# Patient Record
Sex: FEMALE | Race: WHITE | NOT HISPANIC OR LATINO | URBAN - METROPOLITAN AREA
[De-identification: names, ages, dates, MRNs, and addresses within clinical notes are randomized per-mention and may not be internally consistent; named-entity substitution may affect disease eponyms.]

---

## 2017-06-19 ENCOUNTER — INPATIENT (INPATIENT)
Facility: HOSPITAL | Age: 82
LOS: 2 days | Discharge: NURSING, REHAB OR LONG-TERM CARE | End: 2017-06-22
Attending: INTERNAL MEDICINE | Admitting: INTERNAL MEDICINE

## 2017-06-19 DIAGNOSIS — I10 ESSENTIAL (PRIMARY) HYPERTENSION: ICD-10-CM

## 2017-06-19 DIAGNOSIS — B34.9 VIRAL INFECTION, UNSPECIFIED: ICD-10-CM

## 2017-06-19 DIAGNOSIS — R42 DIZZINESS AND GIDDINESS: ICD-10-CM

## 2017-06-19 DIAGNOSIS — I42.9 CARDIOMYOPATHY, UNSPECIFIED: ICD-10-CM

## 2017-06-19 DIAGNOSIS — N20.0 CALCULUS OF KIDNEY: ICD-10-CM

## 2017-06-19 DIAGNOSIS — I48.91 UNSPECIFIED ATRIAL FIBRILLATION: ICD-10-CM

## 2017-06-19 DIAGNOSIS — E78.5 HYPERLIPIDEMIA, UNSPECIFIED: ICD-10-CM

## 2017-06-19 DIAGNOSIS — R19.7 DIARRHEA, UNSPECIFIED: ICD-10-CM

## 2017-06-19 DIAGNOSIS — J47.9 BRONCHIECTASIS, UNCOMPLICATED: ICD-10-CM

## 2017-06-19 DIAGNOSIS — K51.90 ULCERATIVE COLITIS, UNSPECIFIED, WITHOUT COMPLICATIONS: ICD-10-CM

## 2017-06-19 DIAGNOSIS — I25.10 ATHEROSCLEROTIC HEART DISEASE OF NATIVE CORONARY ARTERY WITHOUT ANGINA PECTORIS: ICD-10-CM

## 2017-06-19 DIAGNOSIS — C67.9 MALIGNANT NEOPLASM OF BLADDER, UNSPECIFIED: ICD-10-CM

## 2017-06-19 DIAGNOSIS — I50.9 HEART FAILURE, UNSPECIFIED: ICD-10-CM

## 2017-06-19 DIAGNOSIS — J84.9 INTERSTITIAL PULMONARY DISEASE, UNSPECIFIED: ICD-10-CM

## 2017-06-19 DIAGNOSIS — M25.579 PAIN IN UNSPECIFIED ANKLE AND JOINTS OF UNSPECIFIED FOOT: ICD-10-CM

## 2017-06-19 DIAGNOSIS — K21.9 GASTRO-ESOPHAGEAL REFLUX DISEASE WITHOUT ESOPHAGITIS: ICD-10-CM

## 2017-06-19 DIAGNOSIS — R11.2 NAUSEA WITH VOMITING, UNSPECIFIED: ICD-10-CM

## 2017-06-22 ENCOUNTER — INPATIENT (INPATIENT)
Facility: HOSPITAL | Age: 82
LOS: 0 days | Discharge: ANOTHER HOSPITAL | End: 2017-06-23
Attending: PHYSICAL MEDICINE & REHABILITATION

## 2017-06-22 DIAGNOSIS — R11.2 NAUSEA WITH VOMITING, UNSPECIFIED: ICD-10-CM

## 2017-06-22 DIAGNOSIS — R19.7 DIARRHEA, UNSPECIFIED: ICD-10-CM

## 2017-06-22 DIAGNOSIS — J47.9 BRONCHIECTASIS, UNCOMPLICATED: ICD-10-CM

## 2017-06-22 DIAGNOSIS — I42.9 CARDIOMYOPATHY, UNSPECIFIED: ICD-10-CM

## 2017-06-22 DIAGNOSIS — M25.579 PAIN IN UNSPECIFIED ANKLE AND JOINTS OF UNSPECIFIED FOOT: ICD-10-CM

## 2017-06-22 DIAGNOSIS — I25.10 ATHEROSCLEROTIC HEART DISEASE OF NATIVE CORONARY ARTERY WITHOUT ANGINA PECTORIS: ICD-10-CM

## 2017-06-22 DIAGNOSIS — N20.0 CALCULUS OF KIDNEY: ICD-10-CM

## 2017-06-22 DIAGNOSIS — C67.9 MALIGNANT NEOPLASM OF BLADDER, UNSPECIFIED: ICD-10-CM

## 2017-06-22 DIAGNOSIS — E78.5 HYPERLIPIDEMIA, UNSPECIFIED: ICD-10-CM

## 2017-06-22 DIAGNOSIS — I48.91 UNSPECIFIED ATRIAL FIBRILLATION: ICD-10-CM

## 2017-06-22 DIAGNOSIS — K51.90 ULCERATIVE COLITIS, UNSPECIFIED, WITHOUT COMPLICATIONS: ICD-10-CM

## 2017-06-22 DIAGNOSIS — K21.9 GASTRO-ESOPHAGEAL REFLUX DISEASE WITHOUT ESOPHAGITIS: ICD-10-CM

## 2017-06-22 DIAGNOSIS — B34.9 VIRAL INFECTION, UNSPECIFIED: ICD-10-CM

## 2017-06-22 DIAGNOSIS — I50.9 HEART FAILURE, UNSPECIFIED: ICD-10-CM

## 2017-06-22 DIAGNOSIS — R42 DIZZINESS AND GIDDINESS: ICD-10-CM

## 2017-06-22 DIAGNOSIS — I10 ESSENTIAL (PRIMARY) HYPERTENSION: ICD-10-CM

## 2017-06-22 DIAGNOSIS — J84.9 INTERSTITIAL PULMONARY DISEASE, UNSPECIFIED: ICD-10-CM

## 2017-06-23 ENCOUNTER — INPATIENT (INPATIENT)
Facility: HOSPITAL | Age: 82
LOS: 11 days | Discharge: NURSING, REHAB OR LONG-TERM CARE | End: 2017-07-05
Attending: INTERNAL MEDICINE

## 2017-06-23 DIAGNOSIS — B34.9 VIRAL INFECTION, UNSPECIFIED: ICD-10-CM

## 2017-06-23 DIAGNOSIS — N20.0 CALCULUS OF KIDNEY: ICD-10-CM

## 2017-06-23 DIAGNOSIS — I10 ESSENTIAL (PRIMARY) HYPERTENSION: ICD-10-CM

## 2017-06-23 DIAGNOSIS — I25.10 ATHEROSCLEROTIC HEART DISEASE OF NATIVE CORONARY ARTERY WITHOUT ANGINA PECTORIS: ICD-10-CM

## 2017-06-23 DIAGNOSIS — K21.9 GASTRO-ESOPHAGEAL REFLUX DISEASE WITHOUT ESOPHAGITIS: ICD-10-CM

## 2017-06-23 DIAGNOSIS — I50.9 HEART FAILURE, UNSPECIFIED: ICD-10-CM

## 2017-06-23 DIAGNOSIS — I48.91 UNSPECIFIED ATRIAL FIBRILLATION: ICD-10-CM

## 2017-06-23 DIAGNOSIS — R42 DIZZINESS AND GIDDINESS: ICD-10-CM

## 2017-06-23 DIAGNOSIS — I42.9 CARDIOMYOPATHY, UNSPECIFIED: ICD-10-CM

## 2017-06-23 DIAGNOSIS — M25.579 PAIN IN UNSPECIFIED ANKLE AND JOINTS OF UNSPECIFIED FOOT: ICD-10-CM

## 2017-06-23 DIAGNOSIS — R19.7 DIARRHEA, UNSPECIFIED: ICD-10-CM

## 2017-06-23 DIAGNOSIS — C67.9 MALIGNANT NEOPLASM OF BLADDER, UNSPECIFIED: ICD-10-CM

## 2017-06-23 DIAGNOSIS — J84.9 INTERSTITIAL PULMONARY DISEASE, UNSPECIFIED: ICD-10-CM

## 2017-06-23 DIAGNOSIS — K51.90 ULCERATIVE COLITIS, UNSPECIFIED, WITHOUT COMPLICATIONS: ICD-10-CM

## 2017-06-23 DIAGNOSIS — R11.2 NAUSEA WITH VOMITING, UNSPECIFIED: ICD-10-CM

## 2017-06-23 DIAGNOSIS — E78.5 HYPERLIPIDEMIA, UNSPECIFIED: ICD-10-CM

## 2017-06-23 DIAGNOSIS — J47.9 BRONCHIECTASIS, UNCOMPLICATED: ICD-10-CM

## 2017-06-28 DIAGNOSIS — G47.33 OBSTRUCTIVE SLEEP APNEA (ADULT) (PEDIATRIC): ICD-10-CM

## 2017-06-28 DIAGNOSIS — R47.1 DYSARTHRIA AND ANARTHRIA: ICD-10-CM

## 2017-06-28 DIAGNOSIS — Z86.718 PERSONAL HISTORY OF OTHER VENOUS THROMBOSIS AND EMBOLISM: ICD-10-CM

## 2017-06-28 DIAGNOSIS — I69.322 DYSARTHRIA FOLLOWING CEREBRAL INFARCTION: ICD-10-CM

## 2017-06-28 DIAGNOSIS — Z86.19 PERSONAL HISTORY OF OTHER INFECTIOUS AND PARASITIC DISEASES: ICD-10-CM

## 2017-06-28 DIAGNOSIS — I50.22 CHRONIC SYSTOLIC (CONGESTIVE) HEART FAILURE: ICD-10-CM

## 2017-06-28 DIAGNOSIS — I11.0 HYPERTENSIVE HEART DISEASE WITH HEART FAILURE: ICD-10-CM

## 2017-06-28 DIAGNOSIS — Z85.51 PERSONAL HISTORY OF MALIGNANT NEOPLASM OF BLADDER: ICD-10-CM

## 2017-06-28 DIAGNOSIS — I69.398 OTHER SEQUELAE OF CEREBRAL INFARCTION: ICD-10-CM

## 2017-06-28 DIAGNOSIS — I48.91 UNSPECIFIED ATRIAL FIBRILLATION: ICD-10-CM

## 2017-06-28 DIAGNOSIS — G81.94 HEMIPLEGIA, UNSPECIFIED AFFECTING LEFT NONDOMINANT SIDE: ICD-10-CM

## 2017-06-28 DIAGNOSIS — I69.354 HEMIPLEGIA AND HEMIPARESIS FOLLOWING CEREBRAL INFARCTION AFFECTING LEFT NON-DOMINANT SIDE: ICD-10-CM

## 2017-06-28 DIAGNOSIS — Z95.810 PRESENCE OF AUTOMATIC (IMPLANTABLE) CARDIAC DEFIBRILLATOR: ICD-10-CM

## 2017-06-28 DIAGNOSIS — Z91.013 ALLERGY TO SEAFOOD: ICD-10-CM

## 2017-06-28 DIAGNOSIS — J44.9 CHRONIC OBSTRUCTIVE PULMONARY DISEASE, UNSPECIFIED: ICD-10-CM

## 2017-06-28 DIAGNOSIS — Z87.19 PERSONAL HISTORY OF OTHER DISEASES OF THE DIGESTIVE SYSTEM: ICD-10-CM

## 2017-06-28 DIAGNOSIS — H53.2 DIPLOPIA: ICD-10-CM

## 2017-06-28 DIAGNOSIS — K58.9 IRRITABLE BOWEL SYNDROME WITHOUT DIARRHEA: ICD-10-CM

## 2017-06-28 DIAGNOSIS — I63.9 CEREBRAL INFARCTION, UNSPECIFIED: ICD-10-CM

## 2017-06-28 DIAGNOSIS — I69.391 DYSPHAGIA FOLLOWING CEREBRAL INFARCTION: ICD-10-CM

## 2017-06-28 DIAGNOSIS — I42.9 CARDIOMYOPATHY, UNSPECIFIED: ICD-10-CM

## 2017-06-28 DIAGNOSIS — Z82.49 FAMILY HISTORY OF ISCHEMIC HEART DISEASE AND OTHER DISEASES OF THE CIRCULATORY SYSTEM: ICD-10-CM

## 2017-06-28 DIAGNOSIS — I69.998 OTHER SEQUELAE FOLLOWING UNSPECIFIED CEREBROVASCULAR DISEASE: ICD-10-CM

## 2017-06-28 DIAGNOSIS — I69.320 APHASIA FOLLOWING CEREBRAL INFARCTION: ICD-10-CM

## 2017-06-28 DIAGNOSIS — Z79.02 LONG TERM (CURRENT) USE OF ANTITHROMBOTICS/ANTIPLATELETS: ICD-10-CM

## 2017-06-28 DIAGNOSIS — Z88.5 ALLERGY STATUS TO NARCOTIC AGENT: ICD-10-CM

## 2017-06-28 DIAGNOSIS — R29.810 FACIAL WEAKNESS: ICD-10-CM

## 2017-06-28 DIAGNOSIS — K59.00 CONSTIPATION, UNSPECIFIED: ICD-10-CM

## 2017-06-28 DIAGNOSIS — R53.1 WEAKNESS: ICD-10-CM

## 2017-06-28 DIAGNOSIS — Z91.81 HISTORY OF FALLING: ICD-10-CM

## 2017-06-28 DIAGNOSIS — Z95.0 PRESENCE OF CARDIAC PACEMAKER: ICD-10-CM

## 2017-06-28 DIAGNOSIS — Z88.8 ALLERGY STATUS TO OTHER DRUGS, MEDICAMENTS AND BIOLOGICAL SUBSTANCES STATUS: ICD-10-CM

## 2017-06-28 DIAGNOSIS — R13.10 DYSPHAGIA, UNSPECIFIED: ICD-10-CM

## 2017-06-28 DIAGNOSIS — R47.81 SLURRED SPEECH: ICD-10-CM

## 2017-07-05 ENCOUNTER — INPATIENT (INPATIENT)
Facility: HOSPITAL | Age: 82
LOS: 97 days | Discharge: HOME HEALTH | End: 2017-10-11
Attending: PHYSICAL MEDICINE & REHABILITATION

## 2017-07-05 DIAGNOSIS — E78.5 HYPERLIPIDEMIA, UNSPECIFIED: ICD-10-CM

## 2017-07-05 DIAGNOSIS — R42 DIZZINESS AND GIDDINESS: ICD-10-CM

## 2017-07-05 DIAGNOSIS — K51.90 ULCERATIVE COLITIS, UNSPECIFIED, WITHOUT COMPLICATIONS: ICD-10-CM

## 2017-07-05 DIAGNOSIS — B34.9 VIRAL INFECTION, UNSPECIFIED: ICD-10-CM

## 2017-07-05 DIAGNOSIS — I48.91 UNSPECIFIED ATRIAL FIBRILLATION: ICD-10-CM

## 2017-07-05 DIAGNOSIS — I50.9 HEART FAILURE, UNSPECIFIED: ICD-10-CM

## 2017-07-05 DIAGNOSIS — R19.7 DIARRHEA, UNSPECIFIED: ICD-10-CM

## 2017-07-05 DIAGNOSIS — K21.9 GASTRO-ESOPHAGEAL REFLUX DISEASE WITHOUT ESOPHAGITIS: ICD-10-CM

## 2017-07-05 DIAGNOSIS — C67.9 MALIGNANT NEOPLASM OF BLADDER, UNSPECIFIED: ICD-10-CM

## 2017-07-05 DIAGNOSIS — M25.579 PAIN IN UNSPECIFIED ANKLE AND JOINTS OF UNSPECIFIED FOOT: ICD-10-CM

## 2017-07-05 DIAGNOSIS — I25.10 ATHEROSCLEROTIC HEART DISEASE OF NATIVE CORONARY ARTERY WITHOUT ANGINA PECTORIS: ICD-10-CM

## 2017-07-05 DIAGNOSIS — I42.9 CARDIOMYOPATHY, UNSPECIFIED: ICD-10-CM

## 2017-07-05 DIAGNOSIS — J84.9 INTERSTITIAL PULMONARY DISEASE, UNSPECIFIED: ICD-10-CM

## 2017-07-05 DIAGNOSIS — J47.9 BRONCHIECTASIS, UNCOMPLICATED: ICD-10-CM

## 2017-07-05 DIAGNOSIS — R11.2 NAUSEA WITH VOMITING, UNSPECIFIED: ICD-10-CM

## 2017-07-05 DIAGNOSIS — I10 ESSENTIAL (PRIMARY) HYPERTENSION: ICD-10-CM

## 2017-07-05 DIAGNOSIS — N20.0 CALCULUS OF KIDNEY: ICD-10-CM

## 2017-07-12 DIAGNOSIS — G81.94 HEMIPLEGIA, UNSPECIFIED AFFECTING LEFT NONDOMINANT SIDE: ICD-10-CM

## 2017-07-12 DIAGNOSIS — J44.9 CHRONIC OBSTRUCTIVE PULMONARY DISEASE, UNSPECIFIED: ICD-10-CM

## 2017-07-12 DIAGNOSIS — I50.22 CHRONIC SYSTOLIC (CONGESTIVE) HEART FAILURE: ICD-10-CM

## 2017-07-12 DIAGNOSIS — I48.91 UNSPECIFIED ATRIAL FIBRILLATION: ICD-10-CM

## 2017-07-12 DIAGNOSIS — Z79.82 LONG TERM (CURRENT) USE OF ASPIRIN: ICD-10-CM

## 2017-07-12 DIAGNOSIS — Z85.51 PERSONAL HISTORY OF MALIGNANT NEOPLASM OF BLADDER: ICD-10-CM

## 2017-07-12 DIAGNOSIS — Z87.442 PERSONAL HISTORY OF URINARY CALCULI: ICD-10-CM

## 2017-07-12 DIAGNOSIS — K58.9 IRRITABLE BOWEL SYNDROME WITHOUT DIARRHEA: ICD-10-CM

## 2017-07-12 DIAGNOSIS — R79.1 ABNORMAL COAGULATION PROFILE: ICD-10-CM

## 2017-07-12 DIAGNOSIS — N39.0 URINARY TRACT INFECTION, SITE NOT SPECIFIED: ICD-10-CM

## 2017-07-12 DIAGNOSIS — I11.0 HYPERTENSIVE HEART DISEASE WITH HEART FAILURE: ICD-10-CM

## 2017-07-12 DIAGNOSIS — Z91.013 ALLERGY TO SEAFOOD: ICD-10-CM

## 2017-07-12 DIAGNOSIS — R33.9 RETENTION OF URINE, UNSPECIFIED: ICD-10-CM

## 2017-07-12 DIAGNOSIS — Z86.73 PERSONAL HISTORY OF TRANSIENT ISCHEMIC ATTACK (TIA), AND CEREBRAL INFARCTION WITHOUT RESIDUAL DEFICITS: ICD-10-CM

## 2017-07-12 DIAGNOSIS — E78.5 HYPERLIPIDEMIA, UNSPECIFIED: ICD-10-CM

## 2017-07-12 DIAGNOSIS — I63.9 CEREBRAL INFARCTION, UNSPECIFIED: ICD-10-CM

## 2017-07-12 DIAGNOSIS — Z95.810 PRESENCE OF AUTOMATIC (IMPLANTABLE) CARDIAC DEFIBRILLATOR: ICD-10-CM

## 2017-07-12 PROBLEM — Z00.00 ENCOUNTER FOR PREVENTIVE HEALTH EXAMINATION: Status: ACTIVE | Noted: 2017-07-12

## 2017-08-15 ENCOUNTER — TRANSCRIPTION ENCOUNTER (OUTPATIENT)
Age: 82
End: 2017-08-15

## 2017-10-16 DIAGNOSIS — Z51.89 ENCOUNTER FOR OTHER SPECIFIED AFTERCARE: ICD-10-CM

## 2017-10-16 DIAGNOSIS — Z95.810 PRESENCE OF AUTOMATIC (IMPLANTABLE) CARDIAC DEFIBRILLATOR: ICD-10-CM

## 2017-10-16 DIAGNOSIS — I69.354 HEMIPLEGIA AND HEMIPARESIS FOLLOWING CEREBRAL INFARCTION AFFECTING LEFT NON-DOMINANT SIDE: ICD-10-CM

## 2017-10-16 DIAGNOSIS — J44.9 CHRONIC OBSTRUCTIVE PULMONARY DISEASE, UNSPECIFIED: ICD-10-CM

## 2017-10-16 DIAGNOSIS — R33.8 OTHER RETENTION OF URINE: ICD-10-CM

## 2017-10-16 DIAGNOSIS — Z79.01 LONG TERM (CURRENT) USE OF ANTICOAGULANTS: ICD-10-CM

## 2017-10-16 DIAGNOSIS — Z91.013 ALLERGY TO SEAFOOD: ICD-10-CM

## 2017-10-16 DIAGNOSIS — I50.9 HEART FAILURE, UNSPECIFIED: ICD-10-CM

## 2017-10-16 DIAGNOSIS — E87.6 HYPOKALEMIA: ICD-10-CM

## 2017-10-16 DIAGNOSIS — K22.4 DYSKINESIA OF ESOPHAGUS: ICD-10-CM

## 2017-10-16 DIAGNOSIS — Z85.51 PERSONAL HISTORY OF MALIGNANT NEOPLASM OF BLADDER: ICD-10-CM

## 2017-10-16 DIAGNOSIS — I48.91 UNSPECIFIED ATRIAL FIBRILLATION: ICD-10-CM

## 2017-10-16 DIAGNOSIS — I50.22 CHRONIC SYSTOLIC (CONGESTIVE) HEART FAILURE: ICD-10-CM

## 2017-10-16 DIAGNOSIS — I11.0 HYPERTENSIVE HEART DISEASE WITH HEART FAILURE: ICD-10-CM

## 2017-10-16 DIAGNOSIS — B96.20 UNSPECIFIED ESCHERICHIA COLI [E. COLI] AS THE CAUSE OF DISEASES CLASSIFIED ELSEWHERE: ICD-10-CM

## 2017-10-16 DIAGNOSIS — K59.00 CONSTIPATION, UNSPECIFIED: ICD-10-CM

## 2017-10-16 DIAGNOSIS — K44.9 DIAPHRAGMATIC HERNIA WITHOUT OBSTRUCTION OR GANGRENE: ICD-10-CM

## 2017-10-16 DIAGNOSIS — F32.9 MAJOR DEPRESSIVE DISORDER, SINGLE EPISODE, UNSPECIFIED: ICD-10-CM

## 2017-10-16 DIAGNOSIS — E78.5 HYPERLIPIDEMIA, UNSPECIFIED: ICD-10-CM

## 2017-10-16 DIAGNOSIS — R31.9 HEMATURIA, UNSPECIFIED: ICD-10-CM

## 2017-10-16 DIAGNOSIS — I42.9 CARDIOMYOPATHY, UNSPECIFIED: ICD-10-CM

## 2017-10-16 DIAGNOSIS — F41.9 ANXIETY DISORDER, UNSPECIFIED: ICD-10-CM

## 2017-10-16 DIAGNOSIS — Z88.8 ALLERGY STATUS TO OTHER DRUGS, MEDICAMENTS AND BIOLOGICAL SUBSTANCES STATUS: ICD-10-CM

## 2017-10-16 DIAGNOSIS — K64.9 UNSPECIFIED HEMORRHOIDS: ICD-10-CM

## 2017-10-16 DIAGNOSIS — B35.1 TINEA UNGUIUM: ICD-10-CM

## 2017-10-16 DIAGNOSIS — B96.1 KLEBSIELLA PNEUMONIAE [K. PNEUMONIAE] AS THE CAUSE OF DISEASES CLASSIFIED ELSEWHERE: ICD-10-CM

## 2017-10-16 DIAGNOSIS — R07.89 OTHER CHEST PAIN: ICD-10-CM

## 2017-10-16 DIAGNOSIS — N20.0 CALCULUS OF KIDNEY: ICD-10-CM

## 2017-10-16 DIAGNOSIS — J84.9 INTERSTITIAL PULMONARY DISEASE, UNSPECIFIED: ICD-10-CM

## 2017-10-16 DIAGNOSIS — K58.9 IRRITABLE BOWEL SYNDROME WITHOUT DIARRHEA: ICD-10-CM

## 2017-10-16 DIAGNOSIS — N39.0 URINARY TRACT INFECTION, SITE NOT SPECIFIED: ICD-10-CM

## 2017-10-18 ENCOUNTER — OUTPATIENT (OUTPATIENT)
Dept: OUTPATIENT SERVICES | Facility: HOSPITAL | Age: 82
LOS: 1 days | Discharge: HOME | End: 2017-10-18

## 2017-10-18 DIAGNOSIS — R11.2 NAUSEA WITH VOMITING, UNSPECIFIED: ICD-10-CM

## 2017-10-18 DIAGNOSIS — I10 ESSENTIAL (PRIMARY) HYPERTENSION: ICD-10-CM

## 2017-10-18 DIAGNOSIS — I25.10 ATHEROSCLEROTIC HEART DISEASE OF NATIVE CORONARY ARTERY WITHOUT ANGINA PECTORIS: ICD-10-CM

## 2017-10-18 DIAGNOSIS — B34.9 VIRAL INFECTION, UNSPECIFIED: ICD-10-CM

## 2017-10-18 DIAGNOSIS — R42 DIZZINESS AND GIDDINESS: ICD-10-CM

## 2017-10-18 DIAGNOSIS — N20.0 CALCULUS OF KIDNEY: ICD-10-CM

## 2017-10-18 DIAGNOSIS — J84.9 INTERSTITIAL PULMONARY DISEASE, UNSPECIFIED: ICD-10-CM

## 2017-10-18 DIAGNOSIS — C67.9 MALIGNANT NEOPLASM OF BLADDER, UNSPECIFIED: ICD-10-CM

## 2017-10-18 DIAGNOSIS — I50.9 HEART FAILURE, UNSPECIFIED: ICD-10-CM

## 2017-10-18 DIAGNOSIS — I48.91 UNSPECIFIED ATRIAL FIBRILLATION: ICD-10-CM

## 2017-10-18 DIAGNOSIS — I42.9 CARDIOMYOPATHY, UNSPECIFIED: ICD-10-CM

## 2017-10-18 DIAGNOSIS — R19.7 DIARRHEA, UNSPECIFIED: ICD-10-CM

## 2017-10-18 DIAGNOSIS — K21.9 GASTRO-ESOPHAGEAL REFLUX DISEASE WITHOUT ESOPHAGITIS: ICD-10-CM

## 2017-10-18 DIAGNOSIS — J47.9 BRONCHIECTASIS, UNCOMPLICATED: ICD-10-CM

## 2017-10-18 DIAGNOSIS — E78.5 HYPERLIPIDEMIA, UNSPECIFIED: ICD-10-CM

## 2017-10-18 DIAGNOSIS — K51.90 ULCERATIVE COLITIS, UNSPECIFIED, WITHOUT COMPLICATIONS: ICD-10-CM

## 2017-10-18 DIAGNOSIS — M25.579 PAIN IN UNSPECIFIED ANKLE AND JOINTS OF UNSPECIFIED FOOT: ICD-10-CM

## 2017-10-25 ENCOUNTER — OUTPATIENT (OUTPATIENT)
Dept: OUTPATIENT SERVICES | Facility: HOSPITAL | Age: 82
LOS: 1 days | Discharge: HOME | End: 2017-10-25

## 2017-10-25 DIAGNOSIS — R11.2 NAUSEA WITH VOMITING, UNSPECIFIED: ICD-10-CM

## 2017-10-25 DIAGNOSIS — R19.7 DIARRHEA, UNSPECIFIED: ICD-10-CM

## 2017-10-25 DIAGNOSIS — M25.579 PAIN IN UNSPECIFIED ANKLE AND JOINTS OF UNSPECIFIED FOOT: ICD-10-CM

## 2017-10-25 DIAGNOSIS — I42.9 CARDIOMYOPATHY, UNSPECIFIED: ICD-10-CM

## 2017-10-25 DIAGNOSIS — B34.9 VIRAL INFECTION, UNSPECIFIED: ICD-10-CM

## 2017-10-25 DIAGNOSIS — N20.0 CALCULUS OF KIDNEY: ICD-10-CM

## 2017-10-25 DIAGNOSIS — K21.9 GASTRO-ESOPHAGEAL REFLUX DISEASE WITHOUT ESOPHAGITIS: ICD-10-CM

## 2017-10-25 DIAGNOSIS — J84.9 INTERSTITIAL PULMONARY DISEASE, UNSPECIFIED: ICD-10-CM

## 2017-10-25 DIAGNOSIS — K51.90 ULCERATIVE COLITIS, UNSPECIFIED, WITHOUT COMPLICATIONS: ICD-10-CM

## 2017-10-25 DIAGNOSIS — I48.91 UNSPECIFIED ATRIAL FIBRILLATION: ICD-10-CM

## 2017-10-25 DIAGNOSIS — I25.10 ATHEROSCLEROTIC HEART DISEASE OF NATIVE CORONARY ARTERY WITHOUT ANGINA PECTORIS: ICD-10-CM

## 2017-10-25 DIAGNOSIS — I50.9 HEART FAILURE, UNSPECIFIED: ICD-10-CM

## 2017-10-25 DIAGNOSIS — E78.5 HYPERLIPIDEMIA, UNSPECIFIED: ICD-10-CM

## 2017-10-25 DIAGNOSIS — I10 ESSENTIAL (PRIMARY) HYPERTENSION: ICD-10-CM

## 2017-10-25 DIAGNOSIS — J47.9 BRONCHIECTASIS, UNCOMPLICATED: ICD-10-CM

## 2017-10-25 DIAGNOSIS — C67.9 MALIGNANT NEOPLASM OF BLADDER, UNSPECIFIED: ICD-10-CM

## 2017-10-25 DIAGNOSIS — R42 DIZZINESS AND GIDDINESS: ICD-10-CM

## 2017-11-01 ENCOUNTER — OUTPATIENT (OUTPATIENT)
Dept: OUTPATIENT SERVICES | Facility: HOSPITAL | Age: 82
LOS: 1 days | Discharge: HOME | End: 2017-11-01

## 2017-11-01 DIAGNOSIS — M25.579 PAIN IN UNSPECIFIED ANKLE AND JOINTS OF UNSPECIFIED FOOT: ICD-10-CM

## 2017-11-01 DIAGNOSIS — R19.7 DIARRHEA, UNSPECIFIED: ICD-10-CM

## 2017-11-01 DIAGNOSIS — I50.9 HEART FAILURE, UNSPECIFIED: ICD-10-CM

## 2017-11-01 DIAGNOSIS — E78.5 HYPERLIPIDEMIA, UNSPECIFIED: ICD-10-CM

## 2017-11-01 DIAGNOSIS — N20.0 CALCULUS OF KIDNEY: ICD-10-CM

## 2017-11-01 DIAGNOSIS — K21.9 GASTRO-ESOPHAGEAL REFLUX DISEASE WITHOUT ESOPHAGITIS: ICD-10-CM

## 2017-11-01 DIAGNOSIS — B34.9 VIRAL INFECTION, UNSPECIFIED: ICD-10-CM

## 2017-11-01 DIAGNOSIS — I48.91 UNSPECIFIED ATRIAL FIBRILLATION: ICD-10-CM

## 2017-11-01 DIAGNOSIS — I42.9 CARDIOMYOPATHY, UNSPECIFIED: ICD-10-CM

## 2017-11-01 DIAGNOSIS — I25.10 ATHEROSCLEROTIC HEART DISEASE OF NATIVE CORONARY ARTERY WITHOUT ANGINA PECTORIS: ICD-10-CM

## 2017-11-01 DIAGNOSIS — K51.90 ULCERATIVE COLITIS, UNSPECIFIED, WITHOUT COMPLICATIONS: ICD-10-CM

## 2017-11-01 DIAGNOSIS — C67.9 MALIGNANT NEOPLASM OF BLADDER, UNSPECIFIED: ICD-10-CM

## 2017-11-01 DIAGNOSIS — J47.9 BRONCHIECTASIS, UNCOMPLICATED: ICD-10-CM

## 2017-11-01 DIAGNOSIS — R42 DIZZINESS AND GIDDINESS: ICD-10-CM

## 2017-11-01 DIAGNOSIS — J84.9 INTERSTITIAL PULMONARY DISEASE, UNSPECIFIED: ICD-10-CM

## 2017-11-01 DIAGNOSIS — R11.2 NAUSEA WITH VOMITING, UNSPECIFIED: ICD-10-CM

## 2017-11-01 DIAGNOSIS — I10 ESSENTIAL (PRIMARY) HYPERTENSION: ICD-10-CM

## 2017-11-08 ENCOUNTER — OUTPATIENT (OUTPATIENT)
Dept: OUTPATIENT SERVICES | Facility: HOSPITAL | Age: 82
LOS: 1 days | Discharge: HOME | End: 2017-11-08

## 2017-11-08 DIAGNOSIS — R42 DIZZINESS AND GIDDINESS: ICD-10-CM

## 2017-11-08 DIAGNOSIS — I25.10 ATHEROSCLEROTIC HEART DISEASE OF NATIVE CORONARY ARTERY WITHOUT ANGINA PECTORIS: ICD-10-CM

## 2017-11-08 DIAGNOSIS — K21.9 GASTRO-ESOPHAGEAL REFLUX DISEASE WITHOUT ESOPHAGITIS: ICD-10-CM

## 2017-11-08 DIAGNOSIS — I50.9 HEART FAILURE, UNSPECIFIED: ICD-10-CM

## 2017-11-08 DIAGNOSIS — B34.9 VIRAL INFECTION, UNSPECIFIED: ICD-10-CM

## 2017-11-08 DIAGNOSIS — R11.2 NAUSEA WITH VOMITING, UNSPECIFIED: ICD-10-CM

## 2017-11-08 DIAGNOSIS — K51.90 ULCERATIVE COLITIS, UNSPECIFIED, WITHOUT COMPLICATIONS: ICD-10-CM

## 2017-11-08 DIAGNOSIS — N20.0 CALCULUS OF KIDNEY: ICD-10-CM

## 2017-11-08 DIAGNOSIS — J84.9 INTERSTITIAL PULMONARY DISEASE, UNSPECIFIED: ICD-10-CM

## 2017-11-08 DIAGNOSIS — J47.9 BRONCHIECTASIS, UNCOMPLICATED: ICD-10-CM

## 2017-11-08 DIAGNOSIS — I48.91 UNSPECIFIED ATRIAL FIBRILLATION: ICD-10-CM

## 2017-11-08 DIAGNOSIS — M25.579 PAIN IN UNSPECIFIED ANKLE AND JOINTS OF UNSPECIFIED FOOT: ICD-10-CM

## 2017-11-08 DIAGNOSIS — R19.7 DIARRHEA, UNSPECIFIED: ICD-10-CM

## 2017-11-08 DIAGNOSIS — E78.5 HYPERLIPIDEMIA, UNSPECIFIED: ICD-10-CM

## 2017-11-08 DIAGNOSIS — I42.9 CARDIOMYOPATHY, UNSPECIFIED: ICD-10-CM

## 2017-11-08 DIAGNOSIS — C67.9 MALIGNANT NEOPLASM OF BLADDER, UNSPECIFIED: ICD-10-CM

## 2017-11-08 DIAGNOSIS — I10 ESSENTIAL (PRIMARY) HYPERTENSION: ICD-10-CM

## 2017-11-15 ENCOUNTER — OUTPATIENT (OUTPATIENT)
Dept: OUTPATIENT SERVICES | Facility: HOSPITAL | Age: 82
LOS: 1 days | Discharge: HOME | End: 2017-11-15

## 2017-11-15 DIAGNOSIS — K51.90 ULCERATIVE COLITIS, UNSPECIFIED, WITHOUT COMPLICATIONS: ICD-10-CM

## 2017-11-15 DIAGNOSIS — I50.9 HEART FAILURE, UNSPECIFIED: ICD-10-CM

## 2017-11-15 DIAGNOSIS — I48.91 UNSPECIFIED ATRIAL FIBRILLATION: ICD-10-CM

## 2017-11-15 DIAGNOSIS — R19.7 DIARRHEA, UNSPECIFIED: ICD-10-CM

## 2017-11-15 DIAGNOSIS — M25.579 PAIN IN UNSPECIFIED ANKLE AND JOINTS OF UNSPECIFIED FOOT: ICD-10-CM

## 2017-11-15 DIAGNOSIS — I10 ESSENTIAL (PRIMARY) HYPERTENSION: ICD-10-CM

## 2017-11-15 DIAGNOSIS — B34.9 VIRAL INFECTION, UNSPECIFIED: ICD-10-CM

## 2017-11-15 DIAGNOSIS — R11.2 NAUSEA WITH VOMITING, UNSPECIFIED: ICD-10-CM

## 2017-11-15 DIAGNOSIS — I25.10 ATHEROSCLEROTIC HEART DISEASE OF NATIVE CORONARY ARTERY WITHOUT ANGINA PECTORIS: ICD-10-CM

## 2017-11-15 DIAGNOSIS — I42.9 CARDIOMYOPATHY, UNSPECIFIED: ICD-10-CM

## 2017-11-15 DIAGNOSIS — R42 DIZZINESS AND GIDDINESS: ICD-10-CM

## 2017-11-15 DIAGNOSIS — E78.5 HYPERLIPIDEMIA, UNSPECIFIED: ICD-10-CM

## 2017-11-15 DIAGNOSIS — K21.9 GASTRO-ESOPHAGEAL REFLUX DISEASE WITHOUT ESOPHAGITIS: ICD-10-CM

## 2017-11-15 DIAGNOSIS — N20.0 CALCULUS OF KIDNEY: ICD-10-CM

## 2017-11-15 DIAGNOSIS — C67.9 MALIGNANT NEOPLASM OF BLADDER, UNSPECIFIED: ICD-10-CM

## 2017-11-15 DIAGNOSIS — J47.9 BRONCHIECTASIS, UNCOMPLICATED: ICD-10-CM

## 2017-11-15 DIAGNOSIS — J84.9 INTERSTITIAL PULMONARY DISEASE, UNSPECIFIED: ICD-10-CM

## 2017-11-16 ENCOUNTER — EMERGENCY (EMERGENCY)
Facility: HOSPITAL | Age: 82
LOS: 0 days | Discharge: HOME | End: 2017-11-16

## 2017-11-16 DIAGNOSIS — B34.9 VIRAL INFECTION, UNSPECIFIED: ICD-10-CM

## 2017-11-16 DIAGNOSIS — I50.9 HEART FAILURE, UNSPECIFIED: ICD-10-CM
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
## 2017-11-16 DIAGNOSIS — S20.211A CONTUSION OF RIGHT FRONT WALL OF THORAX, INITIAL ENCOUNTER: ICD-10-CM

## 2017-11-16 DIAGNOSIS — C67.9 MALIGNANT NEOPLASM OF BLADDER, UNSPECIFIED: ICD-10-CM

## 2017-11-16 DIAGNOSIS — S09.90XA UNSPECIFIED INJURY OF HEAD, INITIAL ENCOUNTER: ICD-10-CM

## 2017-11-16 DIAGNOSIS — Z79.899 OTHER LONG TERM (CURRENT) DRUG THERAPY: ICD-10-CM

## 2017-11-16 DIAGNOSIS — Z91.013 ALLERGY TO SEAFOOD: ICD-10-CM

## 2017-11-16 DIAGNOSIS — I10 ESSENTIAL (PRIMARY) HYPERTENSION: ICD-10-CM

## 2017-11-16 DIAGNOSIS — E78.5 HYPERLIPIDEMIA, UNSPECIFIED: ICD-10-CM

## 2017-11-16 DIAGNOSIS — Z79.02 LONG TERM (CURRENT) USE OF ANTITHROMBOTICS/ANTIPLATELETS: ICD-10-CM

## 2017-11-16 DIAGNOSIS — J47.9 BRONCHIECTASIS, UNCOMPLICATED: ICD-10-CM

## 2017-11-16 DIAGNOSIS — I42.9 CARDIOMYOPATHY, UNSPECIFIED: ICD-10-CM

## 2017-11-16 DIAGNOSIS — K21.9 GASTRO-ESOPHAGEAL REFLUX DISEASE WITHOUT ESOPHAGITIS: ICD-10-CM

## 2017-11-16 DIAGNOSIS — Z95.0 PRESENCE OF CARDIAC PACEMAKER: ICD-10-CM

## 2017-11-16 DIAGNOSIS — K51.90 ULCERATIVE COLITIS, UNSPECIFIED, WITHOUT COMPLICATIONS: ICD-10-CM

## 2017-11-16 DIAGNOSIS — I25.10 ATHEROSCLEROTIC HEART DISEASE OF NATIVE CORONARY ARTERY WITHOUT ANGINA PECTORIS: ICD-10-CM

## 2017-11-16 DIAGNOSIS — I48.91 UNSPECIFIED ATRIAL FIBRILLATION: ICD-10-CM

## 2017-11-16 DIAGNOSIS — Z98.890 OTHER SPECIFIED POSTPROCEDURAL STATES: ICD-10-CM

## 2017-11-16 DIAGNOSIS — Z95.810 PRESENCE OF AUTOMATIC (IMPLANTABLE) CARDIAC DEFIBRILLATOR: ICD-10-CM

## 2017-11-16 DIAGNOSIS — R19.7 DIARRHEA, UNSPECIFIED: ICD-10-CM

## 2017-11-16 DIAGNOSIS — N20.0 CALCULUS OF KIDNEY: ICD-10-CM

## 2017-11-16 DIAGNOSIS — R11.2 NAUSEA WITH VOMITING, UNSPECIFIED: ICD-10-CM

## 2017-11-16 DIAGNOSIS — Z91.09 OTHER ALLERGY STATUS, OTHER THAN TO DRUGS AND BIOLOGICAL SUBSTANCES: ICD-10-CM
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
## 2017-11-16 DIAGNOSIS — M25.579 PAIN IN UNSPECIFIED ANKLE AND JOINTS OF UNSPECIFIED FOOT: ICD-10-CM

## 2017-11-16 DIAGNOSIS — J84.9 INTERSTITIAL PULMONARY DISEASE, UNSPECIFIED: ICD-10-CM

## 2017-11-16 DIAGNOSIS — R42 DIZZINESS AND GIDDINESS: ICD-10-CM

## 2017-11-22 ENCOUNTER — OUTPATIENT (OUTPATIENT)
Dept: OUTPATIENT SERVICES | Facility: HOSPITAL | Age: 82
LOS: 1 days | Discharge: HOME | End: 2017-11-22

## 2017-11-22 DIAGNOSIS — C67.9 MALIGNANT NEOPLASM OF BLADDER, UNSPECIFIED: ICD-10-CM

## 2017-11-22 DIAGNOSIS — B34.9 VIRAL INFECTION, UNSPECIFIED: ICD-10-CM

## 2017-11-22 DIAGNOSIS — I50.9 HEART FAILURE, UNSPECIFIED: ICD-10-CM

## 2017-11-22 DIAGNOSIS — I10 ESSENTIAL (PRIMARY) HYPERTENSION: ICD-10-CM

## 2017-11-22 DIAGNOSIS — R42 DIZZINESS AND GIDDINESS: ICD-10-CM

## 2017-11-22 DIAGNOSIS — I48.91 UNSPECIFIED ATRIAL FIBRILLATION: ICD-10-CM

## 2017-11-22 DIAGNOSIS — E78.5 HYPERLIPIDEMIA, UNSPECIFIED: ICD-10-CM

## 2017-11-22 DIAGNOSIS — M25.579 PAIN IN UNSPECIFIED ANKLE AND JOINTS OF UNSPECIFIED FOOT: ICD-10-CM

## 2017-11-22 DIAGNOSIS — K21.9 GASTRO-ESOPHAGEAL REFLUX DISEASE WITHOUT ESOPHAGITIS: ICD-10-CM

## 2017-11-22 DIAGNOSIS — R19.7 DIARRHEA, UNSPECIFIED: ICD-10-CM

## 2017-11-22 DIAGNOSIS — J84.9 INTERSTITIAL PULMONARY DISEASE, UNSPECIFIED: ICD-10-CM

## 2017-11-22 DIAGNOSIS — N20.0 CALCULUS OF KIDNEY: ICD-10-CM

## 2017-11-22 DIAGNOSIS — I42.9 CARDIOMYOPATHY, UNSPECIFIED: ICD-10-CM

## 2017-11-22 DIAGNOSIS — K51.90 ULCERATIVE COLITIS, UNSPECIFIED, WITHOUT COMPLICATIONS: ICD-10-CM

## 2017-11-22 DIAGNOSIS — J47.9 BRONCHIECTASIS, UNCOMPLICATED: ICD-10-CM

## 2017-11-22 DIAGNOSIS — I25.10 ATHEROSCLEROTIC HEART DISEASE OF NATIVE CORONARY ARTERY WITHOUT ANGINA PECTORIS: ICD-10-CM

## 2017-11-22 DIAGNOSIS — R11.2 NAUSEA WITH VOMITING, UNSPECIFIED: ICD-10-CM

## 2017-11-29 ENCOUNTER — OUTPATIENT (OUTPATIENT)
Dept: OUTPATIENT SERVICES | Facility: HOSPITAL | Age: 82
LOS: 1 days | Discharge: HOME | End: 2017-11-29

## 2017-11-29 DIAGNOSIS — I25.10 ATHEROSCLEROTIC HEART DISEASE OF NATIVE CORONARY ARTERY WITHOUT ANGINA PECTORIS: ICD-10-CM

## 2017-11-29 DIAGNOSIS — R42 DIZZINESS AND GIDDINESS: ICD-10-CM

## 2017-11-29 DIAGNOSIS — M25.579 PAIN IN UNSPECIFIED ANKLE AND JOINTS OF UNSPECIFIED FOOT: ICD-10-CM

## 2017-11-29 DIAGNOSIS — C67.9 MALIGNANT NEOPLASM OF BLADDER, UNSPECIFIED: ICD-10-CM

## 2017-11-29 DIAGNOSIS — R11.2 NAUSEA WITH VOMITING, UNSPECIFIED: ICD-10-CM

## 2017-11-29 DIAGNOSIS — E78.5 HYPERLIPIDEMIA, UNSPECIFIED: ICD-10-CM

## 2017-11-29 DIAGNOSIS — I10 ESSENTIAL (PRIMARY) HYPERTENSION: ICD-10-CM

## 2017-11-29 DIAGNOSIS — N20.0 CALCULUS OF KIDNEY: ICD-10-CM

## 2017-11-29 DIAGNOSIS — J84.9 INTERSTITIAL PULMONARY DISEASE, UNSPECIFIED: ICD-10-CM

## 2017-11-29 DIAGNOSIS — I42.9 CARDIOMYOPATHY, UNSPECIFIED: ICD-10-CM

## 2017-11-29 DIAGNOSIS — I50.9 HEART FAILURE, UNSPECIFIED: ICD-10-CM

## 2017-11-29 DIAGNOSIS — B34.9 VIRAL INFECTION, UNSPECIFIED: ICD-10-CM

## 2017-11-29 DIAGNOSIS — K21.9 GASTRO-ESOPHAGEAL REFLUX DISEASE WITHOUT ESOPHAGITIS: ICD-10-CM

## 2017-11-29 DIAGNOSIS — I48.91 UNSPECIFIED ATRIAL FIBRILLATION: ICD-10-CM

## 2017-11-29 DIAGNOSIS — J47.9 BRONCHIECTASIS, UNCOMPLICATED: ICD-10-CM

## 2017-11-29 DIAGNOSIS — R19.7 DIARRHEA, UNSPECIFIED: ICD-10-CM

## 2017-11-29 DIAGNOSIS — K51.90 ULCERATIVE COLITIS, UNSPECIFIED, WITHOUT COMPLICATIONS: ICD-10-CM

## 2017-12-06 ENCOUNTER — OUTPATIENT (OUTPATIENT)
Dept: OUTPATIENT SERVICES | Facility: HOSPITAL | Age: 82
LOS: 1 days | Discharge: HOME | End: 2017-12-06

## 2017-12-06 DIAGNOSIS — B34.9 VIRAL INFECTION, UNSPECIFIED: ICD-10-CM

## 2017-12-06 DIAGNOSIS — I25.10 ATHEROSCLEROTIC HEART DISEASE OF NATIVE CORONARY ARTERY WITHOUT ANGINA PECTORIS: ICD-10-CM

## 2017-12-06 DIAGNOSIS — R19.7 DIARRHEA, UNSPECIFIED: ICD-10-CM

## 2017-12-06 DIAGNOSIS — R11.2 NAUSEA WITH VOMITING, UNSPECIFIED: ICD-10-CM

## 2017-12-06 DIAGNOSIS — I42.9 CARDIOMYOPATHY, UNSPECIFIED: ICD-10-CM

## 2017-12-06 DIAGNOSIS — I10 ESSENTIAL (PRIMARY) HYPERTENSION: ICD-10-CM

## 2017-12-06 DIAGNOSIS — I48.91 UNSPECIFIED ATRIAL FIBRILLATION: ICD-10-CM

## 2017-12-06 DIAGNOSIS — M25.579 PAIN IN UNSPECIFIED ANKLE AND JOINTS OF UNSPECIFIED FOOT: ICD-10-CM

## 2017-12-06 DIAGNOSIS — N20.0 CALCULUS OF KIDNEY: ICD-10-CM

## 2017-12-06 DIAGNOSIS — K51.90 ULCERATIVE COLITIS, UNSPECIFIED, WITHOUT COMPLICATIONS: ICD-10-CM

## 2017-12-06 DIAGNOSIS — E78.5 HYPERLIPIDEMIA, UNSPECIFIED: ICD-10-CM

## 2017-12-06 DIAGNOSIS — J47.9 BRONCHIECTASIS, UNCOMPLICATED: ICD-10-CM

## 2017-12-06 DIAGNOSIS — J84.9 INTERSTITIAL PULMONARY DISEASE, UNSPECIFIED: ICD-10-CM

## 2017-12-06 DIAGNOSIS — R42 DIZZINESS AND GIDDINESS: ICD-10-CM

## 2017-12-06 DIAGNOSIS — K21.9 GASTRO-ESOPHAGEAL REFLUX DISEASE WITHOUT ESOPHAGITIS: ICD-10-CM

## 2017-12-06 DIAGNOSIS — C67.9 MALIGNANT NEOPLASM OF BLADDER, UNSPECIFIED: ICD-10-CM

## 2017-12-06 DIAGNOSIS — I50.9 HEART FAILURE, UNSPECIFIED: ICD-10-CM

## 2017-12-13 ENCOUNTER — OUTPATIENT (OUTPATIENT)
Dept: OUTPATIENT SERVICES | Facility: HOSPITAL | Age: 82
LOS: 1 days | Discharge: HOME | End: 2017-12-13

## 2017-12-13 DIAGNOSIS — J84.9 INTERSTITIAL PULMONARY DISEASE, UNSPECIFIED: ICD-10-CM

## 2017-12-13 DIAGNOSIS — R19.7 DIARRHEA, UNSPECIFIED: ICD-10-CM

## 2017-12-13 DIAGNOSIS — K21.9 GASTRO-ESOPHAGEAL REFLUX DISEASE WITHOUT ESOPHAGITIS: ICD-10-CM

## 2017-12-13 DIAGNOSIS — C67.9 MALIGNANT NEOPLASM OF BLADDER, UNSPECIFIED: ICD-10-CM

## 2017-12-13 DIAGNOSIS — I25.10 ATHEROSCLEROTIC HEART DISEASE OF NATIVE CORONARY ARTERY WITHOUT ANGINA PECTORIS: ICD-10-CM

## 2017-12-13 DIAGNOSIS — B34.9 VIRAL INFECTION, UNSPECIFIED: ICD-10-CM

## 2017-12-13 DIAGNOSIS — N20.0 CALCULUS OF KIDNEY: ICD-10-CM

## 2017-12-13 DIAGNOSIS — K51.90 ULCERATIVE COLITIS, UNSPECIFIED, WITHOUT COMPLICATIONS: ICD-10-CM

## 2017-12-13 DIAGNOSIS — J47.9 BRONCHIECTASIS, UNCOMPLICATED: ICD-10-CM

## 2017-12-13 DIAGNOSIS — I48.91 UNSPECIFIED ATRIAL FIBRILLATION: ICD-10-CM

## 2017-12-13 DIAGNOSIS — R11.2 NAUSEA WITH VOMITING, UNSPECIFIED: ICD-10-CM

## 2017-12-13 DIAGNOSIS — I42.9 CARDIOMYOPATHY, UNSPECIFIED: ICD-10-CM

## 2017-12-13 DIAGNOSIS — N25.9 DISORDER RESULTING FROM IMPAIRED RENAL TUBULAR FUNCTION, UNSPECIFIED: ICD-10-CM

## 2017-12-13 DIAGNOSIS — E78.5 HYPERLIPIDEMIA, UNSPECIFIED: ICD-10-CM

## 2017-12-13 DIAGNOSIS — M25.579 PAIN IN UNSPECIFIED ANKLE AND JOINTS OF UNSPECIFIED FOOT: ICD-10-CM

## 2017-12-13 DIAGNOSIS — R42 DIZZINESS AND GIDDINESS: ICD-10-CM

## 2017-12-13 DIAGNOSIS — I50.9 HEART FAILURE, UNSPECIFIED: ICD-10-CM

## 2017-12-13 DIAGNOSIS — I10 ESSENTIAL (PRIMARY) HYPERTENSION: ICD-10-CM

## 2017-12-20 ENCOUNTER — OUTPATIENT (OUTPATIENT)
Dept: OUTPATIENT SERVICES | Facility: HOSPITAL | Age: 82
LOS: 1 days | Discharge: HOME | End: 2017-12-20

## 2017-12-20 DIAGNOSIS — I48.91 UNSPECIFIED ATRIAL FIBRILLATION: ICD-10-CM

## 2017-12-20 DIAGNOSIS — E78.5 HYPERLIPIDEMIA, UNSPECIFIED: ICD-10-CM

## 2017-12-20 DIAGNOSIS — C67.9 MALIGNANT NEOPLASM OF BLADDER, UNSPECIFIED: ICD-10-CM

## 2017-12-20 DIAGNOSIS — R19.7 DIARRHEA, UNSPECIFIED: ICD-10-CM

## 2017-12-20 DIAGNOSIS — J84.9 INTERSTITIAL PULMONARY DISEASE, UNSPECIFIED: ICD-10-CM

## 2017-12-20 DIAGNOSIS — M25.579 PAIN IN UNSPECIFIED ANKLE AND JOINTS OF UNSPECIFIED FOOT: ICD-10-CM

## 2017-12-20 DIAGNOSIS — I10 ESSENTIAL (PRIMARY) HYPERTENSION: ICD-10-CM

## 2017-12-20 DIAGNOSIS — I25.10 ATHEROSCLEROTIC HEART DISEASE OF NATIVE CORONARY ARTERY WITHOUT ANGINA PECTORIS: ICD-10-CM

## 2017-12-20 DIAGNOSIS — I50.9 HEART FAILURE, UNSPECIFIED: ICD-10-CM

## 2017-12-20 DIAGNOSIS — N20.0 CALCULUS OF KIDNEY: ICD-10-CM

## 2017-12-20 DIAGNOSIS — R42 DIZZINESS AND GIDDINESS: ICD-10-CM

## 2017-12-20 DIAGNOSIS — R11.2 NAUSEA WITH VOMITING, UNSPECIFIED: ICD-10-CM

## 2017-12-20 DIAGNOSIS — K21.9 GASTRO-ESOPHAGEAL REFLUX DISEASE WITHOUT ESOPHAGITIS: ICD-10-CM

## 2017-12-20 DIAGNOSIS — J47.9 BRONCHIECTASIS, UNCOMPLICATED: ICD-10-CM

## 2017-12-20 DIAGNOSIS — I42.9 CARDIOMYOPATHY, UNSPECIFIED: ICD-10-CM

## 2017-12-20 DIAGNOSIS — B34.9 VIRAL INFECTION, UNSPECIFIED: ICD-10-CM

## 2017-12-20 DIAGNOSIS — K51.90 ULCERATIVE COLITIS, UNSPECIFIED, WITHOUT COMPLICATIONS: ICD-10-CM

## 2017-12-27 ENCOUNTER — OUTPATIENT (OUTPATIENT)
Dept: OUTPATIENT SERVICES | Facility: HOSPITAL | Age: 82
LOS: 1 days | Discharge: HOME | End: 2017-12-27

## 2017-12-27 DIAGNOSIS — J47.9 BRONCHIECTASIS, UNCOMPLICATED: ICD-10-CM

## 2017-12-27 DIAGNOSIS — B34.9 VIRAL INFECTION, UNSPECIFIED: ICD-10-CM

## 2017-12-27 DIAGNOSIS — I48.91 UNSPECIFIED ATRIAL FIBRILLATION: ICD-10-CM

## 2017-12-27 DIAGNOSIS — I50.9 HEART FAILURE, UNSPECIFIED: ICD-10-CM

## 2017-12-27 DIAGNOSIS — E78.5 HYPERLIPIDEMIA, UNSPECIFIED: ICD-10-CM

## 2017-12-27 DIAGNOSIS — I10 ESSENTIAL (PRIMARY) HYPERTENSION: ICD-10-CM

## 2017-12-27 DIAGNOSIS — C67.9 MALIGNANT NEOPLASM OF BLADDER, UNSPECIFIED: ICD-10-CM

## 2017-12-27 DIAGNOSIS — K21.9 GASTRO-ESOPHAGEAL REFLUX DISEASE WITHOUT ESOPHAGITIS: ICD-10-CM

## 2017-12-27 DIAGNOSIS — R19.7 DIARRHEA, UNSPECIFIED: ICD-10-CM

## 2017-12-27 DIAGNOSIS — M25.579 PAIN IN UNSPECIFIED ANKLE AND JOINTS OF UNSPECIFIED FOOT: ICD-10-CM

## 2017-12-27 DIAGNOSIS — J84.9 INTERSTITIAL PULMONARY DISEASE, UNSPECIFIED: ICD-10-CM

## 2017-12-27 DIAGNOSIS — N20.0 CALCULUS OF KIDNEY: ICD-10-CM

## 2017-12-27 DIAGNOSIS — K51.90 ULCERATIVE COLITIS, UNSPECIFIED, WITHOUT COMPLICATIONS: ICD-10-CM

## 2017-12-27 DIAGNOSIS — I25.10 ATHEROSCLEROTIC HEART DISEASE OF NATIVE CORONARY ARTERY WITHOUT ANGINA PECTORIS: ICD-10-CM

## 2017-12-27 DIAGNOSIS — I42.9 CARDIOMYOPATHY, UNSPECIFIED: ICD-10-CM

## 2017-12-27 DIAGNOSIS — R42 DIZZINESS AND GIDDINESS: ICD-10-CM

## 2017-12-27 DIAGNOSIS — R11.2 NAUSEA WITH VOMITING, UNSPECIFIED: ICD-10-CM

## 2018-01-03 ENCOUNTER — OUTPATIENT (OUTPATIENT)
Dept: OUTPATIENT SERVICES | Facility: HOSPITAL | Age: 83
LOS: 1 days | Discharge: HOME | End: 2018-01-03

## 2018-01-03 DIAGNOSIS — R42 DIZZINESS AND GIDDINESS: ICD-10-CM

## 2018-01-03 DIAGNOSIS — I25.10 ATHEROSCLEROTIC HEART DISEASE OF NATIVE CORONARY ARTERY WITHOUT ANGINA PECTORIS: ICD-10-CM

## 2018-01-03 DIAGNOSIS — I48.91 UNSPECIFIED ATRIAL FIBRILLATION: ICD-10-CM

## 2018-01-03 DIAGNOSIS — I42.9 CARDIOMYOPATHY, UNSPECIFIED: ICD-10-CM

## 2018-01-03 DIAGNOSIS — I10 ESSENTIAL (PRIMARY) HYPERTENSION: ICD-10-CM

## 2018-01-03 DIAGNOSIS — N20.0 CALCULUS OF KIDNEY: ICD-10-CM

## 2018-01-03 DIAGNOSIS — E78.5 HYPERLIPIDEMIA, UNSPECIFIED: ICD-10-CM

## 2018-01-03 DIAGNOSIS — K51.90 ULCERATIVE COLITIS, UNSPECIFIED, WITHOUT COMPLICATIONS: ICD-10-CM

## 2018-01-03 DIAGNOSIS — J84.9 INTERSTITIAL PULMONARY DISEASE, UNSPECIFIED: ICD-10-CM

## 2018-01-03 DIAGNOSIS — K21.9 GASTRO-ESOPHAGEAL REFLUX DISEASE WITHOUT ESOPHAGITIS: ICD-10-CM

## 2018-01-03 DIAGNOSIS — R19.7 DIARRHEA, UNSPECIFIED: ICD-10-CM

## 2018-01-03 DIAGNOSIS — I50.9 HEART FAILURE, UNSPECIFIED: ICD-10-CM

## 2018-01-03 DIAGNOSIS — B34.9 VIRAL INFECTION, UNSPECIFIED: ICD-10-CM

## 2018-01-03 DIAGNOSIS — M25.579 PAIN IN UNSPECIFIED ANKLE AND JOINTS OF UNSPECIFIED FOOT: ICD-10-CM

## 2018-01-03 DIAGNOSIS — R11.2 NAUSEA WITH VOMITING, UNSPECIFIED: ICD-10-CM

## 2018-01-03 DIAGNOSIS — C67.9 MALIGNANT NEOPLASM OF BLADDER, UNSPECIFIED: ICD-10-CM

## 2018-01-03 DIAGNOSIS — J47.9 BRONCHIECTASIS, UNCOMPLICATED: ICD-10-CM

## 2018-01-10 ENCOUNTER — OUTPATIENT (OUTPATIENT)
Dept: OUTPATIENT SERVICES | Facility: HOSPITAL | Age: 83
LOS: 1 days | Discharge: HOME | End: 2018-01-10

## 2018-01-10 DIAGNOSIS — I42.9 CARDIOMYOPATHY, UNSPECIFIED: ICD-10-CM

## 2018-01-10 DIAGNOSIS — K21.9 GASTRO-ESOPHAGEAL REFLUX DISEASE WITHOUT ESOPHAGITIS: ICD-10-CM

## 2018-01-10 DIAGNOSIS — M25.579 PAIN IN UNSPECIFIED ANKLE AND JOINTS OF UNSPECIFIED FOOT: ICD-10-CM

## 2018-01-10 DIAGNOSIS — J47.9 BRONCHIECTASIS, UNCOMPLICATED: ICD-10-CM

## 2018-01-10 DIAGNOSIS — N20.0 CALCULUS OF KIDNEY: ICD-10-CM

## 2018-01-10 DIAGNOSIS — I48.91 UNSPECIFIED ATRIAL FIBRILLATION: ICD-10-CM

## 2018-01-10 DIAGNOSIS — K51.90 ULCERATIVE COLITIS, UNSPECIFIED, WITHOUT COMPLICATIONS: ICD-10-CM

## 2018-01-10 DIAGNOSIS — J84.9 INTERSTITIAL PULMONARY DISEASE, UNSPECIFIED: ICD-10-CM

## 2018-01-10 DIAGNOSIS — I10 ESSENTIAL (PRIMARY) HYPERTENSION: ICD-10-CM

## 2018-01-10 DIAGNOSIS — B34.9 VIRAL INFECTION, UNSPECIFIED: ICD-10-CM

## 2018-01-10 DIAGNOSIS — C67.9 MALIGNANT NEOPLASM OF BLADDER, UNSPECIFIED: ICD-10-CM

## 2018-01-10 DIAGNOSIS — R11.2 NAUSEA WITH VOMITING, UNSPECIFIED: ICD-10-CM

## 2018-01-10 DIAGNOSIS — I50.9 HEART FAILURE, UNSPECIFIED: ICD-10-CM

## 2018-01-10 DIAGNOSIS — I25.10 ATHEROSCLEROTIC HEART DISEASE OF NATIVE CORONARY ARTERY WITHOUT ANGINA PECTORIS: ICD-10-CM

## 2018-01-10 DIAGNOSIS — R19.7 DIARRHEA, UNSPECIFIED: ICD-10-CM

## 2018-01-10 DIAGNOSIS — E78.5 HYPERLIPIDEMIA, UNSPECIFIED: ICD-10-CM

## 2018-01-10 DIAGNOSIS — R42 DIZZINESS AND GIDDINESS: ICD-10-CM

## 2018-01-17 ENCOUNTER — OUTPATIENT (OUTPATIENT)
Dept: OUTPATIENT SERVICES | Facility: HOSPITAL | Age: 83
LOS: 1 days | Discharge: HOME | End: 2018-01-17

## 2018-01-17 DIAGNOSIS — J84.9 INTERSTITIAL PULMONARY DISEASE, UNSPECIFIED: ICD-10-CM

## 2018-01-17 DIAGNOSIS — I42.9 CARDIOMYOPATHY, UNSPECIFIED: ICD-10-CM

## 2018-01-17 DIAGNOSIS — I25.10 ATHEROSCLEROTIC HEART DISEASE OF NATIVE CORONARY ARTERY WITHOUT ANGINA PECTORIS: ICD-10-CM

## 2018-01-17 DIAGNOSIS — I48.91 UNSPECIFIED ATRIAL FIBRILLATION: ICD-10-CM

## 2018-01-17 DIAGNOSIS — E78.5 HYPERLIPIDEMIA, UNSPECIFIED: ICD-10-CM

## 2018-01-17 DIAGNOSIS — J47.9 BRONCHIECTASIS, UNCOMPLICATED: ICD-10-CM

## 2018-01-17 DIAGNOSIS — C67.9 MALIGNANT NEOPLASM OF BLADDER, UNSPECIFIED: ICD-10-CM

## 2018-01-17 DIAGNOSIS — K21.9 GASTRO-ESOPHAGEAL REFLUX DISEASE WITHOUT ESOPHAGITIS: ICD-10-CM

## 2018-01-17 DIAGNOSIS — R19.7 DIARRHEA, UNSPECIFIED: ICD-10-CM

## 2018-01-17 DIAGNOSIS — N20.0 CALCULUS OF KIDNEY: ICD-10-CM

## 2018-01-17 DIAGNOSIS — B34.9 VIRAL INFECTION, UNSPECIFIED: ICD-10-CM

## 2018-01-17 DIAGNOSIS — R42 DIZZINESS AND GIDDINESS: ICD-10-CM

## 2018-01-17 DIAGNOSIS — K51.90 ULCERATIVE COLITIS, UNSPECIFIED, WITHOUT COMPLICATIONS: ICD-10-CM

## 2018-01-17 DIAGNOSIS — I10 ESSENTIAL (PRIMARY) HYPERTENSION: ICD-10-CM

## 2018-01-17 DIAGNOSIS — M25.579 PAIN IN UNSPECIFIED ANKLE AND JOINTS OF UNSPECIFIED FOOT: ICD-10-CM

## 2018-01-17 DIAGNOSIS — I50.9 HEART FAILURE, UNSPECIFIED: ICD-10-CM

## 2018-01-17 DIAGNOSIS — R11.2 NAUSEA WITH VOMITING, UNSPECIFIED: ICD-10-CM

## 2018-01-24 ENCOUNTER — OUTPATIENT (OUTPATIENT)
Dept: OUTPATIENT SERVICES | Facility: HOSPITAL | Age: 83
LOS: 1 days | Discharge: HOME | End: 2018-01-24

## 2018-01-24 DIAGNOSIS — I48.91 UNSPECIFIED ATRIAL FIBRILLATION: ICD-10-CM

## 2018-01-24 DIAGNOSIS — Z02.89 ENCOUNTER FOR OTHER ADMINISTRATIVE EXAMINATIONS: ICD-10-CM

## 2018-01-31 ENCOUNTER — OUTPATIENT (OUTPATIENT)
Dept: OUTPATIENT SERVICES | Facility: HOSPITAL | Age: 83
LOS: 1 days | Discharge: HOME | End: 2018-01-31

## 2018-01-31 DIAGNOSIS — I48.91 UNSPECIFIED ATRIAL FIBRILLATION: ICD-10-CM

## 2018-02-04 DIAGNOSIS — J84.9 INTERSTITIAL PULMONARY DISEASE, UNSPECIFIED: ICD-10-CM

## 2018-02-04 DIAGNOSIS — I48.91 UNSPECIFIED ATRIAL FIBRILLATION: ICD-10-CM

## 2018-02-04 DIAGNOSIS — J47.9 BRONCHIECTASIS, UNCOMPLICATED: ICD-10-CM

## 2018-02-04 DIAGNOSIS — N20.0 CALCULUS OF KIDNEY: ICD-10-CM

## 2018-02-04 DIAGNOSIS — M25.579 PAIN IN UNSPECIFIED ANKLE AND JOINTS OF UNSPECIFIED FOOT: ICD-10-CM

## 2018-02-04 DIAGNOSIS — B34.9 VIRAL INFECTION, UNSPECIFIED: ICD-10-CM

## 2018-02-04 DIAGNOSIS — R19.7 DIARRHEA, UNSPECIFIED: ICD-10-CM

## 2018-02-04 DIAGNOSIS — I42.9 CARDIOMYOPATHY, UNSPECIFIED: ICD-10-CM

## 2018-02-04 DIAGNOSIS — I10 ESSENTIAL (PRIMARY) HYPERTENSION: ICD-10-CM

## 2018-02-04 DIAGNOSIS — K51.90 ULCERATIVE COLITIS, UNSPECIFIED, WITHOUT COMPLICATIONS: ICD-10-CM

## 2018-02-04 DIAGNOSIS — E78.5 HYPERLIPIDEMIA, UNSPECIFIED: ICD-10-CM

## 2018-02-04 DIAGNOSIS — I50.9 HEART FAILURE, UNSPECIFIED: ICD-10-CM

## 2018-02-04 DIAGNOSIS — R11.2 NAUSEA WITH VOMITING, UNSPECIFIED: ICD-10-CM

## 2018-02-04 DIAGNOSIS — R42 DIZZINESS AND GIDDINESS: ICD-10-CM

## 2018-02-04 DIAGNOSIS — K21.9 GASTRO-ESOPHAGEAL REFLUX DISEASE WITHOUT ESOPHAGITIS: ICD-10-CM

## 2018-02-04 DIAGNOSIS — I25.10 ATHEROSCLEROTIC HEART DISEASE OF NATIVE CORONARY ARTERY WITHOUT ANGINA PECTORIS: ICD-10-CM

## 2018-02-04 DIAGNOSIS — C67.9 MALIGNANT NEOPLASM OF BLADDER, UNSPECIFIED: ICD-10-CM

## 2018-02-07 ENCOUNTER — OUTPATIENT (OUTPATIENT)
Dept: OUTPATIENT SERVICES | Facility: HOSPITAL | Age: 83
LOS: 1 days | Discharge: HOME | End: 2018-02-07

## 2018-02-07 DIAGNOSIS — I48.91 UNSPECIFIED ATRIAL FIBRILLATION: ICD-10-CM

## 2018-02-14 ENCOUNTER — OUTPATIENT (OUTPATIENT)
Dept: OUTPATIENT SERVICES | Facility: HOSPITAL | Age: 83
LOS: 1 days | Discharge: HOME | End: 2018-02-14

## 2018-02-14 DIAGNOSIS — I48.91 UNSPECIFIED ATRIAL FIBRILLATION: ICD-10-CM

## 2018-02-21 ENCOUNTER — OUTPATIENT (OUTPATIENT)
Dept: OUTPATIENT SERVICES | Facility: HOSPITAL | Age: 83
LOS: 1 days | Discharge: HOME | End: 2018-02-21

## 2018-02-21 DIAGNOSIS — I48.91 UNSPECIFIED ATRIAL FIBRILLATION: ICD-10-CM

## 2018-02-21 DIAGNOSIS — I72.9 ANEURYSM OF UNSPECIFIED SITE: ICD-10-CM

## 2018-02-28 ENCOUNTER — OUTPATIENT (OUTPATIENT)
Dept: OUTPATIENT SERVICES | Facility: HOSPITAL | Age: 83
LOS: 1 days | Discharge: HOME | End: 2018-02-28

## 2018-02-28 DIAGNOSIS — I48.91 UNSPECIFIED ATRIAL FIBRILLATION: ICD-10-CM

## 2018-03-07 ENCOUNTER — OUTPATIENT (OUTPATIENT)
Dept: OUTPATIENT SERVICES | Facility: HOSPITAL | Age: 83
LOS: 1 days | Discharge: HOME | End: 2018-03-07

## 2018-03-07 DIAGNOSIS — I48.91 UNSPECIFIED ATRIAL FIBRILLATION: ICD-10-CM

## 2018-03-09 ENCOUNTER — TRANSCRIPTION ENCOUNTER (OUTPATIENT)
Age: 83
End: 2018-03-09

## 2018-03-14 ENCOUNTER — OUTPATIENT (OUTPATIENT)
Dept: OUTPATIENT SERVICES | Facility: HOSPITAL | Age: 83
LOS: 1 days | Discharge: HOME | End: 2018-03-14

## 2018-03-14 DIAGNOSIS — I48.91 UNSPECIFIED ATRIAL FIBRILLATION: ICD-10-CM

## 2018-03-21 ENCOUNTER — OUTPATIENT (OUTPATIENT)
Dept: OUTPATIENT SERVICES | Facility: HOSPITAL | Age: 83
LOS: 1 days | Discharge: HOME | End: 2018-03-21

## 2018-03-21 DIAGNOSIS — I48.91 UNSPECIFIED ATRIAL FIBRILLATION: ICD-10-CM

## 2018-03-28 ENCOUNTER — OUTPATIENT (OUTPATIENT)
Dept: OUTPATIENT SERVICES | Facility: HOSPITAL | Age: 83
LOS: 1 days | Discharge: HOME | End: 2018-03-28

## 2018-03-28 DIAGNOSIS — I48.91 UNSPECIFIED ATRIAL FIBRILLATION: ICD-10-CM

## 2018-04-04 ENCOUNTER — OUTPATIENT (OUTPATIENT)
Dept: OUTPATIENT SERVICES | Facility: HOSPITAL | Age: 83
LOS: 1 days | Discharge: HOME | End: 2018-04-04

## 2018-04-04 DIAGNOSIS — I48.91 UNSPECIFIED ATRIAL FIBRILLATION: ICD-10-CM

## 2018-04-11 ENCOUNTER — OUTPATIENT (OUTPATIENT)
Dept: OUTPATIENT SERVICES | Facility: HOSPITAL | Age: 83
LOS: 1 days | Discharge: HOME | End: 2018-04-11

## 2018-04-11 DIAGNOSIS — I48.91 UNSPECIFIED ATRIAL FIBRILLATION: ICD-10-CM

## 2018-04-18 ENCOUNTER — OUTPATIENT (OUTPATIENT)
Dept: OUTPATIENT SERVICES | Facility: HOSPITAL | Age: 83
LOS: 1 days | Discharge: HOME | End: 2018-04-18

## 2018-04-18 DIAGNOSIS — I48.91 UNSPECIFIED ATRIAL FIBRILLATION: ICD-10-CM

## 2018-04-25 ENCOUNTER — OUTPATIENT (OUTPATIENT)
Dept: OUTPATIENT SERVICES | Facility: HOSPITAL | Age: 83
LOS: 1 days | Discharge: HOME | End: 2018-04-25

## 2018-04-25 DIAGNOSIS — I48.91 UNSPECIFIED ATRIAL FIBRILLATION: ICD-10-CM

## 2018-04-25 DIAGNOSIS — R68.89 OTHER GENERAL SYMPTOMS AND SIGNS: ICD-10-CM

## 2018-04-25 DIAGNOSIS — R73.09 OTHER ABNORMAL GLUCOSE: ICD-10-CM

## 2018-04-25 DIAGNOSIS — E75.6 LIPID STORAGE DISORDER, UNSPECIFIED: ICD-10-CM

## 2018-05-02 ENCOUNTER — OUTPATIENT (OUTPATIENT)
Dept: OUTPATIENT SERVICES | Facility: HOSPITAL | Age: 83
LOS: 1 days | Discharge: HOME | End: 2018-05-02

## 2018-05-02 DIAGNOSIS — I48.91 UNSPECIFIED ATRIAL FIBRILLATION: ICD-10-CM

## 2018-05-09 ENCOUNTER — OUTPATIENT (OUTPATIENT)
Dept: OUTPATIENT SERVICES | Facility: HOSPITAL | Age: 83
LOS: 1 days | Discharge: HOME | End: 2018-05-09

## 2018-05-09 DIAGNOSIS — I48.91 UNSPECIFIED ATRIAL FIBRILLATION: ICD-10-CM

## 2018-05-16 ENCOUNTER — OUTPATIENT (OUTPATIENT)
Dept: OUTPATIENT SERVICES | Facility: HOSPITAL | Age: 83
LOS: 1 days | Discharge: HOME | End: 2018-05-16

## 2018-05-16 DIAGNOSIS — I48.91 UNSPECIFIED ATRIAL FIBRILLATION: ICD-10-CM

## 2018-05-23 ENCOUNTER — OUTPATIENT (OUTPATIENT)
Dept: OUTPATIENT SERVICES | Facility: HOSPITAL | Age: 83
LOS: 1 days | Discharge: HOME | End: 2018-05-23

## 2018-05-23 DIAGNOSIS — I48.91 UNSPECIFIED ATRIAL FIBRILLATION: ICD-10-CM

## 2018-05-30 ENCOUNTER — OUTPATIENT (OUTPATIENT)
Dept: OUTPATIENT SERVICES | Facility: HOSPITAL | Age: 83
LOS: 1 days | Discharge: HOME | End: 2018-05-30

## 2018-05-30 DIAGNOSIS — I48.91 UNSPECIFIED ATRIAL FIBRILLATION: ICD-10-CM

## 2018-05-30 DIAGNOSIS — I10 ESSENTIAL (PRIMARY) HYPERTENSION: ICD-10-CM

## 2018-06-06 ENCOUNTER — OUTPATIENT (OUTPATIENT)
Dept: OUTPATIENT SERVICES | Facility: HOSPITAL | Age: 83
LOS: 1 days | Discharge: HOME | End: 2018-06-06

## 2018-06-06 DIAGNOSIS — I10 ESSENTIAL (PRIMARY) HYPERTENSION: ICD-10-CM

## 2018-06-13 ENCOUNTER — OUTPATIENT (OUTPATIENT)
Dept: OUTPATIENT SERVICES | Facility: HOSPITAL | Age: 83
LOS: 1 days | Discharge: HOME | End: 2018-06-13

## 2018-06-13 DIAGNOSIS — I10 ESSENTIAL (PRIMARY) HYPERTENSION: ICD-10-CM

## 2018-06-20 ENCOUNTER — OUTPATIENT (OUTPATIENT)
Dept: OUTPATIENT SERVICES | Facility: HOSPITAL | Age: 83
LOS: 1 days | Discharge: HOME | End: 2018-06-20

## 2018-06-20 DIAGNOSIS — I48.91 UNSPECIFIED ATRIAL FIBRILLATION: ICD-10-CM

## 2018-06-27 ENCOUNTER — OUTPATIENT (OUTPATIENT)
Dept: OUTPATIENT SERVICES | Facility: HOSPITAL | Age: 83
LOS: 1 days | Discharge: HOME | End: 2018-06-27

## 2018-06-27 DIAGNOSIS — I48.91 UNSPECIFIED ATRIAL FIBRILLATION: ICD-10-CM

## 2018-07-03 ENCOUNTER — OUTPATIENT (OUTPATIENT)
Dept: OUTPATIENT SERVICES | Facility: HOSPITAL | Age: 83
LOS: 1 days | Discharge: HOME | End: 2018-07-03

## 2018-07-03 DIAGNOSIS — R79.89 OTHER SPECIFIED ABNORMAL FINDINGS OF BLOOD CHEMISTRY: ICD-10-CM

## 2018-07-11 ENCOUNTER — OUTPATIENT (OUTPATIENT)
Dept: OUTPATIENT SERVICES | Facility: HOSPITAL | Age: 83
LOS: 1 days | Discharge: HOME | End: 2018-07-11

## 2018-07-11 DIAGNOSIS — I48.91 UNSPECIFIED ATRIAL FIBRILLATION: ICD-10-CM

## 2018-07-18 ENCOUNTER — OUTPATIENT (OUTPATIENT)
Dept: OUTPATIENT SERVICES | Facility: HOSPITAL | Age: 83
LOS: 1 days | Discharge: HOME | End: 2018-07-18

## 2018-07-18 DIAGNOSIS — I48.91 UNSPECIFIED ATRIAL FIBRILLATION: ICD-10-CM

## 2018-07-25 ENCOUNTER — OUTPATIENT (OUTPATIENT)
Dept: OUTPATIENT SERVICES | Facility: HOSPITAL | Age: 83
LOS: 1 days | Discharge: HOME | End: 2018-07-25

## 2018-07-25 DIAGNOSIS — I48.91 UNSPECIFIED ATRIAL FIBRILLATION: ICD-10-CM

## 2018-07-28 ENCOUNTER — INPATIENT (INPATIENT)
Facility: HOSPITAL | Age: 83
LOS: 8 days | Discharge: HOME HEALTH | End: 2018-08-06
Attending: INTERNAL MEDICINE | Admitting: INTERNAL MEDICINE

## 2018-07-28 VITALS
OXYGEN SATURATION: 98 % | RESPIRATION RATE: 18 BRPM | DIASTOLIC BLOOD PRESSURE: 79 MMHG | SYSTOLIC BLOOD PRESSURE: 193 MMHG | TEMPERATURE: 97 F | HEART RATE: 78 BPM

## 2018-07-28 DIAGNOSIS — S43.021A: ICD-10-CM

## 2018-07-28 DIAGNOSIS — E78.5 HYPERLIPIDEMIA, UNSPECIFIED: ICD-10-CM

## 2018-07-28 DIAGNOSIS — I69.954 HEMIPLEGIA AND HEMIPARESIS FOLLOWING UNSPECIFIED CEREBROVASCULAR DISEASE AFFECTING LEFT NON-DOMINANT SIDE: ICD-10-CM

## 2018-07-28 DIAGNOSIS — Z95.0 PRESENCE OF CARDIAC PACEMAKER: ICD-10-CM

## 2018-07-28 DIAGNOSIS — Z79.01 LONG TERM (CURRENT) USE OF ANTICOAGULANTS: ICD-10-CM
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
## 2018-07-28 DIAGNOSIS — I11.0 HYPERTENSIVE HEART DISEASE WITH HEART FAILURE: ICD-10-CM

## 2018-07-28 DIAGNOSIS — M75.51 BURSITIS OF RIGHT SHOULDER: ICD-10-CM

## 2018-07-28 DIAGNOSIS — Z88.8 ALLERGY STATUS TO OTHER DRUGS, MEDICAMENTS AND BIOLOGICAL SUBSTANCES STATUS: ICD-10-CM

## 2018-07-28 DIAGNOSIS — M25.511 PAIN IN RIGHT SHOULDER: ICD-10-CM

## 2018-07-28 DIAGNOSIS — Z85.51 PERSONAL HISTORY OF MALIGNANT NEOPLASM OF BLADDER: ICD-10-CM

## 2018-07-28 DIAGNOSIS — N39.0 URINARY TRACT INFECTION, SITE NOT SPECIFIED: ICD-10-CM

## 2018-07-28 DIAGNOSIS — I50.22 CHRONIC SYSTOLIC (CONGESTIVE) HEART FAILURE: ICD-10-CM

## 2018-07-28 DIAGNOSIS — Z91.013 ALLERGY TO SEAFOOD: ICD-10-CM

## 2018-07-28 DIAGNOSIS — J44.9 CHRONIC OBSTRUCTIVE PULMONARY DISEASE, UNSPECIFIED: ICD-10-CM

## 2018-07-28 DIAGNOSIS — I48.0 PAROXYSMAL ATRIAL FIBRILLATION: ICD-10-CM

## 2018-07-29 DIAGNOSIS — Z95.810 PRESENCE OF AUTOMATIC (IMPLANTABLE) CARDIAC DEFIBRILLATOR: Chronic | ICD-10-CM

## 2018-07-29 DIAGNOSIS — Z90.49 ACQUIRED ABSENCE OF OTHER SPECIFIED PARTS OF DIGESTIVE TRACT: Chronic | ICD-10-CM

## 2018-07-29 LAB
ALBUMIN SERPL ELPH-MCNC: 3.7 G/DL — SIGNIFICANT CHANGE UP (ref 3.5–5.2)
ALP SERPL-CCNC: 100 U/L — SIGNIFICANT CHANGE UP (ref 30–115)
ALT FLD-CCNC: 9 U/L — SIGNIFICANT CHANGE UP (ref 0–41)
ANION GAP SERPL CALC-SCNC: 12 MMOL/L — SIGNIFICANT CHANGE UP (ref 7–14)
APPEARANCE UR: ABNORMAL
APTT BLD: 50.1 SEC — HIGH (ref 27–39.2)
AST SERPL-CCNC: 17 U/L — SIGNIFICANT CHANGE UP (ref 0–41)
BILIRUB SERPL-MCNC: 0.3 MG/DL — SIGNIFICANT CHANGE UP (ref 0.2–1.2)
BILIRUB UR-MCNC: NEGATIVE — SIGNIFICANT CHANGE UP
BUN SERPL-MCNC: 17 MG/DL — SIGNIFICANT CHANGE UP (ref 10–20)
CALCIUM SERPL-MCNC: 9.8 MG/DL — SIGNIFICANT CHANGE UP (ref 8.5–10.1)
CHLORIDE SERPL-SCNC: 102 MMOL/L — SIGNIFICANT CHANGE UP (ref 98–110)
CK MB CFR SERPL CALC: <1 NG/ML — SIGNIFICANT CHANGE UP (ref 0.6–6.3)
CK SERPL-CCNC: 29 U/L — SIGNIFICANT CHANGE UP (ref 0–225)
CO2 SERPL-SCNC: 26 MMOL/L — SIGNIFICANT CHANGE UP (ref 17–32)
COLOR SPEC: YELLOW — SIGNIFICANT CHANGE UP
CREAT SERPL-MCNC: 0.7 MG/DL — SIGNIFICANT CHANGE UP (ref 0.7–1.5)
DIFF PNL FLD: ABNORMAL
ERYTHROCYTE [SEDIMENTATION RATE] IN BLOOD: 38 MM/HR — HIGH (ref 0–20)
GLUCOSE SERPL-MCNC: 109 MG/DL — HIGH (ref 70–99)
GLUCOSE UR QL: NEGATIVE MG/DL — SIGNIFICANT CHANGE UP
HCT VFR BLD CALC: 35.7 % — LOW (ref 37–47)
HGB BLD-MCNC: 11.9 G/DL — LOW (ref 12–16)
INR BLD: 3.98 RATIO — HIGH (ref 0.65–1.3)
KETONES UR-MCNC: NEGATIVE — SIGNIFICANT CHANGE UP
LEUKOCYTE ESTERASE UR-ACNC: ABNORMAL
MCHC RBC-ENTMCNC: 28.4 PG — SIGNIFICANT CHANGE UP (ref 27–31)
MCHC RBC-ENTMCNC: 33.3 G/DL — SIGNIFICANT CHANGE UP (ref 32–37)
MCV RBC AUTO: 85.2 FL — SIGNIFICANT CHANGE UP (ref 81–99)
NITRITE UR-MCNC: POSITIVE
NRBC # BLD: 0 /100 WBCS — SIGNIFICANT CHANGE UP (ref 0–0)
PH UR: 7 — SIGNIFICANT CHANGE UP (ref 5–8)
PLATELET # BLD AUTO: 247 K/UL — SIGNIFICANT CHANGE UP (ref 130–400)
POTASSIUM SERPL-MCNC: 3.9 MMOL/L — SIGNIFICANT CHANGE UP (ref 3.5–5)
POTASSIUM SERPL-SCNC: 3.9 MMOL/L — SIGNIFICANT CHANGE UP (ref 3.5–5)
PROT SERPL-MCNC: 6.6 G/DL — SIGNIFICANT CHANGE UP (ref 6–8)
PROT UR-MCNC: 30 MG/DL
PROTHROM AB SERPL-ACNC: >40 SEC — HIGH (ref 9.95–12.87)
RBC # BLD: 4.19 M/UL — LOW (ref 4.2–5.4)
RBC # FLD: 14.3 % — SIGNIFICANT CHANGE UP (ref 11.5–14.5)
SODIUM SERPL-SCNC: 140 MMOL/L — SIGNIFICANT CHANGE UP (ref 135–146)
SP GR SPEC: 1.01 — SIGNIFICANT CHANGE UP (ref 1.01–1.03)
TROPONIN T SERPL-MCNC: <0.01 NG/ML — SIGNIFICANT CHANGE UP
UROBILINOGEN FLD QL: 0.2 MG/DL — SIGNIFICANT CHANGE UP (ref 0.2–0.2)
WBC # BLD: 10.5 K/UL — SIGNIFICANT CHANGE UP (ref 4.8–10.8)
WBC # FLD AUTO: 10.5 K/UL — SIGNIFICANT CHANGE UP (ref 4.8–10.8)

## 2018-07-29 RX ORDER — METOPROLOL TARTRATE 50 MG
100 TABLET ORAL DAILY
Qty: 0 | Refills: 0 | Status: DISCONTINUED | OUTPATIENT
Start: 2018-07-29 | End: 2018-08-06

## 2018-07-29 RX ORDER — SODIUM CHLORIDE 9 MG/ML
1000 INJECTION INTRAMUSCULAR; INTRAVENOUS; SUBCUTANEOUS
Qty: 0 | Refills: 0 | Status: DISCONTINUED | OUTPATIENT
Start: 2018-07-29 | End: 2018-07-29

## 2018-07-29 RX ORDER — ATORVASTATIN CALCIUM 80 MG/1
80 TABLET, FILM COATED ORAL AT BEDTIME
Qty: 0 | Refills: 0 | Status: DISCONTINUED | OUTPATIENT
Start: 2018-07-29 | End: 2018-08-06

## 2018-07-29 RX ORDER — ONDANSETRON 8 MG/1
4 TABLET, FILM COATED ORAL ONCE
Qty: 0 | Refills: 0 | Status: COMPLETED | OUTPATIENT
Start: 2018-07-29 | End: 2018-07-29

## 2018-07-29 RX ORDER — METOCLOPRAMIDE HCL 10 MG
5 TABLET ORAL EVERY 8 HOURS
Qty: 0 | Refills: 0 | Status: DISCONTINUED | OUTPATIENT
Start: 2018-07-29 | End: 2018-08-06

## 2018-07-29 RX ORDER — DOCUSATE SODIUM 100 MG
100 CAPSULE ORAL THREE TIMES A DAY
Qty: 0 | Refills: 0 | Status: DISCONTINUED | OUTPATIENT
Start: 2018-07-29 | End: 2018-08-06

## 2018-07-29 RX ORDER — SENNA PLUS 8.6 MG/1
2 TABLET ORAL AT BEDTIME
Qty: 0 | Refills: 0 | Status: DISCONTINUED | OUTPATIENT
Start: 2018-07-29 | End: 2018-08-06

## 2018-07-29 RX ORDER — DILTIAZEM HCL 120 MG
120 CAPSULE, EXT RELEASE 24 HR ORAL DAILY
Qty: 0 | Refills: 0 | Status: DISCONTINUED | OUTPATIENT
Start: 2018-07-29 | End: 2018-08-06

## 2018-07-29 RX ORDER — OXYCODONE AND ACETAMINOPHEN 5; 325 MG/1; MG/1
1 TABLET ORAL EVERY 4 HOURS
Qty: 0 | Refills: 0 | Status: DISCONTINUED | OUTPATIENT
Start: 2018-07-29 | End: 2018-07-30

## 2018-07-29 RX ORDER — FUROSEMIDE 40 MG
20 TABLET ORAL DAILY
Qty: 0 | Refills: 0 | Status: DISCONTINUED | OUTPATIENT
Start: 2018-07-29 | End: 2018-08-06

## 2018-07-29 RX ORDER — MORPHINE SULFATE 50 MG/1
4 CAPSULE, EXTENDED RELEASE ORAL ONCE
Qty: 0 | Refills: 0 | Status: DISCONTINUED | OUTPATIENT
Start: 2018-07-29 | End: 2018-07-29

## 2018-07-29 RX ORDER — PANTOPRAZOLE SODIUM 20 MG/1
40 TABLET, DELAYED RELEASE ORAL
Qty: 0 | Refills: 0 | Status: DISCONTINUED | OUTPATIENT
Start: 2018-07-29 | End: 2018-08-06

## 2018-07-29 RX ORDER — HYDROMORPHONE HYDROCHLORIDE 2 MG/ML
0.5 INJECTION INTRAMUSCULAR; INTRAVENOUS; SUBCUTANEOUS ONCE
Qty: 0 | Refills: 0 | Status: DISCONTINUED | OUTPATIENT
Start: 2018-07-29 | End: 2018-07-29

## 2018-07-29 RX ORDER — HYDROMORPHONE HYDROCHLORIDE 2 MG/ML
0.5 INJECTION INTRAMUSCULAR; INTRAVENOUS; SUBCUTANEOUS EVERY 4 HOURS
Qty: 0 | Refills: 0 | Status: DISCONTINUED | OUTPATIENT
Start: 2018-07-29 | End: 2018-08-01

## 2018-07-29 RX ORDER — METOCLOPRAMIDE HCL 10 MG
10 TABLET ORAL ONCE
Qty: 0 | Refills: 0 | Status: COMPLETED | OUTPATIENT
Start: 2018-07-29 | End: 2018-07-29

## 2018-07-29 RX ORDER — SODIUM CHLORIDE 9 MG/ML
1000 INJECTION INTRAMUSCULAR; INTRAVENOUS; SUBCUTANEOUS
Qty: 0 | Refills: 0 | Status: DISCONTINUED | OUTPATIENT
Start: 2018-07-29 | End: 2018-08-01

## 2018-07-29 RX ORDER — MORPHINE SULFATE 50 MG/1
2 CAPSULE, EXTENDED RELEASE ORAL ONCE
Qty: 0 | Refills: 0 | Status: DISCONTINUED | OUTPATIENT
Start: 2018-07-29 | End: 2018-07-29

## 2018-07-29 RX ORDER — SERTRALINE 25 MG/1
50 TABLET, FILM COATED ORAL DAILY
Qty: 0 | Refills: 0 | Status: DISCONTINUED | OUTPATIENT
Start: 2018-07-29 | End: 2018-08-06

## 2018-07-29 RX ADMIN — MORPHINE SULFATE 4 MILLIGRAM(S): 50 CAPSULE, EXTENDED RELEASE ORAL at 03:00

## 2018-07-29 RX ADMIN — OXYCODONE AND ACETAMINOPHEN 1 TABLET(S): 5; 325 TABLET ORAL at 22:39

## 2018-07-29 RX ADMIN — SERTRALINE 50 MILLIGRAM(S): 25 TABLET, FILM COATED ORAL at 13:14

## 2018-07-29 RX ADMIN — SODIUM CHLORIDE 100 MILLILITER(S): 9 INJECTION INTRAMUSCULAR; INTRAVENOUS; SUBCUTANEOUS at 01:13

## 2018-07-29 RX ADMIN — HYDROMORPHONE HYDROCHLORIDE 0.5 MILLIGRAM(S): 2 INJECTION INTRAMUSCULAR; INTRAVENOUS; SUBCUTANEOUS at 11:40

## 2018-07-29 RX ADMIN — MORPHINE SULFATE 2 MILLIGRAM(S): 50 CAPSULE, EXTENDED RELEASE ORAL at 01:37

## 2018-07-29 RX ADMIN — MORPHINE SULFATE 4 MILLIGRAM(S): 50 CAPSULE, EXTENDED RELEASE ORAL at 02:07

## 2018-07-29 RX ADMIN — HYDROMORPHONE HYDROCHLORIDE 0.5 MILLIGRAM(S): 2 INJECTION INTRAMUSCULAR; INTRAVENOUS; SUBCUTANEOUS at 06:35

## 2018-07-29 RX ADMIN — ATORVASTATIN CALCIUM 80 MILLIGRAM(S): 80 TABLET, FILM COATED ORAL at 21:45

## 2018-07-29 RX ADMIN — HYDROMORPHONE HYDROCHLORIDE 0.5 MILLIGRAM(S): 2 INJECTION INTRAMUSCULAR; INTRAVENOUS; SUBCUTANEOUS at 05:30

## 2018-07-29 RX ADMIN — HYDROMORPHONE HYDROCHLORIDE 0.5 MILLIGRAM(S): 2 INJECTION INTRAMUSCULAR; INTRAVENOUS; SUBCUTANEOUS at 19:44

## 2018-07-29 RX ADMIN — Medication 120 MILLIGRAM(S): at 13:14

## 2018-07-29 RX ADMIN — ONDANSETRON 4 MILLIGRAM(S): 8 TABLET, FILM COATED ORAL at 01:12

## 2018-07-29 RX ADMIN — Medication 5 MILLIGRAM(S): at 11:39

## 2018-07-29 RX ADMIN — MORPHINE SULFATE 2 MILLIGRAM(S): 50 CAPSULE, EXTENDED RELEASE ORAL at 01:13

## 2018-07-29 RX ADMIN — Medication 104 MILLIGRAM(S): at 05:30

## 2018-07-29 RX ADMIN — OXYCODONE AND ACETAMINOPHEN 1 TABLET(S): 5; 325 TABLET ORAL at 22:09

## 2018-07-29 RX ADMIN — Medication 5 MILLIGRAM(S): at 19:43

## 2018-07-29 RX ADMIN — Medication 10 MILLIGRAM(S): at 06:35

## 2018-07-29 RX ADMIN — ONDANSETRON 4 MILLIGRAM(S): 8 TABLET, FILM COATED ORAL at 04:08

## 2018-07-29 RX ADMIN — HYDROMORPHONE HYDROCHLORIDE 0.5 MILLIGRAM(S): 2 INJECTION INTRAMUSCULAR; INTRAVENOUS; SUBCUTANEOUS at 20:14

## 2018-07-29 RX ADMIN — Medication 100 MILLIGRAM(S): at 13:14

## 2018-07-29 NOTE — H&P ADULT - HISTORY OF PRESENT ILLNESS
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 Patient gets oupt PT since last stroke she had in June , had new therapists coming in the last week , and the daughter believes that they were putting extra pressure on her shoulders and over exerting her  , patient usually uses a wheelchair since last stroke . R shoulder pain started on Thursday , was worsening in intensity , and leading to immobility of the R arm 2/2 to severe pain , she also reports some numbness in her R hand that currently resolved , pain in radiating to the R elbow , not relive by heat pads and Tramadol .  No direct trauma to the joint , no erythema

## 2018-07-29 NOTE — H&P ADULT - PMH
Atrial fibrillation    Cerebrovascular accident (CVA)    COPD (chronic obstructive pulmonary disease)    HTN (hypertension)    Systolic heart failure

## 2018-07-29 NOTE — H&P ADULT - ASSESSMENT
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, HTN, COPD , and bladder Ca in remission presenting for worsening atraumatic R shoulder pain     1)R shoulder pain     Xray showing possible effusion     DD includes hemarthrosis VS ligament injury vs pathological fracture    Pain seems nonproportional to a simple effusion     -->Ortho evaluation   -->Will get CT shoulder   -->F/U official Xray report  -->Pain control with bowel regimen   -->Hold Coumadin for now     2)Afib , with prior CVA *2    Continue Cardizem and Toprol for rate control    Hold Coumadin , INR is supra therapeutic for now     Will need long term AC considering ROSALINA VASC score of 7    3)Chronic HFrEF S/P AICD   Not in acute exacerbation    Keep on Metoprolol and Lipitor    Consider adding ACE/ARB    #DVT px: SCD  #GI px: Protonix PO

## 2018-07-29 NOTE — PROGRESS NOTE ADULT - SUBJECTIVE AND OBJECTIVE BOX
Patient, an elderly woman in her 80s, with multiple comorbidities including h/o bladder  cancer, stroke, paroxysmal atrial fibrillation on AC, was getting PT/OT at home past few days and developed increasing pain right shoulder, unbearable, brought to ER by daughter  Patient in a lot of pain inspite of Morphine, Dilaudid, Percocet etc.  There is swelling over right shoulder without ecchymosis and significant pain on the most minimal passive movement of the arm.  Alert, in pain, sleepless because of pain  Vitals stable, cardiorespiratory status okay  Last dose of MS at 5.30am  For now apply ice pack every two hours.  Ortho input awaited. Patient has automatic defibrillator so MRI a contraindication.  INR was 4, doubt there is any bleeding in the shoulder joint. Need to r/o fx.

## 2018-07-29 NOTE — ED PROVIDER NOTE - PHYSICAL EXAMINATION
VITAL SIGNS: I have reviewed nursing notes and confirm.  CONSTITUTIONAL: Well-developed; well-nourished; in no acute distress.  SKIN: Skin exam is warm and dry, no acute rash.  HEAD: Normocephalic; atraumatic.  EYES: PERRL, EOM intact; conjunctiva and sclera clear.  ENT: No nasal discharge; airway clear. TMs clear.  NECK: Supple; non tender.  CARD: S1, S2 normal; no murmurs, gallops, or rubs. Regular rate and rhythm.  RESP: No wheezes, rales or rhonchi.  ABD: Normal bowel sounds; soft; non-distended; non-tender;  EXT: mild swelling of the right shoulder, no erythema, ttp anteriorly and laterally, nontender humerus, nontender elbow, unable to actively range due to pain, passive range severely limited due to pain  NEURO: aox3, cn2-12 grossly normal, LUE and LLE with residual weakness, sensation intact RUE, good   PSYCH: Cooperative, anxious

## 2018-07-29 NOTE — H&P ADULT - NSHPPHYSICALEXAM_GEN_ALL_CORE
T(C): 35.6 (07-29-18 @ 09:00), Max: 36.1 (07-28-18 @ 23:08)  HR: 77 (07-29-18 @ 09:00) (66 - 78)  BP: 137/81 (07-29-18 @ 09:00) (137/81 - 193/79)  RR: 18 (07-29-18 @ 09:00) (18 - 18)  SpO2: 95% (07-29-18 @ 07:43) (95% - 98%)    PHYSICAL EXAM:  GENERAL: in pain , well-developed  NECK: Supple, No JVD  CHEST/LUNG: Clear to auscultation bilaterally; No wheeze  HEART: Regular rate and rhythm; No murmurs, rubs, or gallops  ABDOMEN: Soft, Nontender, Nondistended; Bowel sounds present  EXTREMITIES:  2+ Peripheral Pulses, No clubbing, cyanosis, or edema  Swollen R shoulder , no erythema , no ecchymosis

## 2018-07-29 NOTE — ED PROVIDER NOTE - MEDICAL DECISION MAKING DETAILS
pt with right atraumatic shoulder pain, possible widening of joint space on xray, effusion v. hermarthosis, ortho consulted, will admit for intractable pain despite multiple doses of iv pain medication

## 2018-07-29 NOTE — H&P ADULT - NSHPLABSRESULTS_GEN_ALL_CORE
11.9   10.50 )-----------( 247      ( 29 Jul 2018 00:57 )             35.7       07-29    140  |  102  |  17  ----------------------------<  109<H>  3.9   |  26  |  0.7    Ca    9.8      29 Jul 2018 00:57    TPro  6.6  /  Alb  3.7  /  TBili  0.3  /  DBili  x   /  AST  17  /  ALT  9   /  AlkPhos  100  07-29                  PT/INR - ( 29 Jul 2018 00:57 )   PT: >40.00 sec;   INR: 3.98 ratio         PTT - ( 29 Jul 2018 00:57 )  PTT:50.1 sec    Lactate Trend      CARDIAC MARKERS ( 29 Jul 2018 00:57 )  x     / <0.01 ng/mL / 29 U/L / x     / <1.0 ng/mL

## 2018-07-29 NOTE — CONSULT NOTE ADULT - SUBJECTIVE AND OBJECTIVE BOX
84F presents to ED with R shoulder pain. Daughter reports pt has been getting more aggressive PT at home this past week and the R shoulder pain started on Thursday and has been progressively getting worse. Denies fall or other sort of injuries. Denies f/ch. sob/chp. Had short episode of n/t in RUE while in waiting area, now resolved.      PMH/PSH: CVA with L sided weakness, afib on coumadin, see chart for details  Meds: Coumadin, see chart  All: Eliquis, shellfish  Sx: (-) x 3      Vital Signs Last 24 Hrs  T(C): 35.6 (29 Jul 2018 09:00), Max: 36.1 (28 Jul 2018 23:08)  T(F): 96.1 (29 Jul 2018 09:00), Max: 97 (29 Jul 2018 07:43)  HR: 77 (29 Jul 2018 09:00) (66 - 78)  BP: 137/81 (29 Jul 2018 09:00) (137/81 - 193/79)  BP(mean): --  RR: 18 (29 Jul 2018 09:00) (18 - 18)  SpO2: 95% (29 Jul 2018 07:43) (95% - 98%)      NAD, unlabored breathing, AAO x 3  RUE, shoulder  skin intact, nonerythematous  no gross deformity  minimal swelling  no warmth  TTP most prominent over AC joint, NTTP prox/dist  AROM deferred 2/2 pain  PROM 0-75 deg ff and abd  ain/pin/u/r intact  silt throughout  r palpable, digits wwp x 5, cap ref wnl                          11.9   10.50 )-----------( 247      ( 29 Jul 2018 00:57 )             35.7     07-29    140  |  102  |  17  ----------------------------<  109<H>  3.9   |  26  |  0.7    Ca    9.8      29 Jul 2018 00:57    TPro  6.6  /  Alb  3.7  /  TBili  0.3  /  DBili  x   /  AST  17  /  ALT  9   /  AlkPhos  100  07-29    INR: 4  ESR: 38      XR R shoulder: No fx/disloc, no acute osseous changes.

## 2018-07-29 NOTE — CONSULT NOTE ADULT - ASSESSMENT
84F with R shoulder pain/swelling. Clinical presentation and PE not concerning for SA. Likely hemarthrosis from minimal trauma from increased PT and INR of 4.  -No acute ortho surg intervention indicated at this time  -No indication for aspiration  -activity modification  -Ice  -pain control  -monitor/control INR  -Follow up in ortho surg clinic with Dr. Trevino as an outpt

## 2018-07-29 NOTE — ED PROVIDER NOTE - PROGRESS NOTE DETAILS
ortho consulted for possible shoulder effusion v. hermarthosis given pt's coumadin use.  pt continues to have severe pain spoke to dr. lew, says to admit to his service for pain control, waiting for ortho eval

## 2018-07-29 NOTE — ED PROVIDER NOTE - OBJECTIVE STATEMENT
85 yo f with pmh of cva with left sided weakness, biba with c/o right shoulder pain.  as per family, pt has been doing PT/OT at home.  daughter says that she feels the physical therapist had been more aggressive this past week.  pt had been c/o left shoulder pain since thurs, but worse today.  no trauma.  pt says she cannot move her right arm due to pain at the shoulder.  while pt was waiting to be seen in the ED, she started to c/o paresthesias of her right hand.  family became concerned that it was another cva. 83 yo f with pmh of cva, with left sided weakness, afib on coumadin, biba with c/o right shoulder pain.  as per family, pt has been doing PT/OT at home.  daughter says that she feels the physical therapist had been more aggressive this past week.  pt had been c/o left shoulder pain since thurs, but worse today.  no trauma.  pt says she cannot move her right arm due to pain at the shoulder.  while pt was waiting to be seen in the ED, she started to c/o paresthesias of her right hand.  family became concerned that it was another cva. 83 yo f with pmh of cva, with left sided weakness, afib on coumadin, ppm, biba with c/o right shoulder pain.  as per family, pt has been doing PT/OT at home.  daughter says that she feels the physical therapist had been more aggressive this past week.  pt had been c/o left shoulder pain since thurs, but worse today.  no trauma.  pt says she cannot move her right arm due to pain at the shoulder.  while pt was waiting to be seen in the ED, she started to c/o paresthesias of her right hand.  family became concerned that it was another cva.

## 2018-07-30 LAB — CRP SERPL-MCNC: 0.39 MG/DL — SIGNIFICANT CHANGE UP (ref 0–0.4)

## 2018-07-30 RX ADMIN — Medication 5 MILLIGRAM(S): at 11:22

## 2018-07-30 RX ADMIN — HYDROMORPHONE HYDROCHLORIDE 0.5 MILLIGRAM(S): 2 INJECTION INTRAMUSCULAR; INTRAVENOUS; SUBCUTANEOUS at 17:30

## 2018-07-30 RX ADMIN — SERTRALINE 50 MILLIGRAM(S): 25 TABLET, FILM COATED ORAL at 11:21

## 2018-07-30 RX ADMIN — Medication 100 MILLIGRAM(S): at 05:57

## 2018-07-30 RX ADMIN — HYDROMORPHONE HYDROCHLORIDE 0.5 MILLIGRAM(S): 2 INJECTION INTRAMUSCULAR; INTRAVENOUS; SUBCUTANEOUS at 04:34

## 2018-07-30 RX ADMIN — Medication 20 MILLIGRAM(S): at 05:56

## 2018-07-30 RX ADMIN — Medication 5 MILLIGRAM(S): at 21:54

## 2018-07-30 RX ADMIN — SODIUM CHLORIDE 50 MILLILITER(S): 9 INJECTION INTRAMUSCULAR; INTRAVENOUS; SUBCUTANEOUS at 21:55

## 2018-07-30 RX ADMIN — ATORVASTATIN CALCIUM 80 MILLIGRAM(S): 80 TABLET, FILM COATED ORAL at 21:13

## 2018-07-30 RX ADMIN — HYDROMORPHONE HYDROCHLORIDE 0.5 MILLIGRAM(S): 2 INJECTION INTRAMUSCULAR; INTRAVENOUS; SUBCUTANEOUS at 21:50

## 2018-07-30 RX ADMIN — HYDROMORPHONE HYDROCHLORIDE 0.5 MILLIGRAM(S): 2 INJECTION INTRAMUSCULAR; INTRAVENOUS; SUBCUTANEOUS at 11:22

## 2018-07-30 RX ADMIN — HYDROMORPHONE HYDROCHLORIDE 0.5 MILLIGRAM(S): 2 INJECTION INTRAMUSCULAR; INTRAVENOUS; SUBCUTANEOUS at 04:04

## 2018-07-30 RX ADMIN — Medication 100 MILLIGRAM(S): at 05:56

## 2018-07-30 RX ADMIN — Medication 120 MILLIGRAM(S): at 05:57

## 2018-07-30 RX ADMIN — SODIUM CHLORIDE 50 MILLILITER(S): 9 INJECTION INTRAMUSCULAR; INTRAVENOUS; SUBCUTANEOUS at 23:46

## 2018-07-30 RX ADMIN — Medication 100 MILLIGRAM(S): at 17:40

## 2018-07-30 RX ADMIN — HYDROMORPHONE HYDROCHLORIDE 0.5 MILLIGRAM(S): 2 INJECTION INTRAMUSCULAR; INTRAVENOUS; SUBCUTANEOUS at 16:59

## 2018-07-30 RX ADMIN — Medication 100 MILLIGRAM(S): at 21:13

## 2018-07-30 RX ADMIN — Medication 5 MILLIGRAM(S): at 04:04

## 2018-07-30 NOTE — PROGRESS NOTE ADULT - SUBJECTIVE AND OBJECTIVE BOX
SUBJECTIVE:    Patient is a 84y old Female who presents with a chief complaint of R shoulder pain (2018 10:47)    Currently admitted to medicine with the primary diagnosis of Shoulder pain, acute     Today is hospital day 1d. This morning she is resting comfortably in bed and reports no new issues or overnight events.     PAST MEDICAL & SURGICAL HISTORY  HTN (hypertension)  Cerebrovascular accident (CVA)  COPD (chronic obstructive pulmonary disease)  Atrial fibrillation  Systolic heart failure  AICD (automatic cardioverter/defibrillator) present  S/P appendectomy    SOCIAL HISTORY:  Negative for smoking/alcohol/drug use.     ALLERGIES:  No Known Drug Allergies  shellfish (Other)    MEDICATIONS:  STANDING MEDICATIONS  atorvastatin 80 milliGRAM(s) Oral at bedtime  diltiazem    milliGRAM(s) Oral daily  docusate sodium 100 milliGRAM(s) Oral three times a day  furosemide    Tablet 20 milliGRAM(s) Oral daily  metoprolol succinate  milliGRAM(s) Oral daily  pantoprazole    Tablet 40 milliGRAM(s) Oral before breakfast  senna 2 Tablet(s) Oral at bedtime  sertraline 50 milliGRAM(s) Oral daily  sodium chloride 0.9%. 1000 milliLiter(s) IV Continuous <Continuous>    PRN MEDICATIONS  HYDROmorphone  Injectable 0.5 milliGRAM(s) IV Push every 4 hours PRN  metoclopramide Injectable 5 milliGRAM(s) IV Push every 8 hours PRN    VITALS:   T(F): 96.3  HR: 72  BP: 176/80  RR: 18  SpO2: 94%    LABS:                        11.9   10.50 )-----------( 247      ( 2018 00:57 )             35.7     07-    140  |  102  |  17  ----------------------------<  109<H>  3.9   |  26  |  0.7    Ca    9.8      2018 00:57    TPro  6.6  /  Alb  3.7  /  TBili  0.3  /  DBili  x   /  AST  17  /  ALT  9   /  AlkPhos  100  07-29    PT/INR - ( 2018 00:57 )   PT: >40.00 sec;   INR: 3.98 ratio         PTT - ( 2018 00:57 )  PTT:50.1 sec  Urinalysis Basic - ( 2018 11:30 )    Color: Yellow / Appearance: Cloudy / S.015 / pH: x  Gluc: x / Ketone: Negative  / Bili: Negative / Urobili: 0.2 mg/dL   Blood: x / Protein: 30 mg/dL / Nitrite: Positive   Leuk Esterase: Small / RBC: 25-50 /HPF / WBC 3-5 /HPF   Sq Epi: x / Non Sq Epi: Occasional /HPF / Bacteria: Many /HPF            CARDIAC MARKERS ( 2018 00:57 )  x     / <0.01 ng/mL / 29 U/L / x     / <1.0 ng/mL      RADIOLOGY:    < from: CT Shoulder No Cont, Right (18 @ 08:47) >  IMPRESSION:  1. No acute glenohumeral fracture or dislocation  2. Complex subacromial subdeltoid bursitis with Hounsfield units   measuring 60. Findings are consistent with hemorrhagic bursitis   3.    Mild posterior subluxation of humeral head relative to glenoid and   chronic enthesopathy along the lesser tubercle    < end of copied text >      PHYSICAL EXAM:  GEN: No acute distress  LUNGS: Clear to auscultation bilaterally   HEART: Regular  ABD: Soft, non-tender, non-distended.  EXT: NC/NC, R shoulder swelling, tender to palpation. LUE & LLE weakness  NEURO: AAOX3    Intravenous access:   NG tube:   Krishnamurthy Catheter:   Indwelling Urethral Catheter:     Connect To:  Straight Drainage/Gravity    Indication:  Improved Comfort Care (18 @ 23:26) (not performed) [active]  Indwelling Urethral Catheter:     Connect To:  Straight Drainage/Gravity    Indication:  Improved Comfort Care (18 @ 10:01) (not performed) [active]

## 2018-07-30 NOTE — PROGRESS NOTE ADULT - SUBJECTIVE AND OBJECTIVE BOX
Patient able to sleep last night  Needed Krishnamurthy catheter since she urinated every ten minutes and is bed bound  Swelling right shoulder, no erythema, some tenderness  Scheduled for CT shoulder today.

## 2018-07-30 NOTE — PROGRESS NOTE ADULT - ASSESSMENT
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 R shoulder pain   CT scan findings consistent with hemorrhagic bursitis  CT also shows mild posterior subluxation of humeral head  Ortho - no inpatient intervention - Follow up in ortho surg clinic with Dr. Trevnio as an outpt  Pain control  Holding Warfarin for now given INR of 3.98    2. Afib, with prior CVA  Continue Cardizem and Toprol for rate control  Hold Coumadin, INR is supra therapeutic for now     #DVT px: SCD  #GI px: Protonix PO

## 2018-07-31 LAB
ANION GAP SERPL CALC-SCNC: 14 MMOL/L — SIGNIFICANT CHANGE UP (ref 7–14)
BASOPHILS # BLD AUTO: 0.02 K/UL — SIGNIFICANT CHANGE UP (ref 0–0.2)
BASOPHILS NFR BLD AUTO: 0.2 % — SIGNIFICANT CHANGE UP (ref 0–1)
BUN SERPL-MCNC: 15 MG/DL — SIGNIFICANT CHANGE UP (ref 10–20)
CALCIUM SERPL-MCNC: 9.7 MG/DL — SIGNIFICANT CHANGE UP (ref 8.5–10.1)
CHLORIDE SERPL-SCNC: 98 MMOL/L — SIGNIFICANT CHANGE UP (ref 98–110)
CO2 SERPL-SCNC: 28 MMOL/L — SIGNIFICANT CHANGE UP (ref 17–32)
CREAT SERPL-MCNC: 0.7 MG/DL — SIGNIFICANT CHANGE UP (ref 0.7–1.5)
EOSINOPHIL # BLD AUTO: 0.07 K/UL — SIGNIFICANT CHANGE UP (ref 0–0.7)
EOSINOPHIL NFR BLD AUTO: 0.6 % — SIGNIFICANT CHANGE UP (ref 0–8)
GLUCOSE SERPL-MCNC: 120 MG/DL — HIGH (ref 70–99)
HCT VFR BLD CALC: 36.8 % — LOW (ref 37–47)
HGB BLD-MCNC: 12.3 G/DL — SIGNIFICANT CHANGE UP (ref 12–16)
IMM GRANULOCYTES NFR BLD AUTO: 0.3 % — SIGNIFICANT CHANGE UP (ref 0.1–0.3)
INR BLD: 3.85 RATIO — HIGH (ref 0.65–1.3)
LYMPHOCYTES # BLD AUTO: 1.47 K/UL — SIGNIFICANT CHANGE UP (ref 1.2–3.4)
LYMPHOCYTES # BLD AUTO: 12.5 % — LOW (ref 20.5–51.1)
MCHC RBC-ENTMCNC: 28.3 PG — SIGNIFICANT CHANGE UP (ref 27–31)
MCHC RBC-ENTMCNC: 33.4 G/DL — SIGNIFICANT CHANGE UP (ref 32–37)
MCV RBC AUTO: 84.8 FL — SIGNIFICANT CHANGE UP (ref 81–99)
MONOCYTES # BLD AUTO: 1.58 K/UL — HIGH (ref 0.1–0.6)
MONOCYTES NFR BLD AUTO: 13.5 % — HIGH (ref 1.7–9.3)
NEUTROPHILS # BLD AUTO: 8.54 K/UL — HIGH (ref 1.4–6.5)
NEUTROPHILS NFR BLD AUTO: 72.9 % — SIGNIFICANT CHANGE UP (ref 42.2–75.2)
NRBC # BLD: 0 /100 WBCS — SIGNIFICANT CHANGE UP (ref 0–0)
PLATELET # BLD AUTO: 271 K/UL — SIGNIFICANT CHANGE UP (ref 130–400)
POTASSIUM SERPL-MCNC: 4 MMOL/L — SIGNIFICANT CHANGE UP (ref 3.5–5)
POTASSIUM SERPL-SCNC: 4 MMOL/L — SIGNIFICANT CHANGE UP (ref 3.5–5)
PROTHROM AB SERPL-ACNC: >40 SEC — HIGH (ref 9.95–12.87)
RBC # BLD: 4.34 M/UL — SIGNIFICANT CHANGE UP (ref 4.2–5.4)
RBC # FLD: 14.4 % — SIGNIFICANT CHANGE UP (ref 11.5–14.5)
SODIUM SERPL-SCNC: 140 MMOL/L — SIGNIFICANT CHANGE UP (ref 135–146)
WBC # BLD: 11.72 K/UL — HIGH (ref 4.8–10.8)
WBC # FLD AUTO: 11.72 K/UL — HIGH (ref 4.8–10.8)

## 2018-07-31 RX ADMIN — SERTRALINE 50 MILLIGRAM(S): 25 TABLET, FILM COATED ORAL at 11:17

## 2018-07-31 RX ADMIN — Medication 100 MILLIGRAM(S): at 05:40

## 2018-07-31 RX ADMIN — Medication 20 MILLIGRAM(S): at 05:40

## 2018-07-31 RX ADMIN — Medication 120 MILLIGRAM(S): at 05:41

## 2018-07-31 RX ADMIN — Medication 100 MILLIGRAM(S): at 13:19

## 2018-07-31 RX ADMIN — SODIUM CHLORIDE 50 MILLILITER(S): 9 INJECTION INTRAMUSCULAR; INTRAVENOUS; SUBCUTANEOUS at 18:55

## 2018-07-31 RX ADMIN — HYDROMORPHONE HYDROCHLORIDE 0.5 MILLIGRAM(S): 2 INJECTION INTRAMUSCULAR; INTRAVENOUS; SUBCUTANEOUS at 12:30

## 2018-07-31 RX ADMIN — Medication 5 MILLIGRAM(S): at 21:13

## 2018-07-31 RX ADMIN — PANTOPRAZOLE SODIUM 40 MILLIGRAM(S): 20 TABLET, DELAYED RELEASE ORAL at 05:41

## 2018-07-31 RX ADMIN — Medication 100 MILLIGRAM(S): at 21:12

## 2018-07-31 RX ADMIN — HYDROMORPHONE HYDROCHLORIDE 0.5 MILLIGRAM(S): 2 INJECTION INTRAMUSCULAR; INTRAVENOUS; SUBCUTANEOUS at 14:47

## 2018-07-31 RX ADMIN — HYDROMORPHONE HYDROCHLORIDE 0.5 MILLIGRAM(S): 2 INJECTION INTRAMUSCULAR; INTRAVENOUS; SUBCUTANEOUS at 21:13

## 2018-07-31 RX ADMIN — ATORVASTATIN CALCIUM 80 MILLIGRAM(S): 80 TABLET, FILM COATED ORAL at 21:12

## 2018-07-31 RX ADMIN — Medication 5 MILLIGRAM(S): at 12:32

## 2018-07-31 NOTE — PROGRESS NOTE ADULT - SUBJECTIVE AND OBJECTIVE BOX
Patient underwent CT scan, found to have hemorrhagic bursites accounting for pain.  INR still high and Warferin on hold  Ortho opinion: no need to intervene  NSAIDs contraindicated at this time  Continue ice application to shoulder  Krishnamurthy still in place: will keep one more day  Discussed with house staff.

## 2018-07-31 NOTE — PROGRESS NOTE ADULT - SUBJECTIVE AND OBJECTIVE BOX
SUBJECTIVE:    Patient is a 84y old Female who presents with a chief complaint of R shoulder pain (29 Jul 2018 10:47)    Currently admitted to medicine with the primary diagnosis of Shoulder pain, acute     Today is hospital day 2d. This morning she is resting comfortably in bed with the daughter at bedside. Daughter reports that the patient had increased confusion overnight. Otherwise no complaints.    PAST MEDICAL & SURGICAL HISTORY  HTN (hypertension)  Cerebrovascular accident (CVA)  COPD (chronic obstructive pulmonary disease)  Atrial fibrillation  Systolic heart failure  AICD (automatic cardioverter/defibrillator) present  S/P appendectomy    SOCIAL HISTORY:  Negative for smoking/alcohol/drug use.     ALLERGIES:  No Known Drug Allergies  shellfish (Other)    MEDICATIONS:  STANDING MEDICATIONS  atorvastatin 80 milliGRAM(s) Oral at bedtime  diltiazem    milliGRAM(s) Oral daily  docusate sodium 100 milliGRAM(s) Oral three times a day  furosemide    Tablet 20 milliGRAM(s) Oral daily  metoprolol succinate  milliGRAM(s) Oral daily  pantoprazole    Tablet 40 milliGRAM(s) Oral before breakfast  senna 2 Tablet(s) Oral at bedtime  sertraline 50 milliGRAM(s) Oral daily  sodium chloride 0.9%. 1000 milliLiter(s) IV Continuous <Continuous>    PRN MEDICATIONS  HYDROmorphone  Injectable 0.5 milliGRAM(s) IV Push every 4 hours PRN  metoclopramide Injectable 5 milliGRAM(s) IV Push every 8 hours PRN    VITALS:   T(F): 98.4  HR: 89  BP: 175/78  RR: 20  SpO2: 98%    LABS:                        12.3   11.72 )-----------( 271      ( 31 Jul 2018 05:56 )             36.8     07-31    140  |  98  |  15  ----------------------------<  120<H>  4.0   |  28  |  0.7    Ca    9.7      31 Jul 2018 05:56      PT/INR - ( 31 Jul 2018 05:56 )   PT: >40.00 sec;   INR: 3.85 ratio         RADIOLOGY:    < from: CT Shoulder No Cont, Right (07.30.18 @ 08:47) >  IMPRESSION:  1. No acute glenohumeral fracture or dislocation  2. Complex subacromial subdeltoid bursitis with Hounsfield units   measuring 60. Findings are consistent with hemorrhagic bursitis   3.    Mild posterior subluxation of humeral head relative to glenoid and   chronic enthesopathy along the lesser tubercle    < end of copied text >    PHYSICAL EXAM:  GEN: No acute distress  LUNGS: Clear to auscultation bilaterally   HEART: Regular  ABD: Soft, non-tender, non-distended.  EXT: NC/NC, R shoulder swelling, tender to palpation. LUE & LLE weakness  NEURO: AAOX2    Intravenous access: peripheral  NG tube: N/A  Krishnamurthy Catheter:   Indwelling Urethral Catheter:     Connect To:  Straight Drainage/Gravity    Indication:  Improved Comfort Care (07-29-18 @ 23:26) (not performed) [active]  Indwelling Urethral Catheter:     Connect To:  Straight Drainage/Gravity    Indication:  Improved Comfort Care (07-30-18 @ 10:01) (not performed) [active]

## 2018-07-31 NOTE — PROGRESS NOTE ADULT - ASSESSMENT
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    1. R shoulder pain   CT scan findings consistent with hemorrhagic bursitis  CT also shows mild posterior subluxation of humeral head  Ortho - no inpatient intervention - Follow up in ortho surg clinic with Dr. Trevino as an outpt  Pain control with Dilaudid PRN, ice therapy to shoulder  No NSAIDs now as patient is at increased risk for GI bleed with supratherapeutic INR    2. Afib, with prior CVA  Continue Cardizem and Toprol for rate control  Holding Coumadin as INR is still supratherapeutic at 3.85    #DVT px: SCD  #GI px: Protonix PO    Dispo: patient lives with daughter.

## 2018-08-01 LAB
HCT VFR BLD CALC: 34.2 % — LOW (ref 37–47)
HGB BLD-MCNC: 11.3 G/DL — LOW (ref 12–16)
INR BLD: 2.89 RATIO — HIGH (ref 0.65–1.3)
MCHC RBC-ENTMCNC: 28.5 PG — SIGNIFICANT CHANGE UP (ref 27–31)
MCHC RBC-ENTMCNC: 33 G/DL — SIGNIFICANT CHANGE UP (ref 32–37)
MCV RBC AUTO: 86.1 FL — SIGNIFICANT CHANGE UP (ref 81–99)
NRBC # BLD: 0 /100 WBCS — SIGNIFICANT CHANGE UP (ref 0–0)
PLATELET # BLD AUTO: 241 K/UL — SIGNIFICANT CHANGE UP (ref 130–400)
PROTHROM AB SERPL-ACNC: 30.9 SEC — HIGH (ref 9.95–12.87)
RBC # BLD: 3.97 M/UL — LOW (ref 4.2–5.4)
RBC # FLD: 14.6 % — HIGH (ref 11.5–14.5)
WBC # BLD: 10.03 K/UL — SIGNIFICANT CHANGE UP (ref 4.8–10.8)
WBC # FLD AUTO: 10.03 K/UL — SIGNIFICANT CHANGE UP (ref 4.8–10.8)

## 2018-08-01 RX ADMIN — PANTOPRAZOLE SODIUM 40 MILLIGRAM(S): 20 TABLET, DELAYED RELEASE ORAL at 06:33

## 2018-08-01 RX ADMIN — Medication 5 MILLIGRAM(S): at 07:44

## 2018-08-01 RX ADMIN — Medication 100 MILLIGRAM(S): at 05:50

## 2018-08-01 RX ADMIN — HYDROMORPHONE HYDROCHLORIDE 0.5 MILLIGRAM(S): 2 INJECTION INTRAMUSCULAR; INTRAVENOUS; SUBCUTANEOUS at 07:46

## 2018-08-01 RX ADMIN — SERTRALINE 50 MILLIGRAM(S): 25 TABLET, FILM COATED ORAL at 11:03

## 2018-08-01 RX ADMIN — SENNA PLUS 1 TABLET(S): 8.6 TABLET ORAL at 21:19

## 2018-08-01 RX ADMIN — Medication 120 MILLIGRAM(S): at 06:33

## 2018-08-01 RX ADMIN — HYDROMORPHONE HYDROCHLORIDE 0.5 MILLIGRAM(S): 2 INJECTION INTRAMUSCULAR; INTRAVENOUS; SUBCUTANEOUS at 07:41

## 2018-08-01 RX ADMIN — ATORVASTATIN CALCIUM 80 MILLIGRAM(S): 80 TABLET, FILM COATED ORAL at 21:19

## 2018-08-01 RX ADMIN — Medication 100 MILLIGRAM(S): at 13:10

## 2018-08-01 RX ADMIN — Medication 20 MILLIGRAM(S): at 05:50

## 2018-08-01 RX ADMIN — Medication 100 MILLIGRAM(S): at 21:20

## 2018-08-01 NOTE — PROGRESS NOTE ADULT - SUBJECTIVE AND OBJECTIVE BOX
SUBJECTIVE:    Patient is a 84y old Female who presents with a chief complaint of R shoulder pain (29 Jul 2018 10:47)    Currently admitted to medicine with the primary diagnosis of Shoulder pain, acute     Today is hospital day 3d. This morning she is resting comfortably in bed and reports no new issues or overnight events. Daughter at bedside.     Patient was OOBTC today, Krishnamurthy and IVFs has been d/c'd. Mild pain on moving arm. Arm support sling was put today for comfort    PAST MEDICAL & SURGICAL HISTORY  HTN (hypertension)  Cerebrovascular accident (CVA)  COPD (chronic obstructive pulmonary disease)  Atrial fibrillation  Systolic heart failure  AICD (automatic cardioverter/defibrillator) present  S/P appendectomy    SOCIAL HISTORY:  Negative for smoking/alcohol/drug use.     ALLERGIES:  No Known Drug Allergies  shellfish (Other)    MEDICATIONS:  STANDING MEDICATIONS  atorvastatin 80 milliGRAM(s) Oral at bedtime  diltiazem    milliGRAM(s) Oral daily  docusate sodium 100 milliGRAM(s) Oral three times a day  furosemide    Tablet 20 milliGRAM(s) Oral daily  metoprolol succinate  milliGRAM(s) Oral daily  pantoprazole    Tablet 40 milliGRAM(s) Oral before breakfast  senna 2 Tablet(s) Oral at bedtime  sertraline 50 milliGRAM(s) Oral daily    PRN MEDICATIONS  HYDROmorphone  Injectable 0.5 milliGRAM(s) IV Push every 4 hours PRN  metoclopramide Injectable 5 milliGRAM(s) IV Push every 8 hours PRN    VITALS:   T(F): 99.7  HR: 76  BP: 135/60  RR: 18  SpO2: --    LABS:                        11.3   10.03 )-----------( 241      ( 01 Aug 2018 06:42 )             34.2     07-31    140  |  98  |  15  ----------------------------<  120<H>  4.0   |  28  |  0.7    Ca    9.7      31 Jul 2018 05:56      PT/INR - ( 01 Aug 2018 06:42 )   PT: 30.90 sec;   INR: 2.89 ratio        RADIOLOGY:    < from: CT Shoulder No Cont, Right (07.30.18 @ 08:47) >  IMPRESSION:  1. No acute glenohumeral fracture or dislocation  2. Complex subacromial subdeltoid bursitis with Hounsfield units   measuring 60. Findings are consistent with hemorrhagic bursitis   3.    Mild posterior subluxation of humeral head relative to glenoid and   chronic enthesopathy along the lesser tubercle    < end of copied text >    PHYSICAL EXAM:  GEN: No acute distress  LUNGS: Clear to auscultation bilaterally   HEART: Regular  ABD: Soft, non-tender, non-distended.  EXT: NC/NC/2+PP, R shoulder swelling subsiding, tenderness improving. LUE & LLE weakness  NEURO: Alert and follows commands    Intravenous access: peripheral  NG tube: N/A  Krishnamurthy Catheter: Discontinued

## 2018-08-01 NOTE — PROGRESS NOTE ADULT - ASSESSMENT
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    1. R shoulder pain   CT scan findings consistent with hemorrhagic bursitis  CT also shows mild posterior subluxation of humeral head - unknown chronicity   Ortho - no inpatient intervention - Follow up in ortho surg clinic with Dr. Trevino as an outpatient  Pain control with Dilaudid PRN, ice therapy to shoulder, sling for comfort  No NSAIDs now as patient is at increased risk for GI bleed with supratherapeutic INR  RUE ROM improved with mild pain, OOBTC today    2. Afib, with prior CVA  Continue Cardizem and Toprol for rate control  Holding Coumadin as INR is 2.85 today  Keep INR 2-3    #DVT px: SCD  #GI px: Protonix PO    Dispo: patient lives with daughter. Physiatry evaluation: SNF vs home with HHA

## 2018-08-01 NOTE — CONSULT NOTE ADULT - ASSESSMENT
IMPRESSION: Rehab of right shoulder pain/left hemiparesis    PRECAUTIONS: [  ] Cardiac  [  ] Respiratory  [  ] Seizures [  ] Contact Isolation  [  ] Droplet Isolation  [  ] Other    Weight Bearing Status:     RECOMMENDATION:    Out of Bed to Chair     DVT/Decubiti Prophylaxis    REHAB PLAN:     [x   ] Bedside P/T 3-5 times a week   [   ]   Bedside O/T  2-3 times a week             [   ] No Rehab Therapy Indicated                   [   ]  Speech Therapy   Conditioning/ROM                                    ADL  Bed Mobility                                               Conditioning/ROM  Transfers                                                     Bed Mobility  Sitting /Standing Balance                         Transfers                                        Gait Training                                               Sitting/Standing Balance  Stair Training [   ]Applicable                    Home equipment Eval                                                                        Splinting  [   ] Only      GOALS:   ADL   [   ]   Independent                    Transfers  [   ] Independent                          Ambulation  [   ] Independent     [ x   ] With device                            [   ]  CG                                                         [ x  ]  CG                                                                  [ x  ] CG                            [    ] Min A                                                   [   ] Min A                                                              [   ] Min  A          DISCHARGE PLAN:   [   ]  Good candidate for Intensive Rehabilitation/Hospital based-4A [facility]                                             Will tolerate 3hrs Intensive Rehab Daily                                       [ x   ]  Short Term Rehab in Skilled Nursing Facility                           vs            [ x   ]  Home with Outpatient or  services                                         [    ]  Possible Candidate for Intensive Hospital based Rehab

## 2018-08-01 NOTE — PROGRESS NOTE ADULT - SUBJECTIVE AND OBJECTIVE BOX
Patient continues to grimace in pain when right shoulder palpated superficially, though swelling is improving and there is no ecchymosis  INR about 2.8. Will resume Warferin tomorrow  Krishnamurthy should be taken out today and IV fluids can be discontinued  On Dilaudid for pain  Physiatry eval.  Discussed with house staff

## 2018-08-01 NOTE — CONSULT NOTE ADULT - SUBJECTIVE AND OBJECTIVE BOX
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 Patient gets oupt PT since last stroke she had in June , had new therapists coming in the last week , and the daughter believes that they were putting extra pressure on her shoulders and over exerting her  , patient usually uses a wheelchair since last stroke . R shoulder pain started on Thursday , was worsening in intensity , and leading to immobility of the R arm 2/2 to severe pain , she also reports some numbness in her R hand that currently resolved , pain in radiating to the R elbow , not relive by heat pads and Tramadol .  No direct trauma to the joint , no erythema (29 Jul 2018 10:47)      PAST MEDICAL & SURGICAL HISTORY:  HTN (hypertension)  Cerebrovascular accident (CVA)  COPD (chronic obstructive pulmonary disease)  Atrial fibrillation  Systolic heart failure  AICD (automatic cardioverter/defibrillator) present  S/P appendectomy      Hospital Course:    TODAY'S SUBJECTIVE & REVIEW OF SYMPTOMS:     Constitutional WNL   Cardio WNL   Resp WNL   GI WNL  Heme WNL  Endo WNL  Skin WNL  MSK right shoulder pain  Neuro left hemiparesis  Cognitive WNL  Psych WNL      MEDICATIONS  (STANDING):  atorvastatin 80 milliGRAM(s) Oral at bedtime  diltiazem    milliGRAM(s) Oral daily  docusate sodium 100 milliGRAM(s) Oral three times a day  furosemide    Tablet 20 milliGRAM(s) Oral daily  metoprolol succinate  milliGRAM(s) Oral daily  pantoprazole    Tablet 40 milliGRAM(s) Oral before breakfast  senna 2 Tablet(s) Oral at bedtime  sertraline 50 milliGRAM(s) Oral daily    MEDICATIONS  (PRN):  HYDROmorphone  Injectable 0.5 milliGRAM(s) IV Push every 4 hours PRN Severe Pain (7 - 10)  metoclopramide Injectable 5 milliGRAM(s) IV Push every 8 hours PRN nausea      FAMILY HISTORY:  No pertinent family history in first degree relatives      Allergies    No Known Drug Allergies  shellfish (Other)    Intolerances        SOCIAL HISTORY:    [  ] Etoh  [  ] Smoking  [  ] Substance abuse     Home Environment:  [  ] Home Alone  [x  ] Lives with Family  [  ] Home Health Aid    Dwelling:  [  ] Apartment  [ x ] Private House  [  ] Adult Home  [  ] Skilled Nursing Facility      [  ] Short Term  [  ] Long Term  [x  ] Stairs       Elevator [  ]    FUNCTIONAL STATUS PTA: (Check all that apply)  Ambulation: [   ]Independent    [ x ] Dependent     [  ] Non-Ambulatory  Assistive Device: [  ] SA Cane  [x  ]  Q Cane  [  ] Walker  [x  ]  Wheelchair  ADL : [  ] Independent  [ x ]  Dependent       Vital Signs Last 24 Hrs  T(C): 36.9 (01 Aug 2018 07:45), Max: 37.6 (31 Jul 2018 18:10)  T(F): 98.4 (01 Aug 2018 07:45), Max: 99.7 (31 Jul 2018 18:10)  HR: 75 (01 Aug 2018 07:45) (75 - 88)  BP: 136/74 (01 Aug 2018 07:45) (136/74 - 169/74)  BP(mean): --  RR: 18 (01 Aug 2018 07:45) (18 - 18)  SpO2: --      PHYSICAL EXAM: Alert & Oriented X3  GENERAL: NAD, well-groomed, well-developed  HEAD:  Atraumatic, Normocephalic  CHEST/LUNG: Clear   HEART: S1S2+  ABDOMEN: Soft, Nontender  EXTREMITIES:  no calf tenderness, severe right shoulder tenderness    NERVOUS SYSTEM:  Cranial Nerves 2-12 intact [  ] Abnormal  [  ]  ROM: WFL all extremities [  ]  Abnormal [x  ]limited lucian arms/left leg  Motor Strength: WFL all extremities  [  ]  Abnormal [ x ]limited lucian arms / left leg  Sensation: intact to light touch [  ] Abnormal [  ]  Reflexes: Symmetric [  ]  Abnormal [  ]    FUNCTIONAL STATUS:  Bed Mobility: Independent [  ]  Supervision [  ]  Needs Assistance [ x ]  N/A [  ]  Transfers: Independent [  ]  Supervision [  ]  Needs Assistance [  ]  N/A [  ]   Ambulation: Independent [  ]  Supervision [  ]  Needs Assistance [  ]  N/A [  ]  ADL: Independent [  ] Requires Assistance [  ] N/A [  ]      LABS:                        11.3   10.03 )-----------( 241      ( 01 Aug 2018 06:42 )             34.2     07-31    140  |  98  |  15  ----------------------------<  120<H>  4.0   |  28  |  0.7    Ca    9.7      31 Jul 2018 05:56      PT/INR - ( 01 Aug 2018 06:42 )   PT: 30.90 sec;   INR: 2.89 ratio               RADIOLOGY & ADDITIONAL STUDIES:    Assesment:

## 2018-08-02 LAB
HCT VFR BLD CALC: 31.9 % — LOW (ref 37–47)
HGB BLD-MCNC: 10.6 G/DL — LOW (ref 12–16)
INR BLD: 2.8 RATIO — HIGH (ref 0.65–1.3)
MCHC RBC-ENTMCNC: 28.5 PG — SIGNIFICANT CHANGE UP (ref 27–31)
MCHC RBC-ENTMCNC: 33.2 G/DL — SIGNIFICANT CHANGE UP (ref 32–37)
MCV RBC AUTO: 85.8 FL — SIGNIFICANT CHANGE UP (ref 81–99)
NRBC # BLD: 0 /100 WBCS — SIGNIFICANT CHANGE UP (ref 0–0)
PLATELET # BLD AUTO: 232 K/UL — SIGNIFICANT CHANGE UP (ref 130–400)
PROTHROM AB SERPL-ACNC: 29.9 SEC — HIGH (ref 9.95–12.87)
RBC # BLD: 3.72 M/UL — LOW (ref 4.2–5.4)
RBC # FLD: 14.4 % — SIGNIFICANT CHANGE UP (ref 11.5–14.5)
WBC # BLD: 9.58 K/UL — SIGNIFICANT CHANGE UP (ref 4.8–10.8)
WBC # FLD AUTO: 9.58 K/UL — SIGNIFICANT CHANGE UP (ref 4.8–10.8)

## 2018-08-02 RX ORDER — TRAMADOL HYDROCHLORIDE 50 MG/1
50 TABLET ORAL EVERY 6 HOURS
Qty: 0 | Refills: 0 | Status: DISCONTINUED | OUTPATIENT
Start: 2018-08-02 | End: 2018-08-06

## 2018-08-02 RX ADMIN — Medication 120 MILLIGRAM(S): at 05:20

## 2018-08-02 RX ADMIN — SERTRALINE 50 MILLIGRAM(S): 25 TABLET, FILM COATED ORAL at 13:01

## 2018-08-02 RX ADMIN — Medication 100 MILLIGRAM(S): at 05:20

## 2018-08-02 RX ADMIN — SENNA PLUS 2 TABLET(S): 8.6 TABLET ORAL at 22:09

## 2018-08-02 RX ADMIN — PANTOPRAZOLE SODIUM 40 MILLIGRAM(S): 20 TABLET, DELAYED RELEASE ORAL at 09:06

## 2018-08-02 RX ADMIN — Medication 100 MILLIGRAM(S): at 22:09

## 2018-08-02 RX ADMIN — TRAMADOL HYDROCHLORIDE 50 MILLIGRAM(S): 50 TABLET ORAL at 22:40

## 2018-08-02 RX ADMIN — ATORVASTATIN CALCIUM 80 MILLIGRAM(S): 80 TABLET, FILM COATED ORAL at 22:10

## 2018-08-02 RX ADMIN — Medication 20 MILLIGRAM(S): at 05:20

## 2018-08-02 RX ADMIN — TRAMADOL HYDROCHLORIDE 50 MILLIGRAM(S): 50 TABLET ORAL at 22:10

## 2018-08-02 RX ADMIN — Medication 100 MILLIGRAM(S): at 13:01

## 2018-08-02 NOTE — CHART NOTE - NSCHARTNOTEFT_GEN_A_CORE
Pt's daughter at bedside, reported pt requires 1:1 by her at bedside.    Pt is VERY picky and with strong food preference. She does not like hospital foods but able to eat some after 1:1 assist.   Pt is usually a small eater at home, and pt in the hospital is eating close to baseline.   Even when daughter brings home foods, pt is unlikely to eat them due to pickiness.

## 2018-08-02 NOTE — PHYSICAL THERAPY INITIAL EVALUATION ADULT - SPECIFY REASON(S)
Pt. unable to participate at this time due to severe right shoulder pain; will attempt to f/u at a later time if time allows.

## 2018-08-02 NOTE — PROGRESS NOTE ADULT - SUBJECTIVE AND OBJECTIVE BOX
Patient's shoulder pain has improved, off analgesics  Shoulder: swelling present, no erythema  Krishnamurthy catheter out  No fever  INR still 2.8, hold off Warferin one more day  Physiatrist note reviewed  Discussed with house staff

## 2018-08-02 NOTE — PROGRESS NOTE ADULT - ASSESSMENT
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    1. R shoulder pain   CT scan findings consistent with hemorrhagic bursitis  CT also shows mild posterior subluxation of humeral head - unknown chronicity   Ortho - no inpatient intervention - Follow up in ortho surg clinic with Dr. Trevino as an outpatient  Pain control with Dilaudid PRN, ice therapy to shoulder, sling for comfort  No NSAIDs for now as increased risk for GI bleed with supratherapeutic INR  RUE ROM improved with mild pain, OOBTC    2. Afib, with prior CVA  Continue Cardizem and Toprol for rate control  Holding Coumadin tonight as INR is 2.80 today  Check INR daily; Keep INR 2-3    #DVT px: SCD  #GI px: Protonix PO    Dispo: patient lives with daughter. Physiatry evaluation: SNF vs home with VN services

## 2018-08-02 NOTE — PROGRESS NOTE ADULT - SUBJECTIVE AND OBJECTIVE BOX
SUBJECTIVE:    Patient is a 84y old Female who presents with a chief complaint of R shoulder pain (29 Jul 2018 10:47)    Currently admitted to medicine with the primary diagnosis of Shoulder pain, acute     Today is hospital day 4d. This morning she is resting comfortably in bed and reports no new issues or overnight events.     Patient was OOBTC yesterday for several hours, tolerated well. Krishnamurthy removed, and IVFs discontinued. P    PAST MEDICAL & SURGICAL HISTORY  HTN (hypertension)  Cerebrovascular accident (CVA)  COPD (chronic obstructive pulmonary disease)  Atrial fibrillation  Systolic heart failure  AICD (automatic cardioverter/defibrillator) present  S/P appendectomy    SOCIAL HISTORY:  Negative for smoking/alcohol/drug use.     ALLERGIES:  No Known Drug Allergies  shellfish (Other)    MEDICATIONS:  STANDING MEDICATIONS  atorvastatin 80 milliGRAM(s) Oral at bedtime  diltiazem    milliGRAM(s) Oral daily  docusate sodium 100 milliGRAM(s) Oral three times a day  furosemide    Tablet 20 milliGRAM(s) Oral daily  metoprolol succinate  milliGRAM(s) Oral daily  pantoprazole    Tablet 40 milliGRAM(s) Oral before breakfast  senna 2 Tablet(s) Oral at bedtime  sertraline 50 milliGRAM(s) Oral daily    PRN MEDICATIONS  HYDROmorphone  Injectable 0.5 milliGRAM(s) IV Push every 4 hours PRN  metoclopramide Injectable 5 milliGRAM(s) IV Push every 8 hours PRN    VITALS:   T(F): 99.1  HR: 70  BP: 147/68  RR: 18  SpO2: --    LABS:                        10.6   9.58  )-----------( 232      ( 02 Aug 2018 05:25 )             31.9           PT/INR - ( 02 Aug 2018 05:25 )   PT: 29.90 sec;   INR: 2.80 ratio                       RADIOLOGY:    PHYSICAL EXAM:  GEN: No acute distress  LUNGS: Clear to auscultation bilaterally   HEART: Regular  ABD: Soft, non-tender, non-distended.  EXT: NC/NC/NE/2+PP/HARRISON/Skin Intact.   NEURO: AAOX3    Intravenous access:   NG tube:   Krishnamurthy Catheter:   Indwelling Urethral Catheter:     Connect To:  Straight Drainage/Gravity    Indication:  Improved Comfort Care (07-29-18 @ 23:26) (not performed) [active]  Indwelling Urethral Catheter:     Connect To:  Straight Drainage/Gravity    Indication:  Improved Comfort Care (07-30-18 @ 10:01) (not performed) [active]  Indwelling Urethral Catheter:     Connect To:  Straight Drainage/Gravity    Indication:  Improved Comfort Care (08-01-18 @ 07:52) (not performed) [active] SUBJECTIVE:    Patient is a 84y old Female who presents with a chief complaint of R shoulder pain (29 Jul 2018 10:47)    Currently admitted to medicine with the primary diagnosis of Shoulder pain, acute     Today is hospital day 4d. This morning she is resting comfortably in bed and reports no new issues or overnight events.     Pain has improved off analgesia improved ROM. Improved swelling.    Patient was OOBTC yesterday for several hours, tolerated well. Krishnamurthy removed, and IVFs discontinued. She is voiding small amounts of urine, bladder scan showed 280cc    PAST MEDICAL & SURGICAL HISTORY  HTN (hypertension)  Cerebrovascular accident (CVA)  COPD (chronic obstructive pulmonary disease)  Atrial fibrillation  Systolic heart failure  AICD (automatic cardioverter/defibrillator) present  S/P appendectomy    SOCIAL HISTORY:  Negative for smoking/alcohol/drug use.     ALLERGIES:  No Known Drug Allergies  shellfish (Other)    MEDICATIONS:  STANDING MEDICATIONS  atorvastatin 80 milliGRAM(s) Oral at bedtime  diltiazem    milliGRAM(s) Oral daily  docusate sodium 100 milliGRAM(s) Oral three times a day  furosemide    Tablet 20 milliGRAM(s) Oral daily  metoprolol succinate  milliGRAM(s) Oral daily  pantoprazole    Tablet 40 milliGRAM(s) Oral before breakfast  senna 2 Tablet(s) Oral at bedtime  sertraline 50 milliGRAM(s) Oral daily    PRN MEDICATIONS  HYDROmorphone  Injectable 0.5 milliGRAM(s) IV Push every 4 hours PRN  metoclopramide Injectable 5 milliGRAM(s) IV Push every 8 hours PRN    VITALS:   T(F): 99.1  HR: 70  BP: 147/68  RR: 18  SpO2: --    LABS:                        10.6   9.58  )-----------( 232      ( 02 Aug 2018 05:25 )             31.9           PT/INR - ( 02 Aug 2018 05:25 )   PT: 29.90 sec;   INR: 2.80 ratio                       RADIOLOGY:    < from: CT Shoulder No Cont, Right (07.30.18 @ 08:47) >  IMPRESSION:  1. No acute glenohumeral fracture or dislocation  2. Complex subacromial subdeltoid bursitis with Hounsfield units   measuring 60. Findings are consistent with hemorrhagic bursitis   3.    Mild posterior subluxation of humeral head relative to glenoid and   chronic enthesopathy along the lesser tubercle    < end of copied text >    PHYSICAL EXAM:  GEN: No acute distress  LUNGS: Clear to auscultation bilaterally   HEART: Irregularly irregular  ABD: Soft, non-tender, non-distended.  EXT: NC/NC/NE/2+PP, R shoulder swelling subsiding, tenderness improving. LUE & LLE weakness  NEURO: Alert and follows commands but disoriented at times    Intravenous access: None  NG tube: N/A  Krishnamurthy Catheter: None

## 2018-08-03 ENCOUNTER — TRANSCRIPTION ENCOUNTER (OUTPATIENT)
Age: 83
End: 2018-08-03

## 2018-08-03 LAB
INR BLD: 2.4 RATIO — HIGH (ref 0.65–1.3)
PROTHROM AB SERPL-ACNC: 25.7 SEC — HIGH (ref 9.95–12.87)

## 2018-08-03 RX ORDER — WARFARIN SODIUM 2.5 MG/1
0.5 TABLET ORAL ONCE
Qty: 0 | Refills: 0 | Status: COMPLETED | OUTPATIENT
Start: 2018-08-03 | End: 2018-08-03

## 2018-08-03 RX ORDER — ALPRAZOLAM 0.25 MG
0.12 TABLET ORAL ONCE
Qty: 0 | Refills: 0 | Status: DISCONTINUED | OUTPATIENT
Start: 2018-08-03 | End: 2018-08-03

## 2018-08-03 RX ORDER — MAGNESIUM HYDROXIDE 400 MG/1
30 TABLET, CHEWABLE ORAL DAILY
Qty: 0 | Refills: 0 | Status: DISCONTINUED | OUTPATIENT
Start: 2018-08-03 | End: 2018-08-06

## 2018-08-03 RX ADMIN — Medication 100 MILLIGRAM(S): at 21:23

## 2018-08-03 RX ADMIN — ATORVASTATIN CALCIUM 80 MILLIGRAM(S): 80 TABLET, FILM COATED ORAL at 21:23

## 2018-08-03 RX ADMIN — Medication 0.12 MILLIGRAM(S): at 18:25

## 2018-08-03 RX ADMIN — Medication 100 MILLIGRAM(S): at 13:45

## 2018-08-03 RX ADMIN — PANTOPRAZOLE SODIUM 40 MILLIGRAM(S): 20 TABLET, DELAYED RELEASE ORAL at 08:59

## 2018-08-03 RX ADMIN — SERTRALINE 50 MILLIGRAM(S): 25 TABLET, FILM COATED ORAL at 13:45

## 2018-08-03 RX ADMIN — SENNA PLUS 2 TABLET(S): 8.6 TABLET ORAL at 21:24

## 2018-08-03 RX ADMIN — Medication 100 MILLIGRAM(S): at 05:12

## 2018-08-03 RX ADMIN — Medication 20 MILLIGRAM(S): at 05:12

## 2018-08-03 RX ADMIN — TRAMADOL HYDROCHLORIDE 50 MILLIGRAM(S): 50 TABLET ORAL at 22:06

## 2018-08-03 RX ADMIN — WARFARIN SODIUM 0.5 MILLIGRAM(S): 2.5 TABLET ORAL at 21:23

## 2018-08-03 RX ADMIN — Medication 120 MILLIGRAM(S): at 05:12

## 2018-08-03 RX ADMIN — TRAMADOL HYDROCHLORIDE 50 MILLIGRAM(S): 50 TABLET ORAL at 21:28

## 2018-08-03 NOTE — PROGRESS NOTE ADULT - ASSESSMENT
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    1. R shoulder pain   CT scan findings consistent with hemorrhagic bursitis  CT also shows mild posterior subluxation of humeral head - unknown chronicity   Ortho - no inpatient intervention - Follow up in ortho surg clinic with Dr. Trevino as an outpatient  Pain control with Dilaudid PRN, ice therapy to shoulder, sling for comfort  No NSAIDs for now as increased risk for GI bleed with supratherapeutic INR  RUE ROM improved with mild pain, OOBTC    2. Afib, with prior CVA  Continue Cardizem and Toprol for rate control  Restarting 0.5mg of warfarin tonight tonight as INR is 2.40 today  Check INR daily; Keep INR 2-3    #DVT px: SCD  #GI px: Protonix PO    Dispo: patient lives with daughter. Physiatry evaluation: SNF vs home with VN services    Daughter refuses SNF, she has a 12 hour HHA at home

## 2018-08-03 NOTE — DISCHARGE NOTE ADULT - CARE PROVIDER_API CALL
Linden Mulligan), Internal Medicine  [facility] [place] [address]  Phone: [phone]  Fax: [phone]    Junior Herrmann), Cardiovascular Disease  [address] [place] [address]  Phone: [phone]  Fax: [phone]

## 2018-08-03 NOTE — PROGRESS NOTE ADULT - SUBJECTIVE AND OBJECTIVE BOX
SUBJECTIVE:    Patient is a 84y old Female who presents with a chief complaint of R shoulder pain (29 Jul 2018 10:47)    Currently admitted to medicine with the primary diagnosis of Shoulder pain, acute     Today is hospital day 5d. This morning she is resting comfortably in bed and reports no new issues or overnight events.     Pain has improved off analgesia. ROM significantly improved with minimal pain. Improved swelling.    Patient is OOBTC, occupational therapy at bedside, tolerates it well.    PAST MEDICAL & SURGICAL HISTORY  HTN (hypertension)  Cerebrovascular accident (CVA)  COPD (chronic obstructive pulmonary disease)  Atrial fibrillation  Systolic heart failure  AICD (automatic cardioverter/defibrillator) present  S/P appendectomy    SOCIAL HISTORY:  Negative for smoking/alcohol/drug use.     ALLERGIES:  No Known Drug Allergies  shellfish (Other)    MEDICATIONS:  STANDING MEDICATIONS  ALPRAZolam 0.125 milliGRAM(s) Oral once  atorvastatin 80 milliGRAM(s) Oral at bedtime  diltiazem    milliGRAM(s) Oral daily  docusate sodium 100 milliGRAM(s) Oral three times a day  furosemide    Tablet 20 milliGRAM(s) Oral daily  metoprolol succinate  milliGRAM(s) Oral daily  pantoprazole    Tablet 40 milliGRAM(s) Oral before breakfast  senna 2 Tablet(s) Oral at bedtime  sertraline 50 milliGRAM(s) Oral daily  warfarin 0.5 milliGRAM(s) Oral once    PRN MEDICATIONS  HYDROmorphone  Injectable 0.5 milliGRAM(s) IV Push every 4 hours PRN  metoclopramide Injectable 5 milliGRAM(s) IV Push every 8 hours PRN  traMADol 50 milliGRAM(s) Oral every 6 hours PRN    VITALS:   T(F): 97.9  HR: 60  BP: 157/54  RR: 18  SpO2: 98%    LABS:                        10.6   9.58  )-----------( 232      ( 02 Aug 2018 05:25 )             31.9           PT/INR - ( 03 Aug 2018 06:04 )   PT: 25.70 sec;   INR: 2.40 ratio        RADIOLOGY:    < from: CT Shoulder No Cont, Right (07.30.18 @ 08:47) >  IMPRESSION:  1. No acute glenohumeral fracture or dislocation  2. Complex subacromial subdeltoid bursitis with Hounsfield units   measuring 60. Findings are consistent with hemorrhagic bursitis   3.    Mild posterior subluxation of humeral head relative to glenoid and   chronic enthesopathy along the lesser tubercle    < end of copied text >    PHYSICAL EXAM:  GEN: No acute distress  LUNGS: Clear to auscultation bilaterally   HEART: Irregularly irregular  ABD: Soft, non-tender, non-distended.  EXT: NC/NC/NE/2+PP, R shoulder swelling subsiding, tenderness improving. LUE & LLE weakness  NEURO: Alert and follows commands but disoriented at times    Intravenous access: None  NG tube: N/A  Krishnamurthy Catheter: None

## 2018-08-03 NOTE — DISCHARGE NOTE ADULT - PATIENT PORTAL LINK FT
You can access the [email or web address] Patient Portal, offered by [facility], by registering with the following website: [email or web address]

## 2018-08-03 NOTE — PROGRESS NOTE ADULT - SUBJECTIVE AND OBJECTIVE BOX
Patient seen and case discussed with house staff  Daughter by bed side  Recd OT. No inpatient rehab  INR 2.5, resume Warferin at a small dose of 0.5 mgs qd and add Alprazolam 0.125 mgs in the evening for anxiety  Shoulder pain improved but quite tender on palpation and any movement  Daughter refuses discharge to SNF. Has  a 12h HHA at home

## 2018-08-03 NOTE — OCCUPATIONAL THERAPY INITIAL EVALUATION ADULT - RANGE OF MOTION EXAMINATION, UPPER EXTREMITY
RUE: pt guarding shoulder unable to perform A/PROM secondary to pain, elbow/wrist/digits all WFL;  L UE: shoulder ~1/2 AROM full PROM, elbow ~3/4 AROM full PROM, wrist/digits full A/PROM

## 2018-08-03 NOTE — DISCHARGE NOTE ADULT - PLAN OF CARE
Complete resolution and prevention of recurrence CT scan findings consistent with hemorrhagic bursitis  CT also shows mild posterior subluxation of humeral head - unknown chronicity   Orthopedic surgery recommended conservative management with no acute surgical intervention  - Pain has subsided and active range of motion has improved significantly    Recommendations:   continue physical and occupation therapy AS TOLERATED.   Local cold therapy as needed to reduce swelling and pain.  Follow up with Dr Mulligan 1 week after discharge Rate control and anticoagulation Rate is controlled  INR was supratherapeutic on admission so Coumadin was held for a couple of days and then restarted on 0.5 mg and then increased to 1.0 mg every night.    Recommendations:  Continue to monitor INR - keep between 2.0-3.0  Take Coumadin 1.0 every night until otherwise instructed   Follow up with your cardiologist for further management. Complete resolution Positive Urinalysis with cultures growing Klebsiella   - Started on IV Ciprofloxacin   - Continue taking oral Ciprofloxacin twice daily for 5 more days   - Follow up with Dr. Mulligan within 1 week

## 2018-08-03 NOTE — DISCHARGE NOTE ADULT - HOSPITAL COURSE
83yo lady with PMH of Afib on Coumadin, S/P AICD, CVA in June 2016 with residual left sided weakness, HTN, COPD, heart failure and bladder Ca in remission presenting for worsening atraumatic R shoulder pain, CT scan showed hemorrhagic bursitis.    1. R shoulder pain   CT scan findings consistent with hemorrhagic bursitis  CT also shows mild posterior subluxation of humeral head - unknown chronicity   Ortho - no inpatient intervention - Follow up in ortho surg clinic with Dr. Trevino as an outpatient  Pain control with Dilaudid PRN, ice therapy to shoulder, sling for comfort  No NSAIDs for now as increased risk for GI bleed with supratherapeutic INR  RUE ROM improved with no pain, OOBTC    2. Afib, with prior CVA  Continue Cardizem and Toprol for rate control  Patient received 0.5mg Warfarin last 2 nights, INR today 2.23 (yesterday 2.03)  Will increase tonight to 1mg of Warfarin  Check INR daily; Keep INR 2-3    3. UTI  - Patient had an AMS episode Friday night  - U/A was positive on 7/29 but asymptomatic  - Patient was started on antibiotics yesterday pending urine cultures    #DVT px: SCD  #GI px: Protonix PO

## 2018-08-03 NOTE — DISCHARGE NOTE ADULT - CARE PLAN
Principal Discharge DX:	Acute pain of right shoulder  Goal:	Complete resolution and prevention of recurrence  Assessment and plan of treatment:	CT scan findings consistent with hemorrhagic bursitis  CT also shows mild posterior subluxation of humeral head - unknown chronicity   Orthopedic surgery recommended conservative management with no acute surgical intervention  - Pain has subsided and active range of motion has improved significantly    Recommendations:   continue physical and occupation therapy AS TOLERATED.   Local cold therapy as needed to reduce swelling and pain.  Follow up with Dr Mulligan 1 week after discharge  Secondary Diagnosis:	Atrial fibrillation  Goal:	Rate control and anticoagulation  Assessment and plan of treatment:	Rate is controlled  INR was supratherapeutic on admission so Coumadin was held for a couple of days and then restarted on 0.5 mg and then increased to 1.0 mg every night.    Recommendations:  Continue to monitor INR - keep between 2.0-3.0  Take Coumadin 1.0 every night until otherwise instructed   Follow up with your cardiologist for further management.  Secondary Diagnosis:	UTI (urinary tract infection)  Goal:	Complete resolution  Assessment and plan of treatment:	Positive Urinalysis with cultures growing Klebsiella   - Started on IV Ciprofloxacin   - Continue taking oral Ciprofloxacin twice daily for 5 more days   - Follow up with Dr. Mulligan within 1 week

## 2018-08-03 NOTE — OCCUPATIONAL THERAPY INITIAL EVALUATION ADULT - TRANSFER SAFETY CONCERNS NOTED: BED/CHAIR, REHAB EVAL
losing balance/decreased balance during turns/decreased weight-shifting ability/decreased safety awareness

## 2018-08-03 NOTE — DISCHARGE NOTE ADULT - MEDICATION SUMMARY - MEDICATIONS TO TAKE
I will START or STAY ON the medications listed below when I get home from the hospital:    Cardizem  mg/24 hours oral capsule, extended release  -- 1 cap(s) by mouth once a day  -- Indication: For Atrial fibrillation    Zoloft 50 mg oral tablet  -- 1 tab(s) by mouth once a day  -- Indication: For Depression    Lipitor 80 mg oral tablet  -- 1 tab(s) by mouth once a day (at bedtime)  -- Indication: For Hyperlipidemia    Toprol- mg oral tablet, extended release  -- 1 tab(s) by mouth once a day  -- Indication: For HTN (hypertension)    Lasix 20 mg oral tablet  -- 1 tab(s) by mouth once a day  -- Indication: For Heart failure    Colace 100 mg oral capsule  -- 1 cap(s) by mouth 3 times a day  -- Indication: For Constipation    potassium chloride  -- 40 milliequivalent(s) by mouth once a day (at bedtime)  -- Indication: For Low potassium    Probiotic Formula oral capsule  -- 1 cap(s) by mouth once a day (at bedtime)  -- Indication: For Probiotic    Protonix 40 mg oral delayed release tablet  -- 1 tab(s) by mouth once a day  -- Indication: For GI prophylaxis    Cipro 500 mg oral tablet  -- 1 tab(s) by mouth every 12 hours  -- Indication: For Antibiotic I will START or STAY ON the medications listed below when I get home from the hospital:    Cardizem  mg/24 hours oral capsule, extended release  -- 1 cap(s) by mouth once a day  -- Indication: For Atrial fibrillation    warfarin 1 mg oral tablet  -- 1 tab(s) by mouth once a day (at bedtime)  -- Indication: For Atrial fibrillation    Zoloft 50 mg oral tablet  -- 1 tab(s) by mouth once a day  -- Indication: For Depression    Lipitor 80 mg oral tablet  -- 1 tab(s) by mouth once a day (at bedtime)  -- Indication: For Hyperlipidemia    Toprol- mg oral tablet, extended release  -- 1 tab(s) by mouth once a day  -- Indication: For HTN (hypertension)    Lasix 20 mg oral tablet  -- 1 tab(s) by mouth once a day  -- Indication: For Heart failure    Colace 100 mg oral capsule  -- 1 cap(s) by mouth 3 times a day  -- Indication: For Constipation    potassium chloride  -- 40 milliequivalent(s) by mouth once a day (at bedtime)  -- Indication: For Low potassium    Probiotic Formula oral capsule  -- 1 cap(s) by mouth once a day (at bedtime)  -- Indication: For Probiotic    Protonix 40 mg oral delayed release tablet  -- 1 tab(s) by mouth once a day  -- Indication: For GI prophylaxis    Cipro 500 mg oral tablet  -- 1 tab(s) by mouth every 12 hours  -- Indication: For Antibiotic

## 2018-08-03 NOTE — OCCUPATIONAL THERAPY INITIAL EVALUATION ADULT - GENERAL OBSERVATIONS, REHAB EVAL
pt received semi urena in bed in NAD, agreeable to OT evaluation left seated in b/s recliner, daughter present in room throughout OT evaluation

## 2018-08-04 LAB
INR BLD: 2.03 RATIO — HIGH (ref 0.65–1.3)
PROTHROM AB SERPL-ACNC: 21.8 SEC — HIGH (ref 9.95–12.87)

## 2018-08-04 RX ORDER — CIPROFLOXACIN LACTATE 400MG/40ML
400 VIAL (ML) INTRAVENOUS EVERY 12 HOURS
Qty: 0 | Refills: 0 | Status: DISCONTINUED | OUTPATIENT
Start: 2018-08-05 | End: 2018-08-06

## 2018-08-04 RX ORDER — CIPROFLOXACIN LACTATE 400MG/40ML
VIAL (ML) INTRAVENOUS
Qty: 0 | Refills: 0 | Status: DISCONTINUED | OUTPATIENT
Start: 2018-08-04 | End: 2018-08-06

## 2018-08-04 RX ORDER — CIPROFLOXACIN LACTATE 400MG/40ML
400 VIAL (ML) INTRAVENOUS ONCE
Qty: 0 | Refills: 0 | Status: COMPLETED | OUTPATIENT
Start: 2018-08-04 | End: 2018-08-04

## 2018-08-04 RX ORDER — WARFARIN SODIUM 2.5 MG/1
0.5 TABLET ORAL ONCE
Qty: 0 | Refills: 0 | Status: COMPLETED | OUTPATIENT
Start: 2018-08-04 | End: 2018-08-04

## 2018-08-04 RX ADMIN — Medication 120 MILLIGRAM(S): at 06:48

## 2018-08-04 RX ADMIN — Medication 100 MILLIGRAM(S): at 06:48

## 2018-08-04 RX ADMIN — PANTOPRAZOLE SODIUM 40 MILLIGRAM(S): 20 TABLET, DELAYED RELEASE ORAL at 06:48

## 2018-08-04 RX ADMIN — TRAMADOL HYDROCHLORIDE 50 MILLIGRAM(S): 50 TABLET ORAL at 22:00

## 2018-08-04 RX ADMIN — WARFARIN SODIUM 0.5 MILLIGRAM(S): 2.5 TABLET ORAL at 21:21

## 2018-08-04 RX ADMIN — TRAMADOL HYDROCHLORIDE 50 MILLIGRAM(S): 50 TABLET ORAL at 21:22

## 2018-08-04 RX ADMIN — Medication 20 MILLIGRAM(S): at 06:52

## 2018-08-04 RX ADMIN — SERTRALINE 50 MILLIGRAM(S): 25 TABLET, FILM COATED ORAL at 11:23

## 2018-08-04 RX ADMIN — Medication 200 MILLIGRAM(S): at 15:44

## 2018-08-04 RX ADMIN — ATORVASTATIN CALCIUM 80 MILLIGRAM(S): 80 TABLET, FILM COATED ORAL at 21:20

## 2018-08-04 RX ADMIN — Medication 100 MILLIGRAM(S): at 13:10

## 2018-08-04 RX ADMIN — Medication 100 MILLIGRAM(S): at 21:21

## 2018-08-04 NOTE — PROGRESS NOTE ADULT - SUBJECTIVE AND OBJECTIVE BOX
Patient had a change in mental status last night: talked about being "locked in "  No fever  Oral intake is decent  Daughter by bedside  Urinalysis after removal of Krishnamurthy suggestive of UTI.  Discussed with resident to send urine culture and start on abx  Right shoulder swelling present but tenderness improved.  Awiating today's INR

## 2018-08-05 LAB
INR BLD: 2.23 RATIO — HIGH (ref 0.65–1.3)
PROTHROM AB SERPL-ACNC: 23.9 SEC — HIGH (ref 9.95–12.87)

## 2018-08-05 RX ORDER — WARFARIN SODIUM 2.5 MG/1
1 TABLET ORAL ONCE
Qty: 0 | Refills: 0 | Status: COMPLETED | OUTPATIENT
Start: 2018-08-05 | End: 2018-08-05

## 2018-08-05 RX ADMIN — Medication 200 MILLIGRAM(S): at 17:32

## 2018-08-05 RX ADMIN — WARFARIN SODIUM 1 MILLIGRAM(S): 2.5 TABLET ORAL at 21:29

## 2018-08-05 RX ADMIN — Medication 100 MILLIGRAM(S): at 21:29

## 2018-08-05 RX ADMIN — Medication 100 MILLIGRAM(S): at 05:25

## 2018-08-05 RX ADMIN — Medication 20 MILLIGRAM(S): at 05:25

## 2018-08-05 RX ADMIN — Medication 200 MILLIGRAM(S): at 05:37

## 2018-08-05 RX ADMIN — SERTRALINE 50 MILLIGRAM(S): 25 TABLET, FILM COATED ORAL at 14:10

## 2018-08-05 RX ADMIN — TRAMADOL HYDROCHLORIDE 50 MILLIGRAM(S): 50 TABLET ORAL at 21:29

## 2018-08-05 RX ADMIN — ATORVASTATIN CALCIUM 80 MILLIGRAM(S): 80 TABLET, FILM COATED ORAL at 21:29

## 2018-08-05 RX ADMIN — Medication 120 MILLIGRAM(S): at 05:25

## 2018-08-05 RX ADMIN — Medication 100 MILLIGRAM(S): at 14:10

## 2018-08-05 RX ADMIN — PANTOPRAZOLE SODIUM 40 MILLIGRAM(S): 20 TABLET, DELAYED RELEASE ORAL at 05:25

## 2018-08-05 NOTE — DIETITIAN INITIAL EVALUATION ADULT. - ENERGY NEEDS
Calories: MSJ of 973 x 1.2-1.3= 9470-0685 kcal/day  Protein: 58-70 gm/day (1.0-1.2 g/kg)   Fluid: 1 mL/kcal

## 2018-08-05 NOTE — DIETITIAN INITIAL EVALUATION ADULT. - DIET TYPE
Consumed >75% of breakfast tray this am per daughter. 50-75% intake documented 8/2-8/4./DASH/TLC (sodium and cholesterol restricted diet)

## 2018-08-05 NOTE — DIETITIAN INITIAL EVALUATION ADULT. - ORAL INTAKE PTA
fair/daughter reports pt is a very picky eater but much improved intake past couple of days while in hospital. Reports pt eats breakfast, skips on lunch and eats small dinner. Has tried supplements with pt and she does not like. Daughter reports pt eating less since stroke last year.

## 2018-08-05 NOTE — PHYSICAL THERAPY INITIAL EVALUATION ADULT - ACTIVE RANGE OF MOTION EXAMINATION, REHAB EVAL
deficits as listed below/RT shldr flex 20 deg, elb flex 45 deg; RT LE - WDL; LT UE/LE HP - [place] II

## 2018-08-05 NOTE — PROGRESS NOTE ADULT - SUBJECTIVE AND OBJECTIVE BOX
SUBJECTIVE:    Patient is a 84y old Female who presents with a chief complaint of R shoulder pain (03 Aug 2018 16:11)    Currently admitted to medicine with the primary diagnosis of Shoulder pain, acute     Today is hospital day 7d. This morning she is resting comfortably in bed and reports no new issues or overnight events.     Pain has improved off analgesia. ROM significantly improved with no pain. Swelling almost resolved.    Patient is OOBTC, occupational therapy at bedside, tolerates it well.    PAST MEDICAL & SURGICAL HISTORY  HTN (hypertension)  Cerebrovascular accident (CVA)  COPD (chronic obstructive pulmonary disease)  Atrial fibrillation  Systolic heart failure  AICD (automatic cardioverter/defibrillator) present  S/P appendectomy    SOCIAL HISTORY:  Negative for smoking/alcohol/drug use.     ALLERGIES:  No Known Drug Allergies  shellfish (Other)    MEDICATIONS:  STANDING MEDICATIONS  atorvastatin 80 milliGRAM(s) Oral at bedtime  ciprofloxacin   IVPB      ciprofloxacin   IVPB 400 milliGRAM(s) IV Intermittent every 12 hours  diltiazem    milliGRAM(s) Oral daily  docusate sodium 100 milliGRAM(s) Oral three times a day  furosemide    Tablet 20 milliGRAM(s) Oral daily  metoprolol succinate  milliGRAM(s) Oral daily  pantoprazole    Tablet 40 milliGRAM(s) Oral before breakfast  senna 2 Tablet(s) Oral at bedtime  sertraline 50 milliGRAM(s) Oral daily    PRN MEDICATIONS  HYDROmorphone  Injectable 0.5 milliGRAM(s) IV Push every 4 hours PRN  magnesium hydroxide Suspension 30 milliLiter(s) Oral daily PRN  metoclopramide Injectable 5 milliGRAM(s) IV Push every 8 hours PRN  traMADol 50 milliGRAM(s) Oral every 6 hours PRN    VITALS:   T(F): 98.8  HR: 66  BP: 139/64  RR: 18  SpO2: --    LABS:          PT/INR - ( 05 Aug 2018 07:23 )   PT: 23.90 sec;   INR: 2.23 ratio                       RADIOLOGY:    < from: CT Shoulder No Cont, Right (07.30.18 @ 08:47) >  IMPRESSION:  1. No acute glenohumeral fracture or dislocation  2. Complex subacromial subdeltoid bursitis with Hounsfield units   measuring 60. Findings are consistent with hemorrhagic bursitis   3.    Mild posterior subluxation of humeral head relative to glenoid and   chronic enthesopathy along the lesser tubercle    < end of copied text >    PHYSICAL EXAM:  GEN: No acute distress  LUNGS: Clear to auscultation bilaterally   HEART: Irregularly irregular  ABD: Soft, non-tender, non-distended.  EXT: NC/NC/NE/2+PP, R shoulder swelling subsiding, tenderness improving. LUE & LLE weakness  NEURO: Alert and follows commands but disoriented at times

## 2018-08-05 NOTE — PHYSICAL THERAPY INITIAL EVALUATION ADULT - ASR EQUIP NEEDS DISCH PT EVAL
3:1 commode/hemiwalker is safer for the patient than her NBQC; hemiwalker is wider, steadier, and will result in less leaning through the injured RT shldr./hillary walker

## 2018-08-05 NOTE — DIETITIAN INITIAL EVALUATION ADULT. - NS AS NUTRI INTERV COLLABORAT
Recommend SLP eval due to daughter report of pt with swallowing difficulty. Diet order per SLP recommendations.

## 2018-08-05 NOTE — DIETITIAN INITIAL EVALUATION ADULT. - PHYSICAL APPEARANCE
Alert and oriented x 4, daughter at bedside. Surgical incision. BMI 24 with daughter's stated wt of 128#.

## 2018-08-05 NOTE — PHYSICAL THERAPY INITIAL EVALUATION ADULT - GENERAL OBSERVATIONS, REHAB EVAL
1540-4022 pm. patient received in bed, left in b/s chair, + chair alarm, dtr present. patient with improving RT LE AROM decreasing pain per patient and dtr.

## 2018-08-05 NOTE — DIETITIAN INITIAL EVALUATION ADULT. - PERTINENT MEDS FT
abx, MOM, tramadol, atorvastatin, diltiazem, colace, lasix, hydromorphone, reglan, metoprolol, protonix, senna

## 2018-08-05 NOTE — PROGRESS NOTE ADULT - ASSESSMENT
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    1. R shoulder pain   CT scan findings consistent with hemorrhagic bursitis  CT also shows mild posterior subluxation of humeral head - unknown chronicity   Ortho - no inpatient intervention - Follow up in ortho surg clinic with Dr. Trevino as an outpatient  Pain control with Dilaudid PRN, ice therapy to shoulder, sling for comfort  No NSAIDs for now as increased risk for GI bleed with supratherapeutic INR  RUE ROM improved with no pain, OOBTC    2. Afib, with prior CVA  Continue Cardizem and Toprol for rate control  Patient received 0.5mg Warfarin last 2 nights, INR today 2.23 (yesterday 2.03)  Will increase tonight to 1mg of Warfarin  Check INR daily; Keep INR 2-3    #DVT px: SCD  #GI px: Protonix PO    Dispo: patient lives with daughter. Daughter will be able to take her back tomorrow with the aids  Physiatry evaluation: SNF vs home with VN services    Daughter refuses SNF, she has a 12 hour HHA at home 83yo lady with PMH of Afib on Coumadin, S/P AICD, CVA in June 2016 with residual left sided weakness, HTN, COPD, and bladder Ca in remission presenting for worsening atraumatic R shoulder pain, CT scan showed hemorrhagic bursitis.    1. R shoulder pain   CT scan findings consistent with hemorrhagic bursitis  CT also shows mild posterior subluxation of humeral head - unknown chronicity   Ortho - no inpatient intervention - Follow up in ortho surg clinic with Dr. Trevino as an outpatient  Pain control with Dilaudid PRN, ice therapy to shoulder, sling for comfort  No NSAIDs for now as increased risk for GI bleed with supratherapeutic INR  RUE ROM improved with no pain, OOBTC    2. Afib, with prior CVA  Continue Cardizem and Toprol for rate control  Patient received 0.5mg Warfarin last 2 nights, INR today 2.23 (yesterday 2.03)  Will increase tonight to 1mg of Warfarin  Check INR daily; Keep INR 2-3    3. UTI  - Patient had an AMS episode Friday night  - U/A was positive on 7/29 but asymptomatic  - Patient was started on antibiotics yesterday pending urine cultures    #DVT px: SCD  #GI px: Protonix PO    Dispo: patient lives with daughter. Daughter will be able to take her back tomorrow with the aids  Physiatry evaluation: SNF vs home with VN services    Daughter refuses SNF, she has a 12 hour HHA at home

## 2018-08-05 NOTE — DIETITIAN INITIAL EVALUATION ADULT. - NS AS NUTRI INTERV ED CONTENT
Educated daughter on purpose of SLP eval. Also educated to provide pt with high kcal foods to prevent further wt loss.

## 2018-08-05 NOTE — DIETITIAN INITIAL EVALUATION ADULT. - OTHER INFO
Reason for assessment: LOS. Chief complaint of rt shoulder pain, CT scan consistent with hemorrhagic bursitis, mild posterior subluxation of humeral head, no inpt intervention per ortho, pain control; a-fib with prior CVA

## 2018-08-06 VITALS
TEMPERATURE: 97 F | RESPIRATION RATE: 18 BRPM | SYSTOLIC BLOOD PRESSURE: 135 MMHG | DIASTOLIC BLOOD PRESSURE: 60 MMHG | HEART RATE: 61 BPM

## 2018-08-06 LAB
CULTURE RESULTS: SIGNIFICANT CHANGE UP
INR BLD: 2.39 RATIO — HIGH (ref 0.65–1.3)
PROTHROM AB SERPL-ACNC: 25.6 SEC — HIGH (ref 9.95–12.87)
SPECIMEN SOURCE: SIGNIFICANT CHANGE UP

## 2018-08-06 RX ORDER — MOXIFLOXACIN HYDROCHLORIDE TABLETS, 400 MG 400 MG/1
1 TABLET, FILM COATED ORAL
Qty: 10 | Refills: 0 | OUTPATIENT
Start: 2018-08-06 | End: 2018-08-10

## 2018-08-06 RX ORDER — TRAMADOL HYDROCHLORIDE 50 MG/1
1 TABLET ORAL
Qty: 8 | Refills: 0 | OUTPATIENT
Start: 2018-08-06

## 2018-08-06 RX ADMIN — Medication 100 MILLIGRAM(S): at 06:27

## 2018-08-06 RX ADMIN — Medication 20 MILLIGRAM(S): at 06:28

## 2018-08-06 RX ADMIN — Medication 100 MILLIGRAM(S): at 06:29

## 2018-08-06 RX ADMIN — PANTOPRAZOLE SODIUM 40 MILLIGRAM(S): 20 TABLET, DELAYED RELEASE ORAL at 06:29

## 2018-08-06 RX ADMIN — Medication 200 MILLIGRAM(S): at 06:27

## 2018-08-06 RX ADMIN — SERTRALINE 50 MILLIGRAM(S): 25 TABLET, FILM COATED ORAL at 11:41

## 2018-08-06 RX ADMIN — Medication 120 MILLIGRAM(S): at 06:28

## 2018-08-06 NOTE — PROVIDER CONTACT NOTE (OTHER) - SITUATION
Pt and daughter spoke with MD regarding pope risks and benefits, pope will be taken out at this time.
Pt daughter refusing for niko to come out MD krishnamurthy
Pt daughter requests the pope to be d/c'd later in the day 1pm, MD monaco notified of daughters request at this time.
MD notified that Coumadin is not listed on pts discharge medication instructions.
pt c/o SOB. RN took pulse ox pt is 94% on room air. RN elevated HOB pt is no longer stating that she has SOB and does not appear to be in distress.
pt having difficult time emptying bladder, wants to be left on bedpan, both pt and family educated on skin preservation, daughter also c/o that pt not eating or drinking and requesting iv hydration.

## 2018-08-06 NOTE — PROGRESS NOTE ADULT - SUBJECTIVE AND OBJECTIVE BOX
Patient seen and examined  No fever  Right shoulder pain improved, swelling resolved  On IV Cipro for UTI, and can start oral Cipro and continue upon discharge for 5 days  Had episode of altered mental state last night too, which may be from in patient psychosis  Patient will continue Warferin 1 mgs daily and monitor INR at home through blood draw  Cardiac status stable

## 2018-08-06 NOTE — PROGRESS NOTE ADULT - PROVIDER SPECIALTY LIST ADULT
Internal Medicine

## 2018-08-06 NOTE — PROVIDER CONTACT NOTE (OTHER) - ACTION/TREATMENT ORDERED:
Will keep pope cath in until 1pm at this time, will monitor.
Yessy talley taken out now, will continue to monitor.
MD to edit discharge medications
okay, thank you. 94% is acceptable
order received to place pope and place on ivf.

## 2018-08-08 ENCOUNTER — OUTPATIENT (OUTPATIENT)
Dept: OUTPATIENT SERVICES | Facility: HOSPITAL | Age: 83
LOS: 1 days | Discharge: HOME | End: 2018-08-08

## 2018-08-08 DIAGNOSIS — Z02.9 ENCOUNTER FOR ADMINISTRATIVE EXAMINATIONS, UNSPECIFIED: ICD-10-CM

## 2018-08-08 DIAGNOSIS — Z90.49 ACQUIRED ABSENCE OF OTHER SPECIFIED PARTS OF DIGESTIVE TRACT: Chronic | ICD-10-CM

## 2018-08-08 DIAGNOSIS — Z95.810 PRESENCE OF AUTOMATIC (IMPLANTABLE) CARDIAC DEFIBRILLATOR: Chronic | ICD-10-CM

## 2018-08-08 DIAGNOSIS — I48.91 UNSPECIFIED ATRIAL FIBRILLATION: ICD-10-CM

## 2018-08-08 PROBLEM — I50.20 UNSPECIFIED SYSTOLIC (CONGESTIVE) HEART FAILURE: Chronic | Status: ACTIVE | Noted: 2018-07-29

## 2018-08-08 PROBLEM — I63.9 CEREBRAL INFARCTION, UNSPECIFIED: Chronic | Status: ACTIVE | Noted: 2018-07-29

## 2018-08-08 PROBLEM — I10 ESSENTIAL (PRIMARY) HYPERTENSION: Chronic | Status: ACTIVE | Noted: 2018-07-29

## 2018-08-08 PROBLEM — J44.9 CHRONIC OBSTRUCTIVE PULMONARY DISEASE, UNSPECIFIED: Chronic | Status: ACTIVE | Noted: 2018-07-29

## 2018-08-09 ENCOUNTER — OUTPATIENT (OUTPATIENT)
Dept: OUTPATIENT SERVICES | Facility: HOSPITAL | Age: 83
LOS: 1 days | Discharge: HOME | End: 2018-08-09

## 2018-08-09 DIAGNOSIS — R79.9 ABNORMAL FINDING OF BLOOD CHEMISTRY, UNSPECIFIED: ICD-10-CM

## 2018-08-09 DIAGNOSIS — Z90.49 ACQUIRED ABSENCE OF OTHER SPECIFIED PARTS OF DIGESTIVE TRACT: Chronic | ICD-10-CM

## 2018-08-09 DIAGNOSIS — I48.91 UNSPECIFIED ATRIAL FIBRILLATION: ICD-10-CM

## 2018-08-09 DIAGNOSIS — Z95.810 PRESENCE OF AUTOMATIC (IMPLANTABLE) CARDIAC DEFIBRILLATOR: Chronic | ICD-10-CM

## 2018-08-12 ENCOUNTER — EMERGENCY (EMERGENCY)
Facility: HOSPITAL | Age: 83
LOS: 0 days | Discharge: HOME | End: 2018-08-12
Attending: EMERGENCY MEDICINE | Admitting: EMERGENCY MEDICINE

## 2018-08-12 VITALS
HEART RATE: 74 BPM | OXYGEN SATURATION: 96 % | TEMPERATURE: 97 F | SYSTOLIC BLOOD PRESSURE: 133 MMHG | DIASTOLIC BLOOD PRESSURE: 90 MMHG | RESPIRATION RATE: 18 BRPM

## 2018-08-12 VITALS
SYSTOLIC BLOOD PRESSURE: 136 MMHG | DIASTOLIC BLOOD PRESSURE: 68 MMHG | OXYGEN SATURATION: 96 % | TEMPERATURE: 98 F | RESPIRATION RATE: 18 BRPM | HEART RATE: 67 BPM

## 2018-08-12 DIAGNOSIS — J44.9 CHRONIC OBSTRUCTIVE PULMONARY DISEASE, UNSPECIFIED: ICD-10-CM

## 2018-08-12 DIAGNOSIS — K59.00 CONSTIPATION, UNSPECIFIED: ICD-10-CM

## 2018-08-12 DIAGNOSIS — Z79.01 LONG TERM (CURRENT) USE OF ANTICOAGULANTS: ICD-10-CM

## 2018-08-12 DIAGNOSIS — Z79.899 OTHER LONG TERM (CURRENT) DRUG THERAPY: ICD-10-CM

## 2018-08-12 DIAGNOSIS — Z90.49 ACQUIRED ABSENCE OF OTHER SPECIFIED PARTS OF DIGESTIVE TRACT: Chronic | ICD-10-CM

## 2018-08-12 DIAGNOSIS — Z95.810 PRESENCE OF AUTOMATIC (IMPLANTABLE) CARDIAC DEFIBRILLATOR: Chronic | ICD-10-CM

## 2018-08-12 RX ORDER — ONDANSETRON 8 MG/1
4 TABLET, FILM COATED ORAL ONCE
Qty: 0 | Refills: 0 | Status: COMPLETED | OUTPATIENT
Start: 2018-08-12 | End: 2018-08-12

## 2018-08-12 RX ORDER — MULTIVIT WITH MIN/MFOLATE/K2 340-15/3 G
150 POWDER (GRAM) ORAL ONCE
Qty: 0 | Refills: 0 | Status: DISCONTINUED | OUTPATIENT
Start: 2018-08-12 | End: 2018-08-12

## 2018-08-12 RX ORDER — SODIUM CHLORIDE 9 MG/ML
1000 INJECTION, SOLUTION INTRAVENOUS ONCE
Qty: 0 | Refills: 0 | Status: COMPLETED | OUTPATIENT
Start: 2018-08-12 | End: 2018-08-12

## 2018-08-12 RX ADMIN — SODIUM CHLORIDE 1000 MILLILITER(S): 9 INJECTION, SOLUTION INTRAVENOUS at 19:16

## 2018-08-12 RX ADMIN — Medication 1 ENEMA: at 16:42

## 2018-08-12 RX ADMIN — Medication 1 ENEMA: at 17:53

## 2018-08-12 RX ADMIN — SODIUM CHLORIDE 1000 MILLILITER(S): 9 INJECTION, SOLUTION INTRAVENOUS at 17:20

## 2018-08-12 NOTE — ED ADULT NURSE REASSESSMENT NOTE - NS ED NURSE REASSESS COMMENT FT1
patient rectal pain improved after 2 fleets enema and manual disimpaction by Dr. MORALES.  Patients VSS

## 2018-08-12 NOTE — ED PROVIDER NOTE - PROGRESS NOTE DETAILS
I personally evaluated the patient. I reviewed the Resident’s or Physician Assistant’s note (as assigned above), and agree with the findings and plan except as documented in my note.   85 Y/O F HTN, DYSLIPIDEMIA, AFIB (ON COUMADIN) I personally evaluated the patient. I reviewed the Resident’s or Physician Assistant’s note (as assigned above), and agree with the findings and plan except as documented in my note.   85 Y/O F HTN, DYSLIPIDEMIA, AFIB (ON COUMADIN), CVA WITH RESIDUAL L SIDED WEAKNESS, CHF, COPD, H/O BLADDER CA, AICD, DISCHARGED 8/5 AFTER ADMISSION FOR HEMORRHAGIC BURSITIS AND SUPRATHERAPEUTIC INR. PT NOW C/O CONSTIPATION. Pt reports feeling better after disimpaction. Large amount of stool removed, Pt reports feeling better after disimpaction. Pt tolerated well. Will give fleet enema PT DISIMPACTED. PT REPORTS RECTAL PAIN COMPLETELY RESOLVED. WILL GIVE FLEETS ENEMA. I personally evaluated the patient. I reviewed the Resident’s or Physician Assistant’s note (as assigned above), and agree with the findings and plan except as documented in my note.   85 Y/O F HTN, DYSLIPIDEMIA, AFIB (ON COUMADIN), CVA WITH RESIDUAL L SIDED WEAKNESS, CHF, COPD, H/O BLADDER CA, AICD, DISCHARGED 8/5 AFTER ADMISSION FOR HEMORRHAGIC BURSITIS AND SUPRATHERAPEUTIC INR. PT NOW C/O CONSTIPATION SINCE DISCHARGE. PT GIVEN OPIOIDS FOR PAIN DURING RECENT ADMISSION. + RECTAL PAIN. NO ABD PAIN. NO N/V. NO FEVER, CHILLS. NO BACK PAIN. VITALS NOTED. ALERT OX3 NAD ABD- SOFT DISTENDED NONTENDER. BACK NONTENDER. RECTAL + EXT HEMORRHOIDS. + STOOL IN VAULT.

## 2018-08-12 NOTE — ED PROVIDER NOTE - NS ED ROS FT
General: no fever, no chills  Eyes:  No visual changes, eye pain or discharge.  ENMT:  No hearing changes, pain, no sore throat or runny nose, no difficulty swallowing  Cardiac:  No chest pain, SOB or edema. No chest pain with exertion.  Respiratory:  No cough or respiratory distress. No hemoptysis. No history of asthma or RAD.  GI:  +constipation, +abdominal pain, No nausea, vomiting, diarrhea  :  No dysuria, frequency or burning.  MS:  No myalgia, muscle weakness, joint pain or back pain.  Neuro:  No headache or weakness.  No LOC.  Skin:  No skin rash.   Endocrine: No history of thyroid disease or diabetes.

## 2018-08-12 NOTE — ED PROVIDER NOTE - PHYSICAL EXAMINATION
CONSTITUTIONAL: Well-developed; well-nourished; in no acute distress.   SKIN: warm, dry  HEAD: Normocephalic; atraumatic.  EYES: PERRL, EOMI, no conjunctival erythema  ENT: No nasal discharge; airway clear.  NECK: Supple; non tender.  CARD: S1, S2 normal; no murmurs, gallops, or rubs. Regular rate and rhythm.   RESP: No wheezes, rales or rhonchi.  ABD: +hard stool in rectal vault, soft ntnd  EXT: Normal ROM.  No clubbing, cyanosis or edema.   LYMPH: No acute cervical adenopathy.  NEURO: Alert, oriented, grossly unremarkable  PSYCH: Cooperative, appropriate.

## 2018-08-12 NOTE — ED PROVIDER NOTE - MEDICAL DECISION MAKING DETAILS
85 Y/O F C/O CONSTIPATION AFTER RECENT ADMISSION FOR HEMORRHAGIC BURSITIS. PT RECEIVED LARGE AMOUTNS OF OPIOIDS FOR PAIN DURING THE ADMISSION. PT DISIMPLACTED SUCCESSFULLY WITH REMOVAL OF A LARGE AMOUNT OF STOOL AND PT WITH RESOLUTION OF SXS. WILL FOLLOW UP WITH PMD.

## 2018-08-12 NOTE — ED PROVIDER NOTE - OBJECTIVE STATEMENT
84 y.o. F with PMH of CVA with left residual, COPD, Afib, AICD presents with 2 weeks of constipation. She was admitted 2 weeks ago for hemorrhagic bursitis of the shoulder and was put on morphine for pain. She states that she stopped taking morphine when she left the hospital. S 84 y.o. F with PMH of CVA with left residual, COPD, Afib, AICD presents with 2 weeks of constipation. She was admitted 2 weeks ago for hemorrhagic bursitis of the shoulder and was put on morphine for pain. She states that she stopped taking morphine when she left the hospital. She has felt like there was a blockage where her behind was.

## 2018-08-15 ENCOUNTER — OUTPATIENT (OUTPATIENT)
Dept: OUTPATIENT SERVICES | Facility: HOSPITAL | Age: 83
LOS: 1 days | Discharge: HOME | End: 2018-08-15

## 2018-08-15 DIAGNOSIS — Z90.49 ACQUIRED ABSENCE OF OTHER SPECIFIED PARTS OF DIGESTIVE TRACT: Chronic | ICD-10-CM

## 2018-08-15 DIAGNOSIS — Z95.810 PRESENCE OF AUTOMATIC (IMPLANTABLE) CARDIAC DEFIBRILLATOR: Chronic | ICD-10-CM

## 2018-08-15 DIAGNOSIS — R79.9 ABNORMAL FINDING OF BLOOD CHEMISTRY, UNSPECIFIED: ICD-10-CM

## 2018-08-15 DIAGNOSIS — I48.91 UNSPECIFIED ATRIAL FIBRILLATION: ICD-10-CM

## 2018-08-22 ENCOUNTER — OUTPATIENT (OUTPATIENT)
Dept: OUTPATIENT SERVICES | Facility: HOSPITAL | Age: 83
LOS: 1 days | Discharge: HOME | End: 2018-08-22

## 2018-08-22 DIAGNOSIS — Z90.49 ACQUIRED ABSENCE OF OTHER SPECIFIED PARTS OF DIGESTIVE TRACT: Chronic | ICD-10-CM

## 2018-08-22 DIAGNOSIS — Z95.810 PRESENCE OF AUTOMATIC (IMPLANTABLE) CARDIAC DEFIBRILLATOR: Chronic | ICD-10-CM

## 2018-08-22 DIAGNOSIS — R79.9 ABNORMAL FINDING OF BLOOD CHEMISTRY, UNSPECIFIED: ICD-10-CM

## 2018-08-22 DIAGNOSIS — I48.91 UNSPECIFIED ATRIAL FIBRILLATION: ICD-10-CM

## 2018-08-29 ENCOUNTER — OUTPATIENT (OUTPATIENT)
Dept: OUTPATIENT SERVICES | Facility: HOSPITAL | Age: 83
LOS: 1 days | Discharge: HOME | End: 2018-08-29

## 2018-08-29 DIAGNOSIS — I48.91 UNSPECIFIED ATRIAL FIBRILLATION: ICD-10-CM

## 2018-08-29 DIAGNOSIS — R79.9 ABNORMAL FINDING OF BLOOD CHEMISTRY, UNSPECIFIED: ICD-10-CM

## 2018-08-29 DIAGNOSIS — Z95.810 PRESENCE OF AUTOMATIC (IMPLANTABLE) CARDIAC DEFIBRILLATOR: Chronic | ICD-10-CM

## 2018-08-29 DIAGNOSIS — Z90.49 ACQUIRED ABSENCE OF OTHER SPECIFIED PARTS OF DIGESTIVE TRACT: Chronic | ICD-10-CM

## 2018-09-05 ENCOUNTER — OUTPATIENT (OUTPATIENT)
Dept: OUTPATIENT SERVICES | Facility: HOSPITAL | Age: 83
LOS: 1 days | Discharge: HOME | End: 2018-09-05

## 2018-09-05 DIAGNOSIS — R79.9 ABNORMAL FINDING OF BLOOD CHEMISTRY, UNSPECIFIED: ICD-10-CM

## 2018-09-05 DIAGNOSIS — I48.91 UNSPECIFIED ATRIAL FIBRILLATION: ICD-10-CM

## 2018-09-05 DIAGNOSIS — Z95.810 PRESENCE OF AUTOMATIC (IMPLANTABLE) CARDIAC DEFIBRILLATOR: Chronic | ICD-10-CM

## 2018-09-05 DIAGNOSIS — Z90.49 ACQUIRED ABSENCE OF OTHER SPECIFIED PARTS OF DIGESTIVE TRACT: Chronic | ICD-10-CM

## 2018-09-12 ENCOUNTER — OUTPATIENT (OUTPATIENT)
Dept: OUTPATIENT SERVICES | Facility: HOSPITAL | Age: 83
LOS: 1 days | Discharge: HOME | End: 2018-09-12

## 2018-09-12 DIAGNOSIS — Z90.49 ACQUIRED ABSENCE OF OTHER SPECIFIED PARTS OF DIGESTIVE TRACT: Chronic | ICD-10-CM

## 2018-09-12 DIAGNOSIS — Z95.810 PRESENCE OF AUTOMATIC (IMPLANTABLE) CARDIAC DEFIBRILLATOR: Chronic | ICD-10-CM

## 2018-09-12 DIAGNOSIS — R79.9 ABNORMAL FINDING OF BLOOD CHEMISTRY, UNSPECIFIED: ICD-10-CM

## 2018-09-12 DIAGNOSIS — I48.91 UNSPECIFIED ATRIAL FIBRILLATION: ICD-10-CM

## 2018-09-19 ENCOUNTER — OUTPATIENT (OUTPATIENT)
Dept: OUTPATIENT SERVICES | Facility: HOSPITAL | Age: 83
LOS: 1 days | Discharge: HOME | End: 2018-09-19

## 2018-09-19 DIAGNOSIS — I48.91 UNSPECIFIED ATRIAL FIBRILLATION: ICD-10-CM

## 2018-09-19 DIAGNOSIS — R79.9 ABNORMAL FINDING OF BLOOD CHEMISTRY, UNSPECIFIED: ICD-10-CM

## 2018-09-19 DIAGNOSIS — Z90.49 ACQUIRED ABSENCE OF OTHER SPECIFIED PARTS OF DIGESTIVE TRACT: Chronic | ICD-10-CM

## 2018-09-19 DIAGNOSIS — Z95.810 PRESENCE OF AUTOMATIC (IMPLANTABLE) CARDIAC DEFIBRILLATOR: Chronic | ICD-10-CM

## 2018-09-26 ENCOUNTER — OUTPATIENT (OUTPATIENT)
Dept: OUTPATIENT SERVICES | Facility: HOSPITAL | Age: 83
LOS: 1 days | Discharge: HOME | End: 2018-09-26

## 2018-09-26 DIAGNOSIS — R79.9 ABNORMAL FINDING OF BLOOD CHEMISTRY, UNSPECIFIED: ICD-10-CM

## 2018-09-26 DIAGNOSIS — Z95.810 PRESENCE OF AUTOMATIC (IMPLANTABLE) CARDIAC DEFIBRILLATOR: Chronic | ICD-10-CM

## 2018-09-26 DIAGNOSIS — Z90.49 ACQUIRED ABSENCE OF OTHER SPECIFIED PARTS OF DIGESTIVE TRACT: Chronic | ICD-10-CM

## 2018-09-26 DIAGNOSIS — I48.91 UNSPECIFIED ATRIAL FIBRILLATION: ICD-10-CM

## 2018-10-03 ENCOUNTER — OUTPATIENT (OUTPATIENT)
Dept: OUTPATIENT SERVICES | Facility: HOSPITAL | Age: 83
LOS: 1 days | Discharge: HOME | End: 2018-10-03

## 2018-10-03 DIAGNOSIS — Z90.49 ACQUIRED ABSENCE OF OTHER SPECIFIED PARTS OF DIGESTIVE TRACT: Chronic | ICD-10-CM

## 2018-10-03 DIAGNOSIS — R79.9 ABNORMAL FINDING OF BLOOD CHEMISTRY, UNSPECIFIED: ICD-10-CM

## 2018-10-03 DIAGNOSIS — Z95.810 PRESENCE OF AUTOMATIC (IMPLANTABLE) CARDIAC DEFIBRILLATOR: Chronic | ICD-10-CM

## 2018-10-03 DIAGNOSIS — I48.91 UNSPECIFIED ATRIAL FIBRILLATION: ICD-10-CM

## 2018-10-10 ENCOUNTER — OUTPATIENT (OUTPATIENT)
Dept: OUTPATIENT SERVICES | Facility: HOSPITAL | Age: 83
LOS: 1 days | Discharge: HOME | End: 2018-10-10

## 2018-10-10 DIAGNOSIS — Z95.810 PRESENCE OF AUTOMATIC (IMPLANTABLE) CARDIAC DEFIBRILLATOR: Chronic | ICD-10-CM

## 2018-10-10 DIAGNOSIS — I48.91 UNSPECIFIED ATRIAL FIBRILLATION: ICD-10-CM

## 2018-10-10 DIAGNOSIS — Z90.49 ACQUIRED ABSENCE OF OTHER SPECIFIED PARTS OF DIGESTIVE TRACT: Chronic | ICD-10-CM

## 2018-10-10 DIAGNOSIS — R79.9 ABNORMAL FINDING OF BLOOD CHEMISTRY, UNSPECIFIED: ICD-10-CM

## 2018-10-17 ENCOUNTER — OUTPATIENT (OUTPATIENT)
Dept: OUTPATIENT SERVICES | Facility: HOSPITAL | Age: 83
LOS: 1 days | Discharge: HOME | End: 2018-10-17

## 2018-10-17 DIAGNOSIS — Z90.49 ACQUIRED ABSENCE OF OTHER SPECIFIED PARTS OF DIGESTIVE TRACT: Chronic | ICD-10-CM

## 2018-10-17 DIAGNOSIS — I48.91 UNSPECIFIED ATRIAL FIBRILLATION: ICD-10-CM

## 2018-10-17 DIAGNOSIS — R79.9 ABNORMAL FINDING OF BLOOD CHEMISTRY, UNSPECIFIED: ICD-10-CM

## 2018-10-17 DIAGNOSIS — Z95.810 PRESENCE OF AUTOMATIC (IMPLANTABLE) CARDIAC DEFIBRILLATOR: Chronic | ICD-10-CM

## 2018-10-24 ENCOUNTER — OUTPATIENT (OUTPATIENT)
Dept: OUTPATIENT SERVICES | Facility: HOSPITAL | Age: 83
LOS: 1 days | Discharge: HOME | End: 2018-10-24

## 2018-10-24 DIAGNOSIS — R79.9 ABNORMAL FINDING OF BLOOD CHEMISTRY, UNSPECIFIED: ICD-10-CM

## 2018-10-24 DIAGNOSIS — I48.91 UNSPECIFIED ATRIAL FIBRILLATION: ICD-10-CM

## 2018-10-24 DIAGNOSIS — Z90.49 ACQUIRED ABSENCE OF OTHER SPECIFIED PARTS OF DIGESTIVE TRACT: Chronic | ICD-10-CM

## 2018-10-24 DIAGNOSIS — Z95.810 PRESENCE OF AUTOMATIC (IMPLANTABLE) CARDIAC DEFIBRILLATOR: Chronic | ICD-10-CM

## 2018-10-31 ENCOUNTER — OUTPATIENT (OUTPATIENT)
Dept: OUTPATIENT SERVICES | Facility: HOSPITAL | Age: 83
LOS: 1 days | Discharge: HOME | End: 2018-10-31

## 2018-10-31 DIAGNOSIS — Z90.49 ACQUIRED ABSENCE OF OTHER SPECIFIED PARTS OF DIGESTIVE TRACT: Chronic | ICD-10-CM

## 2018-10-31 DIAGNOSIS — Z95.810 PRESENCE OF AUTOMATIC (IMPLANTABLE) CARDIAC DEFIBRILLATOR: Chronic | ICD-10-CM

## 2018-10-31 DIAGNOSIS — I48.91 UNSPECIFIED ATRIAL FIBRILLATION: ICD-10-CM

## 2018-10-31 DIAGNOSIS — R79.9 ABNORMAL FINDING OF BLOOD CHEMISTRY, UNSPECIFIED: ICD-10-CM

## 2018-11-07 ENCOUNTER — OUTPATIENT (OUTPATIENT)
Dept: OUTPATIENT SERVICES | Facility: HOSPITAL | Age: 83
LOS: 1 days | Discharge: HOME | End: 2018-11-07

## 2018-11-07 DIAGNOSIS — Z95.810 PRESENCE OF AUTOMATIC (IMPLANTABLE) CARDIAC DEFIBRILLATOR: Chronic | ICD-10-CM

## 2018-11-07 DIAGNOSIS — R79.9 ABNORMAL FINDING OF BLOOD CHEMISTRY, UNSPECIFIED: ICD-10-CM

## 2018-11-07 DIAGNOSIS — I48.91 UNSPECIFIED ATRIAL FIBRILLATION: ICD-10-CM

## 2018-11-07 DIAGNOSIS — Z90.49 ACQUIRED ABSENCE OF OTHER SPECIFIED PARTS OF DIGESTIVE TRACT: Chronic | ICD-10-CM

## 2018-11-14 ENCOUNTER — OUTPATIENT (OUTPATIENT)
Dept: OUTPATIENT SERVICES | Facility: HOSPITAL | Age: 83
LOS: 1 days | Discharge: HOME | End: 2018-11-14

## 2018-11-14 DIAGNOSIS — I48.91 UNSPECIFIED ATRIAL FIBRILLATION: ICD-10-CM

## 2018-11-14 DIAGNOSIS — Z90.49 ACQUIRED ABSENCE OF OTHER SPECIFIED PARTS OF DIGESTIVE TRACT: Chronic | ICD-10-CM

## 2018-11-14 DIAGNOSIS — R79.9 ABNORMAL FINDING OF BLOOD CHEMISTRY, UNSPECIFIED: ICD-10-CM

## 2018-11-14 DIAGNOSIS — Z95.810 PRESENCE OF AUTOMATIC (IMPLANTABLE) CARDIAC DEFIBRILLATOR: Chronic | ICD-10-CM

## 2018-11-21 ENCOUNTER — OUTPATIENT (OUTPATIENT)
Dept: OUTPATIENT SERVICES | Facility: HOSPITAL | Age: 83
LOS: 1 days | Discharge: HOME | End: 2018-11-21

## 2018-11-21 DIAGNOSIS — I48.91 UNSPECIFIED ATRIAL FIBRILLATION: ICD-10-CM

## 2018-11-21 DIAGNOSIS — Z90.49 ACQUIRED ABSENCE OF OTHER SPECIFIED PARTS OF DIGESTIVE TRACT: Chronic | ICD-10-CM

## 2018-11-21 DIAGNOSIS — Z95.810 PRESENCE OF AUTOMATIC (IMPLANTABLE) CARDIAC DEFIBRILLATOR: Chronic | ICD-10-CM

## 2018-11-21 DIAGNOSIS — R79.9 ABNORMAL FINDING OF BLOOD CHEMISTRY, UNSPECIFIED: ICD-10-CM

## 2018-11-28 ENCOUNTER — OUTPATIENT (OUTPATIENT)
Dept: OUTPATIENT SERVICES | Facility: HOSPITAL | Age: 83
LOS: 1 days | Discharge: HOME | End: 2018-11-28

## 2018-11-28 ENCOUNTER — EMERGENCY (EMERGENCY)
Facility: HOSPITAL | Age: 83
LOS: 0 days | Discharge: HOME | End: 2018-11-28
Attending: EMERGENCY MEDICINE | Admitting: EMERGENCY MEDICINE

## 2018-11-28 VITALS
DIASTOLIC BLOOD PRESSURE: 68 MMHG | HEART RATE: 72 BPM | SYSTOLIC BLOOD PRESSURE: 190 MMHG | OXYGEN SATURATION: 98 % | RESPIRATION RATE: 18 BRPM | TEMPERATURE: 97 F

## 2018-11-28 VITALS
HEART RATE: 78 BPM | DIASTOLIC BLOOD PRESSURE: 80 MMHG | SYSTOLIC BLOOD PRESSURE: 168 MMHG | TEMPERATURE: 96 F | OXYGEN SATURATION: 96 % | RESPIRATION RATE: 18 BRPM

## 2018-11-28 DIAGNOSIS — Z90.49 ACQUIRED ABSENCE OF OTHER SPECIFIED PARTS OF DIGESTIVE TRACT: Chronic | ICD-10-CM

## 2018-11-28 DIAGNOSIS — R05 COUGH: ICD-10-CM

## 2018-11-28 DIAGNOSIS — Z95.810 PRESENCE OF AUTOMATIC (IMPLANTABLE) CARDIAC DEFIBRILLATOR: Chronic | ICD-10-CM

## 2018-11-28 DIAGNOSIS — R31.9 HEMATURIA, UNSPECIFIED: ICD-10-CM

## 2018-11-28 DIAGNOSIS — Z79.899 OTHER LONG TERM (CURRENT) DRUG THERAPY: ICD-10-CM

## 2018-11-28 DIAGNOSIS — Z79.2 LONG TERM (CURRENT) USE OF ANTIBIOTICS: ICD-10-CM

## 2018-11-28 DIAGNOSIS — J44.9 CHRONIC OBSTRUCTIVE PULMONARY DISEASE, UNSPECIFIED: ICD-10-CM

## 2018-11-28 DIAGNOSIS — R06.02 SHORTNESS OF BREATH: ICD-10-CM

## 2018-11-28 DIAGNOSIS — I48.91 UNSPECIFIED ATRIAL FIBRILLATION: ICD-10-CM

## 2018-11-28 DIAGNOSIS — I11.0 HYPERTENSIVE HEART DISEASE WITH HEART FAILURE: ICD-10-CM

## 2018-11-28 DIAGNOSIS — M54.5 LOW BACK PAIN: ICD-10-CM

## 2018-11-28 DIAGNOSIS — E78.5 HYPERLIPIDEMIA, UNSPECIFIED: ICD-10-CM

## 2018-11-28 DIAGNOSIS — R79.9 ABNORMAL FINDING OF BLOOD CHEMISTRY, UNSPECIFIED: ICD-10-CM

## 2018-11-28 DIAGNOSIS — Z79.01 LONG TERM (CURRENT) USE OF ANTICOAGULANTS: ICD-10-CM

## 2018-11-28 DIAGNOSIS — Z79.1 LONG TERM (CURRENT) USE OF NON-STEROIDAL ANTI-INFLAMMATORIES (NSAID): ICD-10-CM

## 2018-11-28 DIAGNOSIS — I50.9 HEART FAILURE, UNSPECIFIED: ICD-10-CM

## 2018-11-28 LAB
ALBUMIN SERPL ELPH-MCNC: 3.6 G/DL — SIGNIFICANT CHANGE UP (ref 3.5–5.2)
ALP SERPL-CCNC: 115 U/L — SIGNIFICANT CHANGE UP (ref 30–115)
ALT FLD-CCNC: 14 U/L — SIGNIFICANT CHANGE UP (ref 0–41)
ANION GAP SERPL CALC-SCNC: 12 MMOL/L — SIGNIFICANT CHANGE UP (ref 7–14)
APPEARANCE UR: ABNORMAL
AST SERPL-CCNC: 28 U/L — SIGNIFICANT CHANGE UP (ref 0–41)
BACTERIA # UR AUTO: ABNORMAL /HPF
BASE EXCESS BLDV CALC-SCNC: 4.5 MMOL/L — HIGH (ref -2–2)
BASOPHILS # BLD AUTO: 0.02 K/UL — SIGNIFICANT CHANGE UP (ref 0–0.2)
BASOPHILS NFR BLD AUTO: 0.3 % — SIGNIFICANT CHANGE UP (ref 0–1)
BILIRUB SERPL-MCNC: 0.5 MG/DL — SIGNIFICANT CHANGE UP (ref 0.2–1.2)
BILIRUB UR-MCNC: NEGATIVE — SIGNIFICANT CHANGE UP
BUN SERPL-MCNC: 14 MG/DL — SIGNIFICANT CHANGE UP (ref 10–20)
CA-I SERPL-SCNC: 1.3 MMOL/L — SIGNIFICANT CHANGE UP (ref 1.12–1.3)
CALCIUM SERPL-MCNC: 9.9 MG/DL — SIGNIFICANT CHANGE UP (ref 8.5–10.1)
CHLORIDE SERPL-SCNC: 103 MMOL/L — SIGNIFICANT CHANGE UP (ref 98–110)
CO2 SERPL-SCNC: 27 MMOL/L — SIGNIFICANT CHANGE UP (ref 17–32)
COLOR SPEC: YELLOW — SIGNIFICANT CHANGE UP
CREAT SERPL-MCNC: 0.7 MG/DL — SIGNIFICANT CHANGE UP (ref 0.7–1.5)
DIFF PNL FLD: ABNORMAL
EOSINOPHIL # BLD AUTO: 0.25 K/UL — SIGNIFICANT CHANGE UP (ref 0–0.7)
EOSINOPHIL NFR BLD AUTO: 4.1 % — SIGNIFICANT CHANGE UP (ref 0–8)
EPI CELLS # UR: ABNORMAL /HPF
FLU A RESULT: NEGATIVE — SIGNIFICANT CHANGE UP
FLU A RESULT: NEGATIVE — SIGNIFICANT CHANGE UP
FLUAV AG NPH QL: NEGATIVE — SIGNIFICANT CHANGE UP
FLUBV AG NPH QL: NEGATIVE — SIGNIFICANT CHANGE UP
GAS PNL BLDV: 142 MMOL/L — SIGNIFICANT CHANGE UP (ref 136–145)
GAS PNL BLDV: SIGNIFICANT CHANGE UP
GLUCOSE SERPL-MCNC: 116 MG/DL — HIGH (ref 70–99)
GLUCOSE UR QL: NEGATIVE MG/DL — SIGNIFICANT CHANGE UP
HCO3 BLDV-SCNC: 31 MMOL/L — HIGH (ref 22–29)
HCT VFR BLD CALC: 37 % — SIGNIFICANT CHANGE UP (ref 37–47)
HCT VFR BLDA CALC: 40 % — SIGNIFICANT CHANGE UP (ref 34–44)
HGB BLD CALC-MCNC: 13.1 G/DL — LOW (ref 14–18)
HGB BLD-MCNC: 12 G/DL — SIGNIFICANT CHANGE UP (ref 12–16)
IMM GRANULOCYTES NFR BLD AUTO: 0.2 % — SIGNIFICANT CHANGE UP (ref 0.1–0.3)
KETONES UR-MCNC: NEGATIVE — SIGNIFICANT CHANGE UP
LACTATE BLDV-MCNC: 1.2 MMOL/L — SIGNIFICANT CHANGE UP (ref 0.5–1.6)
LEUKOCYTE ESTERASE UR-ACNC: ABNORMAL
LYMPHOCYTES # BLD AUTO: 1.51 K/UL — SIGNIFICANT CHANGE UP (ref 1.2–3.4)
LYMPHOCYTES # BLD AUTO: 24.9 % — SIGNIFICANT CHANGE UP (ref 20.5–51.1)
MCHC RBC-ENTMCNC: 27.3 PG — SIGNIFICANT CHANGE UP (ref 27–31)
MCHC RBC-ENTMCNC: 32.4 G/DL — SIGNIFICANT CHANGE UP (ref 32–37)
MCV RBC AUTO: 84.3 FL — SIGNIFICANT CHANGE UP (ref 81–99)
MONOCYTES # BLD AUTO: 0.85 K/UL — HIGH (ref 0.1–0.6)
MONOCYTES NFR BLD AUTO: 14 % — HIGH (ref 1.7–9.3)
NEUTROPHILS # BLD AUTO: 3.43 K/UL — SIGNIFICANT CHANGE UP (ref 1.4–6.5)
NEUTROPHILS NFR BLD AUTO: 56.5 % — SIGNIFICANT CHANGE UP (ref 42.2–75.2)
NITRITE UR-MCNC: NEGATIVE — SIGNIFICANT CHANGE UP
NRBC # BLD: 0 /100 WBCS — SIGNIFICANT CHANGE UP (ref 0–0)
NT-PROBNP SERPL-SCNC: 250 PG/ML — SIGNIFICANT CHANGE UP (ref 0–300)
PCO2 BLDV: 53 MMHG — HIGH (ref 41–51)
PH BLDV: 7.37 — SIGNIFICANT CHANGE UP (ref 7.26–7.43)
PH UR: 6 — SIGNIFICANT CHANGE UP (ref 5–8)
PLATELET # BLD AUTO: 213 K/UL — SIGNIFICANT CHANGE UP (ref 130–400)
PO2 BLDV: 36 MMHG — SIGNIFICANT CHANGE UP (ref 20–40)
POTASSIUM BLDV-SCNC: 3.4 MMOL/L — SIGNIFICANT CHANGE UP (ref 3.3–5.6)
POTASSIUM SERPL-MCNC: 4 MMOL/L — SIGNIFICANT CHANGE UP (ref 3.5–5)
POTASSIUM SERPL-SCNC: 4 MMOL/L — SIGNIFICANT CHANGE UP (ref 3.5–5)
PROT SERPL-MCNC: 7 G/DL — SIGNIFICANT CHANGE UP (ref 6–8)
PROT UR-MCNC: 30 MG/DL
RBC # BLD: 4.39 M/UL — SIGNIFICANT CHANGE UP (ref 4.2–5.4)
RBC # FLD: 15.9 % — HIGH (ref 11.5–14.5)
RBC CASTS # UR COMP ASSIST: >50 /HPF
RSV RESULT: NEGATIVE — SIGNIFICANT CHANGE UP
RSV RNA RESP QL NAA+PROBE: NEGATIVE — SIGNIFICANT CHANGE UP
SAO2 % BLDV: 66 % — SIGNIFICANT CHANGE UP
SODIUM SERPL-SCNC: 142 MMOL/L — SIGNIFICANT CHANGE UP (ref 135–146)
SP GR SPEC: 1.02 — SIGNIFICANT CHANGE UP (ref 1.01–1.03)
TROPONIN T SERPL-MCNC: <0.01 NG/ML — SIGNIFICANT CHANGE UP
UROBILINOGEN FLD QL: 0.2 MG/DL — SIGNIFICANT CHANGE UP (ref 0.2–0.2)
WBC # BLD: 6.07 K/UL — SIGNIFICANT CHANGE UP (ref 4.8–10.8)
WBC # FLD AUTO: 6.07 K/UL — SIGNIFICANT CHANGE UP (ref 4.8–10.8)
WBC UR QL: ABNORMAL /HPF

## 2018-11-28 RX ORDER — TRAMADOL HYDROCHLORIDE 50 MG/1
25 TABLET ORAL ONCE
Qty: 0 | Refills: 0 | Status: DISCONTINUED | OUTPATIENT
Start: 2018-11-28 | End: 2018-11-28

## 2018-11-28 RX ORDER — ACETAMINOPHEN 500 MG
975 TABLET ORAL ONCE
Qty: 0 | Refills: 0 | Status: COMPLETED | OUTPATIENT
Start: 2018-11-28 | End: 2018-11-28

## 2018-11-28 RX ORDER — ALBUTEROL 90 UG/1
2.5 AEROSOL, METERED ORAL ONCE
Qty: 0 | Refills: 0 | Status: COMPLETED | OUTPATIENT
Start: 2018-11-28 | End: 2018-11-28

## 2018-11-28 RX ADMIN — ALBUTEROL 2.5 MILLIGRAM(S): 90 AEROSOL, METERED ORAL at 13:25

## 2018-11-28 RX ADMIN — Medication 975 MILLIGRAM(S): at 16:23

## 2018-11-28 RX ADMIN — TRAMADOL HYDROCHLORIDE 25 MILLIGRAM(S): 50 TABLET ORAL at 16:21

## 2018-11-28 NOTE — ED PROVIDER NOTE - NS ED ROS FT
CONST: No fever, chills or bodyaches  EYES: No pain, redness, drainage or visual changes.  ENT: (+) sore throat  CARD: No chest pain, palpitations  RESP: (+) cough  GI: No abdominal pain, N/V/D  MS: No joint pain, back pain or extremity pain/injury  SKIN: No rashes  NEURO: No headache, dizziness, paresthesias or LOC. Baseline L sided weakness.

## 2018-11-28 NOTE — ED PROVIDER NOTE - CARE PROVIDER_API CALL
Angel Urrutia), Medicine  [address] [place] [address]  Phone: [phone]  Fax: [phone]

## 2018-11-28 NOTE — ED PROVIDER NOTE - ATTENDING CONTRIBUTION TO CARE
84 y F PMH CHF s/p AICD, HTN, HLD, COPD, stroke w/ left sided weakness since, pw sore throat x 1 wk, with cough x 2 days and feeling overall weak. Missed 2 days of Lasix yesterday and today, plan was for doubling up dose today to make up for missed doses until she felt worse so came to the ED.   Exam: NAD, NCAT, HEENT: mmm, EOMI, PERRLA, Neck: supple, nontender, nl ROM, Heart: RRR, no murmur, Lungs: bibasilar crackles, no pursed lip breathing, tripodding, tachypnea, or retractions, Abd: NTND, no guarding or rebound, no hernia palpated, no CVAT. MSK: 2+ b/l pitting edema equal. chest, back, and ext nontender, nl rom, no other deformity. Neuro: A&Ox3, strength baseline left sided weakness, nl sensation throughout, normal speech.   A/P: Eval for CHF exacerbation/fluid overload due to medication noncompliance, vs acute URI vs PNA.

## 2018-11-28 NOTE — ED PROVIDER NOTE - PROGRESS NOTE DETAILS
accepting care from Dr. Campos. pt with hx of chf, aicd, htn, copd presents for eval of cough. pt with dry cough X 2 days. saw pmd yesterday told had bronchitis. pt has persistent sob so came to. now pt endorses right lower back/flank pain. no fever, chills, dysuria. on exam pt no distress, faint bl crackles, s1s2 soft nt/nd, 2+ pedel edema bl. pt now with hematuria, has reported hx of kidney stones, is in no sig distress, ct added on. Discussed all labs and imaging with patient and daughter. Patient was seen yesterday by PMD< placed on Levaquin and provided albuterol solution. has f/u with rivera Chapman. Encouraged to maintain on Levaquin as patient also has wbcs and blood in urine with negatve CT scan for obstructing stone. Daughter prefers patient be discharged, has full time aide at home, nebs, and responsible f/u care.

## 2018-11-28 NOTE — ED PROVIDER NOTE - OBJECTIVE STATEMENT
85yo female with PMHx CVA with residual L sided weakness, AICD on coumadin, COPD, CHF on lasix, kidney stones, presents brought in by daughter for sore throat x 1 week and cough x 3 days, unable to expectorate phlegm. Patient without fever, chills, change in mental status or c/o SOB. No leg swelling. Patient was seen yesterday by PMD and diagnosed bronchitis, placed on Levaquin and at home nebs. Daughter brought patient to ED concerned for possible pneumonia. Patient did not receive flu shot or pneumovax this year.

## 2018-11-28 NOTE — ED PROVIDER NOTE - PHYSICAL EXAMINATION
CONST: Well appearing in NAD  EYES: PERRL, EOMI, Sclera and conjunctiva clear.   ENT: No nasal discharge. No pharyngeal erythema. No exudates.  CARD: Normal S1 S2; Normal rate and rhythm  RESP: Equal BS B/L, No wheezes, rhonchi or rales. Mild crackles bilaterally. No tachypnea.  GI: Soft, non-tender, non-distended.  MS: Normal ROM in all extremities. No midline spinal tenderness.  SKIN: Warm, dry, no acute rashes. Good turgor  NEURO: A&Ox3, L sided upper extremity weakness

## 2018-11-28 NOTE — ED PROVIDER NOTE - MEDICAL DECISION MAKING DETAILS
daughter aware of hematuria, culture sent. pt on levaquin which of UTI can tx uti. instructed to f/u as outpt. pt well appearing, no distress daughter comftorable with dc

## 2018-11-28 NOTE — ED ADULT NURSE NOTE - NSIMPLEMENTINTERV_GEN_ALL_ED
Implemented All Fall with Harm Risk Interventions:  [place] to call system. Call bell, personal items and telephone within reach. Instruct patient to call for assistance. Room bathroom lighting operational. Non-slip footwear when patient is off stretcher. Physically safe environment: no spills, clutter or unnecessary equipment. Stretcher in lowest position, wheels locked, appropriate side rails in place. Provide visual cue, wrist band, yellow gown, etc. Monitor gait and stability. Monitor for mental status changes and reorient to person, place, and time. Review medications for side effects contributing to fall risk. Reinforce activity limits and safety measures with patient and family. Provide visual clues: red socks.

## 2018-11-28 NOTE — ED ADULT NURSE NOTE - OBJECTIVE STATEMENT
pt complaining of cough congestion, shortness of breath generalized weakness at home. pt denies chest pain or discomfort. pt denies any relieving factors, no prior treatment.

## 2018-11-29 LAB
CULTURE RESULTS: SIGNIFICANT CHANGE UP
SPECIMEN SOURCE: SIGNIFICANT CHANGE UP

## 2018-12-05 ENCOUNTER — OUTPATIENT (OUTPATIENT)
Dept: OUTPATIENT SERVICES | Facility: HOSPITAL | Age: 83
LOS: 1 days | Discharge: HOME | End: 2018-12-05

## 2018-12-05 DIAGNOSIS — Z90.49 ACQUIRED ABSENCE OF OTHER SPECIFIED PARTS OF DIGESTIVE TRACT: Chronic | ICD-10-CM

## 2018-12-05 DIAGNOSIS — Z95.810 PRESENCE OF AUTOMATIC (IMPLANTABLE) CARDIAC DEFIBRILLATOR: Chronic | ICD-10-CM

## 2018-12-05 DIAGNOSIS — I48.91 UNSPECIFIED ATRIAL FIBRILLATION: ICD-10-CM

## 2018-12-05 DIAGNOSIS — R79.9 ABNORMAL FINDING OF BLOOD CHEMISTRY, UNSPECIFIED: ICD-10-CM

## 2018-12-12 ENCOUNTER — OUTPATIENT (OUTPATIENT)
Dept: OUTPATIENT SERVICES | Facility: HOSPITAL | Age: 83
LOS: 1 days | Discharge: HOME | End: 2018-12-12

## 2018-12-12 DIAGNOSIS — Z90.49 ACQUIRED ABSENCE OF OTHER SPECIFIED PARTS OF DIGESTIVE TRACT: Chronic | ICD-10-CM

## 2018-12-12 DIAGNOSIS — Z95.810 PRESENCE OF AUTOMATIC (IMPLANTABLE) CARDIAC DEFIBRILLATOR: Chronic | ICD-10-CM

## 2018-12-19 ENCOUNTER — OUTPATIENT (OUTPATIENT)
Dept: OUTPATIENT SERVICES | Facility: HOSPITAL | Age: 83
LOS: 1 days | Discharge: HOME | End: 2018-12-19

## 2018-12-19 DIAGNOSIS — Z95.810 PRESENCE OF AUTOMATIC (IMPLANTABLE) CARDIAC DEFIBRILLATOR: Chronic | ICD-10-CM

## 2018-12-19 DIAGNOSIS — Z90.49 ACQUIRED ABSENCE OF OTHER SPECIFIED PARTS OF DIGESTIVE TRACT: Chronic | ICD-10-CM

## 2018-12-19 DIAGNOSIS — I48.91 UNSPECIFIED ATRIAL FIBRILLATION: ICD-10-CM

## 2018-12-26 ENCOUNTER — OUTPATIENT (OUTPATIENT)
Dept: OUTPATIENT SERVICES | Facility: HOSPITAL | Age: 83
LOS: 1 days | Discharge: HOME | End: 2018-12-26

## 2018-12-26 DIAGNOSIS — I48.91 UNSPECIFIED ATRIAL FIBRILLATION: ICD-10-CM

## 2018-12-26 DIAGNOSIS — Z95.810 PRESENCE OF AUTOMATIC (IMPLANTABLE) CARDIAC DEFIBRILLATOR: Chronic | ICD-10-CM

## 2018-12-26 DIAGNOSIS — Z90.49 ACQUIRED ABSENCE OF OTHER SPECIFIED PARTS OF DIGESTIVE TRACT: Chronic | ICD-10-CM

## 2019-01-01 ENCOUNTER — OUTPATIENT (OUTPATIENT)
Dept: OUTPATIENT SERVICES | Facility: HOSPITAL | Age: 84
LOS: 1 days | Discharge: HOME | End: 2019-01-01

## 2019-01-01 ENCOUNTER — OUTPATIENT (OUTPATIENT)
Dept: OUTPATIENT SERVICES | Facility: HOSPITAL | Age: 84
LOS: 1 days | Discharge: HOME | End: 2019-01-01
Payer: MEDICARE

## 2019-01-01 ENCOUNTER — APPOINTMENT (OUTPATIENT)
Dept: CARDIOLOGY | Facility: CLINIC | Age: 84
End: 2019-01-01
Payer: MEDICARE

## 2019-01-01 ENCOUNTER — OUTPATIENT (OUTPATIENT)
Dept: OUTPATIENT SERVICES | Facility: HOSPITAL | Age: 84
LOS: 1 days | Discharge: HOME | End: 2019-01-01
Payer: COMMERCIAL

## 2019-01-01 DIAGNOSIS — F01.51 VASCULAR DEMENTIA, UNSPECIFIED SEVERITY, WITH BEHAVIORAL DISTURBANCE: ICD-10-CM

## 2019-01-01 DIAGNOSIS — Z95.810 PRESENCE OF AUTOMATIC (IMPLANTABLE) CARDIAC DEFIBRILLATOR: Chronic | ICD-10-CM

## 2019-01-01 DIAGNOSIS — Z90.49 ACQUIRED ABSENCE OF OTHER SPECIFIED PARTS OF DIGESTIVE TRACT: Chronic | ICD-10-CM

## 2019-01-01 DIAGNOSIS — Z79.01 LONG TERM (CURRENT) USE OF ANTICOAGULANTS: ICD-10-CM

## 2019-01-01 DIAGNOSIS — I48.91 UNSPECIFIED ATRIAL FIBRILLATION: ICD-10-CM

## 2019-01-01 DIAGNOSIS — I48.0 PAROXYSMAL ATRIAL FIBRILLATION: ICD-10-CM

## 2019-01-01 DIAGNOSIS — D68.9 COAGULATION DEFECT, UNSPECIFIED: ICD-10-CM

## 2019-01-01 PROCEDURE — 99213 OFFICE O/P EST LOW 20 MIN: CPT | Mod: 25

## 2019-01-01 PROCEDURE — 93290 INTERROG DEV EVAL ICPMS IP: CPT | Mod: 59

## 2019-01-01 PROCEDURE — 99214 OFFICE O/P EST MOD 30 MIN: CPT

## 2019-01-01 PROCEDURE — 93284 PRGRMG EVAL IMPLANTABLE DFB: CPT | Mod: 59

## 2019-01-01 PROCEDURE — 99213 OFFICE O/P EST LOW 20 MIN: CPT

## 2019-01-02 ENCOUNTER — OUTPATIENT (OUTPATIENT)
Dept: OUTPATIENT SERVICES | Facility: HOSPITAL | Age: 84
LOS: 1 days | Discharge: HOME | End: 2019-01-02

## 2019-01-02 DIAGNOSIS — I48.91 UNSPECIFIED ATRIAL FIBRILLATION: ICD-10-CM

## 2019-01-02 DIAGNOSIS — Z90.49 ACQUIRED ABSENCE OF OTHER SPECIFIED PARTS OF DIGESTIVE TRACT: Chronic | ICD-10-CM

## 2019-01-02 DIAGNOSIS — Z95.810 PRESENCE OF AUTOMATIC (IMPLANTABLE) CARDIAC DEFIBRILLATOR: Chronic | ICD-10-CM

## 2019-01-09 ENCOUNTER — OUTPATIENT (OUTPATIENT)
Dept: OUTPATIENT SERVICES | Facility: HOSPITAL | Age: 84
LOS: 1 days | Discharge: HOME | End: 2019-01-09

## 2019-01-09 DIAGNOSIS — Z90.49 ACQUIRED ABSENCE OF OTHER SPECIFIED PARTS OF DIGESTIVE TRACT: Chronic | ICD-10-CM

## 2019-01-09 DIAGNOSIS — I48.91 UNSPECIFIED ATRIAL FIBRILLATION: ICD-10-CM

## 2019-01-09 DIAGNOSIS — Z95.810 PRESENCE OF AUTOMATIC (IMPLANTABLE) CARDIAC DEFIBRILLATOR: Chronic | ICD-10-CM

## 2019-01-11 DIAGNOSIS — I48.91 UNSPECIFIED ATRIAL FIBRILLATION: ICD-10-CM

## 2019-01-16 ENCOUNTER — OUTPATIENT (OUTPATIENT)
Dept: OUTPATIENT SERVICES | Facility: HOSPITAL | Age: 84
LOS: 1 days | Discharge: HOME | End: 2019-01-16

## 2019-01-16 DIAGNOSIS — I48.91 UNSPECIFIED ATRIAL FIBRILLATION: ICD-10-CM

## 2019-01-16 DIAGNOSIS — Z90.49 ACQUIRED ABSENCE OF OTHER SPECIFIED PARTS OF DIGESTIVE TRACT: Chronic | ICD-10-CM

## 2019-01-16 DIAGNOSIS — Z95.810 PRESENCE OF AUTOMATIC (IMPLANTABLE) CARDIAC DEFIBRILLATOR: Chronic | ICD-10-CM

## 2019-01-23 ENCOUNTER — OUTPATIENT (OUTPATIENT)
Dept: OUTPATIENT SERVICES | Facility: HOSPITAL | Age: 84
LOS: 1 days | Discharge: HOME | End: 2019-01-23

## 2019-01-23 DIAGNOSIS — Z90.49 ACQUIRED ABSENCE OF OTHER SPECIFIED PARTS OF DIGESTIVE TRACT: Chronic | ICD-10-CM

## 2019-01-23 DIAGNOSIS — I48.91 UNSPECIFIED ATRIAL FIBRILLATION: ICD-10-CM

## 2019-01-23 DIAGNOSIS — Z95.810 PRESENCE OF AUTOMATIC (IMPLANTABLE) CARDIAC DEFIBRILLATOR: Chronic | ICD-10-CM

## 2019-01-30 ENCOUNTER — OUTPATIENT (OUTPATIENT)
Dept: OUTPATIENT SERVICES | Facility: HOSPITAL | Age: 84
LOS: 1 days | Discharge: HOME | End: 2019-01-30

## 2019-01-30 DIAGNOSIS — Z95.810 PRESENCE OF AUTOMATIC (IMPLANTABLE) CARDIAC DEFIBRILLATOR: Chronic | ICD-10-CM

## 2019-01-30 DIAGNOSIS — Z90.49 ACQUIRED ABSENCE OF OTHER SPECIFIED PARTS OF DIGESTIVE TRACT: Chronic | ICD-10-CM

## 2019-01-30 DIAGNOSIS — I48.91 UNSPECIFIED ATRIAL FIBRILLATION: ICD-10-CM

## 2019-02-06 ENCOUNTER — OUTPATIENT (OUTPATIENT)
Dept: OUTPATIENT SERVICES | Facility: HOSPITAL | Age: 84
LOS: 1 days | Discharge: HOME | End: 2019-02-06

## 2019-02-06 DIAGNOSIS — Z90.49 ACQUIRED ABSENCE OF OTHER SPECIFIED PARTS OF DIGESTIVE TRACT: Chronic | ICD-10-CM

## 2019-02-06 DIAGNOSIS — Z95.810 PRESENCE OF AUTOMATIC (IMPLANTABLE) CARDIAC DEFIBRILLATOR: Chronic | ICD-10-CM

## 2019-02-06 DIAGNOSIS — I48.91 UNSPECIFIED ATRIAL FIBRILLATION: ICD-10-CM

## 2019-02-13 ENCOUNTER — OUTPATIENT (OUTPATIENT)
Dept: OUTPATIENT SERVICES | Facility: HOSPITAL | Age: 84
LOS: 1 days | Discharge: HOME | End: 2019-02-13

## 2019-02-13 DIAGNOSIS — Z90.49 ACQUIRED ABSENCE OF OTHER SPECIFIED PARTS OF DIGESTIVE TRACT: Chronic | ICD-10-CM

## 2019-02-13 DIAGNOSIS — Z95.810 PRESENCE OF AUTOMATIC (IMPLANTABLE) CARDIAC DEFIBRILLATOR: Chronic | ICD-10-CM

## 2019-02-13 DIAGNOSIS — I48.91 UNSPECIFIED ATRIAL FIBRILLATION: ICD-10-CM

## 2019-02-16 ENCOUNTER — EMERGENCY (EMERGENCY)
Facility: HOSPITAL | Age: 84
LOS: 0 days | Discharge: HOME | End: 2019-02-16
Attending: EMERGENCY MEDICINE | Admitting: EMERGENCY MEDICINE

## 2019-02-16 VITALS
RESPIRATION RATE: 18 BRPM | OXYGEN SATURATION: 96 % | SYSTOLIC BLOOD PRESSURE: 158 MMHG | HEART RATE: 78 BPM | DIASTOLIC BLOOD PRESSURE: 84 MMHG | TEMPERATURE: 97 F

## 2019-02-16 VITALS — SYSTOLIC BLOOD PRESSURE: 180 MMHG | DIASTOLIC BLOOD PRESSURE: 86 MMHG

## 2019-02-16 DIAGNOSIS — Z79.891 LONG TERM (CURRENT) USE OF OPIATE ANALGESIC: ICD-10-CM

## 2019-02-16 DIAGNOSIS — Z79.01 LONG TERM (CURRENT) USE OF ANTICOAGULANTS: ICD-10-CM

## 2019-02-16 DIAGNOSIS — I50.9 HEART FAILURE, UNSPECIFIED: ICD-10-CM

## 2019-02-16 DIAGNOSIS — Z03.89 ENCOUNTER FOR OBSERVATION FOR OTHER SUSPECTED DISEASES AND CONDITIONS RULED OUT: ICD-10-CM

## 2019-02-16 DIAGNOSIS — F32.9 MAJOR DEPRESSIVE DISORDER, SINGLE EPISODE, UNSPECIFIED: ICD-10-CM

## 2019-02-16 DIAGNOSIS — Z95.810 PRESENCE OF AUTOMATIC (IMPLANTABLE) CARDIAC DEFIBRILLATOR: Chronic | ICD-10-CM

## 2019-02-16 DIAGNOSIS — R41.9 UNSPECIFIED SYMPTOMS AND SIGNS INVOLVING COGNITIVE FUNCTIONS AND AWARENESS: ICD-10-CM

## 2019-02-16 DIAGNOSIS — J44.9 CHRONIC OBSTRUCTIVE PULMONARY DISEASE, UNSPECIFIED: ICD-10-CM

## 2019-02-16 DIAGNOSIS — F03.91 UNSPECIFIED DEMENTIA WITH BEHAVIORAL DISTURBANCE: ICD-10-CM

## 2019-02-16 DIAGNOSIS — I11.0 HYPERTENSIVE HEART DISEASE WITH HEART FAILURE: ICD-10-CM

## 2019-02-16 DIAGNOSIS — Z95.810 PRESENCE OF AUTOMATIC (IMPLANTABLE) CARDIAC DEFIBRILLATOR: ICD-10-CM

## 2019-02-16 DIAGNOSIS — R45.1 RESTLESSNESS AND AGITATION: ICD-10-CM

## 2019-02-16 DIAGNOSIS — Z90.49 ACQUIRED ABSENCE OF OTHER SPECIFIED PARTS OF DIGESTIVE TRACT: Chronic | ICD-10-CM

## 2019-02-16 DIAGNOSIS — Z79.2 LONG TERM (CURRENT) USE OF ANTIBIOTICS: ICD-10-CM

## 2019-02-16 DIAGNOSIS — Z86.73 PERSONAL HISTORY OF TRANSIENT ISCHEMIC ATTACK (TIA), AND CEREBRAL INFARCTION WITHOUT RESIDUAL DEFICITS: ICD-10-CM

## 2019-02-16 DIAGNOSIS — Z91.013 ALLERGY TO SEAFOOD: ICD-10-CM

## 2019-02-16 DIAGNOSIS — Z79.899 OTHER LONG TERM (CURRENT) DRUG THERAPY: ICD-10-CM

## 2019-02-16 LAB
ALBUMIN SERPL ELPH-MCNC: 3.8 G/DL — SIGNIFICANT CHANGE UP (ref 3.5–5.2)
ALP SERPL-CCNC: 98 U/L — SIGNIFICANT CHANGE UP (ref 30–115)
ALT FLD-CCNC: 18 U/L — SIGNIFICANT CHANGE UP (ref 0–41)
ANION GAP SERPL CALC-SCNC: 17 MMOL/L — HIGH (ref 7–14)
APPEARANCE UR: CLEAR — SIGNIFICANT CHANGE UP
APTT BLD: 49.7 SEC — HIGH (ref 27–39.2)
AST SERPL-CCNC: 44 U/L — HIGH (ref 0–41)
BASOPHILS # BLD AUTO: 0.03 K/UL — SIGNIFICANT CHANGE UP (ref 0–0.2)
BASOPHILS NFR BLD AUTO: 0.4 % — SIGNIFICANT CHANGE UP (ref 0–1)
BILIRUB SERPL-MCNC: 0.5 MG/DL — SIGNIFICANT CHANGE UP (ref 0.2–1.2)
BILIRUB UR-MCNC: NEGATIVE — SIGNIFICANT CHANGE UP
BUN SERPL-MCNC: 16 MG/DL — SIGNIFICANT CHANGE UP (ref 10–20)
CALCIUM SERPL-MCNC: 8.9 MG/DL — SIGNIFICANT CHANGE UP (ref 8.5–10.1)
CHLORIDE SERPL-SCNC: 101 MMOL/L — SIGNIFICANT CHANGE UP (ref 98–110)
CO2 SERPL-SCNC: 23 MMOL/L — SIGNIFICANT CHANGE UP (ref 17–32)
COLOR SPEC: YELLOW — SIGNIFICANT CHANGE UP
CREAT SERPL-MCNC: 0.7 MG/DL — SIGNIFICANT CHANGE UP (ref 0.7–1.5)
DIFF PNL FLD: ABNORMAL
EOSINOPHIL # BLD AUTO: 0.34 K/UL — SIGNIFICANT CHANGE UP (ref 0–0.7)
EOSINOPHIL NFR BLD AUTO: 4.9 % — SIGNIFICANT CHANGE UP (ref 0–8)
EPI CELLS # UR: ABNORMAL /HPF
GLUCOSE SERPL-MCNC: 120 MG/DL — HIGH (ref 70–99)
GLUCOSE UR QL: NEGATIVE MG/DL — SIGNIFICANT CHANGE UP
HCT VFR BLD CALC: 35.2 % — LOW (ref 37–47)
HGB BLD-MCNC: 11.4 G/DL — LOW (ref 12–16)
IMM GRANULOCYTES NFR BLD AUTO: 0.1 % — SIGNIFICANT CHANGE UP (ref 0.1–0.3)
INR BLD: 4.03 RATIO — HIGH (ref 0.65–1.3)
KETONES UR-MCNC: NEGATIVE — SIGNIFICANT CHANGE UP
LACTATE SERPL-SCNC: 1.2 MMOL/L — SIGNIFICANT CHANGE UP (ref 0.5–2.2)
LEUKOCYTE ESTERASE UR-ACNC: ABNORMAL
LIDOCAIN IGE QN: 41 U/L — SIGNIFICANT CHANGE UP (ref 7–60)
LYMPHOCYTES # BLD AUTO: 1.7 K/UL — SIGNIFICANT CHANGE UP (ref 1.2–3.4)
LYMPHOCYTES # BLD AUTO: 24.6 % — SIGNIFICANT CHANGE UP (ref 20.5–51.1)
MCHC RBC-ENTMCNC: 27.9 PG — SIGNIFICANT CHANGE UP (ref 27–31)
MCHC RBC-ENTMCNC: 32.4 G/DL — SIGNIFICANT CHANGE UP (ref 32–37)
MCV RBC AUTO: 86.1 FL — SIGNIFICANT CHANGE UP (ref 81–99)
MONOCYTES # BLD AUTO: 0.89 K/UL — HIGH (ref 0.1–0.6)
MONOCYTES NFR BLD AUTO: 12.9 % — HIGH (ref 1.7–9.3)
NEUTROPHILS # BLD AUTO: 3.95 K/UL — SIGNIFICANT CHANGE UP (ref 1.4–6.5)
NEUTROPHILS NFR BLD AUTO: 57.1 % — SIGNIFICANT CHANGE UP (ref 42.2–75.2)
NITRITE UR-MCNC: NEGATIVE — SIGNIFICANT CHANGE UP
NRBC # BLD: 0 /100 WBCS — SIGNIFICANT CHANGE UP (ref 0–0)
NT-PROBNP SERPL-SCNC: 293 PG/ML — SIGNIFICANT CHANGE UP (ref 0–300)
PH UR: 6 — SIGNIFICANT CHANGE UP (ref 5–8)
PLATELET # BLD AUTO: 256 K/UL — SIGNIFICANT CHANGE UP (ref 130–400)
POTASSIUM SERPL-MCNC: 4.4 MMOL/L — SIGNIFICANT CHANGE UP (ref 3.5–5)
POTASSIUM SERPL-SCNC: 4.4 MMOL/L — SIGNIFICANT CHANGE UP (ref 3.5–5)
PROT SERPL-MCNC: 7 G/DL — SIGNIFICANT CHANGE UP (ref 6–8)
PROT UR-MCNC: NEGATIVE MG/DL — SIGNIFICANT CHANGE UP
PROTHROM AB SERPL-ACNC: >40 SEC — HIGH (ref 9.95–12.87)
RBC # BLD: 4.09 M/UL — LOW (ref 4.2–5.4)
RBC # FLD: 15.9 % — HIGH (ref 11.5–14.5)
RBC CASTS # UR COMP ASSIST: ABNORMAL /HPF
SODIUM SERPL-SCNC: 141 MMOL/L — SIGNIFICANT CHANGE UP (ref 135–146)
SP GR SPEC: 1.01 — SIGNIFICANT CHANGE UP (ref 1.01–1.03)
TROPONIN T SERPL-MCNC: <0.01 NG/ML — SIGNIFICANT CHANGE UP
UROBILINOGEN FLD QL: 0.2 MG/DL — SIGNIFICANT CHANGE UP (ref 0.2–0.2)
WBC # BLD: 6.92 K/UL — SIGNIFICANT CHANGE UP (ref 4.8–10.8)
WBC # FLD AUTO: 6.92 K/UL — SIGNIFICANT CHANGE UP (ref 4.8–10.8)
WBC UR QL: SIGNIFICANT CHANGE UP /HPF

## 2019-02-16 RX ORDER — OLANZAPINE 15 MG/1
2.5 TABLET, FILM COATED ORAL ONCE
Qty: 0 | Refills: 0 | Status: COMPLETED | OUTPATIENT
Start: 2019-02-16 | End: 2019-02-16

## 2019-02-16 RX ORDER — OLANZAPINE 15 MG/1
2.5 TABLET, FILM COATED ORAL ONCE
Qty: 0 | Refills: 0 | Status: DISCONTINUED | OUTPATIENT
Start: 2019-02-16 | End: 2019-02-16

## 2019-02-16 RX ORDER — ONDANSETRON 8 MG/1
4 TABLET, FILM COATED ORAL ONCE
Qty: 0 | Refills: 0 | Status: COMPLETED | OUTPATIENT
Start: 2019-02-16 | End: 2019-02-16

## 2019-02-16 RX ORDER — OLANZAPINE 15 MG/1
1 TABLET, FILM COATED ORAL
Qty: 14 | Refills: 0 | OUTPATIENT
Start: 2019-02-16 | End: 2019-03-01

## 2019-02-16 RX ADMIN — OLANZAPINE 2.5 MILLIGRAM(S): 15 TABLET, FILM COATED ORAL at 20:38

## 2019-02-16 RX ADMIN — ONDANSETRON 4 MILLIGRAM(S): 8 TABLET, FILM COATED ORAL at 20:38

## 2019-02-16 NOTE — ED BEHAVIORAL HEALTH ASSESSMENT NOTE - PAST PSYCHOTROPIC MEDICATION
Patient denies taking any psychotropic medications. Daughter states that patient was Xanax years ago and has also been on Seroquel. Daughter reports that Seroquel made her drowsy, although she does not recall the dose.

## 2019-02-16 NOTE — ED PROVIDER NOTE - PHYSICAL EXAMINATION
VITAL SIGNS: I have reviewed nursing notes and confirm.  CONSTITUTIONAL: Well-developed; well-nourished; in no acute distress.obese  SKIN: Skin exam is warm and dry, <2 sec cap refill  HEAD: Normocephalic; atraumatic.  EYES: PERRL, EOM intact; normal conjunctiva.  ENT: MMM; airway clear.   NECK: Supple; non tender.  CARD: RRR, 2+ dp pulses  RESP: No wheezes, rales or rhonchi, speaking in full sentences  ABD: soft non tender.   EXT: Normal ROM. No edema.  NEURO: Alert, oriented. Grossly unremarkable-at baseline according to pts daughter. residual L  sided weakness   PSYCH: Cooperative, appropriate.

## 2019-02-16 NOTE — ED BEHAVIORAL HEALTH ASSESSMENT NOTE - SUMMARY
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 Patient has no h/o suicide attempts or IPP admission. 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 Patient has no h/o suicide attempts or IPP admission. She was referred to ED by daughter for worsening agitation.  On exam, patient is grossly oriented but has notable difficulty with recall and concentration. She also appears significantly depressed on exam, but does not appear to be an imminent suicide risk. He agitation appears to be secondary to cognitive impairment with increasing suspicions regarding her providers of care, including her daughter. She will benefit from frequent re-orientation and would likely not benefit from IPP admission where the foreign environment and lack of familiar people or things may worsen her confusion. As discussed with patient, daughter, PA, and supervising psychiatrist, she may benefit from a low-dose antipsychotic such as Zyprexa. It is also strongly encouraged that the patient follow-up with geriatric psychiatry promptly, with patient and daughter agreeable to doing so.

## 2019-02-16 NOTE — ED PROVIDER NOTE - NSFOLLOWUPCLINICS_GEN_ALL_ED_FT
A Psychiatrist  Psychiatry  .  [place]   Phone:   Fax:     [facility]   Mental Health  [address] [place] [id number]  Phone: [phone]  Fax:   Follow Up Time:

## 2019-02-16 NOTE — ED PROVIDER NOTE - OBJECTIVE STATEMENT
85yo female with PMHx CVA with residual L sided weakness, AICD on coumadin, COPD, CHF on lasix, kidney stones, COPD, lives at home with 34-7 aids and pts daughter, BIB EMS complaining of increased agitation onset around 4am. Per pts daughter pt has been screaming all day and has been calling random relatives and EMS on the phone and yelling at them. Pts daughter states pts neurologist (Dr. Cristofer Paredes) has been trying to manage her symptoms with her PMD Dr. Mulligan, without success and referred pt to the ED. origninally on 50mg zoloft, switched to paxil, whoch didn't work, and is now on 100mg of zoloft. no similar episodes in the past. pt notes sinus pressure and congestion for the past few weeks and was prescribed Levaquin for her symptoms (finished course of abx). no new meds or exposures. pt otherwise has not complaints. denies fever, chills, urinary symptoms, cp, sob, ha, dizziness, syncope, rash, leg swelling

## 2019-02-16 NOTE — ED PROVIDER NOTE - ATTENDING CONTRIBUTION TO CARE
83 yo female h/o CVA with residual left sided weakness, HTN, COPD, a.fib on coumadin, AICD, heart failure  brought by her daughter for evaluation of agitation.  According to daughter patient has been angry, screaming, accusatory and just not acting her normal self.  her neurologist attempted to treat these symptoms with meds, but they are not really working,  Daughter is seeking further help in ED.  No recent illness or trauma.  Will check labs and obtain psychiatry consult.

## 2019-02-16 NOTE — ED ADULT TRIAGE NOTE - CHIEF COMPLAINT QUOTE
patient brought in by EMS from home for increased agitation x 1 day. Patient was c/o right sided sinus pressure and congestion yesterday

## 2019-02-16 NOTE — ED ADULT NURSE NOTE - NSIMPLEMENTINTERV_GEN_ALL_ED
Implemented All Fall with Harm Risk Interventions:  [place] to call system. Call bell, personal items and telephone within reach. Instruct patient to call for assistance. Room bathroom lighting operational. Non-slip footwear when patient is off stretcher. Physically safe environment: no spills, clutter or unnecessary equipment. Stretcher in lowest position, wheels locked, appropriate side rails in place. Provide visual cue, wrist band, yellow gown, etc. Monitor gait and stability. Monitor for mental status changes and reorient to person, place, and time. Review medications for side effects contributing to fall risk. Reinforce activity limits and safety measures with patient and family. Provide visual clues: red socks.

## 2019-02-16 NOTE — ED BEHAVIORAL HEALTH ASSESSMENT NOTE - OTHER
tearful on approach, easily tearful on exam. ED provider states that pt was not tearful during initial assessment gait not observed knows president, does not recall any of her medications, denies knowledge of any psychotropic medications daughter

## 2019-02-16 NOTE — ED PROVIDER NOTE - CLINICAL SUMMARY MEDICAL DECISION MAKING FREE TEXT BOX
83 yo female with agitation likely due to cognitive impairment and depression,  Evaluated by psychiatrist in ED, labs and imaging studies were done, no acute pathology was found.  D/ c home with coco.

## 2019-02-16 NOTE — ED BEHAVIORAL HEALTH ASSESSMENT NOTE - RISK ASSESSMENT
Patient is an elevated risk of unintentional self harm due to poor recall, increasing suspicions and h/o agitation. This risk is mitigated by close supervision with 24hr home attendant, outpatient follow-up and agreeableness to f/u outpatient.

## 2019-02-16 NOTE — ED BEHAVIORAL HEALTH ASSESSMENT NOTE - OTHER PAST PSYCHIATRIC HISTORY (INCLUDE DETAILS REGARDING ONSET, COURSE OF ILLNESS, INPATIENT/OUTPATIENT TREATMENT)
no ipp admissions, suicide attempts or outpatient psychiatric care. currently managed by PMD Dr. Mulligan and neurologist Dr. Moreno. Patient has also been seen by [facility] senior services, but the daughter was unable to afford the $40 copay/visit.

## 2019-02-16 NOTE — ED BEHAVIORAL HEALTH ASSESSMENT NOTE - DESCRIPTION
Seen by ED provider patient domiciled with only daughter. former homemaker,   when daughter was 7yo CVA with residual left side weakness, Afib, CHF
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
## 2019-02-16 NOTE — ED BEHAVIORAL HEALTH ASSESSMENT NOTE - RECENT MEMORY
Darci Betancourt   MRN: [id number]    Department:  [facility] Emergency Department   Date of Visit:  11/10/2017           Disclosure     Insurance plans vary and the physician(s) referred by the ER may not be covered by your plan.  Please contact y If you have been prescribed any medication(s), please fill your prescription right away and begin taking the medication(s) as directed    If the emergency physician has read X-rays, these will be re-interpreted by a radiologist.  If there is a significant Normal

## 2019-02-16 NOTE — ED PROVIDER NOTE - NSFOLLOWUPINSTRUCTIONS_ED_ALL_ED_FT
Take zyprexa once a day every night. avoid taking potassium with zyprexa. follow up with the outpatient psych team

## 2019-02-16 NOTE — ED BEHAVIORAL HEALTH ASSESSMENT NOTE - SAFETY PLAN DETAILS
patient daughter to call 911 or return patient to the nearest ED if she becomes worsingly agitated or threat to self or others

## 2019-02-16 NOTE — ED BEHAVIORAL HEALTH ASSESSMENT NOTE - SUICIDE PROTECTIVE FACTORS
Supportive social network or family/Identifies reasons for living/Fear of death or dying due to pain/suffering

## 2019-02-16 NOTE — ED BEHAVIORAL HEALTH ASSESSMENT NOTE - ADDITIONAL DETAILS / COMMENTS
immediate registration 3/3, recall at 5 min 1/3 +1 with prompting, list months of year in reverse December - September, then "February, March, April, May"
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
## 2019-02-16 NOTE — ED PROVIDER NOTE - PROGRESS NOTE DETAILS
spoke with psych, Dr. Cerna. aware of patient will come to consult psych at bedside spoke with Dr. Cerna from psych. recommends zyprexa 2.5mg now and to monitor her for about an hour to ensure pt does not become hypotensive. she also requests a prescription to be sent to pts pharmacy for pt to take one time every night. pt needs to follow up at the outpt clinic. Per Dr. Melvin, pt can be given a dose of zyprexa 2.5mg right now and can be observed for an hour to ensure she does not become hypotensive. pt can be d/c home on zyprexa 2.5mg once a day at night. pt needs to follow up with psych outpt. dr. melvin educated pts daughter on the risk of taking KCL and ultram while taking zyprexa and pts daughter states she will give pt KCL in the morning and zyprexa at night. spoke with psych, Dr. Lagunas. aware of patient will come to consult Per Dr. Lagunas, pt can be given a dose of zyprexa 2.5mg right now and can be observed for an hour to ensure she does not become hypotensive. pt can be d/c home on zyprexa 2.5mg once a day at night. pt needs to follow up with psych outpt. dr. melvin educated pts daughter on the risk of taking KCL and ultram while taking zyprexa and pts daughter states she will give pt KCL in the morning and zyprexa at night.

## 2019-02-17 LAB
CULTURE RESULTS: SIGNIFICANT CHANGE UP
SPECIMEN SOURCE: SIGNIFICANT CHANGE UP

## 2019-02-20 ENCOUNTER — OUTPATIENT (OUTPATIENT)
Dept: OUTPATIENT SERVICES | Facility: HOSPITAL | Age: 84
LOS: 1 days | Discharge: HOME | End: 2019-02-20

## 2019-02-20 DIAGNOSIS — Z95.810 PRESENCE OF AUTOMATIC (IMPLANTABLE) CARDIAC DEFIBRILLATOR: Chronic | ICD-10-CM

## 2019-02-20 DIAGNOSIS — Z90.49 ACQUIRED ABSENCE OF OTHER SPECIFIED PARTS OF DIGESTIVE TRACT: Chronic | ICD-10-CM

## 2019-02-20 DIAGNOSIS — I48.91 UNSPECIFIED ATRIAL FIBRILLATION: ICD-10-CM

## 2019-02-28 ENCOUNTER — OUTPATIENT (OUTPATIENT)
Dept: OUTPATIENT SERVICES | Facility: HOSPITAL | Age: 84
LOS: 1 days | Discharge: HOME | End: 2019-02-28

## 2019-02-28 DIAGNOSIS — R68.89 OTHER GENERAL SYMPTOMS AND SIGNS: ICD-10-CM

## 2019-02-28 DIAGNOSIS — I48.91 UNSPECIFIED ATRIAL FIBRILLATION: ICD-10-CM

## 2019-02-28 DIAGNOSIS — E75.6 LIPID STORAGE DISORDER, UNSPECIFIED: ICD-10-CM

## 2019-02-28 DIAGNOSIS — Z95.810 PRESENCE OF AUTOMATIC (IMPLANTABLE) CARDIAC DEFIBRILLATOR: Chronic | ICD-10-CM

## 2019-02-28 DIAGNOSIS — R79.1 ABNORMAL COAGULATION PROFILE: ICD-10-CM

## 2019-02-28 DIAGNOSIS — Z90.49 ACQUIRED ABSENCE OF OTHER SPECIFIED PARTS OF DIGESTIVE TRACT: Chronic | ICD-10-CM

## 2019-02-28 DIAGNOSIS — R73.09 OTHER ABNORMAL GLUCOSE: ICD-10-CM

## 2019-02-28 DIAGNOSIS — Z02.89 ENCOUNTER FOR OTHER ADMINISTRATIVE EXAMINATIONS: ICD-10-CM

## 2019-03-06 ENCOUNTER — OUTPATIENT (OUTPATIENT)
Dept: OUTPATIENT SERVICES | Facility: HOSPITAL | Age: 84
LOS: 1 days | Discharge: HOME | End: 2019-03-06

## 2019-03-06 DIAGNOSIS — R79.1 ABNORMAL COAGULATION PROFILE: ICD-10-CM

## 2019-03-06 DIAGNOSIS — I48.91 UNSPECIFIED ATRIAL FIBRILLATION: ICD-10-CM

## 2019-03-06 DIAGNOSIS — Z90.49 ACQUIRED ABSENCE OF OTHER SPECIFIED PARTS OF DIGESTIVE TRACT: Chronic | ICD-10-CM

## 2019-03-06 DIAGNOSIS — Z95.810 PRESENCE OF AUTOMATIC (IMPLANTABLE) CARDIAC DEFIBRILLATOR: Chronic | ICD-10-CM

## 2019-03-08 ENCOUNTER — INPATIENT (INPATIENT)
Facility: HOSPITAL | Age: 84
LOS: 4 days | Discharge: HOME HEALTH | End: 2019-03-13
Attending: INTERNAL MEDICINE | Admitting: INTERNAL MEDICINE

## 2019-03-08 VITALS
OXYGEN SATURATION: 97 % | TEMPERATURE: 98 F | RESPIRATION RATE: 18 BRPM | DIASTOLIC BLOOD PRESSURE: 79 MMHG | SYSTOLIC BLOOD PRESSURE: 173 MMHG | HEART RATE: 68 BPM

## 2019-03-08 DIAGNOSIS — Z95.810 PRESENCE OF AUTOMATIC (IMPLANTABLE) CARDIAC DEFIBRILLATOR: Chronic | ICD-10-CM

## 2019-03-08 DIAGNOSIS — Z90.49 ACQUIRED ABSENCE OF OTHER SPECIFIED PARTS OF DIGESTIVE TRACT: Chronic | ICD-10-CM

## 2019-03-08 LAB
ALBUMIN SERPL ELPH-MCNC: 3.7 G/DL — SIGNIFICANT CHANGE UP (ref 3.5–5.2)
ALP SERPL-CCNC: 109 U/L — SIGNIFICANT CHANGE UP (ref 30–115)
ALT FLD-CCNC: 15 U/L — SIGNIFICANT CHANGE UP (ref 0–41)
AMMONIA BLD-MCNC: 41 UMOL/L — SIGNIFICANT CHANGE UP (ref 11–55)
ANION GAP SERPL CALC-SCNC: 12 MMOL/L — SIGNIFICANT CHANGE UP (ref 7–14)
APPEARANCE UR: ABNORMAL
APTT BLD: 40.5 SEC — HIGH (ref 27–39.2)
AST SERPL-CCNC: 28 U/L — SIGNIFICANT CHANGE UP (ref 0–41)
BACTERIA # UR AUTO: ABNORMAL /HPF
BASE EXCESS BLDV CALC-SCNC: 3.5 MMOL/L — HIGH (ref -2–2)
BASOPHILS # BLD AUTO: 0.04 K/UL — SIGNIFICANT CHANGE UP (ref 0–0.2)
BASOPHILS NFR BLD AUTO: 0.5 % — SIGNIFICANT CHANGE UP (ref 0–1)
BILIRUB SERPL-MCNC: 0.4 MG/DL — SIGNIFICANT CHANGE UP (ref 0.2–1.2)
BILIRUB UR-MCNC: NEGATIVE — SIGNIFICANT CHANGE UP
BUN SERPL-MCNC: 18 MG/DL — SIGNIFICANT CHANGE UP (ref 10–20)
CA-I SERPL-SCNC: 1.27 MMOL/L — SIGNIFICANT CHANGE UP (ref 1.12–1.3)
CALCIUM SERPL-MCNC: 9.2 MG/DL — SIGNIFICANT CHANGE UP (ref 8.5–10.1)
CHLORIDE SERPL-SCNC: 108 MMOL/L — SIGNIFICANT CHANGE UP (ref 98–110)
CO2 SERPL-SCNC: 24 MMOL/L — SIGNIFICANT CHANGE UP (ref 17–32)
COLOR SPEC: YELLOW — SIGNIFICANT CHANGE UP
COMMENT - URINE: SIGNIFICANT CHANGE UP
CREAT SERPL-MCNC: 0.8 MG/DL — SIGNIFICANT CHANGE UP (ref 0.7–1.5)
DIFF PNL FLD: NEGATIVE — SIGNIFICANT CHANGE UP
EOSINOPHIL # BLD AUTO: 0.41 K/UL — SIGNIFICANT CHANGE UP (ref 0–0.7)
EOSINOPHIL NFR BLD AUTO: 5 % — SIGNIFICANT CHANGE UP (ref 0–8)
EPI CELLS # UR: ABNORMAL /HPF
GAS PNL BLDV: 143 MMOL/L — SIGNIFICANT CHANGE UP (ref 136–145)
GAS PNL BLDV: SIGNIFICANT CHANGE UP
GLUCOSE SERPL-MCNC: 94 MG/DL — SIGNIFICANT CHANGE UP (ref 70–99)
GLUCOSE UR QL: NEGATIVE MG/DL — SIGNIFICANT CHANGE UP
HCO3 BLDV-SCNC: 29 MMOL/L — SIGNIFICANT CHANGE UP (ref 22–29)
HCT VFR BLD CALC: 36.4 % — LOW (ref 37–47)
HCT VFR BLDA CALC: 34.5 % — SIGNIFICANT CHANGE UP (ref 34–44)
HGB BLD CALC-MCNC: 11.2 G/DL — LOW (ref 14–18)
HGB BLD-MCNC: 11.8 G/DL — LOW (ref 12–16)
IMM GRANULOCYTES NFR BLD AUTO: 0.2 % — SIGNIFICANT CHANGE UP (ref 0.1–0.3)
INR BLD: 1.96 RATIO — HIGH (ref 0.65–1.3)
KETONES UR-MCNC: NEGATIVE — SIGNIFICANT CHANGE UP
LACTATE BLDV-MCNC: SIGNIFICANT CHANGE UP MMOL/L (ref 0.5–1.6)
LEUKOCYTE ESTERASE UR-ACNC: ABNORMAL
LYMPHOCYTES # BLD AUTO: 2.16 K/UL — SIGNIFICANT CHANGE UP (ref 1.2–3.4)
LYMPHOCYTES # BLD AUTO: 26.5 % — SIGNIFICANT CHANGE UP (ref 20.5–51.1)
MCHC RBC-ENTMCNC: 28 PG — SIGNIFICANT CHANGE UP (ref 27–31)
MCHC RBC-ENTMCNC: 32.4 G/DL — SIGNIFICANT CHANGE UP (ref 32–37)
MCV RBC AUTO: 86.5 FL — SIGNIFICANT CHANGE UP (ref 81–99)
MONOCYTES # BLD AUTO: 1.05 K/UL — HIGH (ref 0.1–0.6)
MONOCYTES NFR BLD AUTO: 12.9 % — HIGH (ref 1.7–9.3)
NEUTROPHILS # BLD AUTO: 4.47 K/UL — SIGNIFICANT CHANGE UP (ref 1.4–6.5)
NEUTROPHILS NFR BLD AUTO: 54.9 % — SIGNIFICANT CHANGE UP (ref 42.2–75.2)
NITRITE UR-MCNC: NEGATIVE — SIGNIFICANT CHANGE UP
NRBC # BLD: 0 /100 WBCS — SIGNIFICANT CHANGE UP (ref 0–0)
PCO2 BLDV: 47 MMHG — SIGNIFICANT CHANGE UP (ref 41–51)
PH BLDV: 7.4 — SIGNIFICANT CHANGE UP (ref 7.26–7.43)
PH UR: 6 — SIGNIFICANT CHANGE UP (ref 5–8)
PLATELET # BLD AUTO: 251 K/UL — SIGNIFICANT CHANGE UP (ref 130–400)
PO2 BLDV: 68 MMHG — HIGH (ref 20–40)
POTASSIUM BLDV-SCNC: SIGNIFICANT CHANGE UP MMOL/L (ref 3.3–5.6)
POTASSIUM SERPL-MCNC: 4.2 MMOL/L — SIGNIFICANT CHANGE UP (ref 3.5–5)
POTASSIUM SERPL-SCNC: 4.2 MMOL/L — SIGNIFICANT CHANGE UP (ref 3.5–5)
PROT SERPL-MCNC: 6.7 G/DL — SIGNIFICANT CHANGE UP (ref 6–8)
PROT UR-MCNC: ABNORMAL MG/DL
PROTHROM AB SERPL-ACNC: 22.4 SEC — HIGH (ref 9.95–12.87)
RBC # BLD: 4.21 M/UL — SIGNIFICANT CHANGE UP (ref 4.2–5.4)
RBC # FLD: 16.1 % — HIGH (ref 11.5–14.5)
SAO2 % BLDV: 94 % — SIGNIFICANT CHANGE UP
SODIUM SERPL-SCNC: 144 MMOL/L — SIGNIFICANT CHANGE UP (ref 135–146)
SP GR SPEC: 1.02 — SIGNIFICANT CHANGE UP (ref 1.01–1.03)
TROPONIN T SERPL-MCNC: <0.01 NG/ML — SIGNIFICANT CHANGE UP
UROBILINOGEN FLD QL: 0.2 MG/DL — SIGNIFICANT CHANGE UP (ref 0.2–0.2)
WBC # BLD: 8.15 K/UL — SIGNIFICANT CHANGE UP (ref 4.8–10.8)
WBC # FLD AUTO: 8.15 K/UL — SIGNIFICANT CHANGE UP (ref 4.8–10.8)
WBC UR QL: ABNORMAL /HPF

## 2019-03-08 RX ORDER — DILTIAZEM HCL 120 MG
120 CAPSULE, EXT RELEASE 24 HR ORAL DAILY
Qty: 0 | Refills: 0 | Status: DISCONTINUED | OUTPATIENT
Start: 2019-03-08 | End: 2019-03-13

## 2019-03-08 RX ORDER — PANTOPRAZOLE SODIUM 20 MG/1
40 TABLET, DELAYED RELEASE ORAL
Qty: 0 | Refills: 0 | Status: DISCONTINUED | OUTPATIENT
Start: 2019-03-08 | End: 2019-03-13

## 2019-03-08 RX ORDER — CEFEPIME 1 G/1
2000 INJECTION, POWDER, FOR SOLUTION INTRAMUSCULAR; INTRAVENOUS ONCE
Qty: 0 | Refills: 0 | Status: COMPLETED | OUTPATIENT
Start: 2019-03-08 | End: 2019-03-08

## 2019-03-08 RX ORDER — SERTRALINE 25 MG/1
1 TABLET, FILM COATED ORAL
Qty: 0 | Refills: 0 | COMMUNITY

## 2019-03-08 RX ORDER — CHLORHEXIDINE GLUCONATE 213 G/1000ML
1 SOLUTION TOPICAL
Qty: 0 | Refills: 0 | Status: DISCONTINUED | OUTPATIENT
Start: 2019-03-08 | End: 2019-03-13

## 2019-03-08 RX ORDER — OLANZAPINE 15 MG/1
2.5 TABLET, FILM COATED ORAL AT BEDTIME
Qty: 0 | Refills: 0 | Status: DISCONTINUED | OUTPATIENT
Start: 2019-03-08 | End: 2019-03-13

## 2019-03-08 RX ORDER — SERTRALINE 25 MG/1
100 TABLET, FILM COATED ORAL DAILY
Qty: 0 | Refills: 0 | Status: DISCONTINUED | OUTPATIENT
Start: 2019-03-08 | End: 2019-03-12

## 2019-03-08 RX ORDER — VANCOMYCIN HCL 1 G
1000 VIAL (EA) INTRAVENOUS ONCE
Qty: 0 | Refills: 0 | Status: COMPLETED | OUTPATIENT
Start: 2019-03-08 | End: 2019-03-08

## 2019-03-08 RX ORDER — METOPROLOL TARTRATE 50 MG
100 TABLET ORAL DAILY
Qty: 0 | Refills: 0 | Status: DISCONTINUED | OUTPATIENT
Start: 2019-03-08 | End: 2019-03-13

## 2019-03-08 RX ORDER — ATORVASTATIN CALCIUM 80 MG/1
80 TABLET, FILM COATED ORAL AT BEDTIME
Qty: 0 | Refills: 0 | Status: DISCONTINUED | OUTPATIENT
Start: 2019-03-08 | End: 2019-03-13

## 2019-03-08 RX ORDER — WARFARIN SODIUM 2.5 MG/1
1 TABLET ORAL ONCE
Qty: 0 | Refills: 0 | Status: COMPLETED | OUTPATIENT
Start: 2019-03-08 | End: 2019-03-08

## 2019-03-08 RX ORDER — DOCUSATE SODIUM 100 MG
100 CAPSULE ORAL THREE TIMES A DAY
Qty: 0 | Refills: 0 | Status: DISCONTINUED | OUTPATIENT
Start: 2019-03-08 | End: 2019-03-13

## 2019-03-08 RX ORDER — FUROSEMIDE 40 MG
20 TABLET ORAL DAILY
Qty: 0 | Refills: 0 | Status: DISCONTINUED | OUTPATIENT
Start: 2019-03-08 | End: 2019-03-13

## 2019-03-08 RX ADMIN — OLANZAPINE 2.5 MILLIGRAM(S): 15 TABLET, FILM COATED ORAL at 22:25

## 2019-03-08 RX ADMIN — WARFARIN SODIUM 1 MILLIGRAM(S): 2.5 TABLET ORAL at 22:25

## 2019-03-08 RX ADMIN — CEFEPIME 100 MILLIGRAM(S): 1 INJECTION, POWDER, FOR SOLUTION INTRAMUSCULAR; INTRAVENOUS at 17:51

## 2019-03-08 RX ADMIN — CEFEPIME 2000 MILLIGRAM(S): 1 INJECTION, POWDER, FOR SOLUTION INTRAMUSCULAR; INTRAVENOUS at 19:00

## 2019-03-08 RX ADMIN — Medication 100 MILLIGRAM(S): at 22:28

## 2019-03-08 RX ADMIN — ATORVASTATIN CALCIUM 80 MILLIGRAM(S): 80 TABLET, FILM COATED ORAL at 22:25

## 2019-03-08 RX ADMIN — Medication 250 MILLIGRAM(S): at 19:08

## 2019-03-08 NOTE — ED PROVIDER NOTE - OBJECTIVE STATEMENT
83yo F h/o CVA with residual L sided weakness, AICD on coumadin, COPD, CHF on lasix, kidney stones, COPD, lives at home with 24-7 aids and pts daughter, BIB EMS for eval of AMS onset today. pt last seen normal last night. per pts daughter pt has been more tired than baseline. pt taking her medications as prescribed (including zyprexa and 100mg of zoloft). denies numbness, tingling, weakness, n/v, abd pain, 83yo F h/o dementia, CVA with residual L sided weakness, AICD on coumadin, COPD, CHF on lasix, kidney stones, COPD, lives at home with 24-7 aids and pts daughter, BIB EMS for eval of AMS onset today. pt last seen normal last night. per pts daughter pt has been more tired than baseline. pt taking her medications as prescribed (including zyprexa and 100mg of zoloft). denies numbness, tingling, weakness, n/v, abd pain, 85yo F h/o dementia, CVA with residual L sided weakness, AICD, afib on coumadin, COPD, CHF on lasix, kidney stones, COPD, lives at home with 24-7 aids and pts daughter, BIB EMS for eval of AMS onset today. pt last seen normal last night. per pts daughter pt has been more tired than baseline with a facial droop and slurred speech. pt taking her medications as prescribed (including zyprexa and 100mg of zoloft). denies numbness, tingling, weakness, n/v, abd pain,

## 2019-03-08 NOTE — ED PROVIDER NOTE - PROGRESS NOTE DETAILS
pts INR today 1.96. per pts daughter pt had an INR of about 4.06 a few weeks ago and has since been taking one of her coumadin pills. Spoke to Dr. thorep. states pt can be admitted into the stroke unit. pt and family aware and agree with plan pts INR today 1.96. per pts daughter pt had an INR of about 4.06 a few weeks ago and has since been taking 1mg of coumadin per day instead of her standard 1.5mg

## 2019-03-08 NOTE — H&P ADULT - HISTORY OF PRESENT ILLNESS
83 yo F with PMH of CVA w/ residual L-sided weakness, dementia, A-fib on Coumadin, HFrEF s/p AICD, COPD, HTN, bladder cancer in remission presented to ED with altered mental status, facial droop, and slurred speech. Patient reports this AM patient appeared more lethargic than normal, had a new facial droop and slurred speech. Last seen normal last night. Patient lives at home with daughter and has 24 HHA.    In ED, stroke code was called. CT head and CTA head/neck were negative for acute pathology. NIH score was 14 (though baseline is 11). No tPA was given since outside window given last known normal. 85 yo F with PMH of CVA w/ residual L-sided weakness, A-fib on Coumadin, HFrEF s/p AICD, HTN presented to ED with lethargy, facial droop, and slurred speech. Collateral history obtained from daughter at bedside. Daughter reports that when patient woke up in AM was noted to be very lethargic and drowsy. Was mumbling her words a little. Did not think much of it at first. Then afterwards noted patient was sleeping most of day, had worsening of slurred speech and facial droop, so called EMS.    Patient has nearly complete L-sided paralysis at baseline after previous stroke. She is non-ambulatory. Has 24 HHA. Speech and cognition is still intact. Daughter reports night PTP was at her normal baseline - no slurred speech, no facial droop, no lethargy.    In ED, stroke code was called. CT head and CTA head/neck were negative for acute pathology. NIH score was 14 (though baseline is 11). No tPA was given since outside window.

## 2019-03-08 NOTE — ED PROVIDER NOTE - NS ED ROS FT
Review of Systems:  CONSTITUTIONAL: no fever  EYES: no photophobia, no blurred vision  ENT: no ear pain,+nasal discharge and congestion  RESPIRATORY: no shortness of breath, no cough  CARDIAC: no chest pain, no palpitations  GI: no abd pain, no nausea, no vomiting, no diarrhea, no constipation,   : no dysuria; no hematuria,   MUSCULOSKELETAL: no joint paint  NEUROLOGIC: no headache,   PSYCH: no anxiety, non suicidal, non homicidal, no hallucination, no depression

## 2019-03-08 NOTE — ED PROVIDER NOTE - CRITICAL CARE PROVIDED
direct patient care (not related to procedure)/documentation/additional history taking/interpretation of diagnostic studies/consult w/ pt's family directly relating to pts condition/consultation with other physicians

## 2019-03-08 NOTE — ED ADULT TRIAGE NOTE - CHIEF COMPLAINT QUOTE
Pt presents with slurred and delayed speech and worsening right sided facial droop that began at 10am this morning, pt has left sided paralysis from a previous CVA

## 2019-03-08 NOTE — ED ADULT NURSE NOTE - NSIMPLEMENTINTERV_GEN_ALL_ED
Implemented All Fall with Harm Risk Interventions:  [place] to call system. Call bell, personal items and telephone within reach. Instruct patient to call for assistance. Room bathroom lighting operational. Non-slip footwear when patient is off stretcher. Physically safe environment: no spills, clutter or unnecessary equipment. Stretcher in lowest position, wheels locked, appropriate side rails in place. Provide visual cue, wrist band, yellow gown, etc. Monitor gait and stability. Monitor for mental status changes and reorient to person, place, and time. Review medications for side effects contributing to fall risk. Reinforce activity limits and safety measures with patient and family. Provide visual clues: red socks.

## 2019-03-08 NOTE — ED PROVIDER NOTE - CLINICAL SUMMARY MEDICAL DECISION MAKING FREE TEXT BOX
Patient presented with generalized fatigue, right facial droop and dysarthria which is new from her baseline of prior left-sided weakness. Patient's last known normal was outside the window for TPA and patient not interventional candidate based on baseline MRS score of 3. CT head and CTAs head and neck negative for acute findings, just chronic abnormalities. Patient otherwise HD stable. Found on CXR to have worsening opacities, likely 2/2 aspiration PNA. Covered with abx here in ED. Labs otherwise unremarkable. Spoke with neurology Dr. Munoz who approved patient for admission to stroke unit. Patient's family agreeable with plan.

## 2019-03-08 NOTE — H&P ADULT - ATTENDING COMMENTS
83 yo F with PMH of CVA w/ residual L-sided weakness, A-fib on Coumadin, HFrEF s/p AICD, HTN presented to ED with lethargy, facial droop, and slurred speech. Collateral history obtained from daughter at bedside. Daughter reports that when patient woke up in AM was noted to be very lethargic and drowsy. Was mumbling her words a little. Did not think much of it at first. Then afterwards noted patient was sleeping most of day, had worsening of slurred speech and facial droop, so called EMS.    Patient has nearly complete L-sided paralysis at baseline after previous stroke. She is non-ambulatory. Has 24 HHA. Speech and cognition is still intact. Daughter reports night PTP was at her normal baseline - no slurred speech, no facial droop, no lethargy.    In ED, stroke code was called. CT head and CTA head/neck were negative for acute pathology. NIH score was 14 (though baseline is 11). No tPA was given since outside window.    REVIEW OF SYSTEMS: see cc/HPI  CONSTITUTIONAL: No weakness, fevers or chills  EYES/ENT: No visual changes;  No vertigo or throat pain   NECK: No pain or stiffness  RESPIRATORY: No cough, wheezing, hemoptysis; No shortness of breath  CARDIOVASCULAR: No chest pain or palpitations  GASTROINTESTINAL: No abdominal or epigastric pain. No nausea, vomiting, or hematemesis; No diarrhea or constipation. No melena or hematochezia.  GENITOURINARY: No dysuria, frequency or hematuria  NEUROLOGICAL: No numbness or weakness  SKIN: No itching, rashes      Physical Exam:  General: WN/WD NAD  Neurology: A&Ox3, nonfocal, follows commands  Eyes: PERRLA/ EOMI  ENT/Neck: Neck supple, trachea midline, No JVD  Respiratory: CTA B/L, No wheezing, rales, rhonchi  CV: Normal rate regular rhythm, S1S2, no murmurs, rubs or gallops  Abdominal: Soft, NT, ND +BS,   Extremities: No edema, + peripheral pulses  Skin: No Rashes, Hematoma, Ecchymosis  Incisions:   Tubes:

## 2019-03-08 NOTE — H&P ADULT - NSHPPHYSICALEXAM_GEN_ALL_CORE
GEN: NAD, comfortable  CARDIO: RRR, no m/r/g  RESP: CTAB, no w/r/r  ABD: soft, NT/ND, +BS  EXT: no edema, pp b/l GEN: NAD, comfortable  CARDIO: RRR, no m/r/g  RESP: CTAB, no w/r/r  ABD: soft, NT/ND, +BS  EXT: +1 edema b/l LE  Neuro: AAOx3, slurring of words noted, L-sided facial droop on smile, L-sided strength 1/5 UE + LE, R-sided 4/5 UE + LE

## 2019-03-08 NOTE — H&P ADULT - NSHPSOCIALHISTORY_GEN_ALL_CORE
Denies alcohol, tobacco, or illicit drug use  Lives at home with daughter  Has 24 HHA Denies alcohol, tobacco, or illicit drug use  Lives at home with daughter  Has 24 HHA  Non-ambulatory - nearly complete L-sided paralysis

## 2019-03-08 NOTE — H&P ADULT - NSHPLABSRESULTS_GEN_ALL_CORE
11.8   8.15  )-----------( 251      ( 08 Mar 2019 15:30 )             36.4     03-08    144  |  108  |  18  ----------------------------<  94  4.2   |  24  |  0.8    Ca    9.2      08 Mar 2019 15:30    TPro  6.7  /  Alb  3.7  /  TBili  0.4  /  DBili  x   /  AST  28  /  ALT  15  /  AlkPhos  109  03-08        Urinalysis Basic - ( 08 Mar 2019 18:00 )    Color: Yellow / Appearance: Cloudy / S.020 / pH: x  Gluc: x / Ketone: Negative  / Bili: Negative / Urobili: 0.2 mg/dL   Blood: x / Protein: Trace mg/dL / Nitrite: Negative   Leuk Esterase: Moderate / RBC: x / WBC x   Sq Epi: x / Non Sq Epi: x / Bacteria: x    PT/INR - ( 08 Mar 2019 15:30 )   PT: 22.40 sec;   INR: 1.96 ratio       PTT - ( 08 Mar 2019 15:30 )  PTT:40.5 sec    CARDIAC MARKERS ( 08 Mar 2019 15:30 )  x     / <0.01 ng/mL / x     / x     / x        POCT Blood Glucose.: 87 mg/dL (08 Mar 2019 15:44)

## 2019-03-08 NOTE — ED PROVIDER NOTE - ATTENDING CONTRIBUTION TO CARE
85 y/o F PMH Dementia, CVA with residual L sided weakness, AICD on coumadin, COPD, CHF on Lasix, kidney stones, COPD, lives at home with 24-7 aids and patients daughter, [facility] for eval of AMS onset today. pt last seen normal last night around 10pm. per pts daughter pt has been more tired than baseline. pt taking her medications as prescribed (including Zyprexa and 100mg of Zoloft). denies numbness, tingling, weakness, n/v, abd pain.    On exam, VS reviewed. Patient is awake and answering questions appropriately. She knows her name and age. Given last known well time, patient is not a candidate for t-PA.  Patient is not an Interventional candidate given her baseline MRS score 3.    Initial NIH score 14, from baseline of 11 and is documented.  Unclear if this is stroke related vs possibly infectious. IV placed, labs sent, CXR done.  Will get Neuro consult.  Will get CT scan of brain with and without contrast.  Will follow up work up and Neuro evaluations.

## 2019-03-08 NOTE — ED PROVIDER NOTE - CARE PLAN
Principal Discharge DX:	CVA (cerebral vascular accident)  Secondary Diagnosis:	Dysarthria  Secondary Diagnosis:	Facial droop  Secondary Diagnosis:	Pneumonia

## 2019-03-08 NOTE — ED PROVIDER NOTE - PHYSICAL EXAMINATION
VITAL SIGNS: I have reviewed nursing notes and confirm.  CONSTITUTIONAL: Well-developed; well-nourished; in no acute distress. obese  SKIN: Skin exam is warm and dry, <2 sec cap refill  HEAD: Normocephalic; atraumatic.  EYES: PERRL, EOM intact; normal conjunctiva.  ENT: MMM; airway clear.   NECK: Supple; non tender.  CARD: RRR, 2+ dp pulses  RESP: No wheezes, rales or rhonchi, speaking in full sentences  ABD: soft non tender.   EXT: Normal ROM. No edema.  NEURO: Alert, oriented. Grossly unremarkable. No focal deficits. VITAL SIGNS: I have reviewed nursing notes and confirm.  CONSTITUTIONAL: Well-developed; well-nourished; in no acute distress. obese  SKIN: Skin exam is warm and dry, <2 sec cap refill  HEAD: Normocephalic; atraumatic.  EYES: PERRL, EOM intact; normal conjunctiva.  ENT: MMM; airway clear.   NECK: Supple; non tender.  CARD: RRR, 2+ dp pulses  RESP: No wheezes, rales or rhonchi, speaking in full sentences  ABD: soft non tender.   EXT: Normal ROM. No edema.  NEURO: Alert, oriented. Grossly unremarkable. No focal deficits. +right sided facial droop VITAL SIGNS: I have reviewed nursing notes and confirm.  CONSTITUTIONAL: Well-developed; well-nourished; in no acute distress. obese  SKIN: Skin exam is warm and dry, <2 sec cap refill  HEAD: Normocephalic; atraumatic.  EYES: PERRL, EOM intact; normal conjunctiva.  ENT: MMM; airway clear.   NECK: Supple; non tender.  CARD: RRR, 2+ dp pulses  RESP: No wheezes, rales or rhonchi, speaking in full sentences  ABD: soft non tender.   EXT: Normal ROM. No edema.  NEURO: Alert, oriented. Grossly unremarkable. No focal deficits. +right sided facial droop, dysarthria, residual L sided weakness. otherwise pt is at baseline per pts daughter at bedside

## 2019-03-08 NOTE — H&P ADULT - ASSESSMENT
85 yo F with PMH of CVA w/ residual L-sided weakness, A-fib on Coumadin, HFrEF s/p AICD, HTN presented to ED with lethargy, facial droop, and slurred speech. In ED, stroke code was called. CT head and CTA head/neck were negative for acute pathology. NIH score was 14 (though baseline is 11). No tPA was given since outside window.    #) New slurred speech + facial droop likely 2/2 acute stroke  - CT head = negative for acute change  - CTA head/neck = no acute obstruction, stable exam since prior  - NIH stroke baseline = 11, currently in ED 14  - Neuro following - Rx stroke unit  - will hold off on MRI for now given AICD - needs EPS eval for interrogation and possible clearance  - will get PT/OT/speech eval    #) A-fib - rate controlled, c/w Cardizem + Toprol + Coumadin (1mg for now - previously taking 1.5mg but INR was elevated), daily INR    #) HFrEF - c/w BB + Lasix    #) HTN - c/w CCB + BB + Lasix    DVT ppx: on Coumadin  Diet: DASH  Activity: OOBTC w/ assistance  Code status: DNR/DNI - discussed with patient and daughter at bedside. Patient clear and adamant in her requests. MOLST form filled out, placed in chart - needs attending signature  Dispo: pending - from home

## 2019-03-09 LAB
ALBUMIN SERPL ELPH-MCNC: 3.5 G/DL — SIGNIFICANT CHANGE UP (ref 3.5–5.2)
ALP SERPL-CCNC: 110 U/L — SIGNIFICANT CHANGE UP (ref 30–115)
ALT FLD-CCNC: 13 U/L — SIGNIFICANT CHANGE UP (ref 0–41)
ANION GAP SERPL CALC-SCNC: 13 MMOL/L — SIGNIFICANT CHANGE UP (ref 7–14)
APTT BLD: 39.3 SEC — HIGH (ref 27–39.2)
AST SERPL-CCNC: 20 U/L — SIGNIFICANT CHANGE UP (ref 0–41)
BASOPHILS # BLD AUTO: 0.03 K/UL — SIGNIFICANT CHANGE UP (ref 0–0.2)
BASOPHILS NFR BLD AUTO: 0.4 % — SIGNIFICANT CHANGE UP (ref 0–1)
BILIRUB SERPL-MCNC: 0.6 MG/DL — SIGNIFICANT CHANGE UP (ref 0.2–1.2)
BUN SERPL-MCNC: 16 MG/DL — SIGNIFICANT CHANGE UP (ref 10–20)
CALCIUM SERPL-MCNC: 9.7 MG/DL — SIGNIFICANT CHANGE UP (ref 8.5–10.1)
CHLORIDE SERPL-SCNC: 105 MMOL/L — SIGNIFICANT CHANGE UP (ref 98–110)
CHOLEST SERPL-MCNC: 188 MG/DL — SIGNIFICANT CHANGE UP (ref 100–200)
CO2 SERPL-SCNC: 25 MMOL/L — SIGNIFICANT CHANGE UP (ref 17–32)
CREAT SERPL-MCNC: 0.8 MG/DL — SIGNIFICANT CHANGE UP (ref 0.7–1.5)
CULTURE RESULTS: SIGNIFICANT CHANGE UP
EOSINOPHIL # BLD AUTO: 0.4 K/UL — SIGNIFICANT CHANGE UP (ref 0–0.7)
EOSINOPHIL NFR BLD AUTO: 5.3 % — SIGNIFICANT CHANGE UP (ref 0–8)
GLUCOSE SERPL-MCNC: 88 MG/DL — SIGNIFICANT CHANGE UP (ref 70–99)
HCT VFR BLD CALC: 35 % — LOW (ref 37–47)
HDLC SERPL-MCNC: 48 MG/DL — LOW
HGB BLD-MCNC: 11.1 G/DL — LOW (ref 12–16)
IMM GRANULOCYTES NFR BLD AUTO: 0.4 % — HIGH (ref 0.1–0.3)
INR BLD: 1.93 RATIO — HIGH (ref 0.65–1.3)
LIPID PNL WITH DIRECT LDL SERPL: 123 MG/DL — SIGNIFICANT CHANGE UP (ref 4–129)
LYMPHOCYTES # BLD AUTO: 1.81 K/UL — SIGNIFICANT CHANGE UP (ref 1.2–3.4)
LYMPHOCYTES # BLD AUTO: 24 % — SIGNIFICANT CHANGE UP (ref 20.5–51.1)
MAGNESIUM SERPL-MCNC: 1.9 MG/DL — SIGNIFICANT CHANGE UP (ref 1.8–2.4)
MCHC RBC-ENTMCNC: 27.4 PG — SIGNIFICANT CHANGE UP (ref 27–31)
MCHC RBC-ENTMCNC: 31.7 G/DL — LOW (ref 32–37)
MCV RBC AUTO: 86.4 FL — SIGNIFICANT CHANGE UP (ref 81–99)
MONOCYTES # BLD AUTO: 0.86 K/UL — HIGH (ref 0.1–0.6)
MONOCYTES NFR BLD AUTO: 11.4 % — HIGH (ref 1.7–9.3)
NEUTROPHILS # BLD AUTO: 4.4 K/UL — SIGNIFICANT CHANGE UP (ref 1.4–6.5)
NEUTROPHILS NFR BLD AUTO: 58.5 % — SIGNIFICANT CHANGE UP (ref 42.2–75.2)
NRBC # BLD: 0 /100 WBCS — SIGNIFICANT CHANGE UP (ref 0–0)
PHOSPHATE SERPL-MCNC: 4.2 MG/DL — SIGNIFICANT CHANGE UP (ref 2.1–4.9)
PLATELET # BLD AUTO: 237 K/UL — SIGNIFICANT CHANGE UP (ref 130–400)
POTASSIUM SERPL-MCNC: 4 MMOL/L — SIGNIFICANT CHANGE UP (ref 3.5–5)
POTASSIUM SERPL-SCNC: 4 MMOL/L — SIGNIFICANT CHANGE UP (ref 3.5–5)
PROT SERPL-MCNC: 6.6 G/DL — SIGNIFICANT CHANGE UP (ref 6–8)
PROTHROM AB SERPL-ACNC: 22 SEC — HIGH (ref 9.95–12.87)
RBC # BLD: 4.05 M/UL — LOW (ref 4.2–5.4)
RBC # FLD: 16.1 % — HIGH (ref 11.5–14.5)
SODIUM SERPL-SCNC: 143 MMOL/L — SIGNIFICANT CHANGE UP (ref 135–146)
SPECIMEN SOURCE: SIGNIFICANT CHANGE UP
TOTAL CHOLESTEROL/HDL RATIO MEASUREMENT: 3.9 RATIO — LOW (ref 4–5.5)
TRIGL SERPL-MCNC: 195 MG/DL — HIGH (ref 10–149)
WBC # BLD: 7.53 K/UL — SIGNIFICANT CHANGE UP (ref 4.8–10.8)
WBC # FLD AUTO: 7.53 K/UL — SIGNIFICANT CHANGE UP (ref 4.8–10.8)

## 2019-03-09 RX ORDER — WARFARIN SODIUM 2.5 MG/1
1 TABLET ORAL ONCE
Qty: 0 | Refills: 0 | Status: COMPLETED | OUTPATIENT
Start: 2019-03-09 | End: 2019-03-09

## 2019-03-09 RX ADMIN — ATORVASTATIN CALCIUM 80 MILLIGRAM(S): 80 TABLET, FILM COATED ORAL at 22:30

## 2019-03-09 RX ADMIN — CHLORHEXIDINE GLUCONATE 1 APPLICATION(S): 213 SOLUTION TOPICAL at 06:34

## 2019-03-09 RX ADMIN — Medication 100 MILLIGRAM(S): at 10:16

## 2019-03-09 RX ADMIN — Medication 100 MILLIGRAM(S): at 15:01

## 2019-03-09 RX ADMIN — Medication 120 MILLIGRAM(S): at 06:43

## 2019-03-09 RX ADMIN — Medication 20 MILLIGRAM(S): at 06:43

## 2019-03-09 RX ADMIN — Medication 100 MILLIGRAM(S): at 22:30

## 2019-03-09 RX ADMIN — OLANZAPINE 2.5 MILLIGRAM(S): 15 TABLET, FILM COATED ORAL at 22:30

## 2019-03-09 RX ADMIN — SERTRALINE 100 MILLIGRAM(S): 25 TABLET, FILM COATED ORAL at 15:01

## 2019-03-09 RX ADMIN — PANTOPRAZOLE SODIUM 40 MILLIGRAM(S): 20 TABLET, DELAYED RELEASE ORAL at 06:17

## 2019-03-09 RX ADMIN — Medication 100 MILLIGRAM(S): at 06:43

## 2019-03-09 RX ADMIN — WARFARIN SODIUM 1 MILLIGRAM(S): 2.5 TABLET ORAL at 22:31

## 2019-03-09 NOTE — CONSULT NOTE ADULT - SUBJECTIVE AND OBJECTIVE BOX
Patient is a 84y old  Female who presents with a chief complaint of CVA (08 Mar 2019 19:58)      HPI:  85 yo F with PMH of CVA w/ residual L-sided weakness, A-fib on Coumadin, HFrEF s/p AICD, HTN presented to ED with lethargy, facial droop, and slurred speech. Collateral history obtained from daughter at bedside. Daughter reports that when patient woke up in AM was noted to be very lethargic and drowsy. Was mumbling her words a little. Did not think much of it at first. Then afterwards noted patient was sleeping most of day, had worsening of slurred speech and facial droop, so called EMS.  Patient has nearly complete L-sided paralysis at baseline after previous stroke. She is non-ambulatory. Has 24 HHA. Speech and cognition is still intact. Daughter reports night PTP was at her normal baseline - no slurred speech, no facial droop, no lethargy.    In ED, stroke code was called. CT head and CTA head/neck were negative for acute pathology. NIH score was 14 (though baseline is 11). No tPA was given since outside window.    Currently, pt lying comfortably in bed with daughter at bedside. Pt without complaints.       PAST MEDICAL & SURGICAL HISTORY:  HTN (hypertension)  Cerebrovascular accident (CVA)  COPD (chronic obstructive pulmonary disease)  Atrial fibrillation  Systolic heart failure  AICD (automatic cardioverter/defibrillator) present  S/P appendectomy        PREVIOUS DIAGNOSTIC TESTING:    ECHO 17  ·	Left ventricle: Size was normal. Systolic function was normal. Ejection fraction was estimated in the range of 55 % to 65 %. There were no regional wall motion abnormalities. Wall thickness was normal.  ·	Aortic valve: The valve was trileaflet. Leaflets exhibited normal thickness and normal cuspal separation. Doppler: Transaortic velocity was within the normal range. There was no stenosis. There was no regurgitation.  ·	Aorta: The root exhibited normal size.  ·	Mitral valve: Valve structure was normal. There was normal leaflet separation. Doppler: The transmitral velocity was within the normal range. There was no evidence for stenosis. There was no regurgitation.  ·	Left atrium: Size was normal.  ·	Right ventricle: The size was normal. Systolic function was normal. Wall thickness was normal.  ·	Pulmonic valve: Leaflets exhibited normal thickness, no calcification, and normal cuspal separation. Doppler: The transpulmonic velocity was within the normal range. There was no regurgitation.  ·	Pulmonary artery: The size was normal. Doppler: Systolic pressure was within the normal range.  ·	Tricuspid valve: The valve structure was normal. There was normal leaflet separation. Doppler: The transtricuspid velocity was within the normal range. There was no evidence for tricuspid stenosis. There was mild regurgitation.  ·	Right atrium: Size was normal.   ·	Systemic veins: IVC: The inferior vena cava was normal in size. The respirophasic change in diameter was less than 50%.  ·	Pericardium: There was no pericardial effusion. The pericardium was normal in appearance.       MEDICATIONS  (STANDING):  atorvastatin 80 milliGRAM(s) Oral at bedtime  chlorhexidine 4% Liquid 1 Application(s) Topical <User Schedule>  diltiazem    milliGRAM(s) Oral daily  docusate sodium 100 milliGRAM(s) Oral three times a day  furosemide    Tablet 20 milliGRAM(s) Oral daily  metoprolol succinate  milliGRAM(s) Oral daily  multivitamin 1 Tablet(s) Oral daily  OLANZapine 2.5 milliGRAM(s) Oral at bedtime  pantoprazole    Tablet 40 milliGRAM(s) Oral before breakfast  sertraline 100 milliGRAM(s) Oral daily    MEDICATIONS  (PRN):    FAMILY HISTORY:  No pertinent family history in first degree relatives      SOCIAL HISTORY  CIGARETTES:  ALCOHOL:    Past Surgical History:    Allergies:    No Known Drug Allergies  shellfish (Other)      REVIEW OF SYSTEMS:  CONSTITUTIONAL: No fever, weight loss, chills  EYES: No visual problems  ENMT:  No difficulty hearing, tinnitus, vertigo  NECK: No neck pain   RESPIRATORY: No dyspnea, cough, wheezing  CARDIOVASCULAR: No chest pain, palpitations, dizziness, syncope, paroxysmal nocturnal dyspnea, orthopnea, leg swelling  GASTROINTESTINAL: No abdominal pain, nausea, vomiting, hematemesis, diarrhea, constipation, melena or bright red blood  GENITOURINARY: No dysuria, nocturia, hematuria, or urinary incontinence  NEUROLOGICAL: left sided hemiplegia from prior CVA; dysarthria; slurred speech  ENDOCRINE: No heat or cold intolerance, or hair loss  MUSCULOSKELETAL: No joint pain or swelling, muscle, back, or extremity pain  PSYCHIATRIC: No depression or anxiety  HEME/LYMPH: No easy bruising or bleeding gums      Vital Signs Last 24 Hrs  T(C): 36.3 (09 Mar 2019 07:38), Max: 37.1 (08 Mar 2019 16:00)  T(F): 97.4 (09 Mar 2019 07:38), Max: 98.7 (08 Mar 2019 16:00)  HR: 64 (09 Mar 2019 07:38) (61 - 83)  BP: 151/66 (09 Mar 2019 07:38) (127/68 - 173/79)  BP(mean): --  RR: 20 (09 Mar 2019 07:38) (18 - 20)  SpO2: 99% (09 Mar 2019 07:38) (96% - 99%)      PHYSICAL EXAM  NAD, comfortably lying in bed  RRR, normal S1S2  CTA anteriorly  prior scar in left infraclavicular region well healed    ECG: A-sensed, BiV Paced    I&O's Detail      LABS:                        11.1   7.53  )-----------( 237      ( 09 Mar 2019 08:11 )             35.0     03-09    143  |  105  |  16  ----------------------------<  88  4.0   |  25  |  0.8    Ca    9.7      09 Mar 2019 08:11  Phos  4.2     03-09  Mg     1.9     03-09    TPro  6.6  /  Alb  3.5  /  TBili  0.6  /  DBili  x   /  AST  20  /  ALT  13  /  AlkPhos  110  03-09    CARDIAC MARKERS ( 08 Mar 2019 15:30 )  x     / <0.01 ng/mL / x     / x     / x          PT/INR - ( 09 Mar 2019 08:11 )   PT: 22.00 sec;   INR: 1.93 ratio         PTT - ( 09 Mar 2019 08:11 )  PTT:39.3 sec  Urinalysis Basic - ( 08 Mar 2019 18:00 )    Color: Yellow / Appearance: Cloudy / S.020 / pH: x  Gluc: x / Ketone: Negative  / Bili: Negative / Urobili: 0.2 mg/dL   Blood: x / Protein: Trace mg/dL / Nitrite: Negative   Leuk Esterase: Moderate / RBC: x / WBC 6-10 /HPF   Sq Epi: x / Non Sq Epi: Occasional /HPF / Bacteria: Few /HPF      BNP  I&O's Detail    Daily     Daily     RADIOLOGY & ADDITIONAL STUDIES:

## 2019-03-09 NOTE — PROGRESS NOTE ADULT - ASSESSMENT
83 yo F with PMH of CVA w/ residual L-sided weakness, A-fib on Coumadin NR 1.93 today, HFrEF s/p AICD, HTN presented to ED with lethargy, facial droop, and slurred speech. In ED, stroke code was called. CT head and CTA head/neck were negative for acute pathology. NIH score was 14 (though baseline is 11). No tPA was given since outside window.    #) New slurred speech + facial droop likely TIA rule out acute stroke  - Pacer lead NOT MRI compatible so NO MRI at this time   - Daughter concerned neurology consultation has not taken place but re-assured her Neurology Team will see pt today and that no tests needed at this time and that pt is admitted to Stroke Unit and will be transfered once bed available unless neuro decides otherwise.   - CT head = negative for acute change  - CTA head/neck = no acute obstruction, stable exam since prior  - NIH stroke baseline = 11, currently in ED 14  - Neuro following - Rx stroke unit  - will hold off on MRI for now given AICD - needs EPS eval for interrogation and possible clearance  - f/u speech report for feeding   - f/u OT recs  - Check TTE   - Neuro checks q4h    #) A-fib - rate controlled, c/w Cardizem + Toprol + Coumadin (1mg for now - previously taking 1.5mg but INR was elevated), daily INR  - Dose coumadin home dose 1mg qhs   - Check TTE    #) HFrEF - c/w BB + Lasix    #) HTN - c/w CCB + BB + Lasix    DVT ppx: on Coumadin  Diet: DASH  Activity: OOBTC w/ assistance  Code status: DNR/DNI - MOLST signed in chart

## 2019-03-09 NOTE — SWALLOW BEDSIDE ASSESSMENT ADULT - PHARYNGEAL PHASE
Throat clear post oral intake/Wet vocal quality post oral intake/Delayed cough post oral intake Within functional limits

## 2019-03-09 NOTE — PROGRESS NOTE ADULT - SUBJECTIVE AND OBJECTIVE BOX
JUDYMEÑO PUGAKRISTI  84y  Female    Patient is a 84y old  Female who presents with a chief complaint of CVA (09 Mar 2019 11:21)    84F h.x CVA with Lt hillary, bedbound from stroke presents with dysarthria noted by daughter who she lives with and HHA. There also might have been an episode of confusion. Denies fevers, chills, n/v, HA or dizziness.     INTERVAL HPI/OVERNIGHT EVENTS: none pt appears herself per daughter today. Daughter has been at bedside since yesterday on admission.     PAST MEDICAL & SURGICAL HISTORY:  HTN (hypertension)  Cerebrovascular accident (CVA)  COPD (chronic obstructive pulmonary disease)  Atrial fibrillation  Systolic heart failure  AICD (automatic cardioverter/defibrillator) present  S/P appendectomy    VS:  T(F): 97.4 (19 @ 07:38), Max: 98.7 (19 @ 16:00)  HR: 64 (19 @ 07:38) (61 - 83)  BP: 151/66 (19 @ 07:38) (127/68 - 173/79)  RR: 20 (19 @ 07:38) (18 - 20)  SpO2: 99% (19 @ 07:38) (96% - 99%)    PHYSICAL EXAM:  GENERAL: NAD, Obesed  HEAD:  Atraumatic, Normocephalic  EYES: EOMI, PERRLA, conjunctiva and sclera clear  NECK: Supple, No JVD  CHEST/LUNG: Clear to auscultation bilaterally; No wheeze  HEART: Regular rate and rhythm; No murmurs, rubs, or gallops  ABDOMEN: Soft, Nontender, Nondistended; Bowel sounds present  EXTREMITIES:  2+ Peripheral Pulses, No clubbing, cyanosis, or edema  PSYCH: AAOx2 (person and place only)  NEUROLOGY: Left hillary paresis (old), speech is normal  SKIN: No rashes or lesions    LABS:                        11.1   7.53  )-----------( 237      ( 09 Mar 2019 08:11 )             35.0       -    143  |  105  |  16  ----------------------------<  88  4.0   |  25  |  0.8    Ca    9.7      09 Mar 2019 08:11  Phos  4.2     03-  Mg     1.9     -    TPro  6.6  /  Alb  3.5  /  TBili  0.6  /  DBili  x   /  AST  20  /  ALT  13  /  AlkPhos  110  03-09    PT/INR - ( 09 Mar 2019 08:11 )   PT: 22.00 sec;   INR: 1.93 ratio      PTT - ( 09 Mar 2019 08:11 )  PTT:39.3 sec    Urinalysis Basic - ( 08 Mar 2019 18:00 )    Color: Yellow / Appearance: Cloudy / S.020 / pH: x  Gluc: x / Ketone: Negative  / Bili: Negative / Urobili: 0.2 mg/dL   Blood: x / Protein: Trace mg/dL / Nitrite: Negative   Leuk Esterase: Moderate / RBC: x / WBC 6-10 /HPF   Sq Epi: x / Non Sq Epi: Occasional /HPF / Bacteria: Few /HPF    MICROBIOLOGY: no report    RADIOLOGY & ADDITIONAL TESTS:        PROBLEM LIST:  HEALTH ISSUES - PROBLEM Dx: KRISTI SAMUELS  84y  Female    Patient is a 84y old  Female who presents with a chief complaint of CVA (09 Mar 2019 11:21)    84F h.x CVA with Lt hillary, bedbound from stroke presents with dysarthria noted by daughter who she lives with and HHA. There also might have been an episode of confusion. Denies fevers, chills, n/v, HA or dizziness.     INTERVAL HPI/OVERNIGHT EVENTS: none pt appears herself per daughter today. Daughter has been at bedside since yesterday on admission.     PAST MEDICAL & SURGICAL HISTORY:  HTN (hypertension)  Cerebrovascular accident (CVA)  COPD (chronic obstructive pulmonary disease)  Atrial fibrillation  Systolic heart failure  AICD (automatic cardioverter/defibrillator) present  S/P appendectomy    VS:  T(F): 97.4 (19 @ 07:38), Max: 98.7 (19 @ 16:00)  HR: 64 (19 @ 07:38) (61 - 83)  BP: 151/66 (19 @ 07:38) (127/68 - 173/79)  RR: 20 (19 @ 07:38) (18 - 20)  SpO2: 99% (19 @ 07:38) (96% - 99%)    PHYSICAL EXAM:  GENERAL: NAD, Obesed  HEAD:  Atraumatic, Normocephalic  EYES: EOMI, PERRLA, conjunctiva and sclera clear  NECK: Supple, No JVD  CHEST/LUNG: Clear to auscultation bilaterally; No wheeze  HEART: Regular rate and rhythm; No murmurs, rubs, or gallops  ABDOMEN: Soft, Nontender, Nondistended; Bowel sounds present  EXTREMITIES:  2+ Peripheral Pulses, No clubbing, cyanosis, or edema  PSYCH: AAOx2 (person and place only)  NEUROLOGY: Left hillary paresis (old), speech is normal  SKIN: No rashes or lesions    MEDICATIONS  (STANDING):  atorvastatin 80 milliGRAM(s) Oral at bedtime  chlorhexidine 4% Liquid 1 Application(s) Topical <User Schedule>  diltiazem    milliGRAM(s) Oral daily  docusate sodium 100 milliGRAM(s) Oral three times a day  furosemide    Tablet 20 milliGRAM(s) Oral daily  metoprolol succinate  milliGRAM(s) Oral daily  multivitamin 1 Tablet(s) Oral daily  OLANZapine 2.5 milliGRAM(s) Oral at bedtime  pantoprazole    Tablet 40 milliGRAM(s) Oral before breakfast  sertraline 100 milliGRAM(s) Oral daily    LABS:                        11.1   7.53  )-----------( 237      ( 09 Mar 2019 08:11 )             35.0       03-09    143  |  105  |  16  ----------------------------<  88  4.0   |  25  |  0.8    Ca    9.7      09 Mar 2019 08:11  Phos  4.2     03-09  Mg     1.9     -09    TPro  6.6  /  Alb  3.5  /  TBili  0.6  /  DBili  x   /  AST  20  /  ALT  13  /  AlkPhos  110  03-09    PT/INR - ( 09 Mar 2019 08:11 )   PT: 22.00 sec;   INR: 1.93 ratio      PTT - ( 09 Mar 2019 08:11 )  PTT:39.3 sec    Urinalysis Basic - ( 08 Mar 2019 18:00 )    Color: Yellow / Appearance: Cloudy / S.020 / pH: x  Gluc: x / Ketone: Negative  / Bili: Negative / Urobili: 0.2 mg/dL   Blood: x / Protein: Trace mg/dL / Nitrite: Negative   Leuk Esterase: Moderate / RBC: x / WBC 6-10 /HPF   Sq Epi: x / Non Sq Epi: Occasional /HPF / Bacteria: Few /HPF    MICROBIOLOGY: no report    RADIOLOGY: Reviewed

## 2019-03-09 NOTE — CONSULT NOTE ADULT - ATTENDING COMMENTS
EP: Dr. Roe    Pt seen and examined. She is being worked up for a recurrent CVA. She has an abandoned lead that makes her system NOT MRI COMPATIBLE.  Device personally interrogated.   No other events noted on interrogation and PPM working well. Pt to follow up with Dr. Roe as an outpatient.     please recall prn.

## 2019-03-10 LAB
ANION GAP SERPL CALC-SCNC: 8 MMOL/L — SIGNIFICANT CHANGE UP (ref 7–14)
BASOPHILS # BLD AUTO: 0.03 K/UL — SIGNIFICANT CHANGE UP (ref 0–0.2)
BASOPHILS NFR BLD AUTO: 0.4 % — SIGNIFICANT CHANGE UP (ref 0–1)
BUN SERPL-MCNC: 15 MG/DL — SIGNIFICANT CHANGE UP (ref 10–20)
CALCIUM SERPL-MCNC: 9.3 MG/DL — SIGNIFICANT CHANGE UP (ref 8.5–10.1)
CHLORIDE SERPL-SCNC: 103 MMOL/L — SIGNIFICANT CHANGE UP (ref 98–110)
CO2 SERPL-SCNC: 28 MMOL/L — SIGNIFICANT CHANGE UP (ref 17–32)
CREAT SERPL-MCNC: 0.8 MG/DL — SIGNIFICANT CHANGE UP (ref 0.7–1.5)
EOSINOPHIL # BLD AUTO: 0.35 K/UL — SIGNIFICANT CHANGE UP (ref 0–0.7)
EOSINOPHIL NFR BLD AUTO: 5 % — SIGNIFICANT CHANGE UP (ref 0–8)
GLUCOSE SERPL-MCNC: 87 MG/DL — SIGNIFICANT CHANGE UP (ref 70–99)
HCT VFR BLD CALC: 35.2 % — LOW (ref 37–47)
HGB BLD-MCNC: 11.2 G/DL — LOW (ref 12–16)
IMM GRANULOCYTES NFR BLD AUTO: 0.3 % — SIGNIFICANT CHANGE UP (ref 0.1–0.3)
INR BLD: 2.14 RATIO — HIGH (ref 0.65–1.3)
LYMPHOCYTES # BLD AUTO: 1.69 K/UL — SIGNIFICANT CHANGE UP (ref 1.2–3.4)
LYMPHOCYTES # BLD AUTO: 24.2 % — SIGNIFICANT CHANGE UP (ref 20.5–51.1)
MCHC RBC-ENTMCNC: 27.4 PG — SIGNIFICANT CHANGE UP (ref 27–31)
MCHC RBC-ENTMCNC: 31.8 G/DL — LOW (ref 32–37)
MCV RBC AUTO: 86.1 FL — SIGNIFICANT CHANGE UP (ref 81–99)
MONOCYTES # BLD AUTO: 0.92 K/UL — HIGH (ref 0.1–0.6)
MONOCYTES NFR BLD AUTO: 13.2 % — HIGH (ref 1.7–9.3)
NEUTROPHILS # BLD AUTO: 3.97 K/UL — SIGNIFICANT CHANGE UP (ref 1.4–6.5)
NEUTROPHILS NFR BLD AUTO: 56.9 % — SIGNIFICANT CHANGE UP (ref 42.2–75.2)
NRBC # BLD: 0 /100 WBCS — SIGNIFICANT CHANGE UP (ref 0–0)
PLATELET # BLD AUTO: 231 K/UL — SIGNIFICANT CHANGE UP (ref 130–400)
POTASSIUM SERPL-MCNC: 3.9 MMOL/L — SIGNIFICANT CHANGE UP (ref 3.5–5)
POTASSIUM SERPL-SCNC: 3.9 MMOL/L — SIGNIFICANT CHANGE UP (ref 3.5–5)
PROTHROM AB SERPL-ACNC: 24.4 SEC — HIGH (ref 9.95–12.87)
RBC # BLD: 4.09 M/UL — LOW (ref 4.2–5.4)
RBC # FLD: 15.9 % — HIGH (ref 11.5–14.5)
SODIUM SERPL-SCNC: 139 MMOL/L — SIGNIFICANT CHANGE UP (ref 135–146)
WBC # BLD: 6.98 K/UL — SIGNIFICANT CHANGE UP (ref 4.8–10.8)
WBC # FLD AUTO: 6.98 K/UL — SIGNIFICANT CHANGE UP (ref 4.8–10.8)

## 2019-03-10 RX ORDER — WARFARIN SODIUM 2.5 MG/1
1 TABLET ORAL AT BEDTIME
Qty: 0 | Refills: 0 | Status: DISCONTINUED | OUTPATIENT
Start: 2019-03-10 | End: 2019-03-11

## 2019-03-10 RX ADMIN — OLANZAPINE 2.5 MILLIGRAM(S): 15 TABLET, FILM COATED ORAL at 22:09

## 2019-03-10 RX ADMIN — Medication 100 MILLIGRAM(S): at 06:30

## 2019-03-10 RX ADMIN — Medication 20 MILLIGRAM(S): at 06:30

## 2019-03-10 RX ADMIN — Medication 100 MILLIGRAM(S): at 22:09

## 2019-03-10 RX ADMIN — Medication 1 TABLET(S): at 13:21

## 2019-03-10 RX ADMIN — PANTOPRAZOLE SODIUM 40 MILLIGRAM(S): 20 TABLET, DELAYED RELEASE ORAL at 06:29

## 2019-03-10 RX ADMIN — Medication 100 MILLIGRAM(S): at 06:29

## 2019-03-10 RX ADMIN — SERTRALINE 100 MILLIGRAM(S): 25 TABLET, FILM COATED ORAL at 13:21

## 2019-03-10 RX ADMIN — Medication 100 MILLIGRAM(S): at 17:23

## 2019-03-10 RX ADMIN — Medication 120 MILLIGRAM(S): at 06:30

## 2019-03-10 RX ADMIN — WARFARIN SODIUM 1 MILLIGRAM(S): 2.5 TABLET ORAL at 22:09

## 2019-03-10 RX ADMIN — ATORVASTATIN CALCIUM 80 MILLIGRAM(S): 80 TABLET, FILM COATED ORAL at 22:09

## 2019-03-10 NOTE — CONSULT NOTE ADULT - SUBJECTIVE AND OBJECTIVE BOX
HPI:  83 yo F with PMH of CVA w/ residual L-sided weakness, A-fib on Coumadin, HFrEF s/p AICD, HTN presented to ED with lethargy, facial droop, and slurred speech. Collateral history obtained from daughter at bedside. Daughter reports that when patient woke up in AM was noted to be very lethargic and drowsy. Was mumbling her words a little. Did not think much of it at first. Then afterwards noted patient was sleeping most of day, had worsening of slurred speech and facial droop, so called EMS.    Patient has nearly complete L-sided paralysis at baseline after previous stroke. She is non-ambulatory. Has 24 HHA. Speech and cognition is still intact. Daughter reports night PTP was at her normal baseline - no slurred speech, no facial droop, no lethargy.    Symptoms apparently began shortly after being started on Zyprexa in addition to Trazodone and Zoloft.    Initial HCT neg for acute process. 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 and  Stable multifocal mild stenoses of bilateral anterior and middle   cerebral arteries.        Vital Signs Last 24 Hrs  T(C): 36.8 (10 Mar 2019 08:17), Max: 36.8 (09 Mar 2019 15:30)  T(F): 98.2 (10 Mar 2019 08:17), Max: 98.2 (09 Mar 2019 15:30)  HR: 63 (10 Mar 2019 08:17) (63 - 75)  BP: 154/73 (10 Mar 2019 08:17) (131/63 - 171/76)  BP(mean): --  RR: 18 (10 Mar 2019 08:17) (18 - 19)  SpO2: 98% (10 Mar 2019 08:17) (98% - 99%)     NIHSS:   LOC 0  Gaze 0  Visual 1 old left hemiparesis  Facial 1 right facial  Motor Arm 3 left  Motor Leg 3 left  Ataxia 0  Sensory 0  Dysarthria 1  Language 0  Extinction 0  Total: 9, mostly old deficits  Baseline MRS: 4    Allergies    No Known Drug Allergies  shellfish (Other)    Intolerances      MEDICATIONS  (STANDING):  atorvastatin 80 milliGRAM(s) Oral at bedtime  chlorhexidine 4% Liquid 1 Application(s) Topical <User Schedule>  diltiazem    milliGRAM(s) Oral daily  docusate sodium 100 milliGRAM(s) Oral three times a day  furosemide    Tablet 20 milliGRAM(s) Oral daily  metoprolol succinate  milliGRAM(s) Oral daily  multivitamin 1 Tablet(s) Oral daily  OLANZapine 2.5 milliGRAM(s) Oral at bedtime  pantoprazole    Tablet 40 milliGRAM(s) Oral before breakfast  sertraline 100 milliGRAM(s) Oral daily  warfarin 1 milliGRAM(s) Oral at bedtime    MEDICATIONS  (PRN):      LABS:                        11.2   6.98  )-----------( 231      ( 10 Mar 2019 06:55 )             35.2     03-10    139  |  103  |  15  ----------------------------<  87  3.9   |  28  |  0.8    Ca    9.3      10 Mar 2019 06:55  Phos  4.2     03-09  Mg     1.9     03-09    TPro  6.6  /  Alb  3.5  /  TBili  0.6  /  DBili  x   /  AST  20  /  ALT  13  /  AlkPhos  110  03-09    PT/INR - ( 10 Mar 2019 06:55 )   PT: 24.40 sec;   INR: 2.14 ratio         PTT - ( 09 Mar 2019 08:11 )  PTT:39.3 sec        Neuro Imaging:    < from: CT Angio Neck w/ IV Cont (03.08.19 @ 17:08) >  NECK:  The visualized aortic arch is widely patent. The great vessel origins are   widely patent.    The right common, internal and external carotid arteries are widely   patent.    The left common, internal and carotid arteries are widely patent.    The vertebral arteries are patent bilaterally, dominant on the left.    There is generalized tortuosity of the cervical vessels.    HEAD:  The distal internal carotid arteries are patent. There is fusiform   dilatation of the bilateral cavernous carotid arteries measuring 7-8 mm.   There are multifocal mild stenoses of bilateral anterior and middle   cerebral arteries.    The distal vertebral arteries are patent. The basilar artery is patent.   The posterior cerebral arteries are patent. Redemonstrated persistent   fetal origin of the left PCA, normal variation.    OTHER:  The thyroid gland is enlarged with bilateral nodules.  Left chest wall pacemaker is present.  Mild cervical spine degenerative changes.  There is complete opacification of the right maxillary, anterior ethmoid   and frontal sinuses (ostiomeatal complex pattern of obstruction).    IMPRESSION:     1.  No evidence of major vascular stenosis or occlusion. Stable exam   since2/21/2018.    2.  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      3.  Stable multifocal mild stenoses of bilateral anterior and middle   cerebral arteries.      < end of copied text > HPI:  83 yo F with PMH of CVA w/ residual L-sided weakness, A-fib on Coumadin, HFrEF s/p AICD, HTN presented to ED with lethargy, facial droop, and slurred speech. Collateral history obtained from daughter at bedside. Daughter reports that when patient woke up in AM was noted to be very lethargic and drowsy. Was mumbling her words a little. Did not think much of it at first. Then afterwards noted patient was sleeping most of day, had worsening of slurred speech and facial droop, so called EMS.    Patient has nearly complete L-sided paralysis at baseline after previous stroke. She is non-ambulatory. Has 24 HHA. Speech and cognition is still intact. Daughter reports night PTP was at her normal baseline - no slurred speech, no facial droop, no lethargy.    Symptoms apparently began shortly after being started on Zyprexa in addition to Trazodone and Zoloft.  Had been more confused and lethargic last few days after starting combination of medications but family also states pt sleeping better and less agitated at night after addition of zyprexa.      Initial HCT neg for acute process. 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 and  Stable multifocal mild stenoses of bilateral anterior and middle   cerebral arteries.    Vital Signs Last 24 Hrs  T(C): 36.8 (10 Mar 2019 08:17), Max: 36.8 (09 Mar 2019 15:30)  T(F): 98.2 (10 Mar 2019 08:17), Max: 98.2 (09 Mar 2019 15:30)  HR: 63 (10 Mar 2019 08:17) (63 - 75)  BP: 154/73 (10 Mar 2019 08:17) (131/63 - 171/76)  BP(mean): --  RR: 18 (10 Mar 2019 08:17) (18 - 19)  SpO2: 98% (10 Mar 2019 08:17) (98% - 99%)     NIHSS:   LOC 0  Gaze 0  Visual 1 old left hemiparesis  Facial 1 right facial  Motor Arm 3 left  Motor Leg 3 left  Ataxia 0  Sensory 0  Dysarthria 1  Language 0  Extinction 0  Total: 9, mostly old deficits  Baseline MRS: 4    Allergies    No Known Drug Allergies  shellfish (Other)    Intolerances      MEDICATIONS  (STANDING):  atorvastatin 80 milliGRAM(s) Oral at bedtime  chlorhexidine 4% Liquid 1 Application(s) Topical <User Schedule>  diltiazem    milliGRAM(s) Oral daily  docusate sodium 100 milliGRAM(s) Oral three times a day  furosemide    Tablet 20 milliGRAM(s) Oral daily  metoprolol succinate  milliGRAM(s) Oral daily  multivitamin 1 Tablet(s) Oral daily  OLANZapine 2.5 milliGRAM(s) Oral at bedtime  pantoprazole    Tablet 40 milliGRAM(s) Oral before breakfast  sertraline 100 milliGRAM(s) Oral daily  warfarin 1 milliGRAM(s) Oral at bedtime    MEDICATIONS  (PRN):      LABS:                        11.2   6.98  )-----------( 231      ( 10 Mar 2019 06:55 )             35.2     03-10    139  |  103  |  15  ----------------------------<  87  3.9   |  28  |  0.8    Ca    9.3      10 Mar 2019 06:55  Phos  4.2     03-09  Mg     1.9     03-09    TPro  6.6  /  Alb  3.5  /  TBili  0.6  /  DBili  x   /  AST  20  /  ALT  13  /  AlkPhos  110  03-09    PT/INR - ( 10 Mar 2019 06:55 )   PT: 24.40 sec;   INR: 2.14 ratio         PTT - ( 09 Mar 2019 08:11 )  PTT:39.3 sec        Neuro Imaging:    < from: CT Angio Neck w/ IV Cont (03.08.19 @ 17:08) >  NECK:  The visualized aortic arch is widely patent. The great vessel origins are   widely patent.    The right common, internal and external carotid arteries are widely   patent.    The left common, internal and carotid arteries are widely patent.    The vertebral arteries are patent bilaterally, dominant on the left.    There is generalized tortuosity of the cervical vessels.    HEAD:  The distal internal carotid arteries are patent. There is fusiform   dilatation of the bilateral cavernous carotid arteries measuring 7-8 mm.   There are multifocal mild stenoses of bilateral anterior and middle   cerebral arteries.    The distal vertebral arteries are patent. The basilar artery is patent.   The posterior cerebral arteries are patent. Redemonstrated persistent   fetal origin of the left PCA, normal variation.    OTHER:  The thyroid gland is enlarged with bilateral nodules.  Left chest wall pacemaker is present.  Mild cervical spine degenerative changes.  There is complete opacification of the right maxillary, anterior ethmoid   and frontal sinuses (ostiomeatal complex pattern of obstruction).    IMPRESSION:     1.  No evidence of major vascular stenosis or occlusion. Stable exam   since2/21/2018.    2.  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      3.  Stable multifocal mild stenoses of bilateral anterior and middle   cerebral arteries.      < end of copied text >

## 2019-03-10 NOTE — PROGRESS NOTE ADULT - ASSESSMENT
85 yo F with PMH of CVA w/ residual L-sided weakness, A-fib on Coumadin NR 1.93 today, HFrEF s/p AICD, HTN presented to ED with lethargy, facial droop, and slurred speech. In ED, stroke code was called. CT head and CTA head/neck were negative for acute pathology. NIH score was 14 (though baseline is 11). No tPA was given since outside window.    #) New slurred speech + facial droop likely TIA rule out acute stroke  - NIH stroke baseline = 11, on ED presentation 14  - Neuro checks q4h  - c/w Lipitor  - Neuro consult placed  - PT consulted   - CT head = negative for acute change  - CTA head/neck = no acute obstruction, stable exam since prior  - EPS eval appreciated: pt has an abandoned lead that makes her system NOT MRI COMPATIBLE.    #) A-fib   - rate controlled  - INR therapeutic: 2.14  - c/w Cardizem, Toprol, Coumadin (1mg)  - ECHO pending    #) HFrEF  - euvolemic on exam   - c/w Diltiazem metoprolol and Lasix    #) HTN   - controlled  - c/w Diltiazem metoprolol and Lasix    # DVT ppx  - on Coumadin    # DASH diet: Dysphagia 3     # OOBTC with assistance    # DNR/DNI 85 yo F with PMH of CVA w/ residual L-sided weakness, A-fib on Coumadin NR 1.93 today, HFrEF s/p AICD, HTN presented to ED with lethargy, facial droop, and slurred speech. In ED, stroke code was called. CT head and CTA head/neck were negative for acute pathology. NIH score was 14 (though baseline is 11). No tPA was given since outside window.    #) New slurred speech + facial droop likely TIA rule out acute stroke  - NIH stroke baseline = 11, on ED presentation 14  - Neuro checks q4h  - c/w Lipitor  - Neuro consult placed  - PT consulted   - CT head = negative for acute change  - CTA head/neck = no acute obstruction, stable exam since prior  - EPS eval appreciated: pt has an abandoned lead that makes her system NOT MRI COMPATIBLE.    #) A-fib   - rate controlled  - INR therapeutic: 2.14  - c/w Cardizem, Toprol, Coumadin (1mg)  - ECHO pending    #) HFrEF  - euvolemic on exam   - c/w Diltiazem metoprolol and Lasix    #) HTN   - controlled  - c/w Diltiazem metoprolol and Lasix    # DVT ppx  - on Coumadin    # DASH diet: Dysphagia 3     # OOBTC with assistance    # DNR/DNI    Attending Attestation  Pt has been seen and examined. Case and Plan discussed with Dr. Ruiz and Daughter at bedside.   Pt is here for slurred speech and facial droop which resolved.  Stroke w/up done and in progress to be transfered to Stroke Unit for monitoring.  Neurology Consult pending assessment - unclear why consult called from ER and ordered.  For now pt is stable   PNA Ruled out  CHF Ruled out  Findings on CXR and chronic changes and pt with no pulmonary complaints or hypoxia - f/u with PMD  Continue current care and Coumadin dosing  After Neuro evaluation can start d/c planning  STOP TRAZADONE as sx could be related  Continue Zoloft and Zyprexa  f/u INR daily    #Progress Note Handoff  Pending  Consults: Neurology (pt with h/x CVA with Lt Dany / p/w slurred speech and fascial droop)  Tests:  Test Results: Urine cxs  Family Discussion: Yes at bedside and agrees with current plan of care  Future Disposition:  HOME with HHA and family    Agree with resident note above as corrected

## 2019-03-10 NOTE — CONSULT NOTE ADULT - ASSESSMENT
Impression:  83 y/o female with hx of stroke w/ residual left sided deficits presenting to hospital with increased lethargy, facial droop and dysarthria now improved.    Plan:  repeat head CT (device not compatible  continue Zoloft, Zyprexa  Hold Trazodone  continue Lipitor and anticoagulation Impression:  85 y/o female with hx of stroke w/ residual left sided deficits presenting to hospital with increased lethargy, facial droop and dysarthria now improved.    Plan:  repeat head CT (device not compatible w/ MRI)  continue Zoloft, Zyprexa  Hold Trazodone  continue Lipitor and anticoagulation

## 2019-03-10 NOTE — PROGRESS NOTE ADULT - SUBJECTIVE AND OBJECTIVE BOX
SUBJECTIVE:    Patient is a 84y old Female who presents with a chief complaint of CVA (09 Mar 2019 14:25)    Currently admitted to medicine with the primary diagnosis of CVA (cerebral vascular accident)     Today is hospital day 2d. This morning she is resting comfortably in bed and reports no new issues or overnight events.     PAST MEDICAL & SURGICAL HISTORY  HTN (hypertension)  Cerebrovascular accident (CVA)  COPD (chronic obstructive pulmonary disease)  Atrial fibrillation  Systolic heart failure  AICD (automatic cardioverter/defibrillator) present  S/P appendectomy    SOCIAL HISTORY:  Negative for smoking/alcohol/drug use.     ALLERGIES:  No Known Drug Allergies  shellfish (Other)    MEDICATIONS:  STANDING MEDICATIONS  atorvastatin 80 milliGRAM(s) Oral at bedtime  chlorhexidine 4% Liquid 1 Application(s) Topical <User Schedule>  diltiazem    milliGRAM(s) Oral daily  docusate sodium 100 milliGRAM(s) Oral three times a day  furosemide    Tablet 20 milliGRAM(s) Oral daily  metoprolol succinate  milliGRAM(s) Oral daily  multivitamin 1 Tablet(s) Oral daily  OLANZapine 2.5 milliGRAM(s) Oral at bedtime  pantoprazole    Tablet 40 milliGRAM(s) Oral before breakfast  sertraline 100 milliGRAM(s) Oral daily  warfarin 1 milliGRAM(s) Oral at bedtime    PRN MEDICATIONS    VITALS:   T(F): 98.2  HR: 63  BP: 154/73  RR: 18  SpO2: 98%    LABS:                        11.2   6.98  )-----------( 231      ( 10 Mar 2019 06:55 )             35.2     03-10    139  |  103  |  15  ----------------------------<  87  3.9   |  28  |  0.8    Ca    9.3      10 Mar 2019 06:55  Phos  4.2     03-09  Mg     1.9     -09    TPro  6.6  /  Alb  3.5  /  TBili  0.6  /  DBili  x   /  AST  20  /  ALT  13  /  AlkPhos  110  03-09    PT/INR - ( 10 Mar 2019 06:55 )   PT: 24.40 sec;   INR: 2.14 ratio         PTT - ( 09 Mar 2019 08:11 )  PTT:39.3 sec  Urinalysis Basic - ( 08 Mar 2019 18:00 )    Color: Yellow / Appearance: Cloudy / S.020 / pH: x  Gluc: x / Ketone: Negative  / Bili: Negative / Urobili: 0.2 mg/dL   Blood: x / Protein: Trace mg/dL / Nitrite: Negative   Leuk Esterase: Moderate / RBC: x / WBC 6-10 /HPF   Sq Epi: x / Non Sq Epi: Occasional /HPF / Bacteria: Few /HPF      Culture - Urine (collected 08 Mar 2019 17:20)  Source: .Urine Clean Catch (Midstream)  Final Report (09 Mar 2019 21:28):    <10,000 CFU/mL Normal Urogenital Amalia      CARDIAC MARKERS ( 08 Mar 2019 15:30 )  x     / <0.01 ng/mL / x     / x     / x          RADIOLOGY:  CT Angio Neck w/ IV Cont (19)  1.  No evidence of major vascular stenosis or occlusion. Stable exam since2018.  2.  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    3.  Stable multifocal mild stenoses of bilateral anterior and middle cerebral arteries.    CT Head No Cont (19 @ 17:08) >  1.  No evidence of acute intracranial pathology. Stable exam since 2019.  2.  Stable chronic right frontoparietal infarct and mild chronic microvascular changes.  3.  Unchanged right-sided paranasal sinus inflammatory changes.      PHYSICAL EXAM:  GEN: No acute distress  LUNGS: Clear to auscultation bilaterally   HEART: S1/S2 present. RRR.   ABD: Soft, non-tender, non-distended. Bowel sounds present  EXT: NC/NC/NE/2+PP/HARRISON  NEURO: AAOX2, L-sided facial droop on smile, 1/5 strength on left side and 4/5 strength on right side

## 2019-03-11 LAB
ANION GAP SERPL CALC-SCNC: 10 MMOL/L — SIGNIFICANT CHANGE UP (ref 7–14)
BASOPHILS # BLD AUTO: 0.03 K/UL — SIGNIFICANT CHANGE UP (ref 0–0.2)
BASOPHILS NFR BLD AUTO: 0.4 % — SIGNIFICANT CHANGE UP (ref 0–1)
BUN SERPL-MCNC: 14 MG/DL — SIGNIFICANT CHANGE UP (ref 10–20)
CALCIUM SERPL-MCNC: 9.4 MG/DL — SIGNIFICANT CHANGE UP (ref 8.5–10.1)
CHLORIDE SERPL-SCNC: 104 MMOL/L — SIGNIFICANT CHANGE UP (ref 98–110)
CO2 SERPL-SCNC: 28 MMOL/L — SIGNIFICANT CHANGE UP (ref 17–32)
CREAT SERPL-MCNC: 0.8 MG/DL — SIGNIFICANT CHANGE UP (ref 0.7–1.5)
EOSINOPHIL # BLD AUTO: 0.28 K/UL — SIGNIFICANT CHANGE UP (ref 0–0.7)
EOSINOPHIL NFR BLD AUTO: 3.3 % — SIGNIFICANT CHANGE UP (ref 0–8)
GLUCOSE SERPL-MCNC: 99 MG/DL — SIGNIFICANT CHANGE UP (ref 70–99)
HCT VFR BLD CALC: 34.7 % — LOW (ref 37–47)
HGB BLD-MCNC: 11.4 G/DL — LOW (ref 12–16)
IMM GRANULOCYTES NFR BLD AUTO: 0.2 % — SIGNIFICANT CHANGE UP (ref 0.1–0.3)
INR BLD: 2.13 RATIO — HIGH (ref 0.65–1.3)
LYMPHOCYTES # BLD AUTO: 1.76 K/UL — SIGNIFICANT CHANGE UP (ref 1.2–3.4)
LYMPHOCYTES # BLD AUTO: 21 % — SIGNIFICANT CHANGE UP (ref 20.5–51.1)
MCHC RBC-ENTMCNC: 28.1 PG — SIGNIFICANT CHANGE UP (ref 27–31)
MCHC RBC-ENTMCNC: 32.9 G/DL — SIGNIFICANT CHANGE UP (ref 32–37)
MCV RBC AUTO: 85.5 FL — SIGNIFICANT CHANGE UP (ref 81–99)
MONOCYTES # BLD AUTO: 1.1 K/UL — HIGH (ref 0.1–0.6)
MONOCYTES NFR BLD AUTO: 13.1 % — HIGH (ref 1.7–9.3)
NEUTROPHILS # BLD AUTO: 5.21 K/UL — SIGNIFICANT CHANGE UP (ref 1.4–6.5)
NEUTROPHILS NFR BLD AUTO: 62 % — SIGNIFICANT CHANGE UP (ref 42.2–75.2)
NRBC # BLD: 0 /100 WBCS — SIGNIFICANT CHANGE UP (ref 0–0)
PLATELET # BLD AUTO: 236 K/UL — SIGNIFICANT CHANGE UP (ref 130–400)
POTASSIUM SERPL-MCNC: 3.8 MMOL/L — SIGNIFICANT CHANGE UP (ref 3.5–5)
POTASSIUM SERPL-SCNC: 3.8 MMOL/L — SIGNIFICANT CHANGE UP (ref 3.5–5)
PROTHROM AB SERPL-ACNC: 24.3 SEC — HIGH (ref 9.95–12.87)
RBC # BLD: 4.06 M/UL — LOW (ref 4.2–5.4)
RBC # FLD: 16 % — HIGH (ref 11.5–14.5)
SODIUM SERPL-SCNC: 142 MMOL/L — SIGNIFICANT CHANGE UP (ref 135–146)
WBC # BLD: 8.4 K/UL — SIGNIFICANT CHANGE UP (ref 4.8–10.8)
WBC # FLD AUTO: 8.4 K/UL — SIGNIFICANT CHANGE UP (ref 4.8–10.8)

## 2019-03-11 RX ORDER — HALOPERIDOL DECANOATE 100 MG/ML
0.5 INJECTION INTRAMUSCULAR ONCE
Qty: 0 | Refills: 0 | Status: COMPLETED | OUTPATIENT
Start: 2019-03-11 | End: 2019-03-11

## 2019-03-11 RX ORDER — WARFARIN SODIUM 2.5 MG/1
1 TABLET ORAL ONCE
Qty: 0 | Refills: 0 | Status: COMPLETED | OUTPATIENT
Start: 2019-03-11 | End: 2019-03-11

## 2019-03-11 RX ORDER — QUETIAPINE FUMARATE 200 MG/1
12.5 TABLET, FILM COATED ORAL ONCE
Qty: 0 | Refills: 0 | Status: DISCONTINUED | OUTPATIENT
Start: 2019-03-11 | End: 2019-03-11

## 2019-03-11 RX ADMIN — CHLORHEXIDINE GLUCONATE 1 APPLICATION(S): 213 SOLUTION TOPICAL at 05:24

## 2019-03-11 RX ADMIN — Medication 100 MILLIGRAM(S): at 13:04

## 2019-03-11 RX ADMIN — HALOPERIDOL DECANOATE 0.5 MILLIGRAM(S): 100 INJECTION INTRAMUSCULAR at 03:03

## 2019-03-11 RX ADMIN — Medication 1 TABLET(S): at 12:02

## 2019-03-11 RX ADMIN — Medication 20 MILLIGRAM(S): at 05:24

## 2019-03-11 RX ADMIN — OLANZAPINE 2.5 MILLIGRAM(S): 15 TABLET, FILM COATED ORAL at 21:15

## 2019-03-11 RX ADMIN — Medication 100 MILLIGRAM(S): at 05:24

## 2019-03-11 RX ADMIN — ATORVASTATIN CALCIUM 80 MILLIGRAM(S): 80 TABLET, FILM COATED ORAL at 21:15

## 2019-03-11 RX ADMIN — Medication 100 MILLIGRAM(S): at 21:15

## 2019-03-11 RX ADMIN — Medication 120 MILLIGRAM(S): at 05:24

## 2019-03-11 RX ADMIN — WARFARIN SODIUM 1 MILLIGRAM(S): 2.5 TABLET ORAL at 21:15

## 2019-03-11 RX ADMIN — PANTOPRAZOLE SODIUM 40 MILLIGRAM(S): 20 TABLET, DELAYED RELEASE ORAL at 06:43

## 2019-03-11 RX ADMIN — SERTRALINE 100 MILLIGRAM(S): 25 TABLET, FILM COATED ORAL at 12:02

## 2019-03-11 RX ADMIN — Medication 100 MILLIGRAM(S): at 05:25

## 2019-03-11 NOTE — CONSULT NOTE ADULT - SUBJECTIVE AND OBJECTIVE BOX
HPI:  83 yo F with PMH of CVA w/ residual L-sided weakness, A-fib on Coumadin, HFrEF s/p AICD, HTN presented to ED with lethargy, facial droop, and slurred speech. Collateral history obtained from daughter at bedside. Daughter reports that when patient woke up in AM was noted to be very lethargic and drowsy. Was mumbling her words a little. Did not think much of it at first. Then afterwards noted patient was sleeping most of day, had worsening of slurred speech and facial droop, so called EMS.    Patient has nearly complete L-sided paralysis at baseline after previous stroke. She is non-ambulatory. Has 24 HHA. Speech and cognition is still intact. Daughter reports night PTP was at her normal baseline - no slurred speech, no facial droop, no lethargy.    In ED, stroke code was called. CT head and CTA head/neck were negative for acute pathology. NIH score was 14 (though baseline is 11). No tPA was given since outside window. (08 Mar 2019 19:58)      PAST MEDICAL & SURGICAL HISTORY:  HTN (hypertension)  Cerebrovascular accident (CVA)  COPD (chronic obstructive pulmonary disease)  Atrial fibrillation  Systolic heart failure  AICD (automatic cardioverter/defibrillator) present  S/P appendectomy      Hospital Course:  At baseline amb. 8 ft with right quad  with assist. required haldol last night for aggitation.  TODAY'S SUBJECTIVE & REVIEW OF SYMPTOMS:     Constitutional WNL   Cardio WNL   Resp WNL   GI WNL  Heme WNL  Endo WNL  Skin WNL  MSK WNL  Neuro WNL  Cognitive WNL  Psych WNL      MEDICATIONS  (STANDING):  atorvastatin 80 milliGRAM(s) Oral at bedtime  chlorhexidine 4% Liquid 1 Application(s) Topical <User Schedule>  diltiazem    milliGRAM(s) Oral daily  docusate sodium 100 milliGRAM(s) Oral three times a day  furosemide    Tablet 20 milliGRAM(s) Oral daily  metoprolol succinate  milliGRAM(s) Oral daily  multivitamin 1 Tablet(s) Oral daily  OLANZapine 2.5 milliGRAM(s) Oral at bedtime  pantoprazole    Tablet 40 milliGRAM(s) Oral before breakfast  sertraline 100 milliGRAM(s) Oral daily  warfarin 1 milliGRAM(s) Oral once    MEDICATIONS  (PRN):      FAMILY HISTORY:  No pertinent family history in first degree relatives      Allergies    No Known Drug Allergies  shellfish (Other)    Intolerances        SOCIAL HISTORY:    [  ] Etoh  [  ] Smoking  [  ] Substance abuse     Home Environment:  [  ] Home Alone  [  ] Lives with Family  [  ] Home Health Aid    Dwelling:  [  ] Apartment  [  ] Private House  [  ] Adult Home  [  ] Skilled Nursing Facility      [  ] Short Term  [  ] Long Term  [  ] Stairs       Elevator [  ]    FUNCTIONAL STATUS PTA: (Check all that apply)  Ambulation: [   ]Independent    [  ] Dependent     [  ] Non-Ambulatory  Assistive Device: [  ] SA Cane  [  ]  Q Cane  [  ] Walker  [  ]  Wheelchair  ADL : [  ] Independent  [  ]  Dependent       Vital Signs Last 24 Hrs  T(C): 36 (11 Mar 2019 14:01), Max: 37.4 (11 Mar 2019 02:00)  T(F): 96.8 (11 Mar 2019 14:01), Max: 99.3 (11 Mar 2019 02:00)  HR: 63 (11 Mar 2019 14:01) (56 - 83)  BP: 148/70 (11 Mar 2019 14:01) (131/62 - 165/70)  BP(mean): --  RR: 20 (11 Mar 2019 14:01) (18 - 20)  SpO2: 96% (11 Mar 2019 12:10) (96% - 98%)      PHYSICAL EXAM: Alert & Oriented X3  GENERAL: NAD, well-groomed, well-developed  HEAD:  Atraumatic, Normocephalic  EYES: EOMI, PERRLA, conjunctiva and sclera clear  NECK: Supple, No JVD, Normal thyroid  CHEST/LUNG: Clear to percussion bilaterally; No rales, rhonchi, wheezing, or rubs  HEART: Regular rate and rhythm; No murmurs, rubs, or gallops  ABDOMEN: Soft, Nontender, Nondistended; Bowel sounds present  EXTREMITIES:  2+ Peripheral Pulses, No clubbing, cyanosis, or edema    NERVOUS SYSTEM:  Cranial Nerves 2-12 intact [  ] Abnormal  [  ]  ROM: WFL all extremities [  ]  Abnormal [  ]  Motor Strength: WFL all extremities  [  ]  Abnormal [x  ] left hemiparesis, LUE 2-/5, LLE 3+/5  Sensation: intact to light touch [  ] Abnormal [  ]  Reflexes: Symmetric [  ]  Abnormal [  ]    FUNCTIONAL STATUS:  Bed Mobility: Independent [  ]  Supervision [  ]  Needs Assistance [  ]  N/A [  ]  Transfers: Independent [  ]  Supervision [  ]  Needs Assistance [  ]  N/A [  ]   Ambulation: Independent [  ]  Supervision [  ]  Needs Assistance [  ]  N/A [  ]  ADL: Independent [  ] Requires Assistance [  ] N/A [  ]      LABS:                        11.4   8.40  )-----------( 236      ( 11 Mar 2019 08:05 )             34.7     03-11    142  |  104  |  14  ----------------------------<  99  3.8   |  28  |  0.8    Ca    9.4      11 Mar 2019 08:05      PT/INR - ( 11 Mar 2019 08:05 )   PT: 24.30 sec;   INR: 2.13 ratio               RADIOLOGY & ADDITIONAL STUDIES:    Assesment:

## 2019-03-11 NOTE — PROGRESS NOTE ADULT - SUBJECTIVE AND OBJECTIVE BOX
SUBJECTIVE:    Patient is a 84y old Female who presents with a chief complaint of CVA (10 Mar 2019 15:20)    As per overnight staff reports that patient was agitated/combative last night and received one dose of haldol. Subsequently patient was asleep rest of night. This morning patient was appropriate, calm, speaking in sentences.     PAST MEDICAL & SURGICAL HISTORY  HTN (hypertension)  Cerebrovascular accident (CVA)  COPD (chronic obstructive pulmonary disease)  Atrial fibrillation  Systolic heart failure  AICD (automatic cardioverter/defibrillator) present  S/P appendectomy       ALLERGIES:  No Known Drug Allergies  shellfish (Other)    MEDICATIONS:  STANDING MEDICATIONS  atorvastatin 80 milliGRAM(s) Oral at bedtime  chlorhexidine 4% Liquid 1 Application(s) Topical <User Schedule>  diltiazem    milliGRAM(s) Oral daily  docusate sodium 100 milliGRAM(s) Oral three times a day  furosemide    Tablet 20 milliGRAM(s) Oral daily  metoprolol succinate  milliGRAM(s) Oral daily  multivitamin 1 Tablet(s) Oral daily  OLANZapine 2.5 milliGRAM(s) Oral at bedtime  pantoprazole    Tablet 40 milliGRAM(s) Oral before breakfast  sertraline 100 milliGRAM(s) Oral daily  warfarin 1 milliGRAM(s) Oral at bedtime    PRN MEDICATIONS    VITALS:   T(F): 97.2  HR: 74  BP: 149/71  RR: 20  SpO2: 96%    LABS:                        11.4   8.40  )-----------( 236      ( 11 Mar 2019 08:05 )             34.7     03-11    142  |  104  |  14  ----------------------------<  99  3.8   |  28  |  0.8    Ca    9.4      11 Mar 2019 08:05      PT/INR - ( 11 Mar 2019 08:05 )   PT: 24.30 sec;   INR: 2.13 ratio                   Culture - Urine (collected 08 Mar 2019 17:20)  Source: .Urine Clean Catch (Midstream)  Final Report (09 Mar 2019 21:28):    <10,000 CFU/mL Normal Urogenital Amalia              03-10-19 @ 07:01  -  03-11-19 @ 07:00  --------------------------------------------------------  IN: 0 mL / OUT: 100 mL / NET: -100 mL          PHYSICAL EXAM:  GEN: NAD, comfortable  LUNGS: CTAB, no w/r/r  HEART: RRR, no murmurs auscultated  ABD: bowel sounds present, non tender to palpation  EXT: no edema bilateral lower ext  NEURO: Patient responding appropriately, speaking in sentences.  Residual left sided facial weakness present (prior CVA), asymmetric smile, no eyelid droop. Able to move all four extremities however left upper ext and lower extremity certainly weaker than right.

## 2019-03-11 NOTE — PROGRESS NOTE ADULT - ASSESSMENT
#) New slurred speech + facial droop likely TIA rule out acute stroke  - Neuro checks q4h  - c/w Lipitor  - PT consulted   - Original CT head = negative for acute change, will f/u repeat CT head today  - CTA head/neck = no acute obstruction, stable exam since prior  - EPS eval appreciated: pt has an abandoned lead that makes her system not compatible with MRI    #) A-fib   - rate controlled  - INR therapeutic: 2.13 today  - c/w Cardizem, Toprol, Coumadin 1m at home  - ECHO required?    #) HFrEF  - no evidence of volume overload currently  - c/w Diltiazem metoprolol and Lasix    #) HTN   - controlled  - c/w Diltiazem metoprolol and Lasix    # DVT ppx  - on Coumadin    # DASH diet: Dysphagia 3     # OOBTC with assistance    # DNR/DNI  # PT, OT pending

## 2019-03-11 NOTE — PROGRESS NOTE ADULT - SUBJECTIVE AND OBJECTIVE BOX
KRISTI SAMUELS    Chief Complaint:    Handed    HPI:  85 yo F with PMH of CVA w/ residual L-sided weakness, A-fib on Coumadin, HFrEF s/p AICD, HTN presented to ED with lethargy, facial droop, and slurred speech. Collateral history obtained from daughter at bedside. Daughter reports that when patient woke up in AM was noted to be very lethargic and drowsy. Was mumbling her words a little. Did not think much of it at first. Then afterwards noted patient was sleeping most of day, had worsening of slurred speech and facial droop, so called EMS.    Patient has nearly complete L-sided paralysis at baseline after previous stroke. She is non-ambulatory. Has 24 HHA. Speech and cognition is still intact. Daughter reports night PTP was at her normal baseline - no slurred speech, no facial droop, no lethargy.    In ED, stroke code was called. CT head and CTA head/neck were negative for acute pathology. NIH score was 14 (though baseline is 11). No tPA was given since outside window. (08 Mar 2019 19:58)    Patient is examined at the bedside, she is awake and alert, eating her breakfast. Her   daughter is at the bedside. As per nurse no acute overnight events. CTA is negative for LVO. She is s/p repeat CTH.    [] Prior ischemic stroke/TIA  [] Afib  []CAD  []HTN  []DLD  []DM []PVD []Obesity [] Sedintary lifestyle []CHF  []ROBIN  []Cancer Hx     Social History: [] Smoking []  Drug Use: []   Alcohol Use:   [] Other:      Possible Location of Stroke:    Possible Cause of Stroke:    Relevant Cerebral Imaging:    Relevant Cervicocerebral Imaging:  CT Angio Neck w/ IV Cont:   EXAM:  CT ANGIO NECK (W)AW IC        EXAM:  CT ANGIO BRAIN (W)AW IC            PROCEDURE DATE:  03/08/2019            INTERPRETATION:  Clinical History / Reason for exam: Altered mental status    Technique: CT angiogram of the head and neck. CTA ofthe head and neck   was performed following the intravenous administration of 100 cc Optiray   320 (0 cc discarded) with coronal, sagittal and multiple 3-D MIP and   volume rendered reformats.    Comparison: CTA brain 2/21/2018, CTA neck/brain 6/20/2017.    Findings:     There is motion artifact.    NECK:  The visualized aortic arch is widely patent. The great vessel origins are   widely patent.    The right common, internal and external carotid arteries are widely   patent.    The left common, internal and carotid arteries are widely patent.    The vertebral arteries are patent bilaterally, dominant on the left.    There is generalized tortuosity of the cervical vessels.    HEAD:  The distal internal carotid arteries are patent. There is fusiform   dilatation of the bilateral cavernous carotid arteries measuring 7-8 mm.   There are multifocal mild stenoses of bilateral anterior and middle   cerebral arteries.    The distal vertebral arteries are patent. The basilar artery is patent.   The posterior cerebral arteries are patent. Redemonstrated persistent   fetal origin of the left PCA, normal variation.    OTHER:  The thyroid gland is enlarged with bilateral nodules.  Left chest wall pacemaker is present.  Mild cervical spine degenerative changes.  There is complete opacification of the right maxillary, anterior ethmoid   and frontal sinuses (ostiomeatal complex pattern of obstruction).    IMPRESSION:     1.  No evidence of major vascular stenosis or occlusion. Stable exam   since2/21/2018.    2.  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      3.  Stable multifocal mild stenoses of bilateral anterior and middle   cerebral arteries.                  WES BARLOW M.D., ATTENDING RADIOLOGIST  This document has been electronically signed. Mar  8 2019  5:21PM             (03-08-19 @ 17:08)    CT Angio Head w/ IV Cont:   EXAM:  CT ANGIO NECK (W)AW IC        EXAM:  CT ANGIO BRAIN (W)AW IC            PROCEDURE DATE:  03/08/2019            INTERPRETATION:  Clinical History / Reason for exam: Altered mental status    Technique: CT angiogram of the head and neck. CTA ofthe head and neck   was performed following the intravenous administration of 100 cc Optiray   320 (0 cc discarded) with coronal, sagittal and multiple 3-D MIP and   volume rendered reformats.    Comparison: CTA brain 2/21/2018, CTA neck/brain 6/20/2017.    Findings:     There is motion artifact.    NECK:  The visualized aortic arch is widely patent. The great vessel origins are   widely patent.    The right common, internal and external carotid arteries are widely   patent.    The left common, internal and carotid arteries are widely patent.    The vertebral arteries are patent bilaterally, dominant on the left.    There is generalized tortuosity of the cervical vessels.    HEAD:  The distal internal carotid arteries are patent. There is fusiform   dilatation of the bilateral cavernous carotid arteries measuring 7-8 mm.   There are multifocal mild stenoses of bilateral anterior and middle   cerebral arteries.    The distal vertebral arteries are patent. The basilar artery is patent.   The posterior cerebral arteries are patent. Redemonstrated persistent   fetal origin of the left PCA, normal variation.    OTHER:  The thyroid gland is enlarged with bilateral nodules.  Left chest wall pacemaker is present.  Mild cervical spine degenerative changes.  There is complete opacification of the right maxillary, anterior ethmoid   and frontal sinuses (ostiomeatal complex pattern of obstruction).    IMPRESSION:     1.  No evidence of major vascular stenosis or occlusion. Stable exam   since2/21/2018.    2.  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      3.  Stable multifocal mild stenoses of bilateral anterior and middle   cerebral arteries.                  WES BARLOW M.D., ATTENDING RADIOLOGIST  This document has been electronically signed. Mar  8 2019  5:21PM             (03-08-19 @ 17:04)        Relevant blood tests:  Direct LDL: 123 mg/dL [4 - 129] (03-09-19 @ 08:11)      Relevant cardiac rhythm monitoring:    Relevant Cardiac Structure:(TTE/BK +/-):[]No intracardiac thrombus/[] no vegetation/[]no akynesia/EF:      Home Medications:  Cardizem  mg/24 hours oral capsule, extended release: 1 cap(s) orally once a day (29 Jul 2018 10:59)  Colace 100 mg oral capsule: 1 cap(s) orally 3 times a day (29 Jul 2018 10:59)  Lasix 20 mg oral tablet: 1 tab(s) orally once a day (29 Jul 2018 10:59)  Lipitor 80 mg oral tablet: 1 tab(s) orally once a day (at bedtime) (29 Jul 2018 10:59)  Multiple Vitamins oral tablet: 1 tab(s) orally once a day (08 Mar 2019 20:55)  potassium chloride: 40 milliequivalent(s) orally once a day (at bedtime) (29 Jul 2018 10:59)  Probiotic Formula oral capsule: 1 cap(s) orally once a day (at bedtime) (29 Jul 2018 10:59)  Protonix 40 mg oral delayed release tablet: 1 tab(s) orally once a day (29 Jul 2018 10:59)  Toprol- mg oral tablet, extended release: 1 tab(s) orally once a day (29 Jul 2018 10:59)  warfarin 1 mg oral tablet: 1 tab(s) orally once a day (at bedtime) (06 Aug 2018 10:57)  Zoloft 100 mg oral tablet: 1 tab(s) orally once a day (08 Mar 2019 20:52)      MEDICATIONS  (STANDING):  atorvastatin 80 milliGRAM(s) Oral at bedtime  chlorhexidine 4% Liquid 1 Application(s) Topical <User Schedule>  diltiazem    milliGRAM(s) Oral daily  docusate sodium 100 milliGRAM(s) Oral three times a day  furosemide    Tablet 20 milliGRAM(s) Oral daily  metoprolol succinate  milliGRAM(s) Oral daily  multivitamin 1 Tablet(s) Oral daily  OLANZapine 2.5 milliGRAM(s) Oral at bedtime  pantoprazole    Tablet 40 milliGRAM(s) Oral before breakfast  sertraline 100 milliGRAM(s) Oral daily  warfarin 1 milliGRAM(s) Oral once      PT/OT/Speech/Rehab/S&Swr:    Exam:    Vital Signs Last 24 Hrs  T(C): 36 (11 Mar 2019 14:01), Max: 37.4 (11 Mar 2019 02:00)  T(F): 96.8 (11 Mar 2019 14:01), Max: 99.3 (11 Mar 2019 02:00)  HR: 63 (11 Mar 2019 14:01) (56 - 83)  BP: 148/70 (11 Mar 2019 14:01) (131/62 - 165/70)  BP(mean): --  RR: 20 (11 Mar 2019 14:01) (18 - 20)  SpO2: 96% (11 Mar 2019 12:10) (96% - 98%)    NIHSS      LOC:       1a:    0 1b(Questions):   1        1c(Instructions):     0        Best Gaze: 0  Visual: 1  Motor:                 RUE:  0   RLE:  0   LUE: 3    LLE: 3    FACE:  1   Limb Ataxia:0  Sensory: 0      Language: 0      Dysarthria: 1         Extinction and Inattention:0    NIHSS on admission:          NIHSS yesterday:   9       NIHSS today: 9            m-RS:3-4    Impression:      Suggestion:  Routine stroke workup including:    Disposition:

## 2019-03-11 NOTE — SWALLOW BEDSIDE ASSESSMENT ADULT - COMMENTS
dysphagia follow up attempted. pt received asleep. nasal inner cannula in place. as per daughter, present bedside, pt administered haldol last nigh to decrease agitation. daughter expressed pt tolerating current diet with no overt s/s of aspiration vs penetration. SLP to follow up when pt is more responsive. MARCY Lyman aware.

## 2019-03-11 NOTE — CONSULT NOTE ADULT - ASSESSMENT
IMPRESSION: Rehab of left hemiparesis, CVA 2 years ago, R/O acute CVA    PRECAUTIONS: [x  ] Cardiac  [  ] Respiratory  [  ] Seizures [  ] Contact Isolation  [  ] Droplet Isolation  [  ] Other    Weight Bearing Status:     RECOMMENDATION:    Out of Bed to Chair     DVT/Decubiti Prophylaxis    REHAB PLAN:     [ xx  ] Bedside P/T 3-5 times a week   [   ]   Bedside O/T  2-3 times a week             [   ] No Rehab Therapy Indicated                   [   ]  Speech Therapy   Conditioning/ROM                                    ADL  Bed Mobility                                               Conditioning/ROM  Transfers                                                     Bed Mobility  Sitting /Standing Balance                         Transfers                                        Gait Training                                               Sitting/Standing Balance  Stair Training [   ]Applicable                    Home equipment Eval                                                                        Splinting  [   ] Only      GOALS:   ADL   [   ]   Independent                    Transfers  [   ] Independent                          Ambulation  [ x  ] Independent     [x    ] With device                            [ x  ]  CG                                                         [ x  ]  CG                                                                  [ x  ] CG                            [    ] Min A                                                   [   ] Min A                                                              [   ] Min  A          DISCHARGE PLAN:   [   ]  Good candidate for Intensive Rehabilitation/Hospital based-[facility]A [facility]                                             Will tolerate 3hrs Intensive Rehab Daily                                       [    ]  Short Term Rehab in Skilled Nursing Facility                                       [ xx   ]  Home with Outpatient or VN services                                         [    ]  Possible Candidate for Intensive Hospital based Rehab

## 2019-03-12 LAB
ANION GAP SERPL CALC-SCNC: 12 MMOL/L — SIGNIFICANT CHANGE UP (ref 7–14)
APTT BLD: 42.8 SEC — HIGH (ref 27–39.2)
BASOPHILS # BLD AUTO: 0.03 K/UL — SIGNIFICANT CHANGE UP (ref 0–0.2)
BASOPHILS NFR BLD AUTO: 0.4 % — SIGNIFICANT CHANGE UP (ref 0–1)
BUN SERPL-MCNC: 15 MG/DL — SIGNIFICANT CHANGE UP (ref 10–20)
CALCIUM SERPL-MCNC: 9.6 MG/DL — SIGNIFICANT CHANGE UP (ref 8.5–10.1)
CHLORIDE SERPL-SCNC: 101 MMOL/L — SIGNIFICANT CHANGE UP (ref 98–110)
CO2 SERPL-SCNC: 27 MMOL/L — SIGNIFICANT CHANGE UP (ref 17–32)
CREAT SERPL-MCNC: 0.8 MG/DL — SIGNIFICANT CHANGE UP (ref 0.7–1.5)
EOSINOPHIL # BLD AUTO: 0.29 K/UL — SIGNIFICANT CHANGE UP (ref 0–0.7)
EOSINOPHIL NFR BLD AUTO: 3.6 % — SIGNIFICANT CHANGE UP (ref 0–8)
GLUCOSE SERPL-MCNC: 96 MG/DL — SIGNIFICANT CHANGE UP (ref 70–99)
HCT VFR BLD CALC: 35.3 % — LOW (ref 37–47)
HGB BLD-MCNC: 11.5 G/DL — LOW (ref 12–16)
IMM GRANULOCYTES NFR BLD AUTO: 0.2 % — SIGNIFICANT CHANGE UP (ref 0.1–0.3)
INR BLD: 2.02 RATIO — HIGH (ref 0.65–1.3)
LYMPHOCYTES # BLD AUTO: 2.27 K/UL — SIGNIFICANT CHANGE UP (ref 1.2–3.4)
LYMPHOCYTES # BLD AUTO: 28.2 % — SIGNIFICANT CHANGE UP (ref 20.5–51.1)
MAGNESIUM SERPL-MCNC: 2 MG/DL — SIGNIFICANT CHANGE UP (ref 1.8–2.4)
MCHC RBC-ENTMCNC: 28.1 PG — SIGNIFICANT CHANGE UP (ref 27–31)
MCHC RBC-ENTMCNC: 32.6 G/DL — SIGNIFICANT CHANGE UP (ref 32–37)
MCV RBC AUTO: 86.3 FL — SIGNIFICANT CHANGE UP (ref 81–99)
MONOCYTES # BLD AUTO: 0.97 K/UL — HIGH (ref 0.1–0.6)
MONOCYTES NFR BLD AUTO: 12.1 % — HIGH (ref 1.7–9.3)
NEUTROPHILS # BLD AUTO: 4.46 K/UL — SIGNIFICANT CHANGE UP (ref 1.4–6.5)
NEUTROPHILS NFR BLD AUTO: 55.5 % — SIGNIFICANT CHANGE UP (ref 42.2–75.2)
NRBC # BLD: 0 /100 WBCS — SIGNIFICANT CHANGE UP (ref 0–0)
PLATELET # BLD AUTO: 261 K/UL — SIGNIFICANT CHANGE UP (ref 130–400)
POTASSIUM SERPL-MCNC: 4.2 MMOL/L — SIGNIFICANT CHANGE UP (ref 3.5–5)
POTASSIUM SERPL-SCNC: 4.2 MMOL/L — SIGNIFICANT CHANGE UP (ref 3.5–5)
PROTHROM AB SERPL-ACNC: 23.1 SEC — HIGH (ref 9.95–12.87)
RBC # BLD: 4.09 M/UL — LOW (ref 4.2–5.4)
RBC # FLD: 15.9 % — HIGH (ref 11.5–14.5)
SODIUM SERPL-SCNC: 140 MMOL/L — SIGNIFICANT CHANGE UP (ref 135–146)
WBC # BLD: 8.04 K/UL — SIGNIFICANT CHANGE UP (ref 4.8–10.8)
WBC # FLD AUTO: 8.04 K/UL — SIGNIFICANT CHANGE UP (ref 4.8–10.8)

## 2019-03-12 RX ORDER — SERTRALINE 25 MG/1
75 TABLET, FILM COATED ORAL DAILY
Qty: 0 | Refills: 0 | Status: DISCONTINUED | OUTPATIENT
Start: 2019-03-12 | End: 2019-03-13

## 2019-03-12 RX ORDER — WARFARIN SODIUM 2.5 MG/1
1.5 TABLET ORAL ONCE
Qty: 0 | Refills: 0 | Status: COMPLETED | OUTPATIENT
Start: 2019-03-12 | End: 2019-03-12

## 2019-03-12 RX ORDER — WARFARIN SODIUM 2.5 MG/1
1 TABLET ORAL ONCE
Qty: 0 | Refills: 0 | Status: DISCONTINUED | OUTPATIENT
Start: 2019-03-12 | End: 2019-03-12

## 2019-03-12 RX ORDER — TRAZODONE HCL 50 MG
50 TABLET ORAL AT BEDTIME
Qty: 0 | Refills: 0 | Status: DISCONTINUED | OUTPATIENT
Start: 2019-03-12 | End: 2019-03-13

## 2019-03-12 RX ADMIN — Medication 100 MILLIGRAM(S): at 05:33

## 2019-03-12 RX ADMIN — WARFARIN SODIUM 1.5 MILLIGRAM(S): 2.5 TABLET ORAL at 21:08

## 2019-03-12 RX ADMIN — Medication 100 MILLIGRAM(S): at 13:49

## 2019-03-12 RX ADMIN — OLANZAPINE 2.5 MILLIGRAM(S): 15 TABLET, FILM COATED ORAL at 21:08

## 2019-03-12 RX ADMIN — ATORVASTATIN CALCIUM 80 MILLIGRAM(S): 80 TABLET, FILM COATED ORAL at 21:08

## 2019-03-12 RX ADMIN — Medication 120 MILLIGRAM(S): at 05:33

## 2019-03-12 RX ADMIN — Medication 50 MILLIGRAM(S): at 21:08

## 2019-03-12 RX ADMIN — Medication 100 MILLIGRAM(S): at 21:08

## 2019-03-12 RX ADMIN — Medication 20 MILLIGRAM(S): at 05:33

## 2019-03-12 RX ADMIN — Medication 1 TABLET(S): at 11:37

## 2019-03-12 RX ADMIN — CHLORHEXIDINE GLUCONATE 1 APPLICATION(S): 213 SOLUTION TOPICAL at 05:32

## 2019-03-12 RX ADMIN — PANTOPRAZOLE SODIUM 40 MILLIGRAM(S): 20 TABLET, DELAYED RELEASE ORAL at 05:38

## 2019-03-12 RX ADMIN — SERTRALINE 100 MILLIGRAM(S): 25 TABLET, FILM COATED ORAL at 11:37

## 2019-03-12 NOTE — OCCUPATIONAL THERAPY INITIAL EVALUATION ADULT - LIGHT TOUCH SENSATION, LUE, REHAB EVAL
Lumbar Spinal Surgery Discharge Instructions   Dr. Shira Carlos  [phone]    Activity:    [facility] You are going home a well person, be as active as possible. Your only exercise should be walking. Start with short frequent walks and increase your walking distance each day. Start with walking twice a day for 5 minutes and increase your distance each day 2-3 minutes until you reach 25 minutes twice a day. Limit the amount of time you sit to 20-30 minute intervals. Sitting for prolonged periods of time will be uncomfortable for you following your surgery.  No bending, lifting (of 5lbs or more), twisting, or straining.  Do not drive until your doctor states you may do so. However, you may ride in a car for short distances.  If you are required to wear a brace, you should wear it at all times when you are out of bed. You may remove it when sleeping unless your physician advises you against it.  When you are in the bed, you may lay on your back or on either side. Do not lie on your stomach.  You may resume sexual relations 4-6 weeks after surgery.  Continue to use your incentive spirometer for deep breathing exercises. Driving:   You may not drive or return to work until instructed by your physician. However, you may ride in the car for short periods of time. Brace:   If you have a back brace, you should wear your brace at all times when you are out of bed. Do not wear the brace while in bed or showering.  Remember to always wear a cotton t-shirt underneath your brace.  Do not bend or twist when your brace is off. Diet:   You may resume your regular home diet as tolerated. If your throat is sore, you should eat soft foods for the first couple of days.  Be sure to drink plenty of fluids; it is important to keep yourself hydrated.  Avoid alcoholic beverages and ABSOLUTELY NO tobacco products. Tobacco products will interfere with your healing.  If you continue to use tobacco, you may end up needing another surgery in the future. Dressing: You have on a Prineo dressing. This is a waterproof bacteriostatic dressing that requires no post-operative care. Please do not peel the dressing off, or apply any oil based products, as they may expedite the deterioration of the mesh. The dressing will slowly chip off on its own.  Do not rub or apply lotion or ointments to incision site. Shower:   You may shower 5 days after your surgery.  You may remove your brace during showers.  NO not use tub baths, swimming pools or Jacuzzis. Medications:   Check with your physician before taking any anti-inflammatory medications. (Advil, Aleve, Ibuprofen, Aspirin)   Take your pain medication as directed   Do NOT take additional Tylenol if your prescribed pain medication has acetaminophen in it (Endocet/Percocet, Lortab, Norco)   It is important to have regular bowel movements. Pain medications can be constipating. Stool softeners, warm prune juice, increasing your water intake, and increasing fiber in your diet can help in preventing constipation.  Do NOT take laxatives if at all possible except in severe situations. It can result in a vicious cycle of constipation and diarrhea. Stockings:   You have been given T.E.D. stockings to wear. Continue to wear these for 7 days after your discharge. Put them on in the morning and take them off at night. They are used to increase your circulation and prevent blood clots from forming in your legs. Follow-Up    Please call ASAP to schedule your 1st post-operative appointment. This should be approximately 3 weeks from your surgery date. Notify your physician in you develop any of the following conditions:   Fever above 101 degrees for 24 hours.  Nausea or vomiting.  Severe headache.    Inability to urinate   Loss of bowel or bladder function (sudden onset of incontinence)   Changes in sensation in your extremities (numbness, tingling, loss of color).  Severe pain or pain not relieved by medications.  Redness, swelling, or drainage from your incision.  Persistent pain in the chest.    Pain in the calf of either leg.  Increased weakness (if this is greater than before your surgery). If you have any questions about your dressing contact your Orthopaedic Surgeons office. severe impairment/pt does not identify light touch to elbow/ shoulder

## 2019-03-12 NOTE — PROGRESS NOTE ADULT - SUBJECTIVE AND OBJECTIVE BOX
SUBJECTIVE:    Patient is a 84y old Female who presents with a chief complaint of CVA (12 Mar 2019 09:25)    Last night patient     PAST MEDICAL & SURGICAL HISTORY  HTN (hypertension)  Cerebrovascular accident (CVA)  COPD (chronic obstructive pulmonary disease)  Atrial fibrillation  Systolic heart failure  AICD (automatic cardioverter/defibrillator) present  S/P appendectomy       ALLERGIES:  No Known Drug Allergies  shellfish (Other)    MEDICATIONS:  STANDING MEDICATIONS  atorvastatin 80 milliGRAM(s) Oral at bedtime  chlorhexidine 4% Liquid 1 Application(s) Topical <User Schedule>  diltiazem    milliGRAM(s) Oral daily  docusate sodium 100 milliGRAM(s) Oral three times a day  furosemide    Tablet 20 milliGRAM(s) Oral daily  metoprolol succinate  milliGRAM(s) Oral daily  multivitamin 1 Tablet(s) Oral daily  OLANZapine 2.5 milliGRAM(s) Oral at bedtime  pantoprazole    Tablet 40 milliGRAM(s) Oral before breakfast  sertraline 75 milliGRAM(s) Oral daily  warfarin 1.5 milliGRAM(s) Oral once    PRN MEDICATIONS    VITALS:   T(F): 98.1  HR: 60  BP: 115/59  RR: 18  SpO2: 93%    LABS:                        11.5   8.04  )-----------( 261      ( 12 Mar 2019 05:13 )             35.3     03-12    140  |  101  |  15  ----------------------------<  96  4.2   |  27  |  0.8    Ca    9.6      12 Mar 2019 05:13  Mg     2.0     03-12      PT/INR - ( 12 Mar 2019 05:13 )   PT: 23.10 sec;   INR: 2.02 ratio         PTT - ( 12 Mar 2019 05:13 )  PTT:42.8 sec                  03-11-19 @ 07:01  -  03-12-19 @ 07:00  --------------------------------------------------------  IN: 0 mL / OUT: 200 mL / NET: -200 mL    03-12-19 @ 07:01  -  03-12-19 @ 15:08  --------------------------------------------------------  IN: 0 mL / OUT: 1 mL / NET: -1 mL          PHYSICAL EXAM:  GEN: NAD, comfortable  LUNGS: CTAB, no w/r/r  HEART: RRR, no m/r/g  ABD: soft, NT/ND, +BS  EXT: no edema, PP b/l  NEURO: AAOx3 SUBJECTIVE:    Patient is a 84y old Female who presents with a chief complaint of CVA (12 Mar 2019 09:25)    Last night patient was agitated and received a dose of trazadone (daughter brought in from home). This morning patient has no specific complaints. Responding appropriately. Denies shortness of breath, abdominal pain, chest pain.     PAST MEDICAL & SURGICAL HISTORY  HTN (hypertension)  Cerebrovascular accident (CVA)  COPD (chronic obstructive pulmonary disease)  Atrial fibrillation  Systolic heart failure  AICD (automatic cardioverter/defibrillator) present  S/P appendectomy       ALLERGIES:  No Known Drug Allergies  shellfish (Other)    MEDICATIONS:  STANDING MEDICATIONS  atorvastatin 80 milliGRAM(s) Oral at bedtime  chlorhexidine 4% Liquid 1 Application(s) Topical <User Schedule>  diltiazem    milliGRAM(s) Oral daily  docusate sodium 100 milliGRAM(s) Oral three times a day  furosemide    Tablet 20 milliGRAM(s) Oral daily  metoprolol succinate  milliGRAM(s) Oral daily  multivitamin 1 Tablet(s) Oral daily  OLANZapine 2.5 milliGRAM(s) Oral at bedtime  pantoprazole    Tablet 40 milliGRAM(s) Oral before breakfast  sertraline 75 milliGRAM(s) Oral daily  warfarin 1.5 milliGRAM(s) Oral once    PRN MEDICATIONS    VITALS:   T(F): 98.1  HR: 60  BP: 115/59  RR: 18  SpO2: 93%    LABS:                        11.5   8.04  )-----------( 261      ( 12 Mar 2019 05:13 )             35.3     03-12    140  |  101  |  15  ----------------------------<  96  4.2   |  27  |  0.8    Ca    9.6      12 Mar 2019 05:13  Mg     2.0     03-12      PT/INR - ( 12 Mar 2019 05:13 )   PT: 23.10 sec;   INR: 2.02 ratio         PTT - ( 12 Mar 2019 05:13 )  PTT:42.8 sec                  03-11-19 @ 07:01  -  03-12-19 @ 07:00  --------------------------------------------------------  IN: 0 mL / OUT: 200 mL / NET: -200 mL    03-12-19 @ 07:01  -  03-12-19 @ 15:08  --------------------------------------------------------  IN: 0 mL / OUT: 1 mL / NET: -1 mL          PHYSICAL EXAM:  GEN: NAD, comfortable. On nasal cannula 2L.  LUNGS: CTAB, no w/r/r  HEART: RRR, no murmurs auscultated  ABD: bowel sounds present, non tender to palpation  EXT: no edema bilateral lower ext  NEURO: Patient responding appropriately, speaking in sentences. Not lethargic. Residual left sided facial weakness present (prior CVA), asymmetric smile, no eyelid droop. Able to move all four extremities however left upper ext and lower extremity certainly weaker than right - no change from yesterdays exam.

## 2019-03-12 NOTE — PROGRESS NOTE ADULT - ASSESSMENT
#) New slurred speech + facial droop - improved now  - c/w Lipitor  - Original CT head = negative for acute change, repeat CT March 11th is stable  - CTA head/neck = no acute obstruction, stable exam since prior  - EPS eval appreciated: pt has an abandoned lead that makes her system not compatible with MRI    #)Agitation/anxity  - As outpatient patient takes a combination of trazadone/zyprexa and zoloft  - Will try to wean off zoloft and continue with trazadone for today      #) A-fib   - rate controlled  - INR therapeutic: 2.02 today - will give 1.5mg today as INR has been dropping on 1mg  - c/w Cardizem, Toprol    #) HFrEF  - no evidence of volume overload currently  - c/w Diltiazem metoprolol and Lasix    #) HTN   - controlled  - c/w Diltiazem metoprolol and Lasix    # DVT ppx  - on Coumadin    # DASH diet: Dysphagia 3     # OOBTC with assistance    # DNR/DNI  Dispo: to be discharged home with home PT

## 2019-03-12 NOTE — PHYSICAL THERAPY INITIAL EVALUATION ADULT - AMBULATION SKILLS, REHAB EVAL
needs device/needed assist/minimal ambulation with quad cane few steps with L knee buckling
quad cane, few steps with daughter or HHA/needed assist/needs device

## 2019-03-12 NOTE — PROGRESS NOTE ADULT - ASSESSMENT
#) Transient  slurring and dysarthria - suspect Toxic Metabolic etiology as etiology  - c/w Lipitor  - PT consulted   - Original CT head = negative for acute change, f/u repeat CT head (prelim unchanged)  - CTA head/neck = no acute obstruction, stable exam since prior  - EPS eval appreciated: pt has an abandoned lead that makes her system not compatible with MRI    #) A-fib   - rate controlled  - c/w Cardizem, Toprol, Coumadin 1m at home  - ECHO required?    #) Chronic HFrEF  - no evidence of volume overload currently  - c/w Diltiazem metoprolol and Lasix    #) HTN   - controlled  - c/w Diltiazem metoprolol and Lasix    # DVT ppx  - on Coumadin    #Dementia with episodes of sundowning  -d/w Dr. Arias today - will cont Zyprexa and Trazodone but taper of Zoloft. Daughter agreronald    # DASH diet: Dysphagia 3     # OOBTC with assistance    # DNR/DNI  # PT, OT pending    #Progress Note Handoff  Pending (specify):  Consults_________, Tests___CTH_____, Test Results_______, Other_________  Family discussion: d/w daughter, she is in agreement to take pt home tomorrow if medically stable  Disposition: Home_x__/SNF___/Other________/Unknown at this time________

## 2019-03-12 NOTE — PROGRESS NOTE ADULT - SUBJECTIVE AND OBJECTIVE BOX
SUBJECTIVE: denies any complaints. Daughter gave pt Trazodone last night as pt was starting to get agitated despite Zyprexa.    Patient is a 84y old Female who presents with a chief complaint of CVA (10 Mar 2019 15:20)        PAST MEDICAL & SURGICAL HISTORY  HTN (hypertension)  Cerebrovascular accident (CVA)  COPD (chronic obstructive pulmonary disease)  Atrial fibrillation  Systolic heart failure  AICD (automatic cardioverter/defibrillator) present  S/P appendectomy       ALLERGIES:  No Known Drug Allergies  shellfish (Other)      PHYSICAL EXAM:  GEN: NAD, AA0x2+  LUNGS: decreased BS b/l bases  HEART: RRR, no murmurs auscultated  ABD: bowel sounds present, non tender to palpation  EXT: no edema bilateral lower ext  NEURO: Lt 3/5, Rt 5/5    Tele - no events        ROS: Denies CP, SOB, AP  All other systems reviewed and are within normal limits except for the complaints above.    MEDICATIONS  (STANDING):  atorvastatin 80 milliGRAM(s) Oral at bedtime  chlorhexidine 4% Liquid 1 Application(s) Topical <User Schedule>  diltiazem    milliGRAM(s) Oral daily  docusate sodium 100 milliGRAM(s) Oral three times a day  furosemide    Tablet 20 milliGRAM(s) Oral daily  metoprolol succinate  milliGRAM(s) Oral daily  multivitamin 1 Tablet(s) Oral daily  OLANZapine 2.5 milliGRAM(s) Oral at bedtime  pantoprazole    Tablet 40 milliGRAM(s) Oral before breakfast  sertraline 75 milliGRAM(s) Oral daily  warfarin 1 milliGRAM(s) Oral once    MEDICATIONS  (PRN):    Home Medications:  Cardizem  mg/24 hours oral capsule, extended release: 1 cap(s) orally once a day (29 Jul 2018 10:59)  Colace 100 mg oral capsule: 1 cap(s) orally 3 times a day (29 Jul 2018 10:59)  Lasix 20 mg oral tablet: 1 tab(s) orally once a day (29 Jul 2018 10:59)  Lipitor 80 mg oral tablet: 1 tab(s) orally once a day (at bedtime) (29 Jul 2018 10:59)  Multiple Vitamins oral tablet: 1 tab(s) orally once a day (08 Mar 2019 20:55)  potassium chloride: 40 milliequivalent(s) orally once a day (at bedtime) (29 Jul 2018 10:59)  Probiotic Formula oral capsule: 1 cap(s) orally once a day (at bedtime) (29 Jul 2018 10:59)  Protonix 40 mg oral delayed release tablet: 1 tab(s) orally once a day (29 Jul 2018 10:59)  Toprol- mg oral tablet, extended release: 1 tab(s) orally once a day (29 Jul 2018 10:59)  warfarin 1 mg oral tablet: 1 tab(s) orally once a day (at bedtime) (06 Aug 2018 10:57)  Zoloft 100 mg oral tablet: 1 tab(s) orally once a day (08 Mar 2019 20:52)    Vital Signs Last 24 Hrs  T(C): 36.7 (12 Mar 2019 13:51), Max: 36.7 (11 Mar 2019 19:35)  T(F): 98.1 (12 Mar 2019 13:51), Max: 98.1 (11 Mar 2019 19:35)  HR: 60 (12 Mar 2019 13:51) (60 - 79)  BP: 115/59 (12 Mar 2019 13:51) (106/57 - 187/84)  BP(mean): --  RR: 18 (12 Mar 2019 13:51) (18 - 20)  SpO2: 93% (12 Mar 2019 09:27) (93% - 93%)  CAPILLARY BLOOD GLUCOSE        LABS:                        11.5   8.04  )-----------( 261      ( 12 Mar 2019 05:13 )             35.3     03-12    140  |  101  |  15  ----------------------------<  96  4.2   |  27  |  0.8    Ca    9.6      12 Mar 2019 05:13  Mg     2.0     03-12            PT/INR - ( 12 Mar 2019 05:13 )   PT: 23.10 sec;   INR: 2.02 ratio         PTT - ( 12 Mar 2019 05:13 )  PTT:42.8 sec            Consultant(s) Notes Reviewed:  [x ] YES  [ ] NO  Care Discussed with Consultants/Other Providers/ Housestaff [ x] YES  [ ] NO  Radiology personally reviewed.

## 2019-03-12 NOTE — PHYSICAL THERAPY INITIAL EVALUATION ADULT - PLANNED THERAPY INTERVENTIONS, PT EVAL
bed mobility training/balance training/strengthening/transfer training/pt would benefit from home PT/gait training

## 2019-03-12 NOTE — PHYSICAL THERAPY INITIAL EVALUATION ADULT - LIVES WITH, PROFILE
Pt lives with daughter in pvt home, has chairlift to 2nd floor for shower.  Pt has HHA 24 hrs./children
children/pt lives with daughter in pvt home, has ramp, chair lift, hospital bed, recliner, HHA 24 hrs

## 2019-03-12 NOTE — PROGRESS NOTE ADULT - SUBJECTIVE AND OBJECTIVE BOX
KRISTI SAMUELS    Chief Complaint: Dysarthria and left facial palsy.    HPI:  84 year old woman with PMH of an ischemic stroke with residual left sided weakness, A-fib on Coumadin, AICD, HTN presented to ED with lethargy, facial droop, and dysarthria. She is non-ambulatory. Has 24 hour HHA.  On arrival to ED, stroke code was called. CT head and CTA head/neck were negative for acute pathology. NIH score was 14 (baseline is 11). No acute stroke intervention performed. 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      Relevant PMH:  [x] Prior ischemic stroke/TIA  [x] Afib  []CAD  [x]HTN  []DLD  []DM []PVD [x]Obesity [x] Sedintary lifestyle [x]CHF  []ROBIN  []Cancer Hx     Social History: [] Smoking []  Drug Use: []   Alcohol Use:   [] Other:      Possible Location of Stroke:  Unknown will have a better understanding post stroke workup.  Possible Cause of Stroke:  Unknown will ahve a better understanding post stroke workup.  Relevant Cerebral Imaging:  < from: CT Head No Cont (03.08.19 @ 17:08) >  IMPRESSION:    1.  No evidence of acute intracranial pathology. Stable exam since   2/16/2019.    2.  Stable chronic right frontoparietal infarct and mild chronic   microvascular changes.    3.  Unchanged right-sided paranasal sinus inflammatory changes.    Relevant Cervicocerebral Imaging:  CT Angio Neck w/ IV Cont:   EXAM:  CT ANGIO NECK (W)AW IC        EXAM:  CT ANGIO BRAIN (W)AW IC          IMPRESSION:     1.  No evidence of major vascular stenosis or occlusion. Stable exam   since2/21/2018.    2.  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      3.  Stable multifocal mild stenoses of bilateral anterior and middle   cerebral arteries.      CT Angio Head w/ IV Cont:   EXAM:  CT ANGIO NECK (W)AW IC        EXAM:  CT ANGIO BRAIN (W)AW IC          IMPRESSION:     1.  No evidence of major vascular stenosis or occlusion. Stable exam   since2/21/2018.    2.  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      3.  Stable multifocal mild stenoses of bilateral anterior and middle   cerebral arteries.      Relevant blood tests:  Direct LDL: 123 mg/dL [4 - 129] (03-09-19 @ 08:11)      Relevant cardiac rhythm monitoring:  Known to have Afib.  Relevant Cardiac Structure:(TTE/BK +/-):[]No intracardiac thrombus/[] no vegetation/[]no [place]/EF:  pending    Home Medications:  Cardizem  mg/24 hours oral capsule, extended release: 1 cap(s) orally once a day (29 Jul 2018 10:59)  Colace 100 mg oral capsule: 1 cap(s) orally 3 times a day (29 Jul 2018 10:59)  Lasix 20 mg oral tablet: 1 tab(s) orally once a day (29 Jul 2018 10:59)  Lipitor 80 mg oral tablet: 1 tab(s) orally once a day (at bedtime) (29 Jul 2018 10:59)  Multiple Vitamins oral tablet: 1 tab(s) orally once a day (08 Mar 2019 20:55)  potassium chloride: 40 milliequivalent(s) orally once a day (at bedtime) (29 Jul 2018 10:59)  Probiotic Formula oral capsule: 1 cap(s) orally once a day (at bedtime) (29 Jul 2018 10:59)  Protonix 40 mg oral delayed release tablet: 1 tab(s) orally once a day (29 Jul 2018 10:59)  Toprol- mg oral tablet, extended release: 1 tab(s) orally once a day (29 Jul 2018 10:59)  warfarin 1 mg oral tablet: 1 tab(s) orally once a day (at bedtime) (06 Aug 2018 10:57)  Zoloft 100 mg oral tablet: 1 tab(s) orally once a day (08 Mar 2019 20:52)      MEDICATIONS  (STANDING):  atorvastatin 80 milliGRAM(s) Oral at bedtime  chlorhexidine 4% Liquid 1 Application(s) Topical <User Schedule>  diltiazem    milliGRAM(s) Oral daily  docusate sodium 100 milliGRAM(s) Oral three times a day  furosemide    Tablet 20 milliGRAM(s) Oral daily  metoprolol succinate  milliGRAM(s) Oral daily  multivitamin 1 Tablet(s) Oral daily  OLANZapine 2.5 milliGRAM(s) Oral at bedtime  pantoprazole    Tablet 40 milliGRAM(s) Oral before breakfast  sertraline 100 milliGRAM(s) Oral daily  warfarin 1 milliGRAM(s) Oral once      PT/OT/Speech/Rehab/S&Swr:pending    Exam:    Vital Signs Last 24 Hrs  T(C): 36.6 (12 Mar 2019 05:44), Max: 36.7 (11 Mar 2019 19:35)  T(F): 97.9 (12 Mar 2019 05:44), Max: 98.1 (11 Mar 2019 19:35)  HR: 71 (12 Mar 2019 05:44) (63 - 79)  BP: 136/69 (12 Mar 2019 05:44) (136/69 - 187/84)  BP(mean): --  RR: 18 (12 Mar 2019 05:44) (18 - 20)  SpO2: 96% (11 Mar 2019 12:10) (96% - 96%)    NIHSS      LOC:       1a: 0    1b(Questions):   0        1c(Instructions):     0        Best Gaze:0  Visual:0  Motor:                 RUE:  0   RLE: 0    LUE:3     LLE:  2   FACE: 1    Limb Ataxia:2  Sensory:  1    Language: 0      Dysarthria: 0         Extinction and Inattention:0    NIHSS on admission:   14       NIHSS yesterday:   9       NIHSS today:    9         m-RS:3-4    Impression:  84 year old woman with PMH of an ischemic stroke with residual left sided weakness, A-fib on Coumadin, AICD, HTN presented to ED with lethargy, facial droop, and dysarthria. She is non-ambulatory. Has 24 hour HHA.  On arrival to ED, stroke code was called. CT head and CTA head/neck were negative for acute pathology. NIH score was 14 (baseline is 11). No acute stroke intervention performed. 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  Etiology of symptoms unknown, will have a better understanding post stroke workup.    Suggestion:  Routine stroke workup including:  Follow up CTH.  Continue Coumadin.  Continue Statin.  PT OT Rehab speech therapy.  Disposition:  Stroke unit.    Kirill Perera NP  [phone]

## 2019-03-12 NOTE — OCCUPATIONAL THERAPY INITIAL EVALUATION ADULT - ADDITIONAL COMMENTS
Pt resides in  with 24hr HHA assist. +chair lift to 2nd floor shower. Pt sleeps in recliner chair on 1st floor. Pt gets wheeled to bathroom in W/C and then transfers to toilet with assist.

## 2019-03-12 NOTE — SWALLOW BEDSIDE ASSESSMENT ADULT - COMMENTS
Pt known to SLP dept from old CVA in 2017 w/ recs for soft cut up w/ thin liquids no straws. Pts. daughter reported that the pt. drinks nectar thick liquids at home prior to CVA. Daughter reported that pts. coughs on thin liquids at home. +toleration of soft solid w/o overt s/s of penetration/aspiration. +overt s/s of penetration/aspiration for thin liquids. Pt known to SLP dept from old CVA in 2017 w/ recs for soft cut up w/ thin liquids no straws. Pts. daughter reported that the pt. drinks nectar thick liquids at home prior to CVA. Daughter reported that pt. coughs on thin liquids at home.

## 2019-03-12 NOTE — PROGRESS NOTE ADULT - ASSESSMENT
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 CT head and CTA head/neck were negative for acute pathology. NIH score was 14 (baseline is 11). No acute stroke intervention performed. 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  After about 2 days she is back to almost her baseline. The cause of her symptoms is not clear wheather it was an ischmeic event secondary to neurovascular issues or some systemic disarrangement was the cause.  Not sure if she can have brain MRI to clarify better. Nevertheless given her subtle left lower facial possible weakness at this point ischemic stroke can not be completely ruled out. Per her daughter she has been on high dose Statin before this event in addition to Warfarin(INR or presentation was>1.9).    Suggestion:  Continue Coumadin.  Continue Statin.  Investigate if she can have brain MRI; however not sure if it is going to change the current management.  PT/OT/SP therapy and Rehab consult please.

## 2019-03-12 NOTE — OCCUPATIONAL THERAPY INITIAL EVALUATION ADULT - GENERAL OBSERVATIONS, REHAB EVAL
Attempted to see pt for OT eval. Pt daughter at bedside. Pt remains lethargic. OT to f/u in AM
Pt daughter at bedside. Pt lethargic. Pt medicated overnight secondary to agitation. OT to cont when appropriate.
Pt seen 9:00-9:58 Pt encountered semi urena in bed in NAD +tele, +primafit. Pt agreeable to OT liseth. Pt daughter present to provide background info.

## 2019-03-12 NOTE — OCCUPATIONAL THERAPY INITIAL EVALUATION ADULT - RANGE OF MOTION EXAMINATION, UPPER EXTREMITY
right shoulder 0-135 flexion. LUE shoulder: shrug WFL, AROM 0-10 fwd flexion, elbow 0-90 flexion, wrist 3/4 range flex/ext, digits 1/2 range gross flex/ext. min isolated thumb and 1st digit opposition present./Left UE Passive ROM was WFL  (within functional limits)/Right UE Active ROM was WFL (within functional limits)

## 2019-03-12 NOTE — PHYSICAL THERAPY INITIAL EVALUATION ADULT - GENERAL OBSERVATIONS, REHAB EVAL
8:40-8:50 Pt encountered sleeping in bed in NAD. + tele, O2, Primafit. Daughter at bedside, reports pt received Haldol at 3 a.m., pt currently fatigued. Daughter requesting for PT to come back later when pt is more alert.
13:30-14:19 Pt encountered seated in b/s chair in NAD. + tele, daughter present.

## 2019-03-13 ENCOUNTER — TRANSCRIPTION ENCOUNTER (OUTPATIENT)
Age: 84
End: 2019-03-13

## 2019-03-13 ENCOUNTER — OUTPATIENT (OUTPATIENT)
Dept: OUTPATIENT SERVICES | Facility: HOSPITAL | Age: 84
LOS: 1 days | Discharge: HOME | End: 2019-03-13

## 2019-03-13 VITALS — WEIGHT: 165.35 LBS | HEIGHT: 61 IN

## 2019-03-13 DIAGNOSIS — Z02.9 ENCOUNTER FOR ADMINISTRATIVE EXAMINATIONS, UNSPECIFIED: ICD-10-CM

## 2019-03-13 DIAGNOSIS — Z95.810 PRESENCE OF AUTOMATIC (IMPLANTABLE) CARDIAC DEFIBRILLATOR: Chronic | ICD-10-CM

## 2019-03-13 DIAGNOSIS — Z90.49 ACQUIRED ABSENCE OF OTHER SPECIFIED PARTS OF DIGESTIVE TRACT: Chronic | ICD-10-CM

## 2019-03-13 LAB
APTT BLD: 41.2 SEC — HIGH (ref 27–39.2)
INR BLD: 2.54 RATIO — HIGH (ref 0.65–1.3)
PROTHROM AB SERPL-ACNC: 28.9 SEC — HIGH (ref 9.95–12.87)

## 2019-03-13 RX ORDER — DILTIAZEM HCL 120 MG
1 CAPSULE, EXT RELEASE 24 HR ORAL
Qty: 0 | Refills: 0 | COMMUNITY

## 2019-03-13 RX ORDER — FUROSEMIDE 40 MG
1 TABLET ORAL
Qty: 0 | Refills: 0 | DISCHARGE
Start: 2019-03-13

## 2019-03-13 RX ORDER — ATORVASTATIN CALCIUM 80 MG/1
1 TABLET, FILM COATED ORAL
Qty: 0 | Refills: 0 | DISCHARGE
Start: 2019-03-13

## 2019-03-13 RX ORDER — ATORVASTATIN CALCIUM 80 MG/1
1 TABLET, FILM COATED ORAL
Qty: 0 | Refills: 0 | COMMUNITY

## 2019-03-13 RX ORDER — L.ACIDOPH/B.ANIMALIS/B.LONGUM 15B CELL
1 CAPSULE ORAL
Qty: 0 | Refills: 0 | COMMUNITY

## 2019-03-13 RX ORDER — OLANZAPINE 15 MG/1
1 TABLET, FILM COATED ORAL
Qty: 0 | Refills: 0 | DISCHARGE
Start: 2019-03-13

## 2019-03-13 RX ORDER — POTASSIUM CHLORIDE 20 MEQ
40 PACKET (EA) ORAL
Qty: 0 | Refills: 0 | COMMUNITY

## 2019-03-13 RX ORDER — PANTOPRAZOLE SODIUM 20 MG/1
1 TABLET, DELAYED RELEASE ORAL
Qty: 0 | Refills: 0 | COMMUNITY

## 2019-03-13 RX ORDER — METOPROLOL TARTRATE 50 MG
1 TABLET ORAL
Qty: 0 | Refills: 0 | DISCHARGE
Start: 2019-03-13

## 2019-03-13 RX ORDER — DOCUSATE SODIUM 100 MG
1 CAPSULE ORAL
Qty: 0 | Refills: 0 | COMMUNITY

## 2019-03-13 RX ORDER — DOCUSATE SODIUM 100 MG
1 CAPSULE ORAL
Qty: 0 | Refills: 0 | COMMUNITY
Start: 2019-03-13

## 2019-03-13 RX ORDER — SERTRALINE 25 MG/1
3 TABLET, FILM COATED ORAL
Qty: 42 | Refills: 0
Start: 2019-03-13 | End: 2019-04-02

## 2019-03-13 RX ORDER — TRAZODONE HCL 50 MG
1 TABLET ORAL
Qty: 0 | Refills: 0 | DISCHARGE
Start: 2019-03-13

## 2019-03-13 RX ORDER — DILTIAZEM HCL 120 MG
1 CAPSULE, EXT RELEASE 24 HR ORAL
Qty: 0 | Refills: 0 | DISCHARGE
Start: 2019-03-13

## 2019-03-13 RX ORDER — FUROSEMIDE 40 MG
1 TABLET ORAL
Qty: 0 | Refills: 0 | COMMUNITY

## 2019-03-13 RX ORDER — METOPROLOL TARTRATE 50 MG
1 TABLET ORAL
Qty: 0 | Refills: 0 | COMMUNITY

## 2019-03-13 RX ORDER — SERTRALINE 25 MG/1
1 TABLET, FILM COATED ORAL
Qty: 0 | Refills: 0 | COMMUNITY

## 2019-03-13 RX ORDER — PANTOPRAZOLE SODIUM 20 MG/1
1 TABLET, DELAYED RELEASE ORAL
Qty: 0 | Refills: 0 | DISCHARGE
Start: 2019-03-13

## 2019-03-13 RX ADMIN — Medication 120 MILLIGRAM(S): at 06:00

## 2019-03-13 RX ADMIN — Medication 100 MILLIGRAM(S): at 13:18

## 2019-03-13 RX ADMIN — Medication 100 MILLIGRAM(S): at 06:00

## 2019-03-13 RX ADMIN — Medication 1 TABLET(S): at 11:54

## 2019-03-13 RX ADMIN — Medication 100 MILLIGRAM(S): at 05:59

## 2019-03-13 RX ADMIN — SERTRALINE 75 MILLIGRAM(S): 25 TABLET, FILM COATED ORAL at 11:55

## 2019-03-13 RX ADMIN — Medication 20 MILLIGRAM(S): at 06:00

## 2019-03-13 RX ADMIN — PANTOPRAZOLE SODIUM 40 MILLIGRAM(S): 20 TABLET, DELAYED RELEASE ORAL at 06:01

## 2019-03-13 RX ADMIN — CHLORHEXIDINE GLUCONATE 1 APPLICATION(S): 213 SOLUTION TOPICAL at 06:00

## 2019-03-13 NOTE — DISCHARGE NOTE PROVIDER - CARE PROVIDERS DIRECT ADDRESSES
[email or web address],DirectAddress_Unknown

## 2019-03-13 NOTE — DISCHARGE NOTE PROVIDER - HOSPITAL COURSE
85 yo F with PMH of CVA w/ residual L-sided weakness, A-fib on Coumadin, HFrEF s/p AICD, HTN presented to ED with lethargy, facial droop, and slurred speech.         Patient has nearly complete L-sided paralysis at baseline after previous stroke. She is non-ambulatory. Has 24 HHA. Speech and cognition is still intact. Daughter reports the night prior to presentation was at her normal baseline - no slurred speech, no facial droop, no lethargy.        In ED, stroke code was called. CT head and CTA head/neck were negative for acute pathology. NIH score was 14 (though baseline is 11). No tPA was given since outside window.        #) New slurred speech + facial droop - improved now    - Original CT head = negative for acute change, repeat CT March 11th is stable    - CTA head/neck = no acute obstruction, stable exam since prior    - c/w Lipitor    - EPS eval appreciated: pt has an abandoned lead that makes her system not compatible with MRI thus repeat CT was done - stable        #)Agitation/anxity    - As outpatient patient takes a combination of trazadone/zyprexa and zoloft    - Will try to wean off zoloft and continue with trazadone    - Will follow up with Dr. Ruiz as outpatient        #) A-fib     - rate controlled    - c/w INR monitoring and Coumadin - counseled to use 1.5mg once per week because 1mg Coumadin daily causes INR to be sub-therapeutic occasionally    - c/w Cardizem, Toprol        #) HFrEF    - no evidence of volume overload on discharge    - c/w Diltiazem metoprolol and Lasix        #) HTN     - controlled    - c/w Diltiazem metoprolol and Lasix        # DASH diet: Dysphagia 3         # OOBTC with assistance        # DNR/DNI    Dispo: to be discharged home with home PT 83 yo F with PMH of CVA w/ residual L-sided weakness, A-fib on Coumadin, HFrEF s/p AICD, HTN presented to ED with lethargy, facial droop, and slurred speech.         Patient has nearly complete L-sided paralysis at baseline after previous stroke. She is non-ambulatory. Has 24 HHA. Speech and cognition is still intact. Daughter reports the night prior to presentation was at her normal baseline - no slurred speech, no facial droop, no lethargy.        In ED, stroke code was called. CT head and CTA head/neck were negative for acute pathology. NIH score was 14 (though baseline is 11). No tPA was given since outside window.        #) Transient slurred speech + ?facial droop - improved now    - Original CT head = negative for acute change, repeat CT March 11th is stable    - CTA head/neck = no acute obstruction, stable exam since prior    - c/w Lipitor    - EPS eval appreciated: pt has an abandoned lead that makes her system not compatible with MRI thus repeat CT was done - stable    -likely toxic metabolic encephalopathy        #)Agitation/anxity    - As outpatient patient takes a combination of trazadone/zyprexa and zoloft    - Will try to wean off zoloft and continue with trazadone    - Will follow up with Dr. Ruiz as outpatient        #) A-fib     - rate controlled    - c/w INR monitoring and Coumadin - counseled to use 1.5mg once per week because 1mg Coumadin daily causes INR to be sub-therapeutic occasionally    - c/w Cardizem, Toprol        #) HFrEF    - no evidence of volume overload on discharge    - c/w Diltiazem metoprolol and Lasix        #) HTN     - controlled    - c/w Diltiazem metoprolol and Lasix        # DASH diet: Dysphagia 3         # OOBTC with assistance        # DNR/DNI    Dispo: to be discharged home with home PT

## 2019-03-13 NOTE — DISCHARGE NOTE PROVIDER - CARE PROVIDER_API CALL
Alejandro Villegas)  EEGEpilepsy; Neurology  [facility] [facility], [address]  [place] [address]  Phone: [phone]  Fax: [phone]  Follow Up Time: 2 weeks    Linden Mulligan)  Internal Medicine  [facility] [place] [address]  Phone: [phone]  Fax: [phone]  Follow Up Time: 1 week

## 2019-03-13 NOTE — SWALLOW BEDSIDE ASSESSMENT ADULT - SWALLOW EVAL: RECOMMENDED DIET
dysphagia 3 soft cut up w/ nectar-thick liquids
dysphagia 3 soft cut up w/ nectar-thick liquids
Dysphagia 3 w/ nectar thick liquids
Dysphagia 3 w/ nectar thick liquids

## 2019-03-13 NOTE — SWALLOW BEDSIDE ASSESSMENT ADULT - COMMENTS
dysphagia follow up conducted bedside. pt received alert, responsive. normal breath patterns, on room air. Pt known to SLP dept from old CVA in 2017 w/ recs for soft cut up w/ thin liquids no straws. Pts. daughter reported that the pt. drinks nectar thick liquids at home prior to CVA. Daughter reported that pt. coughs on thin liquids at home. +toleration of soft solid w/o overt s/s of penetration/aspiration. min +overt s/s of penetration/aspiration for thin liquids.

## 2019-03-13 NOTE — DIETITIAN INITIAL EVALUATION ADULT. - NS FNS WEIGHT CHANGE REASON
Reports  lbs and denies any recent wt changes, however pt appears closer to documented wt of 165 lbs

## 2019-03-13 NOTE — DISCHARGE NOTE NURSING/CASE MANAGEMENT/SOCIAL WORK - NSDCDPATPORTLINK_GEN_ALL_CORE
You can access the [facility] Patient Portal, offered by [facility], by registering with the following website: [email or web address]

## 2019-03-13 NOTE — PROGRESS NOTE ADULT - ATTENDING COMMENTS
Patient seen and examined independently earlier today. Case discussed with housestaff, nursing, social work, patient, neuro. I agree with most of the resident's note, physical exam, and plan except as below. Patient doing better. No new complaints. Almost back to baseline as per daughter (except for some somnolence as received Haldol last night). UCx negative. On exam, fluent speech, decreased BS b/l, Lt side 2/5, Rt 5/5. F/u repeat CTH. Neuro f/u. Cont Zyprexa at current dose QHS, consider adding Zyprexa 2.5 PRN for agitation as daughter very concerned about agitation at home.    #Progress Note Handoff  Pending (specify):  Consults:__neuro__,Tests:_______, Test Results___CTH____, Other_________  Family discussion: done  Disposition: Home__x_/SNF___/Other_______/Unknown at this time______
Patient seen and examined with PA and case discussed with medical team during rounds.    Spoke with daughter in detail about symptoms.  I was managing some of her behavioral problems with psychiatrist as out patient and gave her trazodone for sleep  Patient was sleeping better with zyprexa and trazodone  This morning she is more alert recognizes me as her neurologist and is able to follow all commands  Has left hemiparesis which is baseline from prior stroke    Plan as above
Patient seen and examined with PA.  Discussed plan with daughter    Will taper off zoloft and increase zyprexa to twice a day and continue trazadone at night

## 2019-03-13 NOTE — PROGRESS NOTE ADULT - SUBJECTIVE AND OBJECTIVE BOX
Pt seen and examined independently. No new complaints. Feeling well.     Gen:NAD, AAOx2+  CV: S1 S2  Resp: Decreased BS b/l  GI: NT/ND/S +BS  MS: neg c/c/e  Neuro: Lt side 3/5, rest 5/5    Discharge instructions discussed and patient's daughter knows when to seek immediate medical attention.  Patient has proper follow up.  All results discussed and patient's daughter aware they may require further work up.  Medications prescribed and changes discussed.  All questions and concerns from patient and daughter addressed. Understanding of instructions verbalized.    Time spent in completing discharge process and coordinating care 35 minutes.

## 2019-03-13 NOTE — DIETITIAN INITIAL EVALUATION ADULT. - ENERGY NEEDS
Calories: 0079-4024 kcals/day (30-35 kcals/kg IBW)  Protein: 57-72 g/day (1.2-1.5 g/kg IBW)  Fluids: 1ml/kcal

## 2019-03-13 NOTE — DISCHARGE NOTE PROVIDER - NSDCCPCAREPLAN_GEN_ALL_CORE_FT
PRINCIPAL DISCHARGE DIAGNOSIS  Problem: Agitation  Assessment and Plan of Treatment: We have discharged you on a medical regiment for your agitation/anxiety. Please continue to take your Trazadone and Zyprexa and wean off your sertraline decreasing by 25mg every week.      SECONDARY DISCHARGE DIAGNOSES  Problem: Atrial fibrillation  Assessment and Plan of Treatment: Please continue to monitor your INR level regularly with a goal of 2-3. Once a week you may need to take 1.5mg of Coumadin instead of 1mg if the INR is approaching subtherapeutic. PRINCIPAL DISCHARGE DIAGNOSIS  Problem: Encephalopathy  Assessment and Plan of Treatment: Resolved. Zoloft being tapered off. Outpt follow up with PMD and Dr. Arias      SECONDARY DISCHARGE DIAGNOSES  Problem: Atrial fibrillation  Assessment and Plan of Treatment: Please continue to monitor your INR level regularly with a goal of 2-3. Once a week you may need to take 1.5mg of Coumadin instead of 1mg if the INR is approaching subtherapeutic.

## 2019-03-13 NOTE — DISCHARGE NOTE NURSING/CASE MANAGEMENT/SOCIAL WORK - NSDCPEPTCOWAR_GEN_ALL_CORE
Coumadin/Warfarin - Dietary Advice/Coumadin/Warfarin - Compliance/Coumadin/Warfarin - Follow up monitoring/Coumadin/Warfarin - Potential for adverse drug reactions and interactions

## 2019-03-13 NOTE — DIETITIAN INITIAL EVALUATION ADULT. - ADHERENCE
eats cup up diet with thin liquids at home, but had some trouble choking on things- tolerates nectar thick best

## 2019-03-13 NOTE — SWALLOW BEDSIDE ASSESSMENT ADULT - SLP PERTINENT HISTORY OF CURRENT PROBLEM
pt admitted from home for slurred speech, lethargy, and facial droop. CTH (-). as per chart, pt has an abandoned lead that makes her system NOT MRI COMPATIBLE. pt known to SLP dept from old CVA in 2017 w/ recs for soft cut up w/ thin liquids no straws.
Pt admitted from home for slurred speech, lethargy, and facial droop. CTH (-). as per chart, pt has an abandoned lead that makes her system NOT MRI COMPATIBLE.
Pt admitted from home for slurred speech, lethargy, and facial droop. CTH (-). as per chart, pt has an abandoned lead that makes her system NOT MRI COMPATIBLE.
pt admitted from home for slurred speech, lethargy, and facial droop. CTH (-). as per chart, pt has an abandoned lead that makes her system NOT MRI COMPATIBLE. pt known to SLP dept from old CVA in 2017 w/ recs for soft cut up w/ thin liquids no straws.

## 2019-03-13 NOTE — SWALLOW BEDSIDE ASSESSMENT ADULT - SWALLOW EVAL: CURRENT DIET
NPO pending SLP evaluation
Dysphagia 3 soft cut up w/ nectar thick liquids
Dysphagia 3 soft cut up w/ nectar thick liquids
Dysphagia Diet III Mechanical soft consistency with cut up meats, Nectar-thickened liquids

## 2019-03-13 NOTE — PROGRESS NOTE ADULT - SUBJECTIVE AND OBJECTIVE BOX
HPI:  85 yo F with PMH of CVA w/ residual L-sided weakness, A-fib on Coumadin, HFrEF s/p AICD, HTN presented to ED with lethargy, facial droop, and slurred speech. Collateral history obtained from daughter at bedside. Daughter reports that when patient woke up in AM was noted to be very lethargic and drowsy. Was mumbling her words a little. Did not think much of it at first. Then afterwards noted patient was sleeping most of day, had worsening of slurred speech and facial droop, so called EMS.    Patient has nearly complete L-sided paralysis at baseline after previous stroke. She is non-ambulatory. Has 24 HHA. Speech and cognition is still intact.     Neuro Follow-up:  Patient seen at bedside this AM. Had extensive conversation w/ family. Had agitation last night, was given Trazodone. Zoloft being tapered, on Zyprexa as well.    Vital Signs Last 24 Hrs  T(C): 35.7 (13 Mar 2019 06:46), Max: 37.3 (12 Mar 2019 17:38)  T(F): 96.3 (13 Mar 2019 06:46), Max: 99.1 (12 Mar 2019 17:38)  HR: 71 (13 Mar 2019 06:46) (59 - 71)  BP: 129/78 (13 Mar 2019 06:46) (106/57 - 146/68)  RR: 20 (13 Mar 2019 06:46) (18 - 20)    NIHSS:   LOC 0  Gaze 0  Visual 1 old left hillary  Facial 1 right facial  Motor Arm 3 left  Motor Leg 3 left  Ataxia 0  Sensory 0  Dysarthria 1  Language 0  Extinction 0  Total: 9, mostly old deficits  Baseline MRS: 4    Allergies    No Known Drug Allergies  shellfish (Other)    Intolerances      MEDICATIONS  (STANDING):  atorvastatin 80 milliGRAM(s) Oral at bedtime  chlorhexidine 4% Liquid 1 Application(s) Topical <User Schedule>  diltiazem    milliGRAM(s) Oral daily  docusate sodium 100 milliGRAM(s) Oral three times a day  furosemide    Tablet 20 milliGRAM(s) Oral daily  metoprolol succinate  milliGRAM(s) Oral daily  multivitamin 1 Tablet(s) Oral daily  OLANZapine 2.5 milliGRAM(s) Oral at bedtime  pantoprazole    Tablet 40 milliGRAM(s) Oral before breakfast  sertraline 75 milliGRAM(s) Oral daily  traZODone 50 milliGRAM(s) Oral at bedtime    MEDICATIONS  (PRN):      LABS:                        11.5   8.04  )-----------( 261      ( 12 Mar 2019 05:13 )             35.3     03-12    140  |  101  |  15  ----------------------------<  96  4.2   |  27  |  0.8    Ca    9.6      12 Mar 2019 05:13  Mg     2.0     03-12      PT/INR - ( 13 Mar 2019 07:25 )   PT: 28.90 sec;   INR: 2.54 ratio         PTT - ( 13 Mar 2019 07:25 )  PTT:41.2 sec

## 2019-03-13 NOTE — DIETITIAN INITIAL EVALUATION ADULT. - OTHER INFO
RD assessment for pressure ulcer stage II to left buttocks (overdue). Pt admitted for facial droop, negative for acute pathology. On cut up diet with nectar thick liquids per SLP recs and tolerating well, with reported adequate PO intake. Observed pt ate 85% of lunch tray today, but per EMR intake fluctuates from 25-75%. Will rec PO supplement to help with wound healing. Allergy to shellfish noted. No recent BM per EMR.

## 2019-03-13 NOTE — SWALLOW BEDSIDE ASSESSMENT ADULT - SWALLOW EVAL: DIAGNOSIS
suspected pharyngeal dysphagia for thin liquids; +toleration for nectar-thick liquids, puree, and soft solids w/o overt s/s aspiration/penetration
+toleration of nectar and soft solid w/o overt s/s of penetration/aspiration. +overt s/s of penetration/aspiration for thin liquids.
+toleration of nectar and soft solid w/o overt s/s of penetration/aspiration. +overt s/s of penetration/aspiration for thin liquids.

## 2019-03-13 NOTE — DISCHARGE NOTE PROVIDER - PROVIDER TOKENS
PROVIDER:[TOKEN:[[id number]],FOLLOWUP:[2 weeks]],PROVIDER:[TOKEN:[[id number]],FOLLOWUP:[1 week]]

## 2019-03-13 NOTE — PROGRESS NOTE ADULT - ASSESSMENT
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 CT head and CTA head/neck were negative for acute pathology. NIH score was 14 (baseline is 11). No acute stroke intervention performed. 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  After about 2 days she is back to almost her baseline.    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 CT head and CTA head/neck were negative for acute pathology. NIH score was 14 (baseline is 11). No acute stroke intervention performed. 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  She is back to almost her baseline, but continues with episodes of agitation and disorientation, likely due to vascular dementia.    Plan:  -stable for discharge today  -continue to taper off Zoloft  -titrate Zyprexa upward once Zoloft is tapered  -continue Trazodone  -continue Lipitor and AC

## 2019-03-13 NOTE — SWALLOW BEDSIDE ASSESSMENT ADULT - ASR SWALLOW ASPIRATION MONITOR
oral hygiene/fever/pneumonia/throat clearing/upper respiratory infection/gurgly voice/change of breathing pattern/position upright (90Y)/cough
change of breathing pattern/position upright (90Y)/fever/throat clearing/oral hygiene/gurgly voice/pneumonia/upper respiratory infection/cough

## 2019-03-13 NOTE — SWALLOW BEDSIDE ASSESSMENT ADULT - NS SPL SWALLOW CLINIC TRIAL FT
+toleration of nectar thick liquids w/o overt s/s of penetration/aspiration.
+toleration of nectar thick liquids w/o overt s/s of penetration/aspiration.

## 2019-03-20 ENCOUNTER — OUTPATIENT (OUTPATIENT)
Dept: OUTPATIENT SERVICES | Facility: HOSPITAL | Age: 84
LOS: 1 days | Discharge: HOME | End: 2019-03-20

## 2019-03-20 DIAGNOSIS — F01.51 VASCULAR DEMENTIA, UNSPECIFIED SEVERITY, WITH BEHAVIORAL DISTURBANCE: ICD-10-CM

## 2019-03-20 DIAGNOSIS — Z79.01 LONG TERM (CURRENT) USE OF ANTICOAGULANTS: ICD-10-CM

## 2019-03-20 DIAGNOSIS — I48.91 UNSPECIFIED ATRIAL FIBRILLATION: ICD-10-CM

## 2019-03-20 DIAGNOSIS — Z90.49 ACQUIRED ABSENCE OF OTHER SPECIFIED PARTS OF DIGESTIVE TRACT: Chronic | ICD-10-CM

## 2019-03-20 DIAGNOSIS — I11.0 HYPERTENSIVE HEART DISEASE WITH HEART FAILURE: ICD-10-CM

## 2019-03-20 DIAGNOSIS — I07.1 RHEUMATIC TRICUSPID INSUFFICIENCY: ICD-10-CM

## 2019-03-20 DIAGNOSIS — Z95.810 PRESENCE OF AUTOMATIC (IMPLANTABLE) CARDIAC DEFIBRILLATOR: Chronic | ICD-10-CM

## 2019-03-20 DIAGNOSIS — I67.2 CEREBRAL ATHEROSCLEROSIS: ICD-10-CM

## 2019-03-20 DIAGNOSIS — E66.9 OBESITY, UNSPECIFIED: ICD-10-CM

## 2019-03-20 DIAGNOSIS — R79.1 ABNORMAL COAGULATION PROFILE: ICD-10-CM

## 2019-03-20 DIAGNOSIS — R29.714 NIHSS SCORE 14: ICD-10-CM

## 2019-03-20 DIAGNOSIS — Z87.891 PERSONAL HISTORY OF NICOTINE DEPENDENCE: ICD-10-CM

## 2019-03-20 DIAGNOSIS — I69.354 HEMIPLEGIA AND HEMIPARESIS FOLLOWING CEREBRAL INFARCTION AFFECTING LEFT NON-DOMINANT SIDE: ICD-10-CM

## 2019-03-20 DIAGNOSIS — I72.0 ANEURYSM OF CAROTID ARTERY: ICD-10-CM

## 2019-03-20 DIAGNOSIS — Z95.810 PRESENCE OF AUTOMATIC (IMPLANTABLE) CARDIAC DEFIBRILLATOR: ICD-10-CM

## 2019-03-20 DIAGNOSIS — I50.22 CHRONIC SYSTOLIC (CONGESTIVE) HEART FAILURE: ICD-10-CM

## 2019-03-20 DIAGNOSIS — Z74.01 BED CONFINEMENT STATUS: ICD-10-CM

## 2019-03-20 DIAGNOSIS — R47.1 DYSARTHRIA AND ANARTHRIA: ICD-10-CM

## 2019-03-20 DIAGNOSIS — Z87.442 PERSONAL HISTORY OF URINARY CALCULI: ICD-10-CM

## 2019-03-20 DIAGNOSIS — R29.810 FACIAL WEAKNESS: ICD-10-CM

## 2019-03-20 DIAGNOSIS — F41.9 ANXIETY DISORDER, UNSPECIFIED: ICD-10-CM

## 2019-03-20 DIAGNOSIS — R47.81 SLURRED SPEECH: ICD-10-CM

## 2019-03-20 DIAGNOSIS — Z66 DO NOT RESUSCITATE: ICD-10-CM

## 2019-03-20 DIAGNOSIS — G92 TOXIC ENCEPHALOPATHY: ICD-10-CM

## 2019-03-20 DIAGNOSIS — J44.9 CHRONIC OBSTRUCTIVE PULMONARY DISEASE, UNSPECIFIED: ICD-10-CM

## 2019-03-27 ENCOUNTER — OUTPATIENT (OUTPATIENT)
Dept: OUTPATIENT SERVICES | Facility: HOSPITAL | Age: 84
LOS: 1 days | Discharge: HOME | End: 2019-03-27

## 2019-03-27 DIAGNOSIS — Z95.810 PRESENCE OF AUTOMATIC (IMPLANTABLE) CARDIAC DEFIBRILLATOR: Chronic | ICD-10-CM

## 2019-03-27 DIAGNOSIS — R79.1 ABNORMAL COAGULATION PROFILE: ICD-10-CM

## 2019-03-27 DIAGNOSIS — I48.91 UNSPECIFIED ATRIAL FIBRILLATION: ICD-10-CM

## 2019-03-27 DIAGNOSIS — Z90.49 ACQUIRED ABSENCE OF OTHER SPECIFIED PARTS OF DIGESTIVE TRACT: Chronic | ICD-10-CM

## 2019-04-03 ENCOUNTER — OUTPATIENT (OUTPATIENT)
Dept: OUTPATIENT SERVICES | Facility: HOSPITAL | Age: 84
LOS: 1 days | Discharge: HOME | End: 2019-04-03

## 2019-04-03 DIAGNOSIS — I48.91 UNSPECIFIED ATRIAL FIBRILLATION: ICD-10-CM

## 2019-04-03 DIAGNOSIS — Z95.810 PRESENCE OF AUTOMATIC (IMPLANTABLE) CARDIAC DEFIBRILLATOR: Chronic | ICD-10-CM

## 2019-04-03 DIAGNOSIS — R79.1 ABNORMAL COAGULATION PROFILE: ICD-10-CM

## 2019-04-03 DIAGNOSIS — Z90.49 ACQUIRED ABSENCE OF OTHER SPECIFIED PARTS OF DIGESTIVE TRACT: Chronic | ICD-10-CM

## 2019-04-10 ENCOUNTER — OUTPATIENT (OUTPATIENT)
Dept: OUTPATIENT SERVICES | Facility: HOSPITAL | Age: 84
LOS: 1 days | Discharge: HOME | End: 2019-04-10

## 2019-04-10 DIAGNOSIS — I48.91 UNSPECIFIED ATRIAL FIBRILLATION: ICD-10-CM

## 2019-04-10 DIAGNOSIS — Z95.810 PRESENCE OF AUTOMATIC (IMPLANTABLE) CARDIAC DEFIBRILLATOR: Chronic | ICD-10-CM

## 2019-04-10 DIAGNOSIS — R79.1 ABNORMAL COAGULATION PROFILE: ICD-10-CM

## 2019-04-10 DIAGNOSIS — Z90.49 ACQUIRED ABSENCE OF OTHER SPECIFIED PARTS OF DIGESTIVE TRACT: Chronic | ICD-10-CM

## 2019-04-16 ENCOUNTER — OUTPATIENT (OUTPATIENT)
Dept: OUTPATIENT SERVICES | Facility: HOSPITAL | Age: 84
LOS: 1 days | Discharge: HOME | End: 2019-04-16

## 2019-04-16 DIAGNOSIS — Z95.810 PRESENCE OF AUTOMATIC (IMPLANTABLE) CARDIAC DEFIBRILLATOR: Chronic | ICD-10-CM

## 2019-04-16 DIAGNOSIS — Z90.49 ACQUIRED ABSENCE OF OTHER SPECIFIED PARTS OF DIGESTIVE TRACT: Chronic | ICD-10-CM

## 2019-04-17 DIAGNOSIS — R79.1 ABNORMAL COAGULATION PROFILE: ICD-10-CM

## 2019-04-17 DIAGNOSIS — I48.91 UNSPECIFIED ATRIAL FIBRILLATION: ICD-10-CM

## 2019-04-24 ENCOUNTER — OUTPATIENT (OUTPATIENT)
Dept: OUTPATIENT SERVICES | Facility: HOSPITAL | Age: 84
LOS: 1 days | Discharge: HOME | End: 2019-04-24

## 2019-04-24 DIAGNOSIS — Z90.49 ACQUIRED ABSENCE OF OTHER SPECIFIED PARTS OF DIGESTIVE TRACT: Chronic | ICD-10-CM

## 2019-04-24 DIAGNOSIS — Z95.810 PRESENCE OF AUTOMATIC (IMPLANTABLE) CARDIAC DEFIBRILLATOR: Chronic | ICD-10-CM

## 2019-04-24 DIAGNOSIS — R79.1 ABNORMAL COAGULATION PROFILE: ICD-10-CM

## 2019-04-24 DIAGNOSIS — I48.91 UNSPECIFIED ATRIAL FIBRILLATION: ICD-10-CM

## 2019-05-01 ENCOUNTER — OUTPATIENT (OUTPATIENT)
Dept: OUTPATIENT SERVICES | Facility: HOSPITAL | Age: 84
LOS: 1 days | Discharge: HOME | End: 2019-05-01

## 2019-05-01 DIAGNOSIS — Z90.49 ACQUIRED ABSENCE OF OTHER SPECIFIED PARTS OF DIGESTIVE TRACT: Chronic | ICD-10-CM

## 2019-05-01 DIAGNOSIS — Z95.810 PRESENCE OF AUTOMATIC (IMPLANTABLE) CARDIAC DEFIBRILLATOR: Chronic | ICD-10-CM

## 2019-05-01 DIAGNOSIS — R79.1 ABNORMAL COAGULATION PROFILE: ICD-10-CM

## 2019-05-01 DIAGNOSIS — I48.91 UNSPECIFIED ATRIAL FIBRILLATION: ICD-10-CM

## 2019-05-08 ENCOUNTER — OUTPATIENT (OUTPATIENT)
Dept: OUTPATIENT SERVICES | Facility: HOSPITAL | Age: 84
LOS: 1 days | Discharge: HOME | End: 2019-05-08

## 2019-05-08 DIAGNOSIS — Z95.810 PRESENCE OF AUTOMATIC (IMPLANTABLE) CARDIAC DEFIBRILLATOR: Chronic | ICD-10-CM

## 2019-05-08 DIAGNOSIS — I48.91 UNSPECIFIED ATRIAL FIBRILLATION: ICD-10-CM

## 2019-05-08 DIAGNOSIS — Z90.49 ACQUIRED ABSENCE OF OTHER SPECIFIED PARTS OF DIGESTIVE TRACT: Chronic | ICD-10-CM

## 2019-05-08 DIAGNOSIS — R79.1 ABNORMAL COAGULATION PROFILE: ICD-10-CM

## 2019-05-15 ENCOUNTER — OUTPATIENT (OUTPATIENT)
Dept: OUTPATIENT SERVICES | Facility: HOSPITAL | Age: 84
LOS: 1 days | Discharge: HOME | End: 2019-05-15

## 2019-05-15 DIAGNOSIS — R79.1 ABNORMAL COAGULATION PROFILE: ICD-10-CM

## 2019-05-15 DIAGNOSIS — I48.91 UNSPECIFIED ATRIAL FIBRILLATION: ICD-10-CM

## 2019-05-15 DIAGNOSIS — Z95.810 PRESENCE OF AUTOMATIC (IMPLANTABLE) CARDIAC DEFIBRILLATOR: Chronic | ICD-10-CM

## 2019-05-15 DIAGNOSIS — Z90.49 ACQUIRED ABSENCE OF OTHER SPECIFIED PARTS OF DIGESTIVE TRACT: Chronic | ICD-10-CM

## 2019-05-20 ENCOUNTER — OUTPATIENT (OUTPATIENT)
Dept: OUTPATIENT SERVICES | Facility: HOSPITAL | Age: 84
LOS: 1 days | Discharge: HOME | End: 2019-05-20

## 2019-05-20 DIAGNOSIS — Z90.49 ACQUIRED ABSENCE OF OTHER SPECIFIED PARTS OF DIGESTIVE TRACT: Chronic | ICD-10-CM

## 2019-05-20 DIAGNOSIS — Z95.810 PRESENCE OF AUTOMATIC (IMPLANTABLE) CARDIAC DEFIBRILLATOR: Chronic | ICD-10-CM

## 2019-05-21 DIAGNOSIS — F43.23 ADJUSTMENT DISORDER WITH MIXED ANXIETY AND DEPRESSED MOOD: ICD-10-CM

## 2019-05-22 ENCOUNTER — OUTPATIENT (OUTPATIENT)
Dept: OUTPATIENT SERVICES | Facility: HOSPITAL | Age: 84
LOS: 1 days | Discharge: HOME | End: 2019-05-22

## 2019-05-22 DIAGNOSIS — I48.91 UNSPECIFIED ATRIAL FIBRILLATION: ICD-10-CM

## 2019-05-22 DIAGNOSIS — Z95.810 PRESENCE OF AUTOMATIC (IMPLANTABLE) CARDIAC DEFIBRILLATOR: Chronic | ICD-10-CM

## 2019-05-22 DIAGNOSIS — Z90.49 ACQUIRED ABSENCE OF OTHER SPECIFIED PARTS OF DIGESTIVE TRACT: Chronic | ICD-10-CM

## 2019-05-28 ENCOUNTER — OUTPATIENT (OUTPATIENT)
Dept: OUTPATIENT SERVICES | Facility: HOSPITAL | Age: 84
LOS: 1 days | Discharge: HOME | End: 2019-05-28

## 2019-05-28 DIAGNOSIS — F43.23 ADJUSTMENT DISORDER WITH MIXED ANXIETY AND DEPRESSED MOOD: ICD-10-CM

## 2019-05-28 DIAGNOSIS — Z95.810 PRESENCE OF AUTOMATIC (IMPLANTABLE) CARDIAC DEFIBRILLATOR: Chronic | ICD-10-CM

## 2019-05-28 DIAGNOSIS — Z90.49 ACQUIRED ABSENCE OF OTHER SPECIFIED PARTS OF DIGESTIVE TRACT: Chronic | ICD-10-CM

## 2019-05-29 ENCOUNTER — RECORD ABSTRACTING (OUTPATIENT)
Age: 84
End: 2019-05-29

## 2019-05-29 ENCOUNTER — OUTPATIENT (OUTPATIENT)
Dept: OUTPATIENT SERVICES | Facility: HOSPITAL | Age: 84
LOS: 1 days | Discharge: HOME | End: 2019-05-29

## 2019-05-29 DIAGNOSIS — I48.91 UNSPECIFIED ATRIAL FIBRILLATION: ICD-10-CM

## 2019-05-29 DIAGNOSIS — G81.94 HEMIPLEGIA, UNSPECIFIED AFFECTING LEFT NONDOMINANT SIDE: ICD-10-CM

## 2019-05-29 DIAGNOSIS — Z86.718 PERSONAL HISTORY OF OTHER VENOUS THROMBOSIS AND EMBOLISM: ICD-10-CM

## 2019-05-29 DIAGNOSIS — Z86.79 PERSONAL HISTORY OF OTHER DISEASES OF THE CIRCULATORY SYSTEM: ICD-10-CM

## 2019-05-29 DIAGNOSIS — Z87.19 PERSONAL HISTORY OF OTHER DISEASES OF THE DIGESTIVE SYSTEM: ICD-10-CM

## 2019-05-29 DIAGNOSIS — F03.90 UNSPECIFIED DEMENTIA W/OUT BEHAVIORAL DISTURBANCE: ICD-10-CM

## 2019-05-29 DIAGNOSIS — Z85.51 PERSONAL HISTORY OF MALIGNANT NEOPLASM OF BLADDER: ICD-10-CM

## 2019-05-29 DIAGNOSIS — Z95.810 PRESENCE OF AUTOMATIC (IMPLANTABLE) CARDIAC DEFIBRILLATOR: ICD-10-CM

## 2019-05-29 DIAGNOSIS — Z87.09 PERSONAL HISTORY OF OTHER DISEASES OF THE RESPIRATORY SYSTEM: ICD-10-CM

## 2019-05-29 DIAGNOSIS — R79.1 ABNORMAL COAGULATION PROFILE: ICD-10-CM

## 2019-05-29 DIAGNOSIS — Z86.73 PERSONAL HISTORY OF TRANSIENT ISCHEMIC ATTACK (TIA), AND CEREBRAL INFARCTION W/OUT RESIDUAL DEFICITS: ICD-10-CM

## 2019-05-29 DIAGNOSIS — Z78.9 OTHER SPECIFIED HEALTH STATUS: ICD-10-CM

## 2019-05-29 DIAGNOSIS — Z95.810 PRESENCE OF AUTOMATIC (IMPLANTABLE) CARDIAC DEFIBRILLATOR: Chronic | ICD-10-CM

## 2019-05-29 DIAGNOSIS — Z90.49 ACQUIRED ABSENCE OF OTHER SPECIFIED PARTS OF DIGESTIVE TRACT: Chronic | ICD-10-CM

## 2019-05-29 RX ORDER — DIGOXIN 0.06 MG/1
TABLET ORAL
Refills: 0 | Status: ACTIVE | COMMUNITY

## 2019-05-31 ENCOUNTER — RX RENEWAL (OUTPATIENT)
Age: 84
End: 2019-05-31

## 2019-06-05 ENCOUNTER — OUTPATIENT (OUTPATIENT)
Dept: OUTPATIENT SERVICES | Facility: HOSPITAL | Age: 84
LOS: 1 days | Discharge: HOME | End: 2019-06-05

## 2019-06-05 DIAGNOSIS — Z90.49 ACQUIRED ABSENCE OF OTHER SPECIFIED PARTS OF DIGESTIVE TRACT: Chronic | ICD-10-CM

## 2019-06-05 DIAGNOSIS — Z95.810 PRESENCE OF AUTOMATIC (IMPLANTABLE) CARDIAC DEFIBRILLATOR: Chronic | ICD-10-CM

## 2019-06-05 DIAGNOSIS — I48.91 UNSPECIFIED ATRIAL FIBRILLATION: ICD-10-CM

## 2019-06-12 ENCOUNTER — OUTPATIENT (OUTPATIENT)
Dept: OUTPATIENT SERVICES | Facility: HOSPITAL | Age: 84
LOS: 1 days | Discharge: HOME | End: 2019-06-12

## 2019-06-12 DIAGNOSIS — Z95.810 PRESENCE OF AUTOMATIC (IMPLANTABLE) CARDIAC DEFIBRILLATOR: Chronic | ICD-10-CM

## 2019-06-12 DIAGNOSIS — Z90.49 ACQUIRED ABSENCE OF OTHER SPECIFIED PARTS OF DIGESTIVE TRACT: Chronic | ICD-10-CM

## 2019-06-12 DIAGNOSIS — I48.91 UNSPECIFIED ATRIAL FIBRILLATION: ICD-10-CM

## 2019-06-18 ENCOUNTER — OUTPATIENT (OUTPATIENT)
Dept: OUTPATIENT SERVICES | Facility: HOSPITAL | Age: 84
LOS: 1 days | Discharge: HOME | End: 2019-06-18
Payer: MEDICARE

## 2019-06-18 DIAGNOSIS — F43.23 ADJUSTMENT DISORDER WITH MIXED ANXIETY AND DEPRESSED MOOD: ICD-10-CM

## 2019-06-18 DIAGNOSIS — Z95.810 PRESENCE OF AUTOMATIC (IMPLANTABLE) CARDIAC DEFIBRILLATOR: Chronic | ICD-10-CM

## 2019-06-18 DIAGNOSIS — F01.51 VASCULAR DEMENTIA, UNSPECIFIED SEVERITY, WITH BEHAVIORAL DISTURBANCE: ICD-10-CM

## 2019-06-18 DIAGNOSIS — Z90.49 ACQUIRED ABSENCE OF OTHER SPECIFIED PARTS OF DIGESTIVE TRACT: Chronic | ICD-10-CM

## 2019-06-18 PROCEDURE — 90792 PSYCH DIAG EVAL W/MED SRVCS: CPT

## 2019-06-19 ENCOUNTER — OUTPATIENT (OUTPATIENT)
Dept: OUTPATIENT SERVICES | Facility: HOSPITAL | Age: 84
LOS: 1 days | Discharge: HOME | End: 2019-06-19

## 2019-06-19 DIAGNOSIS — Z90.49 ACQUIRED ABSENCE OF OTHER SPECIFIED PARTS OF DIGESTIVE TRACT: Chronic | ICD-10-CM

## 2019-06-19 DIAGNOSIS — Z95.810 PRESENCE OF AUTOMATIC (IMPLANTABLE) CARDIAC DEFIBRILLATOR: Chronic | ICD-10-CM

## 2019-06-19 DIAGNOSIS — I48.91 UNSPECIFIED ATRIAL FIBRILLATION: ICD-10-CM

## 2019-06-24 ENCOUNTER — APPOINTMENT (OUTPATIENT)
Dept: NEUROLOGY | Facility: CLINIC | Age: 84
End: 2019-06-24
Payer: MEDICARE

## 2019-06-24 VITALS
SYSTOLIC BLOOD PRESSURE: 126 MMHG | HEIGHT: 60 IN | DIASTOLIC BLOOD PRESSURE: 76 MMHG | WEIGHT: 126 LBS | HEART RATE: 66 BPM | BODY MASS INDEX: 24.74 KG/M2

## 2019-06-24 DIAGNOSIS — I63.9 CEREBRAL INFARCTION, UNSPECIFIED: ICD-10-CM

## 2019-06-24 PROCEDURE — 99214 OFFICE O/P EST MOD 30 MIN: CPT

## 2019-06-24 RX ORDER — ALPRAZOLAM 0.25 MG/1
0.25 TABLET ORAL
Refills: 0 | Status: ACTIVE | COMMUNITY

## 2019-06-24 RX ORDER — MULTIVITAMIN
CAPSULE ORAL DAILY
Refills: 0 | Status: ACTIVE | COMMUNITY

## 2019-06-24 RX ORDER — POTASSIUM 75 MG
TABLET ORAL
Refills: 0 | Status: ACTIVE | COMMUNITY

## 2019-06-24 RX ORDER — DILTIAZEM HYDROCHLORIDE 60 MG/1
TABLET, COATED ORAL
Refills: 0 | Status: ACTIVE | COMMUNITY

## 2019-06-24 RX ORDER — ATORVASTATIN CALCIUM 80 MG/1
80 TABLET, FILM COATED ORAL DAILY
Refills: 0 | Status: ACTIVE | COMMUNITY

## 2019-06-24 RX ORDER — SERTRALINE HYDROCHLORIDE 50 MG/1
50 TABLET, FILM COATED ORAL
Refills: 0 | Status: ACTIVE | COMMUNITY

## 2019-06-24 RX ORDER — OLANZAPINE 5 MG/1
5 TABLET, FILM COATED ORAL DAILY
Refills: 0 | Status: ACTIVE | COMMUNITY

## 2019-06-24 RX ORDER — METOPROLOL TARTRATE 75 MG/1
TABLET, FILM COATED ORAL
Refills: 0 | Status: ACTIVE | COMMUNITY

## 2019-06-24 RX ORDER — WARFARIN SODIUM 6 MG/1
TABLET ORAL
Refills: 0 | Status: ACTIVE | COMMUNITY

## 2019-06-24 RX ORDER — CHROMIUM 200 MCG
1000 TABLET ORAL DAILY
Refills: 0 | Status: ACTIVE | COMMUNITY

## 2019-06-24 RX ORDER — VITAMIN B COMPLEX
CAPSULE ORAL DAILY
Refills: 0 | Status: ACTIVE | COMMUNITY

## 2019-06-24 RX ORDER — OLANZAPINE 2.5 MG/1
2.5 TABLET, FILM COATED ORAL
Qty: 60 | Refills: 4 | Status: ACTIVE | COMMUNITY
Start: 2019-05-31

## 2019-06-24 RX ORDER — PANTOPRAZOLE SODIUM 20 MG/1
TABLET, DELAYED RELEASE ORAL DAILY
Refills: 0 | Status: ACTIVE | COMMUNITY

## 2019-06-24 RX ORDER — TRAZODONE HYDROCHLORIDE 50 MG/1
50 TABLET ORAL
Refills: 0 | Status: ACTIVE | COMMUNITY

## 2019-06-24 RX ORDER — DOCUSATE SODIUM 100 MG/1
TABLET ORAL
Refills: 0 | Status: ACTIVE | COMMUNITY

## 2019-06-24 NOTE — DISCUSSION/SUMMARY
[FreeTextEntry1] : Patient with history of stroke and mild dementia likely secondary to stroke with behavioral problems.\par 1. Psychiatrist to continue managing behavioral episodes\par 2. Physical therapy at home\par 3. Continue current medications\par 4. f/u in 6 months

## 2019-06-24 NOTE — PHYSICAL EXAM
[FreeTextEntry1] : Alert oriented to person place and year.Able to name and repeat\par Slight left facial\par LUE proximal 4-/5 and distal 4+/5\par In LLE power 3/5 proximal and 4/5 distal\par Unable to ambulate\par Sensory slightly diminished on left side\par RLE proximal 4/5\par \par NIHSS 12\par mrankin 4

## 2019-06-24 NOTE — REVIEW OF SYSTEMS
[Sleep Disturbances] : sleep disturbances [Depression] : depression [Emotional Problems] : emotional problems [Change In Personality] : personality change [Negative] : Heme/Lymph

## 2019-06-24 NOTE — HISTORY OF PRESENT ILLNESS
[FreeTextEntry1] : Ms. Duron is an 84-year-old woman last seen by me on 4/5/2019 for follow-up of her strokes.  She also has some behavioral issues.  She was using Xanax and Zoloft as well as Zyprexa.  I did speak with daughter and Zoloft was increased.  She was also admitted to the hospital for lethargy thought to have possible new stroke.  Workup was negative.  She was found not to have a new stroke.  The plan was to lower her off the Zoloft and then try different medication to help.  She tried Seroquel prior to going to the hospital and took 12.5 mg and became extremely lethargic.  Here the daughter gives a letter describing she is come off of the Zoloft two days ago.  She would scream, yell and threaten.  She is threatening and screaming at the aides. \par \par Since the last visit she has continued to have ups and downs.  She gets more confused and sometimes violent at night.  She started with new psychiatrist Dr. Sutton and since some medication adjustments she has been slightly better.\par She was at "[employer] graduation yesterday and had a good day.\par She is not walking much since last visit and says she cannot walk.  She is mostly in the wheelchair and needs assistance with transitioning to toilet.

## 2019-06-26 ENCOUNTER — OUTPATIENT (OUTPATIENT)
Dept: OUTPATIENT SERVICES | Facility: HOSPITAL | Age: 84
LOS: 1 days | Discharge: HOME | End: 2019-06-26

## 2019-06-26 DIAGNOSIS — Z90.49 ACQUIRED ABSENCE OF OTHER SPECIFIED PARTS OF DIGESTIVE TRACT: Chronic | ICD-10-CM

## 2019-06-26 DIAGNOSIS — Z95.810 PRESENCE OF AUTOMATIC (IMPLANTABLE) CARDIAC DEFIBRILLATOR: Chronic | ICD-10-CM

## 2019-06-26 DIAGNOSIS — I48.91 UNSPECIFIED ATRIAL FIBRILLATION: ICD-10-CM

## 2019-06-26 NOTE — ED ADULT NURSE NOTE - PAIN: PRESENCE, MLM
EXAM:  CT abdomen pelvis without contrast 

  

HISTORY:  Diarrhea, vomiting 1 week, abdominal pain 

  

COMPARISON:  09/28/2016 

  

TECHNIQUE:  CT abdomen pelvis performed without intravenous contrast.  Coronal and sagittal reformatt
ed images obtained. 

  

FINDINGS:  The lung bases clear.  No free air.  No acute abnormalities of the bones.  Heart normal in
 size.  Evaluation organ parenchyma limited without contrast.  Liver markedly decreased in attenuatio
n than liver is enlarged.  The gallbladder unremarkable.  Pancreas unremarkable.  Spleen unremarkable
.  Adrenals unremarkable.  Kidneys unremarkable.  No hydronephrosis or nephrolithiasis.  No calculi v
isualized in the normal course of the ureters.  Bladder decompressed and poorly evaluated.  Uterus un
remarkable.  No lymph adenopathy or ascites.  Small fat-containing periumbilical hernia.  Stomach unr
emarkable.  No dilated loops small bowel.  Appendix appears normal.  Colon unremarkable.  No inflamma
tory stranding identified in the abdomen pelvis. 

  

IMPRESSION: 

1.  No bowel or urinary obstruction.  Normal appendix. 

2.  Marked hepatic steatosis.  Hepatomegaly. complains of pain/discomfort/rectal pain has had no BM for 8 days

## 2019-07-02 ENCOUNTER — OUTPATIENT (OUTPATIENT)
Dept: OUTPATIENT SERVICES | Facility: HOSPITAL | Age: 84
LOS: 1 days | Discharge: HOME | End: 2019-07-02

## 2019-07-02 DIAGNOSIS — Z90.49 ACQUIRED ABSENCE OF OTHER SPECIFIED PARTS OF DIGESTIVE TRACT: Chronic | ICD-10-CM

## 2019-07-02 DIAGNOSIS — Z95.810 PRESENCE OF AUTOMATIC (IMPLANTABLE) CARDIAC DEFIBRILLATOR: Chronic | ICD-10-CM

## 2019-07-02 DIAGNOSIS — F01.51 VASCULAR DEMENTIA, UNSPECIFIED SEVERITY, WITH BEHAVIORAL DISTURBANCE: ICD-10-CM

## 2019-07-03 ENCOUNTER — OUTPATIENT (OUTPATIENT)
Dept: OUTPATIENT SERVICES | Facility: HOSPITAL | Age: 84
LOS: 1 days | Discharge: HOME | End: 2019-07-03

## 2019-07-03 DIAGNOSIS — Z95.810 PRESENCE OF AUTOMATIC (IMPLANTABLE) CARDIAC DEFIBRILLATOR: Chronic | ICD-10-CM

## 2019-07-03 DIAGNOSIS — Z90.49 ACQUIRED ABSENCE OF OTHER SPECIFIED PARTS OF DIGESTIVE TRACT: Chronic | ICD-10-CM

## 2019-07-03 DIAGNOSIS — I48.91 UNSPECIFIED ATRIAL FIBRILLATION: ICD-10-CM

## 2019-07-10 ENCOUNTER — OUTPATIENT (OUTPATIENT)
Dept: OUTPATIENT SERVICES | Facility: HOSPITAL | Age: 84
LOS: 1 days | Discharge: HOME | End: 2019-07-10

## 2019-07-10 DIAGNOSIS — Z90.49 ACQUIRED ABSENCE OF OTHER SPECIFIED PARTS OF DIGESTIVE TRACT: Chronic | ICD-10-CM

## 2019-07-10 DIAGNOSIS — Z95.810 PRESENCE OF AUTOMATIC (IMPLANTABLE) CARDIAC DEFIBRILLATOR: Chronic | ICD-10-CM

## 2019-07-10 DIAGNOSIS — I48.91 UNSPECIFIED ATRIAL FIBRILLATION: ICD-10-CM

## 2019-07-16 ENCOUNTER — OUTPATIENT (OUTPATIENT)
Dept: OUTPATIENT SERVICES | Facility: HOSPITAL | Age: 84
LOS: 1 days | Discharge: HOME | End: 2019-07-16
Payer: MEDICARE

## 2019-07-16 DIAGNOSIS — Z90.49 ACQUIRED ABSENCE OF OTHER SPECIFIED PARTS OF DIGESTIVE TRACT: Chronic | ICD-10-CM

## 2019-07-16 DIAGNOSIS — Z95.810 PRESENCE OF AUTOMATIC (IMPLANTABLE) CARDIAC DEFIBRILLATOR: Chronic | ICD-10-CM

## 2019-07-16 DIAGNOSIS — F01.51 VASCULAR DEMENTIA, UNSPECIFIED SEVERITY, WITH BEHAVIORAL DISTURBANCE: ICD-10-CM

## 2019-07-16 PROCEDURE — 99214 OFFICE O/P EST MOD 30 MIN: CPT

## 2019-07-17 ENCOUNTER — OUTPATIENT (OUTPATIENT)
Dept: OUTPATIENT SERVICES | Facility: HOSPITAL | Age: 84
LOS: 1 days | Discharge: HOME | End: 2019-07-17

## 2019-07-17 DIAGNOSIS — Z90.49 ACQUIRED ABSENCE OF OTHER SPECIFIED PARTS OF DIGESTIVE TRACT: Chronic | ICD-10-CM

## 2019-07-17 DIAGNOSIS — Z95.810 PRESENCE OF AUTOMATIC (IMPLANTABLE) CARDIAC DEFIBRILLATOR: Chronic | ICD-10-CM

## 2019-07-17 DIAGNOSIS — I48.91 UNSPECIFIED ATRIAL FIBRILLATION: ICD-10-CM

## 2019-07-19 ENCOUNTER — OUTPATIENT (OUTPATIENT)
Dept: OUTPATIENT SERVICES | Facility: HOSPITAL | Age: 84
LOS: 1 days | Discharge: HOME | End: 2019-07-19

## 2019-07-19 DIAGNOSIS — N39.0 URINARY TRACT INFECTION, SITE NOT SPECIFIED: ICD-10-CM

## 2019-07-19 DIAGNOSIS — Z90.49 ACQUIRED ABSENCE OF OTHER SPECIFIED PARTS OF DIGESTIVE TRACT: Chronic | ICD-10-CM

## 2019-07-19 DIAGNOSIS — Z95.810 PRESENCE OF AUTOMATIC (IMPLANTABLE) CARDIAC DEFIBRILLATOR: Chronic | ICD-10-CM

## 2019-07-24 ENCOUNTER — OUTPATIENT (OUTPATIENT)
Dept: OUTPATIENT SERVICES | Facility: HOSPITAL | Age: 84
LOS: 1 days | Discharge: HOME | End: 2019-07-24

## 2019-07-24 DIAGNOSIS — R79.89 OTHER SPECIFIED ABNORMAL FINDINGS OF BLOOD CHEMISTRY: ICD-10-CM

## 2019-07-24 DIAGNOSIS — Z90.49 ACQUIRED ABSENCE OF OTHER SPECIFIED PARTS OF DIGESTIVE TRACT: Chronic | ICD-10-CM

## 2019-07-24 DIAGNOSIS — Z95.810 PRESENCE OF AUTOMATIC (IMPLANTABLE) CARDIAC DEFIBRILLATOR: Chronic | ICD-10-CM

## 2019-07-30 ENCOUNTER — OUTPATIENT (OUTPATIENT)
Dept: OUTPATIENT SERVICES | Facility: HOSPITAL | Age: 84
LOS: 1 days | Discharge: HOME | End: 2019-07-30

## 2019-07-30 DIAGNOSIS — F01.51 VASCULAR DEMENTIA, UNSPECIFIED SEVERITY, WITH BEHAVIORAL DISTURBANCE: ICD-10-CM

## 2019-07-30 DIAGNOSIS — Z95.810 PRESENCE OF AUTOMATIC (IMPLANTABLE) CARDIAC DEFIBRILLATOR: Chronic | ICD-10-CM

## 2019-07-30 DIAGNOSIS — Z90.49 ACQUIRED ABSENCE OF OTHER SPECIFIED PARTS OF DIGESTIVE TRACT: Chronic | ICD-10-CM

## 2019-07-31 ENCOUNTER — OUTPATIENT (OUTPATIENT)
Dept: OUTPATIENT SERVICES | Facility: HOSPITAL | Age: 84
LOS: 1 days | Discharge: HOME | End: 2019-07-31

## 2019-07-31 DIAGNOSIS — Z90.49 ACQUIRED ABSENCE OF OTHER SPECIFIED PARTS OF DIGESTIVE TRACT: Chronic | ICD-10-CM

## 2019-07-31 DIAGNOSIS — R79.89 OTHER SPECIFIED ABNORMAL FINDINGS OF BLOOD CHEMISTRY: ICD-10-CM

## 2019-07-31 DIAGNOSIS — Z95.810 PRESENCE OF AUTOMATIC (IMPLANTABLE) CARDIAC DEFIBRILLATOR: Chronic | ICD-10-CM

## 2019-08-02 ENCOUNTER — OUTPATIENT (OUTPATIENT)
Dept: OUTPATIENT SERVICES | Facility: HOSPITAL | Age: 84
LOS: 1 days | Discharge: HOME | End: 2019-08-02

## 2019-08-02 DIAGNOSIS — I48.91 UNSPECIFIED ATRIAL FIBRILLATION: ICD-10-CM

## 2019-08-02 DIAGNOSIS — Z90.49 ACQUIRED ABSENCE OF OTHER SPECIFIED PARTS OF DIGESTIVE TRACT: Chronic | ICD-10-CM

## 2019-08-02 DIAGNOSIS — Z95.810 PRESENCE OF AUTOMATIC (IMPLANTABLE) CARDIAC DEFIBRILLATOR: Chronic | ICD-10-CM

## 2019-08-07 ENCOUNTER — OUTPATIENT (OUTPATIENT)
Dept: OUTPATIENT SERVICES | Facility: HOSPITAL | Age: 84
LOS: 1 days | Discharge: HOME | End: 2019-08-07

## 2019-08-07 DIAGNOSIS — Z95.810 PRESENCE OF AUTOMATIC (IMPLANTABLE) CARDIAC DEFIBRILLATOR: Chronic | ICD-10-CM

## 2019-08-07 DIAGNOSIS — R79.89 OTHER SPECIFIED ABNORMAL FINDINGS OF BLOOD CHEMISTRY: ICD-10-CM

## 2019-08-07 DIAGNOSIS — Z90.49 ACQUIRED ABSENCE OF OTHER SPECIFIED PARTS OF DIGESTIVE TRACT: Chronic | ICD-10-CM

## 2019-08-13 ENCOUNTER — OUTPATIENT (OUTPATIENT)
Dept: OUTPATIENT SERVICES | Facility: HOSPITAL | Age: 84
LOS: 1 days | Discharge: HOME | End: 2019-08-13
Payer: MEDICARE

## 2019-08-13 DIAGNOSIS — F01.51 VASCULAR DEMENTIA, UNSPECIFIED SEVERITY, WITH BEHAVIORAL DISTURBANCE: ICD-10-CM

## 2019-08-13 DIAGNOSIS — Z95.810 PRESENCE OF AUTOMATIC (IMPLANTABLE) CARDIAC DEFIBRILLATOR: Chronic | ICD-10-CM

## 2019-08-13 DIAGNOSIS — Z90.49 ACQUIRED ABSENCE OF OTHER SPECIFIED PARTS OF DIGESTIVE TRACT: Chronic | ICD-10-CM

## 2019-08-13 PROCEDURE — 99213 OFFICE O/P EST LOW 20 MIN: CPT

## 2019-08-14 ENCOUNTER — OUTPATIENT (OUTPATIENT)
Dept: OUTPATIENT SERVICES | Facility: HOSPITAL | Age: 84
LOS: 1 days | Discharge: HOME | End: 2019-08-14

## 2019-08-14 DIAGNOSIS — Z90.49 ACQUIRED ABSENCE OF OTHER SPECIFIED PARTS OF DIGESTIVE TRACT: Chronic | ICD-10-CM

## 2019-08-14 DIAGNOSIS — Z95.810 PRESENCE OF AUTOMATIC (IMPLANTABLE) CARDIAC DEFIBRILLATOR: Chronic | ICD-10-CM

## 2019-08-14 DIAGNOSIS — I48.91 UNSPECIFIED ATRIAL FIBRILLATION: ICD-10-CM

## 2019-08-21 ENCOUNTER — OUTPATIENT (OUTPATIENT)
Dept: OUTPATIENT SERVICES | Facility: HOSPITAL | Age: 84
LOS: 1 days | Discharge: HOME | End: 2019-08-21

## 2019-08-21 DIAGNOSIS — Z90.49 ACQUIRED ABSENCE OF OTHER SPECIFIED PARTS OF DIGESTIVE TRACT: Chronic | ICD-10-CM

## 2019-08-21 DIAGNOSIS — I48.91 UNSPECIFIED ATRIAL FIBRILLATION: ICD-10-CM

## 2019-08-21 DIAGNOSIS — Z95.810 PRESENCE OF AUTOMATIC (IMPLANTABLE) CARDIAC DEFIBRILLATOR: Chronic | ICD-10-CM

## 2019-08-26 ENCOUNTER — APPOINTMENT (OUTPATIENT)
Dept: CARDIOLOGY | Facility: CLINIC | Age: 84
End: 2019-08-26
Payer: MEDICARE

## 2019-08-26 PROCEDURE — 99213 OFFICE O/P EST LOW 20 MIN: CPT | Mod: 25

## 2019-08-26 PROCEDURE — 93284 PRGRMG EVAL IMPLANTABLE DFB: CPT | Mod: 59

## 2019-08-26 PROCEDURE — 93290 INTERROG DEV EVAL ICPMS IP: CPT | Mod: 59

## 2019-08-28 ENCOUNTER — OUTPATIENT (OUTPATIENT)
Dept: OUTPATIENT SERVICES | Facility: HOSPITAL | Age: 84
LOS: 1 days | Discharge: HOME | End: 2019-08-28

## 2019-08-28 DIAGNOSIS — Z95.810 PRESENCE OF AUTOMATIC (IMPLANTABLE) CARDIAC DEFIBRILLATOR: Chronic | ICD-10-CM

## 2019-08-28 DIAGNOSIS — I48.91 UNSPECIFIED ATRIAL FIBRILLATION: ICD-10-CM

## 2019-08-28 DIAGNOSIS — Z90.49 ACQUIRED ABSENCE OF OTHER SPECIFIED PARTS OF DIGESTIVE TRACT: Chronic | ICD-10-CM

## 2019-09-04 ENCOUNTER — OUTPATIENT (OUTPATIENT)
Dept: OUTPATIENT SERVICES | Facility: HOSPITAL | Age: 84
LOS: 1 days | Discharge: HOME | End: 2019-09-04

## 2019-09-04 DIAGNOSIS — Z95.810 PRESENCE OF AUTOMATIC (IMPLANTABLE) CARDIAC DEFIBRILLATOR: Chronic | ICD-10-CM

## 2019-09-04 DIAGNOSIS — R68.89 OTHER GENERAL SYMPTOMS AND SIGNS: ICD-10-CM

## 2019-09-04 DIAGNOSIS — I48.91 UNSPECIFIED ATRIAL FIBRILLATION: ICD-10-CM

## 2019-09-04 DIAGNOSIS — Z90.49 ACQUIRED ABSENCE OF OTHER SPECIFIED PARTS OF DIGESTIVE TRACT: Chronic | ICD-10-CM

## 2019-09-10 ENCOUNTER — OUTPATIENT (OUTPATIENT)
Dept: OUTPATIENT SERVICES | Facility: HOSPITAL | Age: 84
LOS: 1 days | Discharge: HOME | End: 2019-09-10
Payer: MEDICARE

## 2019-09-10 DIAGNOSIS — Z95.810 PRESENCE OF AUTOMATIC (IMPLANTABLE) CARDIAC DEFIBRILLATOR: Chronic | ICD-10-CM

## 2019-09-10 DIAGNOSIS — F01.51 VASCULAR DEMENTIA, UNSPECIFIED SEVERITY, WITH BEHAVIORAL DISTURBANCE: ICD-10-CM

## 2019-09-10 DIAGNOSIS — Z90.49 ACQUIRED ABSENCE OF OTHER SPECIFIED PARTS OF DIGESTIVE TRACT: Chronic | ICD-10-CM

## 2019-09-10 PROCEDURE — 99214 OFFICE O/P EST MOD 30 MIN: CPT

## 2019-09-11 ENCOUNTER — OUTPATIENT (OUTPATIENT)
Dept: OUTPATIENT SERVICES | Facility: HOSPITAL | Age: 84
LOS: 1 days | Discharge: HOME | End: 2019-09-11

## 2019-09-11 DIAGNOSIS — I48.91 UNSPECIFIED ATRIAL FIBRILLATION: ICD-10-CM

## 2019-09-11 DIAGNOSIS — Z95.810 PRESENCE OF AUTOMATIC (IMPLANTABLE) CARDIAC DEFIBRILLATOR: Chronic | ICD-10-CM

## 2019-09-11 DIAGNOSIS — Z90.49 ACQUIRED ABSENCE OF OTHER SPECIFIED PARTS OF DIGESTIVE TRACT: Chronic | ICD-10-CM

## 2019-09-18 ENCOUNTER — OUTPATIENT (OUTPATIENT)
Dept: OUTPATIENT SERVICES | Facility: HOSPITAL | Age: 84
LOS: 1 days | Discharge: HOME | End: 2019-09-18

## 2019-09-18 DIAGNOSIS — Z95.810 PRESENCE OF AUTOMATIC (IMPLANTABLE) CARDIAC DEFIBRILLATOR: Chronic | ICD-10-CM

## 2019-09-18 DIAGNOSIS — Z90.49 ACQUIRED ABSENCE OF OTHER SPECIFIED PARTS OF DIGESTIVE TRACT: Chronic | ICD-10-CM

## 2019-09-18 DIAGNOSIS — I48.91 UNSPECIFIED ATRIAL FIBRILLATION: ICD-10-CM

## 2019-09-25 ENCOUNTER — OUTPATIENT (OUTPATIENT)
Dept: OUTPATIENT SERVICES | Facility: HOSPITAL | Age: 84
LOS: 1 days | Discharge: HOME | End: 2019-09-25

## 2019-09-25 DIAGNOSIS — Z90.49 ACQUIRED ABSENCE OF OTHER SPECIFIED PARTS OF DIGESTIVE TRACT: Chronic | ICD-10-CM

## 2019-09-25 DIAGNOSIS — Z95.810 PRESENCE OF AUTOMATIC (IMPLANTABLE) CARDIAC DEFIBRILLATOR: Chronic | ICD-10-CM

## 2019-09-25 DIAGNOSIS — I48.91 UNSPECIFIED ATRIAL FIBRILLATION: ICD-10-CM

## 2019-11-08 NOTE — PATIENT PROFILE ADULT - NSPROEDALEARNER_GEN_A_NUR
You can access the FollowMyHealth Patient Portal offered by [facility] by registering at the following website: [email or web address]. By joining [employer]’s FollowMyHealth portal, you will also be able to view your health information using other applications (apps) compatible with our system. significant other

## 2020-01-01 ENCOUNTER — APPOINTMENT (OUTPATIENT)
Dept: CARDIOLOGY | Facility: CLINIC | Age: 85
End: 2020-01-01
Payer: MEDICARE

## 2020-01-01 ENCOUNTER — TRANSCRIPTION ENCOUNTER (OUTPATIENT)
Age: 85
End: 2020-01-01

## 2020-01-01 ENCOUNTER — INPATIENT (INPATIENT)
Facility: HOSPITAL | Age: 85
LOS: 9 days | Discharge: HOME HEALTH | End: 2020-09-04
Attending: INTERNAL MEDICINE | Admitting: INTERNAL MEDICINE
Payer: MEDICARE

## 2020-01-01 ENCOUNTER — OUTPATIENT (OUTPATIENT)
Dept: OUTPATIENT SERVICES | Facility: HOSPITAL | Age: 85
LOS: 1 days | Discharge: HOME | End: 2020-01-01
Payer: MEDICARE

## 2020-01-01 ENCOUNTER — OUTPATIENT (OUTPATIENT)
Dept: OUTPATIENT SERVICES | Facility: HOSPITAL | Age: 85
LOS: 1 days | Discharge: HOME | End: 2020-01-01

## 2020-01-01 ENCOUNTER — APPOINTMENT (OUTPATIENT)
Dept: NEUROLOGY | Facility: CLINIC | Age: 85
End: 2020-01-01
Payer: MEDICARE

## 2020-01-01 ENCOUNTER — INPATIENT (INPATIENT)
Facility: HOSPITAL | Age: 85
LOS: 5 days | Discharge: HOME HEALTH | End: 2020-06-03
Attending: INTERNAL MEDICINE | Admitting: INTERNAL MEDICINE
Payer: MEDICARE

## 2020-01-01 ENCOUNTER — APPOINTMENT (OUTPATIENT)
Dept: CARDIOLOGY | Facility: CLINIC | Age: 85
End: 2020-01-01

## 2020-01-01 ENCOUNTER — INPATIENT (INPATIENT)
Facility: HOSPITAL | Age: 85
LOS: 6 days | Discharge: HOSPICE | End: 2020-09-25
Attending: INTERNAL MEDICINE | Admitting: INTERNAL MEDICINE
Payer: MEDICARE

## 2020-01-01 VITALS
TEMPERATURE: 97 F | DIASTOLIC BLOOD PRESSURE: 69 MMHG | RESPIRATION RATE: 20 BRPM | SYSTOLIC BLOOD PRESSURE: 123 MMHG | HEART RATE: 78 BPM

## 2020-01-01 VITALS
DIASTOLIC BLOOD PRESSURE: 76 MMHG | WEIGHT: 192.02 LBS | RESPIRATION RATE: 17 BRPM | SYSTOLIC BLOOD PRESSURE: 164 MMHG | TEMPERATURE: 98 F | OXYGEN SATURATION: 98 % | HEART RATE: 68 BPM

## 2020-01-01 VITALS
OXYGEN SATURATION: 95 % | SYSTOLIC BLOOD PRESSURE: 112 MMHG | HEART RATE: 82 BPM | RESPIRATION RATE: 18 BRPM | WEIGHT: 139.99 LBS | HEIGHT: 63 IN | DIASTOLIC BLOOD PRESSURE: 60 MMHG | TEMPERATURE: 97 F

## 2020-01-01 VITALS — DIASTOLIC BLOOD PRESSURE: 60 MMHG | HEART RATE: 64 BPM | SYSTOLIC BLOOD PRESSURE: 128 MMHG

## 2020-01-01 VITALS
DIASTOLIC BLOOD PRESSURE: 65 MMHG | HEART RATE: 80 BPM | OXYGEN SATURATION: 99 % | TEMPERATURE: 99 F | SYSTOLIC BLOOD PRESSURE: 135 MMHG | RESPIRATION RATE: 19 BRPM

## 2020-01-01 VITALS
RESPIRATION RATE: 18 BRPM | HEART RATE: 86 BPM | TEMPERATURE: 97 F | SYSTOLIC BLOOD PRESSURE: 144 MMHG | DIASTOLIC BLOOD PRESSURE: 79 MMHG

## 2020-01-01 VITALS
BODY MASS INDEX: 28.47 KG/M2 | HEART RATE: 69 BPM | SYSTOLIC BLOOD PRESSURE: 108 MMHG | HEIGHT: 60 IN | WEIGHT: 145 LBS | DIASTOLIC BLOOD PRESSURE: 64 MMHG

## 2020-01-01 DIAGNOSIS — I50.32 CHRONIC DIASTOLIC (CONGESTIVE) HEART FAILURE: ICD-10-CM

## 2020-01-01 DIAGNOSIS — I69.954 HEMIPLEGIA AND HEMIPARESIS FOLLOWING UNSPECIFIED CEREBROVASCULAR DISEASE AFFECTING LEFT NON-DOMINANT SIDE: ICD-10-CM

## 2020-01-01 DIAGNOSIS — Z91.013 ALLERGY TO SEAFOOD: ICD-10-CM

## 2020-01-01 DIAGNOSIS — T36.8X5A ADVERSE EFFECT OF OTHER SYSTEMIC ANTIBIOTICS, INITIAL ENCOUNTER: ICD-10-CM

## 2020-01-01 DIAGNOSIS — K59.00 CONSTIPATION, UNSPECIFIED: ICD-10-CM

## 2020-01-01 DIAGNOSIS — N39.0 URINARY TRACT INFECTION, SITE NOT SPECIFIED: ICD-10-CM

## 2020-01-01 DIAGNOSIS — I50.33 ACUTE ON CHRONIC DIASTOLIC (CONGESTIVE) HEART FAILURE: ICD-10-CM

## 2020-01-01 DIAGNOSIS — Z99.81 DEPENDENCE ON SUPPLEMENTAL OXYGEN: ICD-10-CM

## 2020-01-01 DIAGNOSIS — Z95.810 PRESENCE OF AUTOMATIC (IMPLANTABLE) CARDIAC DEFIBRILLATOR: Chronic | ICD-10-CM

## 2020-01-01 DIAGNOSIS — I50.23 ACUTE ON CHRONIC SYSTOLIC (CONGESTIVE) HEART FAILURE: ICD-10-CM

## 2020-01-01 DIAGNOSIS — F01.51 VASCULAR DEMENTIA, UNSPECIFIED SEVERITY, WITH BEHAVIORAL DISTURBANCE: ICD-10-CM

## 2020-01-01 DIAGNOSIS — I27.20 PULMONARY HYPERTENSION, UNSPECIFIED: ICD-10-CM

## 2020-01-01 DIAGNOSIS — Z79.01 LONG TERM (CURRENT) USE OF ANTICOAGULANTS: ICD-10-CM

## 2020-01-01 DIAGNOSIS — L89.152 PRESSURE ULCER OF SACRAL REGION, STAGE 2: ICD-10-CM

## 2020-01-01 DIAGNOSIS — Z95.810 PRESENCE OF AUTOMATIC (IMPLANTABLE) CARDIAC DEFIBRILLATOR: ICD-10-CM

## 2020-01-01 DIAGNOSIS — Z66 DO NOT RESUSCITATE: ICD-10-CM

## 2020-01-01 DIAGNOSIS — E78.5 HYPERLIPIDEMIA, UNSPECIFIED: ICD-10-CM

## 2020-01-01 DIAGNOSIS — Z90.49 ACQUIRED ABSENCE OF OTHER SPECIFIED PARTS OF DIGESTIVE TRACT: Chronic | ICD-10-CM

## 2020-01-01 DIAGNOSIS — F03.91 UNSPECIFIED DEMENTIA WITH BEHAVIORAL DISTURBANCE: ICD-10-CM

## 2020-01-01 DIAGNOSIS — J96.01 ACUTE RESPIRATORY FAILURE WITH HYPOXIA: ICD-10-CM

## 2020-01-01 DIAGNOSIS — J96.11 CHRONIC RESPIRATORY FAILURE WITH HYPOXIA: ICD-10-CM

## 2020-01-01 DIAGNOSIS — I48.20 CHRONIC ATRIAL FIBRILLATION, UNSPECIFIED: ICD-10-CM

## 2020-01-01 DIAGNOSIS — E73.9 LACTOSE INTOLERANCE, UNSPECIFIED: ICD-10-CM

## 2020-01-01 DIAGNOSIS — J44.9 CHRONIC OBSTRUCTIVE PULMONARY DISEASE, UNSPECIFIED: ICD-10-CM

## 2020-01-01 DIAGNOSIS — I63.9 CEREBRAL INFARCTION, UNSPECIFIED: ICD-10-CM

## 2020-01-01 DIAGNOSIS — D68.32 HEMORRHAGIC DISORDER DUE TO EXTRINSIC CIRCULATING ANTICOAGULANTS: ICD-10-CM

## 2020-01-01 DIAGNOSIS — I11.0 HYPERTENSIVE HEART DISEASE WITH HEART FAILURE: ICD-10-CM

## 2020-01-01 DIAGNOSIS — R06.02 SHORTNESS OF BREATH: ICD-10-CM

## 2020-01-01 DIAGNOSIS — J96.10 CHRONIC RESPIRATORY FAILURE, UNSPECIFIED WHETHER WITH HYPOXIA OR HYPERCAPNIA: ICD-10-CM

## 2020-01-01 DIAGNOSIS — J84.10 PULMONARY FIBROSIS, UNSPECIFIED: ICD-10-CM

## 2020-01-01 DIAGNOSIS — R46.89 OTHER SYMPTOMS AND SIGNS INVOLVING APPEARANCE AND BEHAVIOR: ICD-10-CM

## 2020-01-01 DIAGNOSIS — I42.9 CARDIOMYOPATHY, UNSPECIFIED: ICD-10-CM

## 2020-01-01 DIAGNOSIS — R13.10 DYSPHAGIA, UNSPECIFIED: ICD-10-CM

## 2020-01-01 DIAGNOSIS — F03.90 UNSPECIFIED DEMENTIA WITHOUT BEHAVIORAL DISTURBANCE: ICD-10-CM

## 2020-01-01 DIAGNOSIS — E87.6 HYPOKALEMIA: ICD-10-CM

## 2020-01-01 DIAGNOSIS — I48.19 OTHER PERSISTENT ATRIAL FIBRILLATION: ICD-10-CM

## 2020-01-01 DIAGNOSIS — I35.8 OTHER NONRHEUMATIC AORTIC VALVE DISORDERS: ICD-10-CM

## 2020-01-01 DIAGNOSIS — J69.0 PNEUMONITIS DUE TO INHALATION OF FOOD AND VOMIT: ICD-10-CM

## 2020-01-01 DIAGNOSIS — Z90.49 ACQUIRED ABSENCE OF OTHER SPECIFIED PARTS OF DIGESTIVE TRACT: ICD-10-CM

## 2020-01-01 DIAGNOSIS — F41.9 ANXIETY DISORDER, UNSPECIFIED: ICD-10-CM

## 2020-01-01 DIAGNOSIS — T45.515A ADVERSE EFFECT OF ANTICOAGULANTS, INITIAL ENCOUNTER: ICD-10-CM

## 2020-01-01 LAB
-  AMPICILLIN: SIGNIFICANT CHANGE UP
-  CIPROFLOXACIN: SIGNIFICANT CHANGE UP
-  LEVOFLOXACIN: SIGNIFICANT CHANGE UP
-  NITROFURANTOIN: SIGNIFICANT CHANGE UP
-  TETRACYCLINE: SIGNIFICANT CHANGE UP
-  VANCOMYCIN: SIGNIFICANT CHANGE UP
ALBUMIN SERPL ELPH-MCNC: 2.7 G/DL — LOW (ref 3.5–5.2)
ALBUMIN SERPL ELPH-MCNC: 2.8 G/DL — LOW (ref 3.5–5.2)
ALBUMIN SERPL ELPH-MCNC: 2.8 G/DL — LOW (ref 3.5–5.2)
ALBUMIN SERPL ELPH-MCNC: 2.9 G/DL — LOW (ref 3.5–5.2)
ALBUMIN SERPL ELPH-MCNC: 3 G/DL — LOW (ref 3.5–5.2)
ALBUMIN SERPL ELPH-MCNC: 3.5 G/DL — SIGNIFICANT CHANGE UP (ref 3.5–5.2)
ALBUMIN SERPL ELPH-MCNC: 3.5 G/DL — SIGNIFICANT CHANGE UP (ref 3.5–5.2)
ALBUMIN SERPL ELPH-MCNC: 3.7 G/DL — SIGNIFICANT CHANGE UP (ref 3.5–5.2)
ALBUMIN SERPL ELPH-MCNC: 3.8 G/DL — SIGNIFICANT CHANGE UP (ref 3.5–5.2)
ALBUMIN SERPL ELPH-MCNC: 3.9 G/DL — SIGNIFICANT CHANGE UP (ref 3.5–5.2)
ALBUMIN SERPL ELPH-MCNC: 3.9 G/DL — SIGNIFICANT CHANGE UP (ref 3.5–5.2)
ALBUMIN SERPL ELPH-MCNC: 4 G/DL — SIGNIFICANT CHANGE UP (ref 3.5–5.2)
ALP SERPL-CCNC: 103 U/L — SIGNIFICANT CHANGE UP (ref 30–115)
ALP SERPL-CCNC: 64 U/L — SIGNIFICANT CHANGE UP (ref 30–115)
ALP SERPL-CCNC: 69 U/L — SIGNIFICANT CHANGE UP (ref 30–115)
ALP SERPL-CCNC: 72 U/L — SIGNIFICANT CHANGE UP (ref 30–115)
ALP SERPL-CCNC: 73 U/L — SIGNIFICANT CHANGE UP (ref 30–115)
ALP SERPL-CCNC: 76 U/L — SIGNIFICANT CHANGE UP (ref 30–115)
ALP SERPL-CCNC: 81 U/L — SIGNIFICANT CHANGE UP (ref 30–115)
ALP SERPL-CCNC: 83 U/L — SIGNIFICANT CHANGE UP (ref 30–115)
ALP SERPL-CCNC: 87 U/L — SIGNIFICANT CHANGE UP (ref 30–115)
ALP SERPL-CCNC: 98 U/L — SIGNIFICANT CHANGE UP (ref 30–115)
ALP SERPL-CCNC: 98 U/L — SIGNIFICANT CHANGE UP (ref 30–115)
ALP SERPL-CCNC: 99 U/L — SIGNIFICANT CHANGE UP (ref 30–115)
ALT FLD-CCNC: 11 U/L — SIGNIFICANT CHANGE UP (ref 0–41)
ALT FLD-CCNC: 12 U/L — SIGNIFICANT CHANGE UP (ref 0–41)
ALT FLD-CCNC: 13 U/L — SIGNIFICANT CHANGE UP (ref 0–41)
ALT FLD-CCNC: 14 U/L — SIGNIFICANT CHANGE UP (ref 0–41)
ALT FLD-CCNC: 15 U/L — SIGNIFICANT CHANGE UP (ref 0–41)
ALT FLD-CCNC: 15 U/L — SIGNIFICANT CHANGE UP (ref 0–41)
ALT FLD-CCNC: 16 U/L — SIGNIFICANT CHANGE UP (ref 0–41)
ALT FLD-CCNC: 17 U/L — SIGNIFICANT CHANGE UP (ref 0–41)
ALT FLD-CCNC: 17 U/L — SIGNIFICANT CHANGE UP (ref 0–41)
ALT FLD-CCNC: 22 U/L — SIGNIFICANT CHANGE UP (ref 0–41)
ANION GAP SERPL CALC-SCNC: 10 MMOL/L — SIGNIFICANT CHANGE UP (ref 7–14)
ANION GAP SERPL CALC-SCNC: 10 MMOL/L — SIGNIFICANT CHANGE UP (ref 7–14)
ANION GAP SERPL CALC-SCNC: 11 MMOL/L — SIGNIFICANT CHANGE UP (ref 7–14)
ANION GAP SERPL CALC-SCNC: 11 MMOL/L — SIGNIFICANT CHANGE UP (ref 7–14)
ANION GAP SERPL CALC-SCNC: 12 MMOL/L — SIGNIFICANT CHANGE UP (ref 7–14)
ANION GAP SERPL CALC-SCNC: 13 MMOL/L — SIGNIFICANT CHANGE UP (ref 7–14)
ANION GAP SERPL CALC-SCNC: 14 MMOL/L — SIGNIFICANT CHANGE UP (ref 7–14)
ANION GAP SERPL CALC-SCNC: 15 MMOL/L — HIGH (ref 7–14)
ANION GAP SERPL CALC-SCNC: 15 MMOL/L — HIGH (ref 7–14)
ANION GAP SERPL CALC-SCNC: 16 MMOL/L — HIGH (ref 7–14)
ANION GAP SERPL CALC-SCNC: 8 MMOL/L — SIGNIFICANT CHANGE UP (ref 7–14)
ANION GAP SERPL CALC-SCNC: 8 MMOL/L — SIGNIFICANT CHANGE UP (ref 7–14)
ANION GAP SERPL CALC-SCNC: 9 MMOL/L — SIGNIFICANT CHANGE UP (ref 7–14)
APPEARANCE UR: ABNORMAL
APPEARANCE UR: CLEAR — SIGNIFICANT CHANGE UP
APTT BLD: 121.4 SEC — CRITICAL HIGH (ref 27–39.2)
APTT BLD: 30.5 SEC — SIGNIFICANT CHANGE UP (ref 27–39.2)
APTT BLD: 33.2 SEC — SIGNIFICANT CHANGE UP (ref 27–39.2)
APTT BLD: 33.7 SEC — SIGNIFICANT CHANGE UP (ref 27–39.2)
APTT BLD: 35.4 SEC — SIGNIFICANT CHANGE UP (ref 27–39.2)
APTT BLD: 37.1 SEC — SIGNIFICANT CHANGE UP (ref 27–39.2)
APTT BLD: 38.8 SEC — SIGNIFICANT CHANGE UP (ref 27–39.2)
APTT BLD: 41.6 SEC — HIGH (ref 27–39.2)
APTT BLD: 43.5 SEC — HIGH (ref 27–39.2)
APTT BLD: 43.8 SEC — HIGH (ref 27–39.2)
APTT BLD: 44.4 SEC — HIGH (ref 27–39.2)
APTT BLD: 45.3 SEC — HIGH (ref 27–39.2)
APTT BLD: 46.2 SEC — HIGH (ref 27–39.2)
APTT BLD: 48.9 SEC — HIGH (ref 27–39.2)
APTT BLD: 53.9 SEC — HIGH (ref 27–39.2)
AST SERPL-CCNC: 21 U/L — SIGNIFICANT CHANGE UP (ref 0–41)
AST SERPL-CCNC: 25 U/L — SIGNIFICANT CHANGE UP (ref 0–41)
AST SERPL-CCNC: 25 U/L — SIGNIFICANT CHANGE UP (ref 0–41)
AST SERPL-CCNC: 30 U/L — SIGNIFICANT CHANGE UP (ref 0–41)
AST SERPL-CCNC: 30 U/L — SIGNIFICANT CHANGE UP (ref 0–41)
AST SERPL-CCNC: 32 U/L — SIGNIFICANT CHANGE UP (ref 0–41)
AST SERPL-CCNC: 32 U/L — SIGNIFICANT CHANGE UP (ref 0–41)
AST SERPL-CCNC: 34 U/L — SIGNIFICANT CHANGE UP (ref 0–41)
AST SERPL-CCNC: 37 U/L — SIGNIFICANT CHANGE UP (ref 0–41)
AST SERPL-CCNC: 40 U/L — SIGNIFICANT CHANGE UP (ref 0–41)
AST SERPL-CCNC: 40 U/L — SIGNIFICANT CHANGE UP (ref 0–41)
AST SERPL-CCNC: 45 U/L — HIGH (ref 0–41)
BACTERIA # UR AUTO: NEGATIVE — SIGNIFICANT CHANGE UP
BACTERIA # UR AUTO: NEGATIVE — SIGNIFICANT CHANGE UP
BASOPHILS # BLD AUTO: 0.02 K/UL — SIGNIFICANT CHANGE UP (ref 0–0.2)
BASOPHILS # BLD AUTO: 0.02 K/UL — SIGNIFICANT CHANGE UP (ref 0–0.2)
BASOPHILS # BLD AUTO: 0.03 K/UL — SIGNIFICANT CHANGE UP (ref 0–0.2)
BASOPHILS # BLD AUTO: 0.04 K/UL — SIGNIFICANT CHANGE UP (ref 0–0.2)
BASOPHILS # BLD AUTO: 0.04 K/UL — SIGNIFICANT CHANGE UP (ref 0–0.2)
BASOPHILS # BLD AUTO: 0.05 K/UL — SIGNIFICANT CHANGE UP (ref 0–0.2)
BASOPHILS # BLD AUTO: 0.07 K/UL — SIGNIFICANT CHANGE UP (ref 0–0.2)
BASOPHILS NFR BLD AUTO: 0.2 % — SIGNIFICANT CHANGE UP (ref 0–1)
BASOPHILS NFR BLD AUTO: 0.3 % — SIGNIFICANT CHANGE UP (ref 0–1)
BASOPHILS NFR BLD AUTO: 0.3 % — SIGNIFICANT CHANGE UP (ref 0–1)
BASOPHILS NFR BLD AUTO: 0.4 % — SIGNIFICANT CHANGE UP (ref 0–1)
BASOPHILS NFR BLD AUTO: 0.6 % — SIGNIFICANT CHANGE UP (ref 0–1)
BASOPHILS NFR BLD AUTO: 0.8 % — SIGNIFICANT CHANGE UP (ref 0–1)
BILIRUB SERPL-MCNC: 0.3 MG/DL — SIGNIFICANT CHANGE UP (ref 0.2–1.2)
BILIRUB SERPL-MCNC: 0.3 MG/DL — SIGNIFICANT CHANGE UP (ref 0.2–1.2)
BILIRUB SERPL-MCNC: 0.4 MG/DL — SIGNIFICANT CHANGE UP (ref 0.2–1.2)
BILIRUB SERPL-MCNC: 0.6 MG/DL — SIGNIFICANT CHANGE UP (ref 0.2–1.2)
BILIRUB SERPL-MCNC: 0.7 MG/DL — SIGNIFICANT CHANGE UP (ref 0.2–1.2)
BILIRUB SERPL-MCNC: 0.7 MG/DL — SIGNIFICANT CHANGE UP (ref 0.2–1.2)
BILIRUB UR-MCNC: NEGATIVE — SIGNIFICANT CHANGE UP
BILIRUB UR-MCNC: NEGATIVE — SIGNIFICANT CHANGE UP
BLD GP AB SCN SERPL QL: SIGNIFICANT CHANGE UP
BUN SERPL-MCNC: 15 MG/DL — SIGNIFICANT CHANGE UP (ref 10–20)
BUN SERPL-MCNC: 15 MG/DL — SIGNIFICANT CHANGE UP (ref 10–20)
BUN SERPL-MCNC: 17 MG/DL — SIGNIFICANT CHANGE UP (ref 10–20)
BUN SERPL-MCNC: 18 MG/DL — SIGNIFICANT CHANGE UP (ref 10–20)
BUN SERPL-MCNC: 19 MG/DL — SIGNIFICANT CHANGE UP (ref 10–20)
BUN SERPL-MCNC: 22 MG/DL — HIGH (ref 10–20)
BUN SERPL-MCNC: 23 MG/DL — HIGH (ref 10–20)
BUN SERPL-MCNC: 23 MG/DL — HIGH (ref 10–20)
BUN SERPL-MCNC: 24 MG/DL — HIGH (ref 10–20)
BUN SERPL-MCNC: 28 MG/DL — HIGH (ref 10–20)
BUN SERPL-MCNC: 33 MG/DL — HIGH (ref 10–20)
BUN SERPL-MCNC: 33 MG/DL — HIGH (ref 10–20)
BUN SERPL-MCNC: 35 MG/DL — HIGH (ref 10–20)
BUN SERPL-MCNC: 40 MG/DL — HIGH (ref 10–20)
BUN SERPL-MCNC: 42 MG/DL — HIGH (ref 10–20)
BUN SERPL-MCNC: 42 MG/DL — HIGH (ref 10–20)
CALCIUM SERPL-MCNC: 9.1 MG/DL — SIGNIFICANT CHANGE UP (ref 8.5–10.1)
CALCIUM SERPL-MCNC: 9.2 MG/DL — SIGNIFICANT CHANGE UP (ref 8.5–10.1)
CALCIUM SERPL-MCNC: 9.3 MG/DL — SIGNIFICANT CHANGE UP (ref 8.5–10.1)
CALCIUM SERPL-MCNC: 9.4 MG/DL — SIGNIFICANT CHANGE UP (ref 8.5–10.1)
CALCIUM SERPL-MCNC: 9.5 MG/DL — SIGNIFICANT CHANGE UP (ref 8.5–10.1)
CALCIUM SERPL-MCNC: 9.6 MG/DL — SIGNIFICANT CHANGE UP (ref 8.5–10.1)
CALCIUM SERPL-MCNC: 9.7 MG/DL — SIGNIFICANT CHANGE UP (ref 8.5–10.1)
CALCIUM SERPL-MCNC: 9.7 MG/DL — SIGNIFICANT CHANGE UP (ref 8.5–10.1)
CALCIUM SERPL-MCNC: 9.8 MG/DL — SIGNIFICANT CHANGE UP (ref 8.5–10.1)
CALCIUM SERPL-MCNC: 9.9 MG/DL — SIGNIFICANT CHANGE UP (ref 8.5–10.1)
CHLORIDE SERPL-SCNC: 100 MMOL/L — SIGNIFICANT CHANGE UP (ref 98–110)
CHLORIDE SERPL-SCNC: 102 MMOL/L — SIGNIFICANT CHANGE UP (ref 98–110)
CHLORIDE SERPL-SCNC: 103 MMOL/L — SIGNIFICANT CHANGE UP (ref 98–110)
CHLORIDE SERPL-SCNC: 104 MMOL/L — SIGNIFICANT CHANGE UP (ref 98–110)
CHLORIDE SERPL-SCNC: 104 MMOL/L — SIGNIFICANT CHANGE UP (ref 98–110)
CHLORIDE SERPL-SCNC: 106 MMOL/L — SIGNIFICANT CHANGE UP (ref 98–110)
CHLORIDE SERPL-SCNC: 109 MMOL/L — SIGNIFICANT CHANGE UP (ref 98–110)
CHLORIDE SERPL-SCNC: 110 MMOL/L — SIGNIFICANT CHANGE UP (ref 98–110)
CHLORIDE SERPL-SCNC: 97 MMOL/L — LOW (ref 98–110)
CHLORIDE SERPL-SCNC: 98 MMOL/L — SIGNIFICANT CHANGE UP (ref 98–110)
CHLORIDE SERPL-SCNC: 99 MMOL/L — SIGNIFICANT CHANGE UP (ref 98–110)
CO2 SERPL-SCNC: 26 MMOL/L — SIGNIFICANT CHANGE UP (ref 17–32)
CO2 SERPL-SCNC: 27 MMOL/L — SIGNIFICANT CHANGE UP (ref 17–32)
CO2 SERPL-SCNC: 27 MMOL/L — SIGNIFICANT CHANGE UP (ref 17–32)
CO2 SERPL-SCNC: 28 MMOL/L — SIGNIFICANT CHANGE UP (ref 17–32)
CO2 SERPL-SCNC: 29 MMOL/L — SIGNIFICANT CHANGE UP (ref 17–32)
CO2 SERPL-SCNC: 30 MMOL/L — SIGNIFICANT CHANGE UP (ref 17–32)
CO2 SERPL-SCNC: 31 MMOL/L — SIGNIFICANT CHANGE UP (ref 17–32)
CO2 SERPL-SCNC: 31 MMOL/L — SIGNIFICANT CHANGE UP (ref 17–32)
CO2 SERPL-SCNC: 32 MMOL/L — SIGNIFICANT CHANGE UP (ref 17–32)
CO2 SERPL-SCNC: 32 MMOL/L — SIGNIFICANT CHANGE UP (ref 17–32)
CO2 SERPL-SCNC: 33 MMOL/L — HIGH (ref 17–32)
CO2 SERPL-SCNC: 33 MMOL/L — HIGH (ref 17–32)
CO2 SERPL-SCNC: 36 MMOL/L — HIGH (ref 17–32)
COLOR SPEC: YELLOW — SIGNIFICANT CHANGE UP
COLOR SPEC: YELLOW — SIGNIFICANT CHANGE UP
CREAT SERPL-MCNC: 0.9 MG/DL — SIGNIFICANT CHANGE UP (ref 0.7–1.5)
CREAT SERPL-MCNC: 0.9 MG/DL — SIGNIFICANT CHANGE UP (ref 0.7–1.5)
CREAT SERPL-MCNC: 1 MG/DL — SIGNIFICANT CHANGE UP (ref 0.7–1.5)
CREAT SERPL-MCNC: 1 MG/DL — SIGNIFICANT CHANGE UP (ref 0.7–1.5)
CREAT SERPL-MCNC: 1.1 MG/DL — SIGNIFICANT CHANGE UP (ref 0.7–1.5)
CREAT SERPL-MCNC: 1.2 MG/DL — SIGNIFICANT CHANGE UP (ref 0.7–1.5)
CREAT SERPL-MCNC: 1.3 MG/DL — SIGNIFICANT CHANGE UP (ref 0.7–1.5)
CREAT SERPL-MCNC: 1.3 MG/DL — SIGNIFICANT CHANGE UP (ref 0.7–1.5)
CREAT SERPL-MCNC: 1.4 MG/DL — SIGNIFICANT CHANGE UP (ref 0.7–1.5)
CRP SERPL-MCNC: 3.36 MG/DL — HIGH (ref 0–0.4)
CULTURE RESULTS: SIGNIFICANT CHANGE UP
D DIMER BLD IA.RAPID-MCNC: 136 NG/ML DDU — SIGNIFICANT CHANGE UP (ref 0–230)
D DIMER BLD IA.RAPID-MCNC: 184 NG/ML DDU — SIGNIFICANT CHANGE UP (ref 0–230)
DIFF PNL FLD: SIGNIFICANT CHANGE UP
DIFF PNL FLD: SIGNIFICANT CHANGE UP
EOSINOPHIL # BLD AUTO: 0 K/UL — SIGNIFICANT CHANGE UP (ref 0–0.7)
EOSINOPHIL # BLD AUTO: 0.08 K/UL — SIGNIFICANT CHANGE UP (ref 0–0.7)
EOSINOPHIL # BLD AUTO: 0.1 K/UL — SIGNIFICANT CHANGE UP (ref 0–0.7)
EOSINOPHIL # BLD AUTO: 0.17 K/UL — SIGNIFICANT CHANGE UP (ref 0–0.7)
EOSINOPHIL # BLD AUTO: 0.17 K/UL — SIGNIFICANT CHANGE UP (ref 0–0.7)
EOSINOPHIL # BLD AUTO: 0.18 K/UL — SIGNIFICANT CHANGE UP (ref 0–0.7)
EOSINOPHIL # BLD AUTO: 0.2 K/UL — SIGNIFICANT CHANGE UP (ref 0–0.7)
EOSINOPHIL # BLD AUTO: 0.24 K/UL — SIGNIFICANT CHANGE UP (ref 0–0.7)
EOSINOPHIL # BLD AUTO: 0.24 K/UL — SIGNIFICANT CHANGE UP (ref 0–0.7)
EOSINOPHIL # BLD AUTO: 0.32 K/UL — SIGNIFICANT CHANGE UP (ref 0–0.7)
EOSINOPHIL # BLD AUTO: 0.34 K/UL — SIGNIFICANT CHANGE UP (ref 0–0.7)
EOSINOPHIL # BLD AUTO: 0.34 K/UL — SIGNIFICANT CHANGE UP (ref 0–0.7)
EOSINOPHIL # BLD AUTO: 0.39 K/UL — SIGNIFICANT CHANGE UP (ref 0–0.7)
EOSINOPHIL NFR BLD AUTO: 0 % — SIGNIFICANT CHANGE UP (ref 0–8)
EOSINOPHIL NFR BLD AUTO: 0.8 % — SIGNIFICANT CHANGE UP (ref 0–8)
EOSINOPHIL NFR BLD AUTO: 0.8 % — SIGNIFICANT CHANGE UP (ref 0–8)
EOSINOPHIL NFR BLD AUTO: 1.6 % — SIGNIFICANT CHANGE UP (ref 0–8)
EOSINOPHIL NFR BLD AUTO: 1.9 % — SIGNIFICANT CHANGE UP (ref 0–8)
EOSINOPHIL NFR BLD AUTO: 2 % — SIGNIFICANT CHANGE UP (ref 0–8)
EOSINOPHIL NFR BLD AUTO: 2.9 % — SIGNIFICANT CHANGE UP (ref 0–8)
EOSINOPHIL NFR BLD AUTO: 2.9 % — SIGNIFICANT CHANGE UP (ref 0–8)
EOSINOPHIL NFR BLD AUTO: 3.3 % — SIGNIFICANT CHANGE UP (ref 0–8)
EOSINOPHIL NFR BLD AUTO: 4.7 % — SIGNIFICANT CHANGE UP (ref 0–8)
EOSINOPHIL NFR BLD AUTO: 4.8 % — SIGNIFICANT CHANGE UP (ref 0–8)
EOSINOPHIL NFR BLD AUTO: 4.9 % — SIGNIFICANT CHANGE UP (ref 0–8)
EOSINOPHIL NFR BLD AUTO: 6.5 % — SIGNIFICANT CHANGE UP (ref 0–8)
EPI CELLS # UR: 10 /HPF — HIGH (ref 0–5)
EPI CELLS # UR: 7 /HPF — HIGH (ref 0–5)
FERRITIN SERPL-MCNC: 31 NG/ML — SIGNIFICANT CHANGE UP (ref 15–150)
FIBRINOGEN PPP-MCNC: 625 MG/DL — HIGH (ref 204.4–570.6)
GLUCOSE BLDC GLUCOMTR-MCNC: 103 MG/DL — HIGH (ref 70–99)
GLUCOSE SERPL-MCNC: 101 MG/DL — HIGH (ref 70–99)
GLUCOSE SERPL-MCNC: 105 MG/DL — HIGH (ref 70–99)
GLUCOSE SERPL-MCNC: 107 MG/DL — HIGH (ref 70–99)
GLUCOSE SERPL-MCNC: 108 MG/DL — HIGH (ref 70–99)
GLUCOSE SERPL-MCNC: 108 MG/DL — HIGH (ref 70–99)
GLUCOSE SERPL-MCNC: 109 MG/DL — HIGH (ref 70–99)
GLUCOSE SERPL-MCNC: 112 MG/DL — HIGH (ref 70–99)
GLUCOSE SERPL-MCNC: 144 MG/DL — HIGH (ref 70–99)
GLUCOSE SERPL-MCNC: 161 MG/DL — HIGH (ref 70–99)
GLUCOSE SERPL-MCNC: 86 MG/DL — SIGNIFICANT CHANGE UP (ref 70–99)
GLUCOSE SERPL-MCNC: 93 MG/DL — SIGNIFICANT CHANGE UP (ref 70–99)
GLUCOSE SERPL-MCNC: 95 MG/DL — SIGNIFICANT CHANGE UP (ref 70–99)
GLUCOSE SERPL-MCNC: 96 MG/DL — SIGNIFICANT CHANGE UP (ref 70–99)
GLUCOSE SERPL-MCNC: 97 MG/DL — SIGNIFICANT CHANGE UP (ref 70–99)
GLUCOSE SERPL-MCNC: 98 MG/DL — SIGNIFICANT CHANGE UP (ref 70–99)
GLUCOSE UR QL: NEGATIVE — SIGNIFICANT CHANGE UP
GLUCOSE UR QL: NEGATIVE — SIGNIFICANT CHANGE UP
HCT VFR BLD CALC: 31 % — LOW (ref 37–47)
HCT VFR BLD CALC: 32 % — LOW (ref 37–47)
HCT VFR BLD CALC: 32.3 % — LOW (ref 37–47)
HCT VFR BLD CALC: 32.4 % — LOW (ref 37–47)
HCT VFR BLD CALC: 33.1 % — LOW (ref 37–47)
HCT VFR BLD CALC: 33.4 % — LOW (ref 37–47)
HCT VFR BLD CALC: 33.4 % — LOW (ref 37–47)
HCT VFR BLD CALC: 33.8 % — LOW (ref 37–47)
HCT VFR BLD CALC: 33.8 % — LOW (ref 37–47)
HCT VFR BLD CALC: 33.9 % — LOW (ref 37–47)
HCT VFR BLD CALC: 34 % — LOW (ref 37–47)
HCT VFR BLD CALC: 34.3 % — LOW (ref 37–47)
HCT VFR BLD CALC: 34.4 % — LOW (ref 37–47)
HCT VFR BLD CALC: 35.2 % — LOW (ref 37–47)
HCT VFR BLD CALC: 35.3 % — LOW (ref 37–47)
HCT VFR BLD CALC: 36.3 % — LOW (ref 37–47)
HCT VFR BLD CALC: 36.7 % — LOW (ref 37–47)
HGB BLD-MCNC: 10.2 G/DL — LOW (ref 12–16)
HGB BLD-MCNC: 10.2 G/DL — LOW (ref 12–16)
HGB BLD-MCNC: 10.4 G/DL — LOW (ref 12–16)
HGB BLD-MCNC: 10.5 G/DL — LOW (ref 12–16)
HGB BLD-MCNC: 10.7 G/DL — LOW (ref 12–16)
HGB BLD-MCNC: 10.8 G/DL — LOW (ref 12–16)
HGB BLD-MCNC: 10.9 G/DL — LOW (ref 12–16)
HGB BLD-MCNC: 11 G/DL — LOW (ref 12–16)
HGB BLD-MCNC: 11.3 G/DL — LOW (ref 12–16)
HGB BLD-MCNC: 11.4 G/DL — LOW (ref 12–16)
HGB BLD-MCNC: 11.6 G/DL — LOW (ref 12–16)
HGB BLD-MCNC: 11.6 G/DL — LOW (ref 12–16)
HGB BLD-MCNC: 9.9 G/DL — LOW (ref 12–16)
HYALINE CASTS # UR AUTO: 20 /LPF — HIGH (ref 0–7)
HYALINE CASTS # UR AUTO: 3 /LPF — SIGNIFICANT CHANGE UP (ref 0–7)
IMM GRANULOCYTES NFR BLD AUTO: 0.3 % — SIGNIFICANT CHANGE UP (ref 0.1–0.3)
IMM GRANULOCYTES NFR BLD AUTO: 0.4 % — HIGH (ref 0.1–0.3)
IMM GRANULOCYTES NFR BLD AUTO: 0.4 % — HIGH (ref 0.1–0.3)
IMM GRANULOCYTES NFR BLD AUTO: 0.7 % — HIGH (ref 0.1–0.3)
IMM GRANULOCYTES NFR BLD AUTO: 0.8 % — HIGH (ref 0.1–0.3)
IMM GRANULOCYTES NFR BLD AUTO: 1.1 % — HIGH (ref 0.1–0.3)
IMM GRANULOCYTES NFR BLD AUTO: 1.6 % — HIGH (ref 0.1–0.3)
INR BLD: 1.17 RATIO — SIGNIFICANT CHANGE UP (ref 0.65–1.3)
INR BLD: 1.17 RATIO — SIGNIFICANT CHANGE UP (ref 0.65–1.3)
INR BLD: 1.3 RATIO — SIGNIFICANT CHANGE UP (ref 0.65–1.3)
INR BLD: 1.34 RATIO — HIGH (ref 0.65–1.3)
INR BLD: 1.37 RATIO — HIGH (ref 0.65–1.3)
INR BLD: 1.39 RATIO — HIGH (ref 0.65–1.3)
INR BLD: 1.99 RATIO — HIGH (ref 0.65–1.3)
INR BLD: 2.09 RATIO — HIGH (ref 0.65–1.3)
INR BLD: 2.45 RATIO — HIGH (ref 0.65–1.3)
INR BLD: 2.52 RATIO — HIGH (ref 0.65–1.3)
INR BLD: 2.54 RATIO — HIGH (ref 0.65–1.3)
INR BLD: 2.58 RATIO — HIGH (ref 0.65–1.3)
INR BLD: 2.89 RATIO — HIGH (ref 0.65–1.3)
INR BLD: 3.23 RATIO — HIGH (ref 0.65–1.3)
INR BLD: 3.49 RATIO — HIGH (ref 0.65–1.3)
INR BLD: 3.93 RATIO — HIGH (ref 0.65–1.3)
INR BLD: 4.12 RATIO — HIGH (ref 0.65–1.3)
INR BLD: 4.68 RATIO — HIGH (ref 0.65–1.3)
INR BLD: 5.21 RATIO — CRITICAL HIGH (ref 0.65–1.3)
INR BLD: 6.03 RATIO — CRITICAL HIGH (ref 0.65–1.3)
INR BLD: 6.43 RATIO — CRITICAL HIGH (ref 0.65–1.3)
INR BLD: 7.13 RATIO — CRITICAL HIGH (ref 0.65–1.3)
INR BLD: >15 RATIO — CRITICAL HIGH (ref 0.65–1.3)
KETONES UR-MCNC: NEGATIVE — SIGNIFICANT CHANGE UP
KETONES UR-MCNC: NEGATIVE — SIGNIFICANT CHANGE UP
LACTATE SERPL-SCNC: 2 MMOL/L — SIGNIFICANT CHANGE UP (ref 0.7–2)
LACTATE SERPL-SCNC: 2.4 MMOL/L — HIGH (ref 0.7–2)
LEUKOCYTE ESTERASE UR-ACNC: ABNORMAL
LEUKOCYTE ESTERASE UR-ACNC: ABNORMAL
LYMPHOCYTES # BLD AUTO: 0.8 K/UL — LOW (ref 1.2–3.4)
LYMPHOCYTES # BLD AUTO: 1.22 K/UL — SIGNIFICANT CHANGE UP (ref 1.2–3.4)
LYMPHOCYTES # BLD AUTO: 1.45 K/UL — SIGNIFICANT CHANGE UP (ref 1.2–3.4)
LYMPHOCYTES # BLD AUTO: 1.49 K/UL — SIGNIFICANT CHANGE UP (ref 1.2–3.4)
LYMPHOCYTES # BLD AUTO: 1.56 K/UL — SIGNIFICANT CHANGE UP (ref 1.2–3.4)
LYMPHOCYTES # BLD AUTO: 1.58 K/UL — SIGNIFICANT CHANGE UP (ref 1.2–3.4)
LYMPHOCYTES # BLD AUTO: 1.64 K/UL — SIGNIFICANT CHANGE UP (ref 1.2–3.4)
LYMPHOCYTES # BLD AUTO: 1.66 K/UL — SIGNIFICANT CHANGE UP (ref 1.2–3.4)
LYMPHOCYTES # BLD AUTO: 1.7 K/UL — SIGNIFICANT CHANGE UP (ref 1.2–3.4)
LYMPHOCYTES # BLD AUTO: 1.74 K/UL — SIGNIFICANT CHANGE UP (ref 1.2–3.4)
LYMPHOCYTES # BLD AUTO: 1.88 K/UL — SIGNIFICANT CHANGE UP (ref 1.2–3.4)
LYMPHOCYTES # BLD AUTO: 12 % — LOW (ref 20.5–51.1)
LYMPHOCYTES # BLD AUTO: 12.1 % — LOW (ref 20.5–51.1)
LYMPHOCYTES # BLD AUTO: 14.6 % — LOW (ref 20.5–51.1)
LYMPHOCYTES # BLD AUTO: 17.2 % — LOW (ref 20.5–51.1)
LYMPHOCYTES # BLD AUTO: 17.7 % — LOW (ref 20.5–51.1)
LYMPHOCYTES # BLD AUTO: 20.3 % — LOW (ref 20.5–51.1)
LYMPHOCYTES # BLD AUTO: 21.4 % — SIGNIFICANT CHANGE UP (ref 20.5–51.1)
LYMPHOCYTES # BLD AUTO: 22.4 % — SIGNIFICANT CHANGE UP (ref 20.5–51.1)
LYMPHOCYTES # BLD AUTO: 23.2 % — SIGNIFICANT CHANGE UP (ref 20.5–51.1)
LYMPHOCYTES # BLD AUTO: 23.6 % — SIGNIFICANT CHANGE UP (ref 20.5–51.1)
LYMPHOCYTES # BLD AUTO: 25.4 % — SIGNIFICANT CHANGE UP (ref 20.5–51.1)
LYMPHOCYTES # BLD AUTO: 31.5 % — SIGNIFICANT CHANGE UP (ref 20.5–51.1)
LYMPHOCYTES # BLD AUTO: 6.8 % — LOW (ref 20.5–51.1)
MAGNESIUM SERPL-MCNC: 2 MG/DL — SIGNIFICANT CHANGE UP (ref 1.8–2.4)
MAGNESIUM SERPL-MCNC: 2.1 MG/DL — SIGNIFICANT CHANGE UP (ref 1.8–2.4)
MAGNESIUM SERPL-MCNC: 2.1 MG/DL — SIGNIFICANT CHANGE UP (ref 1.8–2.4)
MAGNESIUM SERPL-MCNC: 2.2 MG/DL — SIGNIFICANT CHANGE UP (ref 1.8–2.4)
MAGNESIUM SERPL-MCNC: 2.3 MG/DL — SIGNIFICANT CHANGE UP (ref 1.8–2.4)
MAGNESIUM SERPL-MCNC: 2.5 MG/DL — HIGH (ref 1.8–2.4)
MCHC RBC-ENTMCNC: 29 PG — SIGNIFICANT CHANGE UP (ref 27–31)
MCHC RBC-ENTMCNC: 29.2 PG — SIGNIFICANT CHANGE UP (ref 27–31)
MCHC RBC-ENTMCNC: 29.3 PG — SIGNIFICANT CHANGE UP (ref 27–31)
MCHC RBC-ENTMCNC: 29.7 PG — SIGNIFICANT CHANGE UP (ref 27–31)
MCHC RBC-ENTMCNC: 29.8 PG — SIGNIFICANT CHANGE UP (ref 27–31)
MCHC RBC-ENTMCNC: 29.8 PG — SIGNIFICANT CHANGE UP (ref 27–31)
MCHC RBC-ENTMCNC: 29.9 PG — SIGNIFICANT CHANGE UP (ref 27–31)
MCHC RBC-ENTMCNC: 30.1 PG — SIGNIFICANT CHANGE UP (ref 27–31)
MCHC RBC-ENTMCNC: 30.2 PG — SIGNIFICANT CHANGE UP (ref 27–31)
MCHC RBC-ENTMCNC: 30.2 PG — SIGNIFICANT CHANGE UP (ref 27–31)
MCHC RBC-ENTMCNC: 30.5 PG — SIGNIFICANT CHANGE UP (ref 27–31)
MCHC RBC-ENTMCNC: 31 G/DL — LOW (ref 32–37)
MCHC RBC-ENTMCNC: 31.6 G/DL — LOW (ref 32–37)
MCHC RBC-ENTMCNC: 31.6 G/DL — LOW (ref 32–37)
MCHC RBC-ENTMCNC: 31.7 G/DL — LOW (ref 32–37)
MCHC RBC-ENTMCNC: 31.9 G/DL — LOW (ref 32–37)
MCHC RBC-ENTMCNC: 31.9 G/DL — LOW (ref 32–37)
MCHC RBC-ENTMCNC: 32 G/DL — SIGNIFICANT CHANGE UP (ref 32–37)
MCHC RBC-ENTMCNC: 32 G/DL — SIGNIFICANT CHANGE UP (ref 32–37)
MCHC RBC-ENTMCNC: 32.1 G/DL — SIGNIFICANT CHANGE UP (ref 32–37)
MCHC RBC-ENTMCNC: 32.1 G/DL — SIGNIFICANT CHANGE UP (ref 32–37)
MCHC RBC-ENTMCNC: 32.2 G/DL — SIGNIFICANT CHANGE UP (ref 32–37)
MCHC RBC-ENTMCNC: 32.3 G/DL — SIGNIFICANT CHANGE UP (ref 32–37)
MCHC RBC-ENTMCNC: 32.3 G/DL — SIGNIFICANT CHANGE UP (ref 32–37)
MCHC RBC-ENTMCNC: 32.6 G/DL — SIGNIFICANT CHANGE UP (ref 32–37)
MCHC RBC-ENTMCNC: 32.9 G/DL — SIGNIFICANT CHANGE UP (ref 32–37)
MCHC RBC-ENTMCNC: 32.9 G/DL — SIGNIFICANT CHANGE UP (ref 32–37)
MCHC RBC-ENTMCNC: 33.2 G/DL — SIGNIFICANT CHANGE UP (ref 32–37)
MCV RBC AUTO: 90.3 FL — SIGNIFICANT CHANGE UP (ref 81–99)
MCV RBC AUTO: 90.7 FL — SIGNIFICANT CHANGE UP (ref 81–99)
MCV RBC AUTO: 90.9 FL — SIGNIFICANT CHANGE UP (ref 81–99)
MCV RBC AUTO: 91.5 FL — SIGNIFICANT CHANGE UP (ref 81–99)
MCV RBC AUTO: 91.8 FL — SIGNIFICANT CHANGE UP (ref 81–99)
MCV RBC AUTO: 92.3 FL — SIGNIFICANT CHANGE UP (ref 81–99)
MCV RBC AUTO: 92.6 FL — SIGNIFICANT CHANGE UP (ref 81–99)
MCV RBC AUTO: 92.7 FL — SIGNIFICANT CHANGE UP (ref 81–99)
MCV RBC AUTO: 92.8 FL — SIGNIFICANT CHANGE UP (ref 81–99)
MCV RBC AUTO: 93 FL — SIGNIFICANT CHANGE UP (ref 81–99)
MCV RBC AUTO: 93.1 FL — SIGNIFICANT CHANGE UP (ref 81–99)
MCV RBC AUTO: 93.4 FL — SIGNIFICANT CHANGE UP (ref 81–99)
MCV RBC AUTO: 93.6 FL — SIGNIFICANT CHANGE UP (ref 81–99)
MCV RBC AUTO: 94.2 FL — SIGNIFICANT CHANGE UP (ref 81–99)
MCV RBC AUTO: 94.2 FL — SIGNIFICANT CHANGE UP (ref 81–99)
MCV RBC AUTO: 95.3 FL — SIGNIFICANT CHANGE UP (ref 81–99)
MCV RBC AUTO: 96 FL — SIGNIFICANT CHANGE UP (ref 81–99)
METHOD TYPE: SIGNIFICANT CHANGE UP
MONOCYTES # BLD AUTO: 0.8 K/UL — HIGH (ref 0.1–0.6)
MONOCYTES # BLD AUTO: 0.84 K/UL — HIGH (ref 0.1–0.6)
MONOCYTES # BLD AUTO: 0.98 K/UL — HIGH (ref 0.1–0.6)
MONOCYTES # BLD AUTO: 0.98 K/UL — HIGH (ref 0.1–0.6)
MONOCYTES # BLD AUTO: 0.99 K/UL — HIGH (ref 0.1–0.6)
MONOCYTES # BLD AUTO: 1 K/UL — HIGH (ref 0.1–0.6)
MONOCYTES # BLD AUTO: 1 K/UL — HIGH (ref 0.1–0.6)
MONOCYTES # BLD AUTO: 1.02 K/UL — HIGH (ref 0.1–0.6)
MONOCYTES # BLD AUTO: 1.08 K/UL — HIGH (ref 0.1–0.6)
MONOCYTES # BLD AUTO: 1.34 K/UL — HIGH (ref 0.1–0.6)
MONOCYTES # BLD AUTO: 1.47 K/UL — HIGH (ref 0.1–0.6)
MONOCYTES # BLD AUTO: 1.63 K/UL — HIGH (ref 0.1–0.6)
MONOCYTES # BLD AUTO: 1.67 K/UL — HIGH (ref 0.1–0.6)
MONOCYTES NFR BLD AUTO: 11.3 % — HIGH (ref 1.7–9.3)
MONOCYTES NFR BLD AUTO: 12 % — HIGH (ref 1.7–9.3)
MONOCYTES NFR BLD AUTO: 12.3 % — HIGH (ref 1.7–9.3)
MONOCYTES NFR BLD AUTO: 13.6 % — HIGH (ref 1.7–9.3)
MONOCYTES NFR BLD AUTO: 13.8 % — HIGH (ref 1.7–9.3)
MONOCYTES NFR BLD AUTO: 13.9 % — HIGH (ref 1.7–9.3)
MONOCYTES NFR BLD AUTO: 14.1 % — HIGH (ref 1.7–9.3)
MONOCYTES NFR BLD AUTO: 14.6 % — HIGH (ref 1.7–9.3)
MONOCYTES NFR BLD AUTO: 15.1 % — HIGH (ref 1.7–9.3)
MONOCYTES NFR BLD AUTO: 15.2 % — HIGH (ref 1.7–9.3)
MONOCYTES NFR BLD AUTO: 9.1 % — SIGNIFICANT CHANGE UP (ref 1.7–9.3)
NEUTROPHILS # BLD AUTO: 2.79 K/UL — SIGNIFICANT CHANGE UP (ref 1.4–6.5)
NEUTROPHILS # BLD AUTO: 3.82 K/UL — SIGNIFICANT CHANGE UP (ref 1.4–6.5)
NEUTROPHILS # BLD AUTO: 3.94 K/UL — SIGNIFICANT CHANGE UP (ref 1.4–6.5)
NEUTROPHILS # BLD AUTO: 4.11 K/UL — SIGNIFICANT CHANGE UP (ref 1.4–6.5)
NEUTROPHILS # BLD AUTO: 4.17 K/UL — SIGNIFICANT CHANGE UP (ref 1.4–6.5)
NEUTROPHILS # BLD AUTO: 4.56 K/UL — SIGNIFICANT CHANGE UP (ref 1.4–6.5)
NEUTROPHILS # BLD AUTO: 5.1 K/UL — SIGNIFICANT CHANGE UP (ref 1.4–6.5)
NEUTROPHILS # BLD AUTO: 5.62 K/UL — SIGNIFICANT CHANGE UP (ref 1.4–6.5)
NEUTROPHILS # BLD AUTO: 6.21 K/UL — SIGNIFICANT CHANGE UP (ref 1.4–6.5)
NEUTROPHILS # BLD AUTO: 7.21 K/UL — HIGH (ref 1.4–6.5)
NEUTROPHILS # BLD AUTO: 7.24 K/UL — HIGH (ref 1.4–6.5)
NEUTROPHILS # BLD AUTO: 8.7 K/UL — HIGH (ref 1.4–6.5)
NEUTROPHILS # BLD AUTO: 9.76 K/UL — HIGH (ref 1.4–6.5)
NEUTROPHILS NFR BLD AUTO: 46.8 % — SIGNIFICANT CHANGE UP (ref 42.2–75.2)
NEUTROPHILS NFR BLD AUTO: 56.5 % — SIGNIFICANT CHANGE UP (ref 42.2–75.2)
NEUTROPHILS NFR BLD AUTO: 57.1 % — SIGNIFICANT CHANGE UP (ref 42.2–75.2)
NEUTROPHILS NFR BLD AUTO: 57.3 % — SIGNIFICANT CHANGE UP (ref 42.2–75.2)
NEUTROPHILS NFR BLD AUTO: 59.9 % — SIGNIFICANT CHANGE UP (ref 42.2–75.2)
NEUTROPHILS NFR BLD AUTO: 62.1 % — SIGNIFICANT CHANGE UP (ref 42.2–75.2)
NEUTROPHILS NFR BLD AUTO: 62.6 % — SIGNIFICANT CHANGE UP (ref 42.2–75.2)
NEUTROPHILS NFR BLD AUTO: 64.1 % — SIGNIFICANT CHANGE UP (ref 42.2–75.2)
NEUTROPHILS NFR BLD AUTO: 67 % — SIGNIFICANT CHANGE UP (ref 42.2–75.2)
NEUTROPHILS NFR BLD AUTO: 68.6 % — SIGNIFICANT CHANGE UP (ref 42.2–75.2)
NEUTROPHILS NFR BLD AUTO: 71.4 % — SIGNIFICANT CHANGE UP (ref 42.2–75.2)
NEUTROPHILS NFR BLD AUTO: 72.3 % — SIGNIFICANT CHANGE UP (ref 42.2–75.2)
NEUTROPHILS NFR BLD AUTO: 82.3 % — HIGH (ref 42.2–75.2)
NITRITE UR-MCNC: NEGATIVE — SIGNIFICANT CHANGE UP
NITRITE UR-MCNC: NEGATIVE — SIGNIFICANT CHANGE UP
NRBC # BLD: 0 /100 WBCS — SIGNIFICANT CHANGE UP (ref 0–0)
NT-PROBNP SERPL-SCNC: 285 PG/ML — SIGNIFICANT CHANGE UP (ref 0–300)
NT-PROBNP SERPL-SCNC: 627 PG/ML — HIGH (ref 0–300)
ORGANISM # SPEC MICROSCOPIC CNT: SIGNIFICANT CHANGE UP
ORGANISM # SPEC MICROSCOPIC CNT: SIGNIFICANT CHANGE UP
PH UR: 6 — SIGNIFICANT CHANGE UP (ref 5–8)
PH UR: 6.5 — SIGNIFICANT CHANGE UP (ref 5–8)
PHOSPHATE SERPL-MCNC: 2.5 MG/DL — SIGNIFICANT CHANGE UP (ref 2.1–4.9)
PLATELET # BLD AUTO: 151 K/UL — SIGNIFICANT CHANGE UP (ref 130–400)
PLATELET # BLD AUTO: 160 K/UL — SIGNIFICANT CHANGE UP (ref 130–400)
PLATELET # BLD AUTO: 175 K/UL — SIGNIFICANT CHANGE UP (ref 130–400)
PLATELET # BLD AUTO: 182 K/UL — SIGNIFICANT CHANGE UP (ref 130–400)
PLATELET # BLD AUTO: 189 K/UL — SIGNIFICANT CHANGE UP (ref 130–400)
PLATELET # BLD AUTO: 197 K/UL — SIGNIFICANT CHANGE UP (ref 130–400)
PLATELET # BLD AUTO: 199 K/UL — SIGNIFICANT CHANGE UP (ref 130–400)
PLATELET # BLD AUTO: 202 K/UL — SIGNIFICANT CHANGE UP (ref 130–400)
PLATELET # BLD AUTO: 211 K/UL — SIGNIFICANT CHANGE UP (ref 130–400)
PLATELET # BLD AUTO: 214 K/UL — SIGNIFICANT CHANGE UP (ref 130–400)
PLATELET # BLD AUTO: 214 K/UL — SIGNIFICANT CHANGE UP (ref 130–400)
PLATELET # BLD AUTO: 215 K/UL — SIGNIFICANT CHANGE UP (ref 130–400)
PLATELET # BLD AUTO: 223 K/UL — SIGNIFICANT CHANGE UP (ref 130–400)
PLATELET # BLD AUTO: 225 K/UL — SIGNIFICANT CHANGE UP (ref 130–400)
PLATELET # BLD AUTO: 233 K/UL — SIGNIFICANT CHANGE UP (ref 130–400)
PLATELET # BLD AUTO: 238 K/UL — SIGNIFICANT CHANGE UP (ref 130–400)
PLATELET # BLD AUTO: 242 K/UL — SIGNIFICANT CHANGE UP (ref 130–400)
POTASSIUM SERPL-MCNC: 3.1 MMOL/L — LOW (ref 3.5–5)
POTASSIUM SERPL-MCNC: 3.2 MMOL/L — LOW (ref 3.5–5)
POTASSIUM SERPL-MCNC: 3.3 MMOL/L — LOW (ref 3.5–5)
POTASSIUM SERPL-MCNC: 3.5 MMOL/L — SIGNIFICANT CHANGE UP (ref 3.5–5)
POTASSIUM SERPL-MCNC: 3.5 MMOL/L — SIGNIFICANT CHANGE UP (ref 3.5–5)
POTASSIUM SERPL-MCNC: 3.6 MMOL/L — SIGNIFICANT CHANGE UP (ref 3.5–5)
POTASSIUM SERPL-MCNC: 3.6 MMOL/L — SIGNIFICANT CHANGE UP (ref 3.5–5)
POTASSIUM SERPL-MCNC: 3.7 MMOL/L — SIGNIFICANT CHANGE UP (ref 3.5–5)
POTASSIUM SERPL-MCNC: 3.8 MMOL/L — SIGNIFICANT CHANGE UP (ref 3.5–5)
POTASSIUM SERPL-MCNC: 3.8 MMOL/L — SIGNIFICANT CHANGE UP (ref 3.5–5)
POTASSIUM SERPL-MCNC: 3.9 MMOL/L — SIGNIFICANT CHANGE UP (ref 3.5–5)
POTASSIUM SERPL-MCNC: 4 MMOL/L — SIGNIFICANT CHANGE UP (ref 3.5–5)
POTASSIUM SERPL-MCNC: 4 MMOL/L — SIGNIFICANT CHANGE UP (ref 3.5–5)
POTASSIUM SERPL-MCNC: 4.9 MMOL/L — SIGNIFICANT CHANGE UP (ref 3.5–5)
POTASSIUM SERPL-SCNC: 3.1 MMOL/L — LOW (ref 3.5–5)
POTASSIUM SERPL-SCNC: 3.2 MMOL/L — LOW (ref 3.5–5)
POTASSIUM SERPL-SCNC: 3.3 MMOL/L — LOW (ref 3.5–5)
POTASSIUM SERPL-SCNC: 3.5 MMOL/L — SIGNIFICANT CHANGE UP (ref 3.5–5)
POTASSIUM SERPL-SCNC: 3.5 MMOL/L — SIGNIFICANT CHANGE UP (ref 3.5–5)
POTASSIUM SERPL-SCNC: 3.6 MMOL/L — SIGNIFICANT CHANGE UP (ref 3.5–5)
POTASSIUM SERPL-SCNC: 3.6 MMOL/L — SIGNIFICANT CHANGE UP (ref 3.5–5)
POTASSIUM SERPL-SCNC: 3.7 MMOL/L — SIGNIFICANT CHANGE UP (ref 3.5–5)
POTASSIUM SERPL-SCNC: 3.8 MMOL/L — SIGNIFICANT CHANGE UP (ref 3.5–5)
POTASSIUM SERPL-SCNC: 3.8 MMOL/L — SIGNIFICANT CHANGE UP (ref 3.5–5)
POTASSIUM SERPL-SCNC: 3.9 MMOL/L — SIGNIFICANT CHANGE UP (ref 3.5–5)
POTASSIUM SERPL-SCNC: 4 MMOL/L — SIGNIFICANT CHANGE UP (ref 3.5–5)
POTASSIUM SERPL-SCNC: 4 MMOL/L — SIGNIFICANT CHANGE UP (ref 3.5–5)
POTASSIUM SERPL-SCNC: 4.9 MMOL/L — SIGNIFICANT CHANGE UP (ref 3.5–5)
PROCALCITONIN SERPL-MCNC: 0.08 NG/ML — SIGNIFICANT CHANGE UP (ref 0.02–0.1)
PROCALCITONIN SERPL-MCNC: 0.12 NG/ML — HIGH (ref 0.02–0.1)
PROT SERPL-MCNC: 5.8 G/DL — LOW (ref 6–8)
PROT SERPL-MCNC: 5.8 G/DL — LOW (ref 6–8)
PROT SERPL-MCNC: 6 G/DL — SIGNIFICANT CHANGE UP (ref 6–8)
PROT SERPL-MCNC: 6.1 G/DL — SIGNIFICANT CHANGE UP (ref 6–8)
PROT SERPL-MCNC: 6.4 G/DL — SIGNIFICANT CHANGE UP (ref 6–8)
PROT SERPL-MCNC: 6.7 G/DL — SIGNIFICANT CHANGE UP (ref 6–8)
PROT SERPL-MCNC: 6.8 G/DL — SIGNIFICANT CHANGE UP (ref 6–8)
PROT SERPL-MCNC: 6.9 G/DL — SIGNIFICANT CHANGE UP (ref 6–8)
PROT SERPL-MCNC: 6.9 G/DL — SIGNIFICANT CHANGE UP (ref 6–8)
PROT SERPL-MCNC: 7.1 G/DL — SIGNIFICANT CHANGE UP (ref 6–8)
PROT SERPL-MCNC: 7.3 G/DL — SIGNIFICANT CHANGE UP (ref 6–8)
PROT SERPL-MCNC: 7.5 G/DL — SIGNIFICANT CHANGE UP (ref 6–8)
PROT UR-MCNC: ABNORMAL
PROT UR-MCNC: ABNORMAL
PROTHROM AB SERPL-ACNC: 13.5 SEC — HIGH (ref 9.95–12.87)
PROTHROM AB SERPL-ACNC: 13.5 SEC — HIGH (ref 9.95–12.87)
PROTHROM AB SERPL-ACNC: 14.9 SEC — HIGH (ref 9.95–12.87)
PROTHROM AB SERPL-ACNC: 15.4 SEC — HIGH (ref 9.95–12.87)
PROTHROM AB SERPL-ACNC: 15.8 SEC — HIGH (ref 9.95–12.87)
PROTHROM AB SERPL-ACNC: 16 SEC — HIGH (ref 9.95–12.87)
PROTHROM AB SERPL-ACNC: 22.9 SEC — HIGH (ref 9.95–12.87)
PROTHROM AB SERPL-ACNC: 24 SEC — HIGH (ref 9.95–12.87)
PROTHROM AB SERPL-ACNC: 28.2 SEC — HIGH (ref 9.95–12.87)
PROTHROM AB SERPL-ACNC: 29 SEC — HIGH (ref 9.95–12.87)
PROTHROM AB SERPL-ACNC: 29.2 SEC — HIGH (ref 9.95–12.87)
PROTHROM AB SERPL-ACNC: 29.7 SEC — HIGH (ref 9.95–12.87)
PROTHROM AB SERPL-ACNC: 33.2 SEC — HIGH (ref 9.95–12.87)
PROTHROM AB SERPL-ACNC: 37.2 SEC — HIGH (ref 9.95–12.87)
PROTHROM AB SERPL-ACNC: >40 SEC — HIGH (ref 9.95–12.87)
RAPID RVP RESULT: SIGNIFICANT CHANGE UP
RBC # BLD: 3.29 M/UL — LOW (ref 4.2–5.4)
RBC # BLD: 3.43 M/UL — LOW (ref 4.2–5.4)
RBC # BLD: 3.44 M/UL — LOW (ref 4.2–5.4)
RBC # BLD: 3.49 M/UL — LOW (ref 4.2–5.4)
RBC # BLD: 3.53 M/UL — LOW (ref 4.2–5.4)
RBC # BLD: 3.62 M/UL — LOW (ref 4.2–5.4)
RBC # BLD: 3.62 M/UL — LOW (ref 4.2–5.4)
RBC # BLD: 3.63 M/UL — LOW (ref 4.2–5.4)
RBC # BLD: 3.64 M/UL — LOW (ref 4.2–5.4)
RBC # BLD: 3.65 M/UL — LOW (ref 4.2–5.4)
RBC # BLD: 3.7 M/UL — LOW (ref 4.2–5.4)
RBC # BLD: 3.74 M/UL — LOW (ref 4.2–5.4)
RBC # BLD: 3.76 M/UL — LOW (ref 4.2–5.4)
RBC # BLD: 3.8 M/UL — LOW (ref 4.2–5.4)
RBC # BLD: 3.85 M/UL — LOW (ref 4.2–5.4)
RBC # BLD: 3.88 M/UL — LOW (ref 4.2–5.4)
RBC # BLD: 3.91 M/UL — LOW (ref 4.2–5.4)
RBC # FLD: 15.7 % — HIGH (ref 11.5–14.5)
RBC # FLD: 15.8 % — HIGH (ref 11.5–14.5)
RBC # FLD: 15.8 % — HIGH (ref 11.5–14.5)
RBC # FLD: 15.9 % — HIGH (ref 11.5–14.5)
RBC # FLD: 15.9 % — HIGH (ref 11.5–14.5)
RBC # FLD: 16 % — HIGH (ref 11.5–14.5)
RBC # FLD: 16 % — HIGH (ref 11.5–14.5)
RBC # FLD: 16.1 % — HIGH (ref 11.5–14.5)
RBC # FLD: 16.3 % — HIGH (ref 11.5–14.5)
RBC # FLD: 16.3 % — HIGH (ref 11.5–14.5)
RBC # FLD: 16.4 % — HIGH (ref 11.5–14.5)
RBC # FLD: 16.4 % — HIGH (ref 11.5–14.5)
RBC # FLD: 16.5 % — HIGH (ref 11.5–14.5)
RBC # FLD: 16.7 % — HIGH (ref 11.5–14.5)
RBC # FLD: 16.7 % — HIGH (ref 11.5–14.5)
RBC # FLD: 17 % — HIGH (ref 11.5–14.5)
RBC # FLD: 17.3 % — HIGH (ref 11.5–14.5)
RBC CASTS # UR COMP ASSIST: 2 /HPF — SIGNIFICANT CHANGE UP (ref 0–4)
RBC CASTS # UR COMP ASSIST: 2 /HPF — SIGNIFICANT CHANGE UP (ref 0–4)
SARS-COV-2 RNA SPEC QL NAA+PROBE: SIGNIFICANT CHANGE UP
SODIUM SERPL-SCNC: 140 MMOL/L — SIGNIFICANT CHANGE UP (ref 135–146)
SODIUM SERPL-SCNC: 141 MMOL/L — SIGNIFICANT CHANGE UP (ref 135–146)
SODIUM SERPL-SCNC: 142 MMOL/L — SIGNIFICANT CHANGE UP (ref 135–146)
SODIUM SERPL-SCNC: 143 MMOL/L — SIGNIFICANT CHANGE UP (ref 135–146)
SODIUM SERPL-SCNC: 145 MMOL/L — SIGNIFICANT CHANGE UP (ref 135–146)
SODIUM SERPL-SCNC: 145 MMOL/L — SIGNIFICANT CHANGE UP (ref 135–146)
SODIUM SERPL-SCNC: 147 MMOL/L — HIGH (ref 135–146)
SODIUM SERPL-SCNC: 152 MMOL/L — HIGH (ref 135–146)
SODIUM SERPL-SCNC: 157 MMOL/L — HIGH (ref 135–146)
SP GR SPEC: 1.02 — SIGNIFICANT CHANGE UP (ref 1.01–1.02)
SP GR SPEC: 1.02 — SIGNIFICANT CHANGE UP (ref 1.01–1.02)
SPECIMEN SOURCE: SIGNIFICANT CHANGE UP
TROPONIN T SERPL-MCNC: <0.01 NG/ML — SIGNIFICANT CHANGE UP
UROBILINOGEN FLD QL: SIGNIFICANT CHANGE UP
UROBILINOGEN FLD QL: SIGNIFICANT CHANGE UP
VALPROATE SERPL-MCNC: 13 UG/ML — LOW (ref 50–100)
WBC # BLD: 10.09 K/UL — SIGNIFICANT CHANGE UP (ref 4.8–10.8)
WBC # BLD: 10.81 K/UL — HIGH (ref 4.8–10.8)
WBC # BLD: 11.85 K/UL — HIGH (ref 4.8–10.8)
WBC # BLD: 12.05 K/UL — HIGH (ref 4.8–10.8)
WBC # BLD: 5.97 K/UL — SIGNIFICANT CHANGE UP (ref 4.8–10.8)
WBC # BLD: 6.08 K/UL — SIGNIFICANT CHANGE UP (ref 4.8–10.8)
WBC # BLD: 6.69 K/UL — SIGNIFICANT CHANGE UP (ref 4.8–10.8)
WBC # BLD: 6.71 K/UL — SIGNIFICANT CHANGE UP (ref 4.8–10.8)
WBC # BLD: 6.72 K/UL — SIGNIFICANT CHANGE UP (ref 4.8–10.8)
WBC # BLD: 6.96 K/UL — SIGNIFICANT CHANGE UP (ref 4.8–10.8)
WBC # BLD: 6.96 K/UL — SIGNIFICANT CHANGE UP (ref 4.8–10.8)
WBC # BLD: 7.17 K/UL — SIGNIFICANT CHANGE UP (ref 4.8–10.8)
WBC # BLD: 7.34 K/UL — SIGNIFICANT CHANGE UP (ref 4.8–10.8)
WBC # BLD: 7.85 K/UL — SIGNIFICANT CHANGE UP (ref 4.8–10.8)
WBC # BLD: 8.14 K/UL — SIGNIFICANT CHANGE UP (ref 4.8–10.8)
WBC # BLD: 8.79 K/UL — SIGNIFICANT CHANGE UP (ref 4.8–10.8)
WBC # BLD: 9.05 K/UL — SIGNIFICANT CHANGE UP (ref 4.8–10.8)
WBC # FLD AUTO: 10.09 K/UL — SIGNIFICANT CHANGE UP (ref 4.8–10.8)
WBC # FLD AUTO: 10.81 K/UL — HIGH (ref 4.8–10.8)
WBC # FLD AUTO: 11.85 K/UL — HIGH (ref 4.8–10.8)
WBC # FLD AUTO: 12.05 K/UL — HIGH (ref 4.8–10.8)
WBC # FLD AUTO: 5.97 K/UL — SIGNIFICANT CHANGE UP (ref 4.8–10.8)
WBC # FLD AUTO: 6.08 K/UL — SIGNIFICANT CHANGE UP (ref 4.8–10.8)
WBC # FLD AUTO: 6.69 K/UL — SIGNIFICANT CHANGE UP (ref 4.8–10.8)
WBC # FLD AUTO: 6.71 K/UL — SIGNIFICANT CHANGE UP (ref 4.8–10.8)
WBC # FLD AUTO: 6.72 K/UL — SIGNIFICANT CHANGE UP (ref 4.8–10.8)
WBC # FLD AUTO: 6.96 K/UL — SIGNIFICANT CHANGE UP (ref 4.8–10.8)
WBC # FLD AUTO: 6.96 K/UL — SIGNIFICANT CHANGE UP (ref 4.8–10.8)
WBC # FLD AUTO: 7.17 K/UL — SIGNIFICANT CHANGE UP (ref 4.8–10.8)
WBC # FLD AUTO: 7.34 K/UL — SIGNIFICANT CHANGE UP (ref 4.8–10.8)
WBC # FLD AUTO: 7.85 K/UL — SIGNIFICANT CHANGE UP (ref 4.8–10.8)
WBC # FLD AUTO: 8.14 K/UL — SIGNIFICANT CHANGE UP (ref 4.8–10.8)
WBC # FLD AUTO: 8.79 K/UL — SIGNIFICANT CHANGE UP (ref 4.8–10.8)
WBC # FLD AUTO: 9.05 K/UL — SIGNIFICANT CHANGE UP (ref 4.8–10.8)
WBC UR QL: 160 /HPF — HIGH (ref 0–5)
WBC UR QL: 20 /HPF — HIGH (ref 0–5)

## 2020-01-01 PROCEDURE — 99285 EMERGENCY DEPT VISIT HI MDM: CPT

## 2020-01-01 PROCEDURE — 99232 SBSQ HOSP IP/OBS MODERATE 35: CPT

## 2020-01-01 PROCEDURE — 99497 ADVNCD CARE PLAN 30 MIN: CPT | Mod: 25

## 2020-01-01 PROCEDURE — G2012 BRIEF CHECK IN BY MD/QHP: CPT

## 2020-01-01 PROCEDURE — 99233 SBSQ HOSP IP/OBS HIGH 50: CPT

## 2020-01-01 PROCEDURE — 99232 SBSQ HOSP IP/OBS MODERATE 35: CPT | Mod: GC

## 2020-01-01 PROCEDURE — 93010 ELECTROCARDIOGRAM REPORT: CPT

## 2020-01-01 PROCEDURE — 99213 OFFICE O/P EST LOW 20 MIN: CPT | Mod: 95

## 2020-01-01 PROCEDURE — 71045 X-RAY EXAM CHEST 1 VIEW: CPT | Mod: 26

## 2020-01-01 PROCEDURE — 99214 OFFICE O/P EST MOD 30 MIN: CPT

## 2020-01-01 PROCEDURE — 99214 OFFICE O/P EST MOD 30 MIN: CPT | Mod: 95

## 2020-01-01 PROCEDURE — 99239 HOSP IP/OBS DSCHRG MGMT >30: CPT

## 2020-01-01 PROCEDURE — 99223 1ST HOSP IP/OBS HIGH 75: CPT

## 2020-01-01 PROCEDURE — 93306 TTE W/DOPPLER COMPLETE: CPT | Mod: 26

## 2020-01-01 PROCEDURE — 99238 HOSP IP/OBS DSCHRG MGMT 30/<: CPT

## 2020-01-01 PROCEDURE — 70450 CT HEAD/BRAIN W/O DYE: CPT | Mod: 26

## 2020-01-01 PROCEDURE — 71045 X-RAY EXAM CHEST 1 VIEW: CPT | Mod: 26,76

## 2020-01-01 PROCEDURE — 93284 PRGRMG EVAL IMPLANTABLE DFB: CPT | Mod: 26

## 2020-01-01 RX ORDER — METOPROLOL TARTRATE 50 MG
1 TABLET ORAL
Qty: 0 | Refills: 0 | DISCHARGE
Start: 2020-01-01

## 2020-01-01 RX ORDER — DIVALPROEX SODIUM 500 MG/1
125 TABLET, DELAYED RELEASE ORAL EVERY 12 HOURS
Refills: 0 | Status: DISCONTINUED | OUTPATIENT
Start: 2020-01-01 | End: 2020-01-01

## 2020-01-01 RX ORDER — NITROFURANTOIN MACROCRYSTAL 50 MG
100 CAPSULE ORAL
Refills: 0 | Status: DISCONTINUED | OUTPATIENT
Start: 2020-01-01 | End: 2020-01-01

## 2020-01-01 RX ORDER — OLANZAPINE 15 MG/1
7.5 TABLET, FILM COATED ORAL DAILY
Refills: 0 | Status: DISCONTINUED | OUTPATIENT
Start: 2020-01-01 | End: 2020-01-01

## 2020-01-01 RX ORDER — DILTIAZEM HCL 120 MG
1 CAPSULE, EXT RELEASE 24 HR ORAL
Qty: 0 | Refills: 0 | DISCHARGE
Start: 2020-01-01

## 2020-01-01 RX ORDER — DILTIAZEM HCL 120 MG
120 CAPSULE, EXT RELEASE 24 HR ORAL
Refills: 0 | Status: DISCONTINUED | OUTPATIENT
Start: 2020-01-01 | End: 2020-01-01

## 2020-01-01 RX ORDER — SERTRALINE 25 MG/1
3 TABLET, FILM COATED ORAL
Qty: 0 | Refills: 0 | DISCHARGE
Start: 2020-01-01

## 2020-01-01 RX ORDER — PANTOPRAZOLE SODIUM 20 MG/1
1 TABLET, DELAYED RELEASE ORAL
Qty: 0 | Refills: 0 | DISCHARGE
Start: 2020-01-01

## 2020-01-01 RX ORDER — VALPROIC ACID (AS SODIUM SALT) 250 MG/5ML
2.5 SOLUTION, ORAL ORAL
Qty: 75 | Refills: 0
Start: 2020-01-01 | End: 2020-10-24

## 2020-01-01 RX ORDER — FUROSEMIDE 40 MG
1 TABLET ORAL
Qty: 0 | Refills: 0 | DISCHARGE

## 2020-01-01 RX ORDER — CHLORHEXIDINE GLUCONATE 213 G/1000ML
1 SOLUTION TOPICAL
Refills: 0 | Status: DISCONTINUED | OUTPATIENT
Start: 2020-01-01 | End: 2020-01-01

## 2020-01-01 RX ORDER — DIVALPROEX SODIUM 500 MG/1
1 TABLET, DELAYED RELEASE ORAL
Qty: 0 | Refills: 0 | DISCHARGE
Start: 2020-01-01

## 2020-01-01 RX ORDER — FUROSEMIDE 40 MG
20 TABLET ORAL ONCE
Refills: 0 | Status: COMPLETED | OUTPATIENT
Start: 2020-01-01 | End: 2020-01-01

## 2020-01-01 RX ORDER — AMPICILLIN SODIUM AND SULBACTAM SODIUM 250; 125 MG/ML; MG/ML
1.5 INJECTION, POWDER, FOR SUSPENSION INTRAMUSCULAR; INTRAVENOUS EVERY 6 HOURS
Refills: 0 | Status: DISCONTINUED | OUTPATIENT
Start: 2020-01-01 | End: 2020-01-01

## 2020-01-01 RX ORDER — TRAZODONE HCL 50 MG
150 TABLET ORAL ONCE
Refills: 0 | Status: COMPLETED | OUTPATIENT
Start: 2020-01-01 | End: 2020-01-01

## 2020-01-01 RX ORDER — AMPICILLIN SODIUM AND SULBACTAM SODIUM 250; 125 MG/ML; MG/ML
INJECTION, POWDER, FOR SUSPENSION INTRAMUSCULAR; INTRAVENOUS
Refills: 0 | Status: DISCONTINUED | OUTPATIENT
Start: 2020-01-01 | End: 2020-01-01

## 2020-01-01 RX ORDER — OLANZAPINE 15 MG/1
2.5 TABLET, FILM COATED ORAL EVERY 8 HOURS
Refills: 0 | Status: DISCONTINUED | OUTPATIENT
Start: 2020-01-01 | End: 2020-01-01

## 2020-01-01 RX ORDER — PHYTONADIONE (VIT K1) 5 MG
5 TABLET ORAL ONCE
Refills: 0 | Status: COMPLETED | OUTPATIENT
Start: 2020-01-01 | End: 2020-01-01

## 2020-01-01 RX ORDER — FUROSEMIDE 40 MG
40 TABLET ORAL DAILY
Refills: 0 | Status: DISCONTINUED | OUTPATIENT
Start: 2020-01-01 | End: 2020-01-01

## 2020-01-01 RX ORDER — VALPROIC ACID (AS SODIUM SALT) 250 MG/5ML
125 SOLUTION, ORAL ORAL DAILY
Refills: 0 | Status: DISCONTINUED | OUTPATIENT
Start: 2020-01-01 | End: 2020-01-01

## 2020-01-01 RX ORDER — WARFARIN SODIUM 2.5 MG/1
2 TABLET ORAL ONCE
Refills: 0 | Status: COMPLETED | OUTPATIENT
Start: 2020-01-01 | End: 2020-01-01

## 2020-01-01 RX ORDER — AMPICILLIN SODIUM AND SULBACTAM SODIUM 250; 125 MG/ML; MG/ML
3 INJECTION, POWDER, FOR SUSPENSION INTRAMUSCULAR; INTRAVENOUS EVERY 12 HOURS
Refills: 0 | Status: DISCONTINUED | OUTPATIENT
Start: 2020-01-01 | End: 2020-01-01

## 2020-01-01 RX ORDER — PHYTONADIONE (VIT K1) 5 MG
2.5 TABLET ORAL ONCE
Refills: 0 | Status: COMPLETED | OUTPATIENT
Start: 2020-01-01 | End: 2020-01-01

## 2020-01-01 RX ORDER — OLANZAPINE 15 MG/1
1 TABLET, FILM COATED ORAL
Qty: 0 | Refills: 0 | DISCHARGE
Start: 2020-01-01

## 2020-01-01 RX ORDER — ENOXAPARIN SODIUM 100 MG/ML
40 INJECTION SUBCUTANEOUS AT BEDTIME
Refills: 0 | Status: DISCONTINUED | OUTPATIENT
Start: 2020-01-01 | End: 2020-01-01

## 2020-01-01 RX ORDER — FUROSEMIDE 40 MG
1 TABLET ORAL
Qty: 0 | Refills: 0 | DISCHARGE
Start: 2020-01-01

## 2020-01-01 RX ORDER — METOPROLOL TARTRATE 50 MG
100 TABLET ORAL
Refills: 0 | Status: DISCONTINUED | OUTPATIENT
Start: 2020-01-01 | End: 2020-01-01

## 2020-01-01 RX ORDER — TRAZODONE HCL 50 MG
150 TABLET ORAL AT BEDTIME
Refills: 0 | Status: DISCONTINUED | OUTPATIENT
Start: 2020-01-01 | End: 2020-01-01

## 2020-01-01 RX ORDER — DILTIAZEM HCL 120 MG
120 CAPSULE, EXT RELEASE 24 HR ORAL DAILY
Refills: 0 | Status: DISCONTINUED | OUTPATIENT
Start: 2020-01-01 | End: 2020-01-01

## 2020-01-01 RX ORDER — SENNA PLUS 8.6 MG/1
2 TABLET ORAL AT BEDTIME
Refills: 0 | Status: DISCONTINUED | OUTPATIENT
Start: 2020-01-01 | End: 2020-01-01

## 2020-01-01 RX ORDER — ATORVASTATIN CALCIUM 80 MG/1
80 TABLET, FILM COATED ORAL
Refills: 0 | Status: DISCONTINUED | OUTPATIENT
Start: 2020-01-01 | End: 2020-01-01

## 2020-01-01 RX ORDER — SERTRALINE 25 MG/1
150 TABLET, FILM COATED ORAL DAILY
Refills: 0 | Status: DISCONTINUED | OUTPATIENT
Start: 2020-01-01 | End: 2020-01-01

## 2020-01-01 RX ORDER — DIVALPROEX SODIUM 500 MG/1
125 TABLET, DELAYED RELEASE ORAL DAILY
Refills: 0 | Status: DISCONTINUED | OUTPATIENT
Start: 2020-01-01 | End: 2020-01-01

## 2020-01-01 RX ORDER — ATORVASTATIN CALCIUM 80 MG/1
1 TABLET, FILM COATED ORAL
Qty: 0 | Refills: 0 | DISCHARGE
Start: 2020-01-01

## 2020-01-01 RX ORDER — CEPHALEXIN 500 MG
500 CAPSULE ORAL EVERY 12 HOURS
Refills: 0 | Status: DISCONTINUED | OUTPATIENT
Start: 2020-01-01 | End: 2020-01-01

## 2020-01-01 RX ORDER — DIVALPROEX SODIUM 500 MG/1
250 TABLET, DELAYED RELEASE ORAL
Refills: 0 | Status: DISCONTINUED | OUTPATIENT
Start: 2020-01-01 | End: 2020-01-01

## 2020-01-01 RX ORDER — MORPHINE SULFATE 50 MG/1
5 CAPSULE, EXTENDED RELEASE ORAL EVERY 6 HOURS
Refills: 0 | Status: DISCONTINUED | OUTPATIENT
Start: 2020-01-01 | End: 2020-01-01

## 2020-01-01 RX ORDER — ATORVASTATIN CALCIUM 80 MG/1
80 TABLET, FILM COATED ORAL AT BEDTIME
Refills: 0 | Status: DISCONTINUED | OUTPATIENT
Start: 2020-01-01 | End: 2020-01-01

## 2020-01-01 RX ORDER — PANTOPRAZOLE SODIUM 20 MG/1
40 TABLET, DELAYED RELEASE ORAL
Refills: 0 | Status: DISCONTINUED | OUTPATIENT
Start: 2020-01-01 | End: 2020-01-01

## 2020-01-01 RX ORDER — CEFTRIAXONE 500 MG/1
1000 INJECTION, POWDER, FOR SOLUTION INTRAMUSCULAR; INTRAVENOUS ONCE
Refills: 0 | Status: COMPLETED | OUTPATIENT
Start: 2020-01-01 | End: 2020-01-01

## 2020-01-01 RX ORDER — POTASSIUM CHLORIDE 20 MEQ
20 PACKET (EA) ORAL
Refills: 0 | Status: COMPLETED | OUTPATIENT
Start: 2020-01-01 | End: 2020-01-01

## 2020-01-01 RX ORDER — WARFARIN SODIUM 2.5 MG/1
2 TABLET ORAL ONCE
Refills: 0 | Status: DISCONTINUED | OUTPATIENT
Start: 2020-01-01 | End: 2020-01-01

## 2020-01-01 RX ORDER — POTASSIUM CHLORIDE 20 MEQ
20 PACKET (EA) ORAL ONCE
Refills: 0 | Status: COMPLETED | OUTPATIENT
Start: 2020-01-01 | End: 2020-01-01

## 2020-01-01 RX ORDER — POLYETHYLENE GLYCOL 3350 17 G/17G
17 POWDER, FOR SOLUTION ORAL ONCE
Refills: 0 | Status: DISCONTINUED | OUTPATIENT
Start: 2020-01-01 | End: 2020-01-01

## 2020-01-01 RX ORDER — VALPROIC ACID (AS SODIUM SALT) 250 MG/5ML
250 SOLUTION, ORAL ORAL
Qty: 7500 | Refills: 0
Start: 2020-01-01 | End: 2020-10-24

## 2020-01-01 RX ORDER — HEPARIN SODIUM 5000 [USP'U]/ML
5000 INJECTION INTRAVENOUS; SUBCUTANEOUS EVERY 8 HOURS
Refills: 0 | Status: DISCONTINUED | OUTPATIENT
Start: 2020-01-01 | End: 2020-01-01

## 2020-01-01 RX ORDER — PHYTONADIONE (VIT K1) 5 MG
5 TABLET ORAL ONCE
Refills: 0 | Status: DISCONTINUED | OUTPATIENT
Start: 2020-01-01 | End: 2020-01-01

## 2020-01-01 RX ORDER — WARFARIN SODIUM 2.5 MG/1
2.5 TABLET ORAL ONCE
Refills: 0 | Status: COMPLETED | OUTPATIENT
Start: 2020-01-01 | End: 2020-01-01

## 2020-01-01 RX ORDER — TRAZODONE HCL 50 MG
1 TABLET ORAL
Qty: 0 | Refills: 0 | DISCHARGE
Start: 2020-01-01

## 2020-01-01 RX ORDER — WARFARIN SODIUM 2.5 MG/1
1 TABLET ORAL ONCE
Refills: 0 | Status: COMPLETED | OUTPATIENT
Start: 2020-01-01 | End: 2020-01-01

## 2020-01-01 RX ORDER — CEFTRIAXONE 500 MG/1
1000 INJECTION, POWDER, FOR SOLUTION INTRAMUSCULAR; INTRAVENOUS EVERY 24 HOURS
Refills: 0 | Status: DISCONTINUED | OUTPATIENT
Start: 2020-01-01 | End: 2020-01-01

## 2020-01-01 RX ORDER — DIVALPROEX SODIUM 500 MG/1
3 TABLET, DELAYED RELEASE ORAL
Qty: 0 | Refills: 0 | DISCHARGE
Start: 2020-01-01

## 2020-01-01 RX ORDER — SCOPALAMINE 1 MG/3D
1 PATCH, EXTENDED RELEASE TRANSDERMAL
Refills: 0 | Status: DISCONTINUED | OUTPATIENT
Start: 2020-01-01 | End: 2020-01-01

## 2020-01-01 RX ORDER — MEROPENEM 1 G/30ML
1000 INJECTION INTRAVENOUS EVERY 12 HOURS
Refills: 0 | Status: DISCONTINUED | OUTPATIENT
Start: 2020-01-01 | End: 2020-01-01

## 2020-01-01 RX ORDER — VALPROIC ACID (AS SODIUM SALT) 250 MG/5ML
250 SOLUTION, ORAL ORAL DAILY
Refills: 0 | Status: DISCONTINUED | OUTPATIENT
Start: 2020-01-01 | End: 2020-01-01

## 2020-01-01 RX ORDER — WARFARIN SODIUM 2.5 MG/1
1 TABLET ORAL
Qty: 0 | Refills: 0 | DISCHARGE

## 2020-01-01 RX ORDER — DIVALPROEX SODIUM 500 MG/1
250 TABLET, DELAYED RELEASE ORAL ONCE
Refills: 0 | Status: COMPLETED | OUTPATIENT
Start: 2020-01-01 | End: 2020-01-01

## 2020-01-01 RX ORDER — AZITHROMYCIN 500 MG/1
500 TABLET, FILM COATED ORAL ONCE
Refills: 0 | Status: COMPLETED | OUTPATIENT
Start: 2020-01-01 | End: 2020-01-01

## 2020-01-01 RX ORDER — METOPROLOL TARTRATE 50 MG
50 TABLET ORAL
Refills: 0 | Status: DISCONTINUED | OUTPATIENT
Start: 2020-01-01 | End: 2020-01-01

## 2020-01-01 RX ORDER — HALOPERIDOL DECANOATE 100 MG/ML
2.5 INJECTION INTRAMUSCULAR ONCE
Refills: 0 | Status: DISCONTINUED | OUTPATIENT
Start: 2020-01-01 | End: 2020-01-01

## 2020-01-01 RX ORDER — WARFARIN SODIUM 2.5 MG/1
5 TABLET ORAL ONCE
Refills: 0 | Status: COMPLETED | OUTPATIENT
Start: 2020-01-01 | End: 2020-01-01

## 2020-01-01 RX ORDER — WARFARIN SODIUM 2.5 MG/1
1 TABLET ORAL
Qty: 30 | Refills: 0
Start: 2020-01-01 | End: 2020-10-03

## 2020-01-01 RX ORDER — MORPHINE SULFATE 50 MG/1
2 CAPSULE, EXTENDED RELEASE ORAL ONCE
Refills: 0 | Status: DISCONTINUED | OUTPATIENT
Start: 2020-01-01 | End: 2020-01-01

## 2020-01-01 RX ORDER — OLANZAPINE 15 MG/1
7.5 TABLET, FILM COATED ORAL
Refills: 0 | Status: DISCONTINUED | OUTPATIENT
Start: 2020-01-01 | End: 2020-01-01

## 2020-01-01 RX ORDER — PHYTONADIONE (VIT K1) 5 MG
2.5 TABLET ORAL DAILY
Refills: 0 | Status: DISCONTINUED | OUTPATIENT
Start: 2020-01-01 | End: 2020-01-01

## 2020-01-01 RX ORDER — OLANZAPINE 15 MG/1
7.5 TABLET, FILM COATED ORAL AT BEDTIME
Refills: 0 | Status: DISCONTINUED | OUTPATIENT
Start: 2020-01-01 | End: 2020-01-01

## 2020-01-01 RX ORDER — CITALOPRAM 10 MG/1
10 TABLET, FILM COATED ORAL DAILY
Refills: 0 | Status: DISCONTINUED | COMMUNITY
End: 2020-01-01

## 2020-01-01 RX ORDER — ACETAMINOPHEN 500 MG
650 TABLET ORAL ONCE
Refills: 0 | Status: COMPLETED | OUTPATIENT
Start: 2020-01-01 | End: 2020-01-01

## 2020-01-01 RX ORDER — TRAZODONE HCL 50 MG
100 TABLET ORAL AT BEDTIME
Refills: 0 | Status: DISCONTINUED | OUTPATIENT
Start: 2020-01-01 | End: 2020-01-01

## 2020-01-01 RX ORDER — TRAZODONE HCL 50 MG
150 TABLET ORAL
Refills: 0 | Status: DISCONTINUED | OUTPATIENT
Start: 2020-01-01 | End: 2020-01-01

## 2020-01-01 RX ORDER — WARFARIN SODIUM 2.5 MG/1
1 TABLET ORAL
Qty: 30 | Refills: 0
Start: 2020-01-01 | End: 2020-01-01

## 2020-01-01 RX ORDER — MORPHINE SULFATE 50 MG/1
0.25 CAPSULE, EXTENDED RELEASE ORAL
Qty: 0 | Refills: 0 | DISCHARGE
Start: 2020-01-01

## 2020-01-01 RX ORDER — POTASSIUM CHLORIDE 20 MEQ
40 PACKET (EA) ORAL ONCE
Refills: 0 | Status: COMPLETED | OUTPATIENT
Start: 2020-01-01 | End: 2020-01-01

## 2020-01-01 RX ORDER — POTASSIUM CHLORIDE 20 MEQ
20 PACKET (EA) ORAL
Refills: 0 | Status: DISCONTINUED | OUTPATIENT
Start: 2020-01-01 | End: 2020-01-01

## 2020-01-01 RX ORDER — FUROSEMIDE 40 MG
40 TABLET ORAL ONCE
Refills: 0 | Status: COMPLETED | OUTPATIENT
Start: 2020-01-01 | End: 2020-01-01

## 2020-01-01 RX ORDER — AMPICILLIN SODIUM AND SULBACTAM SODIUM 250; 125 MG/ML; MG/ML
1.5 INJECTION, POWDER, FOR SUSPENSION INTRAMUSCULAR; INTRAVENOUS ONCE
Refills: 0 | Status: COMPLETED | OUTPATIENT
Start: 2020-01-01 | End: 2020-01-01

## 2020-01-01 RX ORDER — DIVALPROEX SODIUM 500 MG/1
375 TABLET, DELAYED RELEASE ORAL EVERY 12 HOURS
Refills: 0 | Status: DISCONTINUED | OUTPATIENT
Start: 2020-01-01 | End: 2020-01-01

## 2020-01-01 RX ORDER — DIVALPROEX SODIUM 500 MG/1
250 TABLET, DELAYED RELEASE ORAL EVERY 8 HOURS
Refills: 0 | Status: DISCONTINUED | OUTPATIENT
Start: 2020-01-01 | End: 2020-01-01

## 2020-01-01 RX ORDER — METOPROLOL TARTRATE 50 MG
100 TABLET ORAL DAILY
Refills: 0 | Status: DISCONTINUED | OUTPATIENT
Start: 2020-01-01 | End: 2020-01-01

## 2020-01-01 RX ORDER — OLANZAPINE 15 MG/1
2.5 TABLET, FILM COATED ORAL EVERY 12 HOURS
Refills: 0 | Status: DISCONTINUED | OUTPATIENT
Start: 2020-01-01 | End: 2020-01-01

## 2020-01-01 RX ORDER — HALOPERIDOL DECANOATE 100 MG/ML
2 INJECTION INTRAMUSCULAR EVERY 8 HOURS
Refills: 0 | Status: DISCONTINUED | OUTPATIENT
Start: 2020-01-01 | End: 2020-01-01

## 2020-01-01 RX ORDER — INFLUENZA VIRUS VACCINE 15; 15; 15; 15 UG/.5ML; UG/.5ML; UG/.5ML; UG/.5ML
0.5 SUSPENSION INTRAMUSCULAR ONCE
Refills: 0 | Status: COMPLETED | OUTPATIENT
Start: 2020-01-01 | End: 2020-01-01

## 2020-01-01 RX ORDER — OLANZAPINE 15 MG/1
1 TABLET, FILM COATED ORAL
Qty: 0 | Refills: 0 | DISCHARGE

## 2020-01-01 RX ORDER — SERTRALINE 25 MG/1
4 TABLET, FILM COATED ORAL
Qty: 0 | Refills: 0 | DISCHARGE
Start: 2020-01-01

## 2020-01-01 RX ORDER — HALOPERIDOL DECANOATE 100 MG/ML
2 INJECTION INTRAMUSCULAR EVERY 6 HOURS
Refills: 0 | Status: DISCONTINUED | OUTPATIENT
Start: 2020-01-01 | End: 2020-01-01

## 2020-01-01 RX ORDER — SCOPALAMINE 1 MG/3D
1 PATCH, EXTENDED RELEASE TRANSDERMAL
Qty: 0 | Refills: 0 | DISCHARGE
Start: 2020-01-01

## 2020-01-01 RX ORDER — POTASSIUM CHLORIDE 20 MEQ
20 PACKET (EA) ORAL EVERY 4 HOURS
Refills: 0 | Status: COMPLETED | OUTPATIENT
Start: 2020-01-01 | End: 2020-01-01

## 2020-01-01 RX ORDER — DIVALPROEX SODIUM 500 MG/1
125 TABLET, DELAYED RELEASE ORAL
Refills: 0 | Status: DISCONTINUED | OUTPATIENT
Start: 2020-01-01 | End: 2020-01-01

## 2020-01-01 RX ORDER — DIVALPROEX SODIUM 500 MG/1
1 TABLET, DELAYED RELEASE ORAL
Qty: 0 | Refills: 0 | DISCHARGE

## 2020-01-01 RX ORDER — SODIUM CHLORIDE 9 MG/ML
1000 INJECTION, SOLUTION INTRAVENOUS
Refills: 0 | Status: DISCONTINUED | OUTPATIENT
Start: 2020-01-01 | End: 2020-01-01

## 2020-01-01 RX ORDER — SERTRALINE 25 MG/1
200 TABLET, FILM COATED ORAL DAILY
Refills: 0 | Status: DISCONTINUED | OUTPATIENT
Start: 2020-01-01 | End: 2020-01-01

## 2020-01-01 RX ORDER — DIVALPROEX SODIUM 500 MG/1
1 TABLET, DELAYED RELEASE ORAL
Qty: 30 | Refills: 0 | DISCHARGE
Start: 2020-01-01 | End: 2020-10-03

## 2020-01-01 RX ORDER — OLANZAPINE 15 MG/1
1 TABLET, FILM COATED ORAL
Qty: 90 | Refills: 0
Start: 2020-01-01 | End: 2020-01-01

## 2020-01-01 RX ORDER — POLYETHYLENE GLYCOL 3350 17 G/17G
17 POWDER, FOR SOLUTION ORAL
Refills: 0 | Status: DISCONTINUED | OUTPATIENT
Start: 2020-01-01 | End: 2020-01-01

## 2020-01-01 RX ORDER — DIVALPROEX SODIUM 500 MG/1
250 TABLET, DELAYED RELEASE ORAL DAILY
Refills: 0 | Status: DISCONTINUED | OUTPATIENT
Start: 2020-01-01 | End: 2020-01-01

## 2020-01-01 RX ORDER — SERTRALINE 25 MG/1
150 TABLET, FILM COATED ORAL
Qty: 0 | Refills: 0 | DISCHARGE

## 2020-01-01 RX ORDER — AMPICILLIN SODIUM AND SULBACTAM SODIUM 250; 125 MG/ML; MG/ML
1.5 INJECTION, POWDER, FOR SUSPENSION INTRAMUSCULAR; INTRAVENOUS EVERY 12 HOURS
Refills: 0 | Status: DISCONTINUED | OUTPATIENT
Start: 2020-01-01 | End: 2020-01-01

## 2020-01-01 RX ORDER — DIVALPROEX SODIUM 500 MG/1
1 TABLET, DELAYED RELEASE ORAL
Qty: 30 | Refills: 0
Start: 2020-01-01 | End: 2020-10-03

## 2020-01-01 RX ORDER — DIVALPROEX SODIUM 500 MG/1
2 TABLET, DELAYED RELEASE ORAL
Qty: 0 | Refills: 0 | DISCHARGE
Start: 2020-01-01 | End: 2020-10-03

## 2020-01-01 RX ORDER — DICYCLOMINE HYDROCHLORIDE 20 MG/1
TABLET ORAL
Refills: 0 | Status: DISCONTINUED | COMMUNITY
End: 2020-01-01

## 2020-01-01 RX ORDER — DIVALPROEX SODIUM 500 MG/1
3 TABLET, DELAYED RELEASE ORAL
Qty: 180 | Refills: 0
Start: 2020-01-01 | End: 2020-10-03

## 2020-01-01 RX ADMIN — ATORVASTATIN CALCIUM 80 MILLIGRAM(S): 80 TABLET, FILM COATED ORAL at 21:14

## 2020-01-01 RX ADMIN — Medication 150 MILLIGRAM(S): at 21:14

## 2020-01-01 RX ADMIN — OLANZAPINE 7.5 MILLIGRAM(S): 15 TABLET, FILM COATED ORAL at 21:21

## 2020-01-01 RX ADMIN — SERTRALINE 150 MILLIGRAM(S): 25 TABLET, FILM COATED ORAL at 11:43

## 2020-01-01 RX ADMIN — AMPICILLIN SODIUM AND SULBACTAM SODIUM 100 GRAM(S): 250; 125 INJECTION, POWDER, FOR SUSPENSION INTRAMUSCULAR; INTRAVENOUS at 18:15

## 2020-01-01 RX ADMIN — ATORVASTATIN CALCIUM 80 MILLIGRAM(S): 80 TABLET, FILM COATED ORAL at 21:39

## 2020-01-01 RX ADMIN — Medication 40 MILLIGRAM(S): at 06:43

## 2020-01-01 RX ADMIN — DIVALPROEX SODIUM 125 MILLIGRAM(S): 500 TABLET, DELAYED RELEASE ORAL at 22:57

## 2020-01-01 RX ADMIN — Medication 100 MILLIGRAM(S): at 18:41

## 2020-01-01 RX ADMIN — CHLORHEXIDINE GLUCONATE 1 APPLICATION(S): 213 SOLUTION TOPICAL at 05:39

## 2020-01-01 RX ADMIN — Medication 120 MILLIGRAM(S): at 05:48

## 2020-01-01 RX ADMIN — Medication 0.5 MILLIGRAM(S): at 21:08

## 2020-01-01 RX ADMIN — Medication 650 MILLIGRAM(S): at 12:21

## 2020-01-01 RX ADMIN — DIVALPROEX SODIUM 125 MILLIGRAM(S): 500 TABLET, DELAYED RELEASE ORAL at 11:49

## 2020-01-01 RX ADMIN — AMPICILLIN SODIUM AND SULBACTAM SODIUM 100 GRAM(S): 250; 125 INJECTION, POWDER, FOR SUSPENSION INTRAMUSCULAR; INTRAVENOUS at 06:30

## 2020-01-01 RX ADMIN — Medication 150 MILLIGRAM(S): at 21:51

## 2020-01-01 RX ADMIN — AMPICILLIN SODIUM AND SULBACTAM SODIUM 100 GRAM(S): 250; 125 INJECTION, POWDER, FOR SUSPENSION INTRAMUSCULAR; INTRAVENOUS at 05:54

## 2020-01-01 RX ADMIN — OLANZAPINE 7.5 MILLIGRAM(S): 15 TABLET, FILM COATED ORAL at 21:36

## 2020-01-01 RX ADMIN — PANTOPRAZOLE SODIUM 40 MILLIGRAM(S): 20 TABLET, DELAYED RELEASE ORAL at 06:04

## 2020-01-01 RX ADMIN — OLANZAPINE 7.5 MILLIGRAM(S): 15 TABLET, FILM COATED ORAL at 21:04

## 2020-01-01 RX ADMIN — CEFTRIAXONE 100 MILLIGRAM(S): 500 INJECTION, POWDER, FOR SOLUTION INTRAMUSCULAR; INTRAVENOUS at 21:45

## 2020-01-01 RX ADMIN — CHLORHEXIDINE GLUCONATE 1 APPLICATION(S): 213 SOLUTION TOPICAL at 05:07

## 2020-01-01 RX ADMIN — Medication 120 MILLIGRAM(S): at 06:03

## 2020-01-01 RX ADMIN — ATORVASTATIN CALCIUM 80 MILLIGRAM(S): 80 TABLET, FILM COATED ORAL at 21:27

## 2020-01-01 RX ADMIN — Medication 600 MILLIGRAM(S): at 06:13

## 2020-01-01 RX ADMIN — PANTOPRAZOLE SODIUM 40 MILLIGRAM(S): 20 TABLET, DELAYED RELEASE ORAL at 06:02

## 2020-01-01 RX ADMIN — OLANZAPINE 7.5 MILLIGRAM(S): 15 TABLET, FILM COATED ORAL at 21:42

## 2020-01-01 RX ADMIN — SENNA PLUS 2 TABLET(S): 8.6 TABLET ORAL at 21:14

## 2020-01-01 RX ADMIN — DIVALPROEX SODIUM 125 MILLIGRAM(S): 500 TABLET, DELAYED RELEASE ORAL at 22:44

## 2020-01-01 RX ADMIN — Medication 100 MILLIGRAM(S): at 11:13

## 2020-01-01 RX ADMIN — WARFARIN SODIUM 5 MILLIGRAM(S): 2.5 TABLET ORAL at 15:37

## 2020-01-01 RX ADMIN — SENNA PLUS 2 TABLET(S): 8.6 TABLET ORAL at 21:27

## 2020-01-01 RX ADMIN — Medication 50 MILLIEQUIVALENT(S): at 17:25

## 2020-01-01 RX ADMIN — PANTOPRAZOLE SODIUM 40 MILLIGRAM(S): 20 TABLET, DELAYED RELEASE ORAL at 06:05

## 2020-01-01 RX ADMIN — Medication 120 MILLIGRAM(S): at 09:50

## 2020-01-01 RX ADMIN — Medication 1 TABLET(S): at 11:16

## 2020-01-01 RX ADMIN — Medication 100 MILLIGRAM(S): at 05:24

## 2020-01-01 RX ADMIN — Medication 40 MILLIGRAM(S): at 20:53

## 2020-01-01 RX ADMIN — SENNA PLUS 2 TABLET(S): 8.6 TABLET ORAL at 21:51

## 2020-01-01 RX ADMIN — Medication 100 MILLIGRAM(S): at 21:38

## 2020-01-01 RX ADMIN — Medication 5 MILLIGRAM(S): at 17:13

## 2020-01-01 RX ADMIN — CHLORHEXIDINE GLUCONATE 1 APPLICATION(S): 213 SOLUTION TOPICAL at 06:05

## 2020-01-01 RX ADMIN — OLANZAPINE 7.5 MILLIGRAM(S): 15 TABLET, FILM COATED ORAL at 22:02

## 2020-01-01 RX ADMIN — Medication 50 MILLIEQUIVALENT(S): at 21:52

## 2020-01-01 RX ADMIN — CEFTRIAXONE 100 MILLIGRAM(S): 500 INJECTION, POWDER, FOR SOLUTION INTRAMUSCULAR; INTRAVENOUS at 12:44

## 2020-01-01 RX ADMIN — CHLORHEXIDINE GLUCONATE 1 APPLICATION(S): 213 SOLUTION TOPICAL at 06:20

## 2020-01-01 RX ADMIN — PANTOPRAZOLE SODIUM 40 MILLIGRAM(S): 20 TABLET, DELAYED RELEASE ORAL at 06:12

## 2020-01-01 RX ADMIN — OLANZAPINE 2.5 MILLIGRAM(S): 15 TABLET, FILM COATED ORAL at 17:00

## 2020-01-01 RX ADMIN — Medication 120 MILLIGRAM(S): at 05:08

## 2020-01-01 RX ADMIN — DIVALPROEX SODIUM 250 MILLIGRAM(S): 500 TABLET, DELAYED RELEASE ORAL at 22:52

## 2020-01-01 RX ADMIN — SERTRALINE 150 MILLIGRAM(S): 25 TABLET, FILM COATED ORAL at 11:27

## 2020-01-01 RX ADMIN — DIVALPROEX SODIUM 250 MILLIGRAM(S): 500 TABLET, DELAYED RELEASE ORAL at 06:16

## 2020-01-01 RX ADMIN — Medication 150 MILLIGRAM(S): at 02:21

## 2020-01-01 RX ADMIN — Medication 100 MILLIGRAM(S): at 12:40

## 2020-01-01 RX ADMIN — ATORVASTATIN CALCIUM 80 MILLIGRAM(S): 80 TABLET, FILM COATED ORAL at 22:03

## 2020-01-01 RX ADMIN — Medication 1 TABLET(S): at 13:20

## 2020-01-01 RX ADMIN — Medication 120 MILLIGRAM(S): at 05:34

## 2020-01-01 RX ADMIN — CHLORHEXIDINE GLUCONATE 1 APPLICATION(S): 213 SOLUTION TOPICAL at 06:04

## 2020-01-01 RX ADMIN — SENNA PLUS 2 TABLET(S): 8.6 TABLET ORAL at 22:01

## 2020-01-01 RX ADMIN — Medication 40 MILLIGRAM(S): at 06:05

## 2020-01-01 RX ADMIN — HALOPERIDOL DECANOATE 2 MILLIGRAM(S): 100 INJECTION INTRAMUSCULAR at 12:51

## 2020-01-01 RX ADMIN — OLANZAPINE 2.5 MILLIGRAM(S): 15 TABLET, FILM COATED ORAL at 06:19

## 2020-01-01 RX ADMIN — Medication 50 MILLIGRAM(S): at 17:18

## 2020-01-01 RX ADMIN — OLANZAPINE 7.5 MILLIGRAM(S): 15 TABLET, FILM COATED ORAL at 21:51

## 2020-01-01 RX ADMIN — AMPICILLIN SODIUM AND SULBACTAM SODIUM 100 GRAM(S): 250; 125 INJECTION, POWDER, FOR SUSPENSION INTRAMUSCULAR; INTRAVENOUS at 12:52

## 2020-01-01 RX ADMIN — DIVALPROEX SODIUM 250 MILLIGRAM(S): 500 TABLET, DELAYED RELEASE ORAL at 14:07

## 2020-01-01 RX ADMIN — DIVALPROEX SODIUM 250 MILLIGRAM(S): 500 TABLET, DELAYED RELEASE ORAL at 17:36

## 2020-01-01 RX ADMIN — AMPICILLIN SODIUM AND SULBACTAM SODIUM 100 GRAM(S): 250; 125 INJECTION, POWDER, FOR SUSPENSION INTRAMUSCULAR; INTRAVENOUS at 17:25

## 2020-01-01 RX ADMIN — Medication 100 MILLIGRAM(S): at 18:16

## 2020-01-01 RX ADMIN — CHLORHEXIDINE GLUCONATE 1 APPLICATION(S): 213 SOLUTION TOPICAL at 05:03

## 2020-01-01 RX ADMIN — Medication 50 MILLIGRAM(S): at 06:43

## 2020-01-01 RX ADMIN — DIVALPROEX SODIUM 250 MILLIGRAM(S): 500 TABLET, DELAYED RELEASE ORAL at 16:41

## 2020-01-01 RX ADMIN — DIVALPROEX SODIUM 125 MILLIGRAM(S): 500 TABLET, DELAYED RELEASE ORAL at 22:04

## 2020-01-01 RX ADMIN — SERTRALINE 150 MILLIGRAM(S): 25 TABLET, FILM COATED ORAL at 11:22

## 2020-01-01 RX ADMIN — Medication 5 MILLIGRAM(S): at 02:23

## 2020-01-01 RX ADMIN — Medication 100 MILLIGRAM(S): at 22:00

## 2020-01-01 RX ADMIN — OLANZAPINE 7.5 MILLIGRAM(S): 15 TABLET, FILM COATED ORAL at 21:07

## 2020-01-01 RX ADMIN — SERTRALINE 150 MILLIGRAM(S): 25 TABLET, FILM COATED ORAL at 09:53

## 2020-01-01 RX ADMIN — Medication 1 TABLET(S): at 11:41

## 2020-01-01 RX ADMIN — PANTOPRAZOLE SODIUM 40 MILLIGRAM(S): 20 TABLET, DELAYED RELEASE ORAL at 11:23

## 2020-01-01 RX ADMIN — Medication 50 MILLIGRAM(S): at 06:12

## 2020-01-01 RX ADMIN — Medication 120 MILLIGRAM(S): at 06:04

## 2020-01-01 RX ADMIN — SCOPALAMINE 1 PATCH: 1 PATCH, EXTENDED RELEASE TRANSDERMAL at 19:19

## 2020-01-01 RX ADMIN — CHLORHEXIDINE GLUCONATE 1 APPLICATION(S): 213 SOLUTION TOPICAL at 05:14

## 2020-01-01 RX ADMIN — DIVALPROEX SODIUM 250 MILLIGRAM(S): 500 TABLET, DELAYED RELEASE ORAL at 14:09

## 2020-01-01 RX ADMIN — AMPICILLIN SODIUM AND SULBACTAM SODIUM 100 GRAM(S): 250; 125 INJECTION, POWDER, FOR SUSPENSION INTRAMUSCULAR; INTRAVENOUS at 18:07

## 2020-01-01 RX ADMIN — OLANZAPINE 7.5 MILLIGRAM(S): 15 TABLET, FILM COATED ORAL at 22:39

## 2020-01-01 RX ADMIN — SERTRALINE 150 MILLIGRAM(S): 25 TABLET, FILM COATED ORAL at 12:19

## 2020-01-01 RX ADMIN — Medication 40 MILLIGRAM(S): at 05:08

## 2020-01-01 RX ADMIN — Medication 50 MILLIGRAM(S): at 17:24

## 2020-01-01 RX ADMIN — HEPARIN SODIUM 5000 UNIT(S): 5000 INJECTION INTRAVENOUS; SUBCUTANEOUS at 05:38

## 2020-01-01 RX ADMIN — Medication 50 MILLIGRAM(S): at 05:39

## 2020-01-01 RX ADMIN — Medication 1 TABLET(S): at 11:23

## 2020-01-01 RX ADMIN — Medication 1 TABLET(S): at 11:42

## 2020-01-01 RX ADMIN — DIVALPROEX SODIUM 250 MILLIGRAM(S): 500 TABLET, DELAYED RELEASE ORAL at 11:19

## 2020-01-01 RX ADMIN — HEPARIN SODIUM 5000 UNIT(S): 5000 INJECTION INTRAVENOUS; SUBCUTANEOUS at 14:25

## 2020-01-01 RX ADMIN — Medication 50 MILLIGRAM(S): at 18:41

## 2020-01-01 RX ADMIN — DIVALPROEX SODIUM 250 MILLIGRAM(S): 500 TABLET, DELAYED RELEASE ORAL at 22:05

## 2020-01-01 RX ADMIN — DIVALPROEX SODIUM 250 MILLIGRAM(S): 500 TABLET, DELAYED RELEASE ORAL at 06:43

## 2020-01-01 RX ADMIN — CHLORHEXIDINE GLUCONATE 1 APPLICATION(S): 213 SOLUTION TOPICAL at 05:09

## 2020-01-01 RX ADMIN — DIVALPROEX SODIUM 250 MILLIGRAM(S): 500 TABLET, DELAYED RELEASE ORAL at 22:04

## 2020-01-01 RX ADMIN — PANTOPRAZOLE SODIUM 40 MILLIGRAM(S): 20 TABLET, DELAYED RELEASE ORAL at 05:39

## 2020-01-01 RX ADMIN — OLANZAPINE 2.5 MILLIGRAM(S): 15 TABLET, FILM COATED ORAL at 06:37

## 2020-01-01 RX ADMIN — SERTRALINE 150 MILLIGRAM(S): 25 TABLET, FILM COATED ORAL at 11:13

## 2020-01-01 RX ADMIN — SERTRALINE 150 MILLIGRAM(S): 25 TABLET, FILM COATED ORAL at 12:32

## 2020-01-01 RX ADMIN — DIVALPROEX SODIUM 250 MILLIGRAM(S): 500 TABLET, DELAYED RELEASE ORAL at 22:09

## 2020-01-01 RX ADMIN — DIVALPROEX SODIUM 375 MILLIGRAM(S): 500 TABLET, DELAYED RELEASE ORAL at 17:25

## 2020-01-01 RX ADMIN — Medication 40 MILLIGRAM(S): at 05:39

## 2020-01-01 RX ADMIN — Medication 120 MILLIGRAM(S): at 05:14

## 2020-01-01 RX ADMIN — SODIUM CHLORIDE 50 MILLILITER(S): 9 INJECTION, SOLUTION INTRAVENOUS at 21:51

## 2020-01-01 RX ADMIN — Medication 0.5 MILLIGRAM(S): at 21:41

## 2020-01-01 RX ADMIN — OLANZAPINE 7.5 MILLIGRAM(S): 15 TABLET, FILM COATED ORAL at 22:03

## 2020-01-01 RX ADMIN — Medication 20 MILLIEQUIVALENT(S): at 17:20

## 2020-01-01 RX ADMIN — AZITHROMYCIN 255 MILLIGRAM(S): 500 TABLET, FILM COATED ORAL at 13:07

## 2020-01-01 RX ADMIN — Medication 40 MILLIEQUIVALENT(S): at 06:31

## 2020-01-01 RX ADMIN — OLANZAPINE 7.5 MILLIGRAM(S): 15 TABLET, FILM COATED ORAL at 11:16

## 2020-01-01 RX ADMIN — Medication 120 MILLIGRAM(S): at 18:16

## 2020-01-01 RX ADMIN — ATORVASTATIN CALCIUM 80 MILLIGRAM(S): 80 TABLET, FILM COATED ORAL at 22:00

## 2020-01-01 RX ADMIN — OLANZAPINE 2.5 MILLIGRAM(S): 15 TABLET, FILM COATED ORAL at 16:42

## 2020-01-01 RX ADMIN — Medication 500 MILLIGRAM(S): at 06:43

## 2020-01-01 RX ADMIN — OLANZAPINE 7.5 MILLIGRAM(S): 15 TABLET, FILM COATED ORAL at 23:08

## 2020-01-01 RX ADMIN — PANTOPRAZOLE SODIUM 40 MILLIGRAM(S): 20 TABLET, DELAYED RELEASE ORAL at 06:25

## 2020-01-01 RX ADMIN — DIVALPROEX SODIUM 250 MILLIGRAM(S): 500 TABLET, DELAYED RELEASE ORAL at 11:49

## 2020-01-01 RX ADMIN — AMPICILLIN SODIUM AND SULBACTAM SODIUM 100 GRAM(S): 250; 125 INJECTION, POWDER, FOR SUSPENSION INTRAMUSCULAR; INTRAVENOUS at 00:00

## 2020-01-01 RX ADMIN — OLANZAPINE 7.5 MILLIGRAM(S): 15 TABLET, FILM COATED ORAL at 21:39

## 2020-01-01 RX ADMIN — DIVALPROEX SODIUM 250 MILLIGRAM(S): 500 TABLET, DELAYED RELEASE ORAL at 22:03

## 2020-01-01 RX ADMIN — AMPICILLIN SODIUM AND SULBACTAM SODIUM 100 GRAM(S): 250; 125 INJECTION, POWDER, FOR SUSPENSION INTRAMUSCULAR; INTRAVENOUS at 18:23

## 2020-01-01 RX ADMIN — Medication 100 MILLIGRAM(S): at 17:24

## 2020-01-01 RX ADMIN — Medication 20 MILLIEQUIVALENT(S): at 16:32

## 2020-01-01 RX ADMIN — HEPARIN SODIUM 5000 UNIT(S): 5000 INJECTION INTRAVENOUS; SUBCUTANEOUS at 06:14

## 2020-01-01 RX ADMIN — Medication 50 MILLIGRAM(S): at 17:01

## 2020-01-01 RX ADMIN — PANTOPRAZOLE SODIUM 40 MILLIGRAM(S): 20 TABLET, DELAYED RELEASE ORAL at 06:14

## 2020-01-01 RX ADMIN — Medication 40 MILLIGRAM(S): at 20:27

## 2020-01-01 RX ADMIN — SERTRALINE 150 MILLIGRAM(S): 25 TABLET, FILM COATED ORAL at 18:47

## 2020-01-01 RX ADMIN — Medication 20 MILLIGRAM(S): at 18:19

## 2020-01-01 RX ADMIN — OLANZAPINE 7.5 MILLIGRAM(S): 15 TABLET, FILM COATED ORAL at 21:27

## 2020-01-01 RX ADMIN — Medication 1 TABLET(S): at 11:27

## 2020-01-01 RX ADMIN — PANTOPRAZOLE SODIUM 40 MILLIGRAM(S): 20 TABLET, DELAYED RELEASE ORAL at 06:26

## 2020-01-01 RX ADMIN — DIVALPROEX SODIUM 250 MILLIGRAM(S): 500 TABLET, DELAYED RELEASE ORAL at 06:05

## 2020-01-01 RX ADMIN — HEPARIN SODIUM 5000 UNIT(S): 5000 INJECTION INTRAVENOUS; SUBCUTANEOUS at 00:00

## 2020-01-01 RX ADMIN — OLANZAPINE 7.5 MILLIGRAM(S): 15 TABLET, FILM COATED ORAL at 21:38

## 2020-01-01 RX ADMIN — DIVALPROEX SODIUM 250 MILLIGRAM(S): 500 TABLET, DELAYED RELEASE ORAL at 11:16

## 2020-01-01 RX ADMIN — Medication 100 MILLIGRAM(S): at 11:43

## 2020-01-01 RX ADMIN — CHLORHEXIDINE GLUCONATE 1 APPLICATION(S): 213 SOLUTION TOPICAL at 05:34

## 2020-01-01 RX ADMIN — ATORVASTATIN CALCIUM 80 MILLIGRAM(S): 80 TABLET, FILM COATED ORAL at 21:45

## 2020-01-01 RX ADMIN — OLANZAPINE 7.5 MILLIGRAM(S): 15 TABLET, FILM COATED ORAL at 22:04

## 2020-01-01 RX ADMIN — Medication 150 MILLIGRAM(S): at 21:04

## 2020-01-01 RX ADMIN — Medication 50 MILLIGRAM(S): at 06:25

## 2020-01-01 RX ADMIN — Medication 50 MILLIGRAM(S): at 17:39

## 2020-01-01 RX ADMIN — Medication 40 MILLIGRAM(S): at 05:54

## 2020-01-01 RX ADMIN — DIVALPROEX SODIUM 250 MILLIGRAM(S): 500 TABLET, DELAYED RELEASE ORAL at 00:11

## 2020-01-01 RX ADMIN — PANTOPRAZOLE SODIUM 40 MILLIGRAM(S): 20 TABLET, DELAYED RELEASE ORAL at 06:17

## 2020-01-01 RX ADMIN — Medication 600 MILLIGRAM(S): at 05:09

## 2020-01-01 RX ADMIN — Medication 100 MILLIGRAM(S): at 05:39

## 2020-01-01 RX ADMIN — Medication 100 MILLIGRAM(S): at 06:14

## 2020-01-01 RX ADMIN — Medication 10 MILLIGRAM(S): at 14:18

## 2020-01-01 RX ADMIN — Medication 40 MILLIGRAM(S): at 17:18

## 2020-01-01 RX ADMIN — Medication 40 MILLIGRAM(S): at 06:12

## 2020-01-01 RX ADMIN — SERTRALINE 150 MILLIGRAM(S): 25 TABLET, FILM COATED ORAL at 11:16

## 2020-01-01 RX ADMIN — HEPARIN SODIUM 5000 UNIT(S): 5000 INJECTION INTRAVENOUS; SUBCUTANEOUS at 14:14

## 2020-01-01 RX ADMIN — Medication 40 MILLIGRAM(S): at 06:25

## 2020-01-01 RX ADMIN — SERTRALINE 150 MILLIGRAM(S): 25 TABLET, FILM COATED ORAL at 12:16

## 2020-01-01 RX ADMIN — AMPICILLIN SODIUM AND SULBACTAM SODIUM 100 GRAM(S): 250; 125 INJECTION, POWDER, FOR SUSPENSION INTRAMUSCULAR; INTRAVENOUS at 18:46

## 2020-01-01 RX ADMIN — OLANZAPINE 7.5 MILLIGRAM(S): 15 TABLET, FILM COATED ORAL at 18:16

## 2020-01-01 RX ADMIN — DIVALPROEX SODIUM 250 MILLIGRAM(S): 500 TABLET, DELAYED RELEASE ORAL at 12:31

## 2020-01-01 RX ADMIN — DIVALPROEX SODIUM 250 MILLIGRAM(S): 500 TABLET, DELAYED RELEASE ORAL at 09:49

## 2020-01-01 RX ADMIN — Medication 50 MILLIGRAM(S): at 05:49

## 2020-01-01 RX ADMIN — AMPICILLIN SODIUM AND SULBACTAM SODIUM 100 GRAM(S): 250; 125 INJECTION, POWDER, FOR SUSPENSION INTRAMUSCULAR; INTRAVENOUS at 11:54

## 2020-01-01 RX ADMIN — SERTRALINE 150 MILLIGRAM(S): 25 TABLET, FILM COATED ORAL at 11:11

## 2020-01-01 RX ADMIN — Medication 100 MILLIGRAM(S): at 10:01

## 2020-01-01 RX ADMIN — OLANZAPINE 2.5 MILLIGRAM(S): 15 TABLET, FILM COATED ORAL at 21:21

## 2020-01-01 RX ADMIN — Medication 20 MILLIGRAM(S): at 18:40

## 2020-01-01 RX ADMIN — ATORVASTATIN CALCIUM 80 MILLIGRAM(S): 80 TABLET, FILM COATED ORAL at 22:43

## 2020-01-01 RX ADMIN — PANTOPRAZOLE SODIUM 40 MILLIGRAM(S): 20 TABLET, DELAYED RELEASE ORAL at 06:20

## 2020-01-01 RX ADMIN — Medication 1 TABLET(S): at 11:13

## 2020-01-01 RX ADMIN — ATORVASTATIN CALCIUM 80 MILLIGRAM(S): 80 TABLET, FILM COATED ORAL at 21:52

## 2020-01-01 RX ADMIN — OLANZAPINE 7.5 MILLIGRAM(S): 15 TABLET, FILM COATED ORAL at 21:53

## 2020-01-01 RX ADMIN — Medication 10 MILLIGRAM(S): at 13:41

## 2020-01-01 RX ADMIN — CEFTRIAXONE 100 MILLIGRAM(S): 500 INJECTION, POWDER, FOR SOLUTION INTRAMUSCULAR; INTRAVENOUS at 06:49

## 2020-01-01 RX ADMIN — PANTOPRAZOLE SODIUM 40 MILLIGRAM(S): 20 TABLET, DELAYED RELEASE ORAL at 05:56

## 2020-01-01 RX ADMIN — ATORVASTATIN CALCIUM 80 MILLIGRAM(S): 80 TABLET, FILM COATED ORAL at 22:38

## 2020-01-01 RX ADMIN — WARFARIN SODIUM 5 MILLIGRAM(S): 2.5 TABLET ORAL at 21:14

## 2020-01-01 RX ADMIN — Medication 1 TABLET(S): at 11:19

## 2020-01-01 RX ADMIN — Medication 100 MILLIGRAM(S): at 11:41

## 2020-01-01 RX ADMIN — Medication 120 MILLIGRAM(S): at 05:40

## 2020-01-01 RX ADMIN — SERTRALINE 150 MILLIGRAM(S): 25 TABLET, FILM COATED ORAL at 11:19

## 2020-01-01 RX ADMIN — Medication 40 MILLIGRAM(S): at 06:14

## 2020-01-01 RX ADMIN — AMPICILLIN SODIUM AND SULBACTAM SODIUM 100 GRAM(S): 250; 125 INJECTION, POWDER, FOR SUSPENSION INTRAMUSCULAR; INTRAVENOUS at 06:13

## 2020-01-01 RX ADMIN — DIVALPROEX SODIUM 250 MILLIGRAM(S): 500 TABLET, DELAYED RELEASE ORAL at 06:04

## 2020-01-01 RX ADMIN — DIVALPROEX SODIUM 250 MILLIGRAM(S): 500 TABLET, DELAYED RELEASE ORAL at 06:12

## 2020-01-01 RX ADMIN — SERTRALINE 150 MILLIGRAM(S): 25 TABLET, FILM COATED ORAL at 11:10

## 2020-01-01 RX ADMIN — PANTOPRAZOLE SODIUM 40 MILLIGRAM(S): 20 TABLET, DELAYED RELEASE ORAL at 06:07

## 2020-01-01 RX ADMIN — DIVALPROEX SODIUM 250 MILLIGRAM(S): 500 TABLET, DELAYED RELEASE ORAL at 22:25

## 2020-01-01 RX ADMIN — DIVALPROEX SODIUM 250 MILLIGRAM(S): 500 TABLET, DELAYED RELEASE ORAL at 12:55

## 2020-01-01 RX ADMIN — Medication 50 MILLIGRAM(S): at 05:14

## 2020-01-01 RX ADMIN — SCOPALAMINE 1 PATCH: 1 PATCH, EXTENDED RELEASE TRANSDERMAL at 19:52

## 2020-01-01 RX ADMIN — PANTOPRAZOLE SODIUM 40 MILLIGRAM(S): 20 TABLET, DELAYED RELEASE ORAL at 06:43

## 2020-01-01 RX ADMIN — DIVALPROEX SODIUM 250 MILLIGRAM(S): 500 TABLET, DELAYED RELEASE ORAL at 15:02

## 2020-01-01 RX ADMIN — SENNA PLUS 2 TABLET(S): 8.6 TABLET ORAL at 21:39

## 2020-01-01 RX ADMIN — Medication 150 MILLIGRAM(S): at 22:47

## 2020-01-01 RX ADMIN — DIVALPROEX SODIUM 375 MILLIGRAM(S): 500 TABLET, DELAYED RELEASE ORAL at 05:49

## 2020-01-01 RX ADMIN — Medication 1 TABLET(S): at 11:05

## 2020-01-01 RX ADMIN — SERTRALINE 150 MILLIGRAM(S): 25 TABLET, FILM COATED ORAL at 13:24

## 2020-01-01 RX ADMIN — Medication 50 MILLIGRAM(S): at 06:05

## 2020-01-01 RX ADMIN — Medication 150 MILLIGRAM(S): at 21:37

## 2020-01-01 RX ADMIN — Medication 1 MILLIGRAM(S): at 00:11

## 2020-01-01 RX ADMIN — Medication 1 TABLET(S): at 11:43

## 2020-01-01 RX ADMIN — SCOPALAMINE 1 PATCH: 1 PATCH, EXTENDED RELEASE TRANSDERMAL at 18:19

## 2020-01-01 RX ADMIN — DIVALPROEX SODIUM 250 MILLIGRAM(S): 500 TABLET, DELAYED RELEASE ORAL at 14:02

## 2020-01-01 RX ADMIN — Medication 0.5 MILLIGRAM(S): at 21:39

## 2020-01-01 RX ADMIN — Medication 40 MILLIGRAM(S): at 06:04

## 2020-01-01 RX ADMIN — Medication 50 MILLIGRAM(S): at 17:36

## 2020-01-01 RX ADMIN — Medication 40 MILLIGRAM(S): at 09:50

## 2020-01-01 RX ADMIN — SERTRALINE 150 MILLIGRAM(S): 25 TABLET, FILM COATED ORAL at 11:42

## 2020-01-01 RX ADMIN — ATORVASTATIN CALCIUM 80 MILLIGRAM(S): 80 TABLET, FILM COATED ORAL at 21:23

## 2020-01-01 RX ADMIN — SENNA PLUS 2 TABLET(S): 8.6 TABLET ORAL at 21:23

## 2020-01-01 RX ADMIN — Medication 150 MILLIGRAM(S): at 21:21

## 2020-01-01 RX ADMIN — Medication 50 MILLIGRAM(S): at 17:13

## 2020-01-01 RX ADMIN — Medication 40 MILLIGRAM(S): at 05:09

## 2020-01-01 RX ADMIN — Medication 40 MILLIGRAM(S): at 05:33

## 2020-01-01 RX ADMIN — Medication 120 MILLIGRAM(S): at 12:40

## 2020-01-01 RX ADMIN — Medication 40 MILLIGRAM(S): at 18:17

## 2020-01-01 RX ADMIN — Medication 40 MILLIGRAM(S): at 05:14

## 2020-01-01 RX ADMIN — Medication 50 MILLIEQUIVALENT(S): at 18:18

## 2020-01-01 RX ADMIN — HEPARIN SODIUM 5000 UNIT(S): 5000 INJECTION INTRAVENOUS; SUBCUTANEOUS at 21:52

## 2020-01-01 RX ADMIN — POLYETHYLENE GLYCOL 3350 17 GRAM(S): 17 POWDER, FOR SOLUTION ORAL at 18:20

## 2020-01-01 RX ADMIN — SERTRALINE 150 MILLIGRAM(S): 25 TABLET, FILM COATED ORAL at 11:41

## 2020-01-01 RX ADMIN — Medication 100 MILLIGRAM(S): at 08:37

## 2020-01-01 RX ADMIN — Medication 600 MILLIGRAM(S): at 18:20

## 2020-01-01 RX ADMIN — Medication 1 TABLET(S): at 18:16

## 2020-01-01 RX ADMIN — PANTOPRAZOLE SODIUM 40 MILLIGRAM(S): 20 TABLET, DELAYED RELEASE ORAL at 05:49

## 2020-01-01 RX ADMIN — Medication 50 MILLIGRAM(S): at 17:20

## 2020-01-01 RX ADMIN — Medication 150 MILLIGRAM(S): at 21:23

## 2020-01-01 RX ADMIN — DIVALPROEX SODIUM 250 MILLIGRAM(S): 500 TABLET, DELAYED RELEASE ORAL at 12:39

## 2020-01-01 RX ADMIN — Medication 150 MILLIGRAM(S): at 22:02

## 2020-01-01 RX ADMIN — Medication 1 TABLET(S): at 11:11

## 2020-01-01 RX ADMIN — Medication 150 MILLIGRAM(S): at 22:03

## 2020-01-01 RX ADMIN — AMPICILLIN SODIUM AND SULBACTAM SODIUM 100 GRAM(S): 250; 125 INJECTION, POWDER, FOR SUSPENSION INTRAMUSCULAR; INTRAVENOUS at 18:50

## 2020-01-01 RX ADMIN — ATORVASTATIN CALCIUM 80 MILLIGRAM(S): 80 TABLET, FILM COATED ORAL at 21:03

## 2020-01-01 RX ADMIN — DIVALPROEX SODIUM 375 MILLIGRAM(S): 500 TABLET, DELAYED RELEASE ORAL at 17:41

## 2020-01-01 RX ADMIN — Medication 100 MILLIGRAM(S): at 05:34

## 2020-01-01 RX ADMIN — DIVALPROEX SODIUM 125 MILLIGRAM(S): 500 TABLET, DELAYED RELEASE ORAL at 22:39

## 2020-01-01 RX ADMIN — DIVALPROEX SODIUM 375 MILLIGRAM(S): 500 TABLET, DELAYED RELEASE ORAL at 05:14

## 2020-01-01 RX ADMIN — DIVALPROEX SODIUM 125 MILLIGRAM(S): 500 TABLET, DELAYED RELEASE ORAL at 22:56

## 2020-01-01 RX ADMIN — Medication 20 MILLIEQUIVALENT(S): at 11:41

## 2020-01-01 RX ADMIN — AMPICILLIN SODIUM AND SULBACTAM SODIUM 100 GRAM(S): 250; 125 INJECTION, POWDER, FOR SUSPENSION INTRAMUSCULAR; INTRAVENOUS at 23:06

## 2020-01-01 RX ADMIN — PANTOPRAZOLE SODIUM 40 MILLIGRAM(S): 20 TABLET, DELAYED RELEASE ORAL at 06:49

## 2020-01-01 RX ADMIN — ATORVASTATIN CALCIUM 80 MILLIGRAM(S): 80 TABLET, FILM COATED ORAL at 21:42

## 2020-01-01 RX ADMIN — OLANZAPINE 7.5 MILLIGRAM(S): 15 TABLET, FILM COATED ORAL at 21:52

## 2020-01-01 RX ADMIN — ATORVASTATIN CALCIUM 80 MILLIGRAM(S): 80 TABLET, FILM COATED ORAL at 21:38

## 2020-01-01 RX ADMIN — Medication 2.5 MILLIGRAM(S): at 15:04

## 2020-01-01 RX ADMIN — ATORVASTATIN CALCIUM 80 MILLIGRAM(S): 80 TABLET, FILM COATED ORAL at 21:26

## 2020-01-01 RX ADMIN — Medication 40 MILLIGRAM(S): at 05:23

## 2020-01-01 RX ADMIN — Medication 50 MILLIGRAM(S): at 06:04

## 2020-01-01 RX ADMIN — CHLORHEXIDINE GLUCONATE 1 APPLICATION(S): 213 SOLUTION TOPICAL at 06:12

## 2020-01-01 RX ADMIN — DIVALPROEX SODIUM 250 MILLIGRAM(S): 500 TABLET, DELAYED RELEASE ORAL at 02:22

## 2020-01-01 RX ADMIN — Medication 100 MILLIGRAM(S): at 06:02

## 2020-01-01 RX ADMIN — PANTOPRAZOLE SODIUM 40 MILLIGRAM(S): 20 TABLET, DELAYED RELEASE ORAL at 05:40

## 2020-01-01 RX ADMIN — Medication 120 MILLIGRAM(S): at 06:14

## 2020-01-01 RX ADMIN — SERTRALINE 150 MILLIGRAM(S): 25 TABLET, FILM COATED ORAL at 13:20

## 2020-01-01 RX ADMIN — PANTOPRAZOLE SODIUM 40 MILLIGRAM(S): 20 TABLET, DELAYED RELEASE ORAL at 06:19

## 2020-01-01 RX ADMIN — Medication 120 MILLIGRAM(S): at 06:12

## 2020-01-01 RX ADMIN — Medication 150 MILLIGRAM(S): at 21:52

## 2020-01-01 RX ADMIN — Medication 100 MILLIGRAM(S): at 21:27

## 2020-01-01 RX ADMIN — AMPICILLIN SODIUM AND SULBACTAM SODIUM 100 GRAM(S): 250; 125 INJECTION, POWDER, FOR SUSPENSION INTRAMUSCULAR; INTRAVENOUS at 11:36

## 2020-01-01 RX ADMIN — AMPICILLIN SODIUM AND SULBACTAM SODIUM 100 GRAM(S): 250; 125 INJECTION, POWDER, FOR SUSPENSION INTRAMUSCULAR; INTRAVENOUS at 05:08

## 2020-01-01 RX ADMIN — OLANZAPINE 2.5 MILLIGRAM(S): 15 TABLET, FILM COATED ORAL at 00:42

## 2020-01-01 RX ADMIN — PANTOPRAZOLE SODIUM 40 MILLIGRAM(S): 20 TABLET, DELAYED RELEASE ORAL at 05:08

## 2020-01-01 RX ADMIN — Medication 100 MILLIGRAM(S): at 09:50

## 2020-01-01 RX ADMIN — Medication 120 MILLIGRAM(S): at 06:43

## 2020-01-01 RX ADMIN — OLANZAPINE 7.5 MILLIGRAM(S): 15 TABLET, FILM COATED ORAL at 21:14

## 2020-01-01 RX ADMIN — AMPICILLIN SODIUM AND SULBACTAM SODIUM 100 GRAM(S): 250; 125 INJECTION, POWDER, FOR SUSPENSION INTRAMUSCULAR; INTRAVENOUS at 05:04

## 2020-01-01 RX ADMIN — WARFARIN SODIUM 1 MILLIGRAM(S): 2.5 TABLET ORAL at 21:45

## 2020-01-01 RX ADMIN — Medication 100 MILLIGRAM(S): at 22:25

## 2020-01-01 RX ADMIN — CHLORHEXIDINE GLUCONATE 1 APPLICATION(S): 213 SOLUTION TOPICAL at 06:03

## 2020-01-01 RX ADMIN — OLANZAPINE 7.5 MILLIGRAM(S): 15 TABLET, FILM COATED ORAL at 22:00

## 2020-01-01 RX ADMIN — ATORVASTATIN CALCIUM 80 MILLIGRAM(S): 80 TABLET, FILM COATED ORAL at 23:08

## 2020-01-01 RX ADMIN — ATORVASTATIN CALCIUM 80 MILLIGRAM(S): 80 TABLET, FILM COATED ORAL at 21:51

## 2020-01-01 RX ADMIN — HALOPERIDOL DECANOATE 2 MILLIGRAM(S): 100 INJECTION INTRAMUSCULAR at 07:56

## 2020-01-01 RX ADMIN — DIVALPROEX SODIUM 375 MILLIGRAM(S): 500 TABLET, DELAYED RELEASE ORAL at 06:25

## 2020-01-01 RX ADMIN — Medication 120 MILLIGRAM(S): at 05:25

## 2020-01-01 RX ADMIN — Medication 150 MILLIGRAM(S): at 22:38

## 2020-01-01 RX ADMIN — ATORVASTATIN CALCIUM 80 MILLIGRAM(S): 80 TABLET, FILM COATED ORAL at 21:06

## 2020-01-01 RX ADMIN — Medication 40 MILLIGRAM(S): at 06:02

## 2020-01-01 RX ADMIN — SERTRALINE 150 MILLIGRAM(S): 25 TABLET, FILM COATED ORAL at 11:05

## 2020-01-01 RX ADMIN — DIVALPROEX SODIUM 125 MILLIGRAM(S): 500 TABLET, DELAYED RELEASE ORAL at 21:53

## 2020-01-01 RX ADMIN — Medication 100 MILLIGRAM(S): at 17:20

## 2020-01-01 RX ADMIN — DIVALPROEX SODIUM 250 MILLIGRAM(S): 500 TABLET, DELAYED RELEASE ORAL at 06:20

## 2020-01-01 RX ADMIN — SERTRALINE 150 MILLIGRAM(S): 25 TABLET, FILM COATED ORAL at 11:50

## 2020-01-01 RX ADMIN — SERTRALINE 150 MILLIGRAM(S): 25 TABLET, FILM COATED ORAL at 12:39

## 2020-01-01 RX ADMIN — WARFARIN SODIUM 2.5 MILLIGRAM(S): 2.5 TABLET ORAL at 21:27

## 2020-01-01 RX ADMIN — AMPICILLIN SODIUM AND SULBACTAM SODIUM 100 GRAM(S): 250; 125 INJECTION, POWDER, FOR SUSPENSION INTRAMUSCULAR; INTRAVENOUS at 05:38

## 2020-01-01 RX ADMIN — OLANZAPINE 7.5 MILLIGRAM(S): 15 TABLET, FILM COATED ORAL at 21:23

## 2020-01-01 RX ADMIN — Medication 50 MILLIGRAM(S): at 17:11

## 2020-01-01 RX ADMIN — Medication 40 MILLIGRAM(S): at 05:49

## 2020-01-01 RX ADMIN — Medication 40 MILLIGRAM(S): at 05:04

## 2020-01-01 RX ADMIN — OLANZAPINE 2.5 MILLIGRAM(S): 15 TABLET, FILM COATED ORAL at 11:10

## 2020-01-01 RX ADMIN — Medication 150 MILLIGRAM(S): at 22:04

## 2020-01-01 RX ADMIN — Medication 0.5 MILLIGRAM(S): at 17:33

## 2020-01-01 RX ADMIN — Medication 120 MILLIGRAM(S): at 05:39

## 2020-01-01 RX ADMIN — Medication 120 MILLIGRAM(S): at 05:33

## 2020-01-01 RX ADMIN — DIVALPROEX SODIUM 375 MILLIGRAM(S): 500 TABLET, DELAYED RELEASE ORAL at 18:41

## 2020-01-01 RX ADMIN — WARFARIN SODIUM 1 MILLIGRAM(S): 2.5 TABLET ORAL at 21:07

## 2020-01-01 RX ADMIN — CHLORHEXIDINE GLUCONATE 1 APPLICATION(S): 213 SOLUTION TOPICAL at 05:38

## 2020-01-01 RX ADMIN — Medication 1 MILLIGRAM(S): at 21:42

## 2020-01-01 RX ADMIN — Medication 600 MILLIGRAM(S): at 05:39

## 2020-01-01 RX ADMIN — DIVALPROEX SODIUM 125 MILLIGRAM(S): 500 TABLET, DELAYED RELEASE ORAL at 21:52

## 2020-01-01 RX ADMIN — OLANZAPINE 7.5 MILLIGRAM(S): 15 TABLET, FILM COATED ORAL at 22:43

## 2020-01-01 RX ADMIN — AMPICILLIN SODIUM AND SULBACTAM SODIUM 100 GRAM(S): 250; 125 INJECTION, POWDER, FOR SUSPENSION INTRAMUSCULAR; INTRAVENOUS at 23:29

## 2020-01-01 RX ADMIN — Medication 50 MILLIGRAM(S): at 05:33

## 2020-01-01 RX ADMIN — DIVALPROEX SODIUM 250 MILLIGRAM(S): 500 TABLET, DELAYED RELEASE ORAL at 12:16

## 2020-01-01 RX ADMIN — Medication 1 TABLET(S): at 18:48

## 2020-01-01 RX ADMIN — WARFARIN SODIUM 1 MILLIGRAM(S): 2.5 TABLET ORAL at 21:26

## 2020-01-01 RX ADMIN — HEPARIN SODIUM 5000 UNIT(S): 5000 INJECTION INTRAVENOUS; SUBCUTANEOUS at 22:38

## 2020-01-01 RX ADMIN — Medication 40 MILLIEQUIVALENT(S): at 15:41

## 2020-01-01 RX ADMIN — SENNA PLUS 2 TABLET(S): 8.6 TABLET ORAL at 22:00

## 2020-01-01 RX ADMIN — Medication 100 MILLIGRAM(S): at 11:03

## 2020-01-01 RX ADMIN — Medication 120 MILLIGRAM(S): at 11:03

## 2020-01-01 RX ADMIN — Medication 120 MILLIGRAM(S): at 10:00

## 2020-01-01 RX ADMIN — ATORVASTATIN CALCIUM 80 MILLIGRAM(S): 80 TABLET, FILM COATED ORAL at 22:02

## 2020-01-01 RX ADMIN — DIVALPROEX SODIUM 250 MILLIGRAM(S): 500 TABLET, DELAYED RELEASE ORAL at 22:46

## 2020-01-01 RX ADMIN — CHLORHEXIDINE GLUCONATE 1 APPLICATION(S): 213 SOLUTION TOPICAL at 05:22

## 2020-01-01 RX ADMIN — Medication 100 MILLIGRAM(S): at 05:08

## 2020-01-01 RX ADMIN — Medication 120 MILLIGRAM(S): at 06:05

## 2020-01-01 RX ADMIN — HEPARIN SODIUM 5000 UNIT(S): 5000 INJECTION INTRAVENOUS; SUBCUTANEOUS at 05:09

## 2020-01-01 RX ADMIN — Medication 1 TABLET(S): at 11:14

## 2020-01-01 RX ADMIN — Medication 120 MILLIGRAM(S): at 12:50

## 2020-01-01 RX ADMIN — Medication 500 MILLIGRAM(S): at 17:17

## 2020-01-01 RX ADMIN — Medication 40 MILLIGRAM(S): at 06:20

## 2020-01-01 RX ADMIN — SERTRALINE 150 MILLIGRAM(S): 25 TABLET, FILM COATED ORAL at 11:14

## 2020-01-02 PROBLEM — R46.89 BEHAVIORAL CHANGE: Status: ACTIVE | Noted: 2019-05-29

## 2020-01-02 PROBLEM — I63.9 CEREBROVASCULAR ACCIDENT (CVA): Status: ACTIVE | Noted: 2020-01-01

## 2020-01-02 NOTE — HISTORY OF PRESENT ILLNESS
[FreeTextEntry1] : Ms. Duron is an 85-year-old woman last seen by me on 6/24/2019 for follow-up of her strokes. She also has some behavioral issues.  Since the last visit she has stopped ambulating and is mostly wheelchair bound.  She does exercise for at most 1 -2 minutes in the day with certain aides.  She is still following with Dr. Sutton of Psychiatry and her Zyprexa is 7.5 and her zoloft is 150mg and she also uses trazodone to help sleep.\par Patient is angry most of the time at her daughter including during the visit she says her daughter is always mean.

## 2020-01-02 NOTE — REVIEW OF SYSTEMS
[Feeling Tired] : feeling tired [Memory Lapses or Loss] : memory loss [Decr. Concentrating Ability] : decreased concentrating ability [Depression] : depression [Joint Pain] : joint pain [Negative] : Respiratory

## 2020-01-02 NOTE — DISCUSSION/SUMMARY
[FreeTextEntry1] : Ms. Duron is a 86yo woman with strokes due too afib on anticoagulation.  She also has some cognitive imapirment which is likely a vascular dementia with superimposed depression.  She has not continued to do exercise\par 1. Continue current medications\par 2. Encouraged physical therapy and exercise daily\par 3. f/u in 1 year
Constitutional: denies fever, chills, diaphoresis   HEENT: denies blurry vision, difficulty hearing  Respiratory: denies SOB, YUN, cough, sputum production, wheezing, hemoptysis  Cardiovascular: denies CP, palpitations  Gastrointestinal: denies nausea, vomiting, diarrhea, constipation, abdominal pain, melena, hematochezia   Genitourinary: denies dysuria, frequency, urgency, hematuria   Skin: RLE edema, erythema, and tenderness   Ext: LLE swelling chronic   Neurologic: denies headache, weakness, dizziness, paresthesias, numbness/tingling   ROS negative except as noted above

## 2020-01-02 NOTE — PHYSICAL EXAM
[FreeTextEntry1] : Alert oriented to person place and year.Able to name and repeat\par Slight left facial\par LUE proximal 4-/5 and distal 4+/5 (better with flexors)\par In LLE power 3/5 proximal and 4/5 distal (better with extensors)\par Unable to ambulate\par Sensory slightly diminished on left side\par RLE proximal 4/5\par \par NIHSS 8\par mrankin 4\par \par

## 2020-02-18 NOTE — ED ADULT NURSE NOTE - NSFALLRSKASSISTTYPE_ED_ALL_ED
Patient requesting results on Mammogram and Bone density that were done on 2/15/2020 at [facility].       Also patient would like to get prescription for her arteritis, stating she ws prescribed something long time ago but  she is not sure the name of medication.    Standing/Walking/Toileting

## 2020-05-28 NOTE — ED PROVIDER NOTE - ATTENDING CONTRIBUTION TO CARE
84 y/o f w/ pmhx of CVA w/ residual L-sided weakness, A-fib on Coumadin (does not know last INR), HFrEF s/p AICD, HTN, COPD not on home o2, dementia presents for sob x 2 days. pt poor historian, daughter amikel and reports pt has been more agitated and aggressive, as reporting sob with polyuria. No fever, chills, n/v, cp,  palpitations, diaphoresis, cough, ha/lh/dizziness, numbness/tingling, neck pain/ stiffness, abd pain, diarrhea, constipation, melena/brbpr, trauma, sick contacts, recent travel or rash.    Vital Signs: I have reviewed the initial vital signs. Constitutional: Elderly female sitting on stretcher speaking full sentences. Integumentary: No rash. ENT: MMM NECK: Supple, non-tender, no meningeal signs. Cardiovascular: RRR, radial pulses 2/4 b/l. No JVD. Respiratory: BS present b/l, poor resp effort and excursion, poor air exchange,  no accessory muscle use, no stridor. Gastrointestinal: BS present throughout all 4 quadrants, soft, nd, nt, no rebound tenderness or guarding, no cvat. Musculoskeletal: (+) B/l 1+ pitting edema, no calf pain/erythema. Neurologic: Awake and alert, AAox3 at this time reporting only sob, CN II-XII intact, No facial droop or slurring of speech. No acute focal deficits.

## 2020-05-28 NOTE — ED PROVIDER NOTE - CLINICAL SUMMARY MEDICAL DECISION MAKING FREE TEXT BOX
all results reviewed and understood, antibiotics given, concern for also COVID, pt aware of plan for admission as also discussed with daughter, medical admitting team aware of pt and admission.

## 2020-05-28 NOTE — H&P ADULT - NSHPPHYSICALEXAM_GEN_ALL_CORE
GENERAL: NAD, forgetfull, speaks in full sentences, no signs of respiratory distress  HEAD:  Atraumatic, Normocephalic  CHEST/LUNG: BIlateral crackles bases; No accessory muscles used  HEART: Regular rate and rhythm; No murmurs;   ABDOMEN: Soft, Nontender, Nondistended; Bowel sounds present; No guarding  EXTREMITIES: Bilateral LE non pitting edema, Left side residual weakness  PSYCH: AAOx3

## 2020-05-28 NOTE — ED PROVIDER NOTE - OBJECTIVE STATEMENT
pt is a 85 yof w/ dementia, afib on coumadin, COPD not on home O2, HTN, AICD here for SOB x 2. Pt is unreliable historian. according to daughter, pt has been exhibiting a worsening mental status over the last 2 days complaining of shortness of breath, but urinating frequently. Pt lives at home, daughter is primary caretaker.

## 2020-05-28 NOTE — H&P ADULT - HISTORY OF PRESENT ILLNESS
85 year old pmh of afib on coumadin, COPD not on home O2, HTN, AICD presenting with 2- 3 day hx of SOB and dry cough. Pt is forgetful which is baseline per chart.   Pt denies sneezing, watery eyes ot other constitutional symptoms. Pt denies any inciting or alleviating or aggravating factors. Denies sick contacts. Pt. denies any chest pain. No recent travel.  In ED pt placed on nasal cannula currently satting 98% on 3 liters.   Pt. also with incidental finding of a positive UA although she denies dysuria or polyuria.  Pt lives at home, daughter is primary caretaker. 85 year old pmh of dementia, afib on coumadin, COPD not on home O2, CVA with left side residual weakness, HTN, HfeEf s/p AICD presenting with 2- 3 day hx of SOB and dry cough. Pt is AOx3 but has dementia and is forgetful history corroborated by daughter Goldie who is the primary caretaker ( phone number -#[phone]).   Per daughter pt has been short of breath for past few weeks and per Dr. Keller, daughter has at times increased home lasix dose form 40mg to 60mg. Last time was given 60mg was this past Monday. This am pt was becoming more agitated and pulse ox on room air taken by daughter was 84-85%. Daughter also noted pt becoming more aggressive towards the aids past few days -using inappropriate language and becoming more agitated.   Additionally, daughter noted increased frequency of urination.   Pt denies sneezing, watery eyes ot other constitutional symptoms. Pt denies any inciting or alleviating or aggravating factors. Denies sick contacts. Pt. denies any chest pain. No recent travel.  In ED pt placed on nasal cannula currently satting 98% on 3 liters. Pt was tested for Covid -19.

## 2020-05-28 NOTE — ED PROVIDER NOTE - PHYSICAL EXAMINATION
CONSTITUTIONAL: Well-developed; well-nourished; in no acute distress.   SKIN: warm, dry  HEAD: Normocephalic; atraumatic.  EYES: PERRL, EOMI, normal sclera and conjunctiva   ENT: No nasal discharge; airway clear.  NECK: Supple; non tender.  CARD: S1, S2 normal; no murmurs, gallops, or rubs. Regular rate and rhythm. +2 pitting edema b/l  RESP: No wheezes, rales or rhonchi.  ABD: soft ntnd  EXT: Normal ROM.  No clubbing, cyanosis or edema.   LYMPH: No acute cervical adenopathy.  NEURO: mildly confused, oriented to person, place and time  PSYCH: Cooperative, appropriate.

## 2020-05-28 NOTE — ED PROVIDER NOTE - CARE PLAN
Assessment and plan of treatment:	Plan: Monitor, EKG, CXR, labs, urine, ct head, reassess. Principal Discharge DX:	UTI (urinary tract infection)  Assessment and plan of treatment:	Plan: Monitor, EKG, CXR, labs, urine, ct head, reassess.  Secondary Diagnosis:	SOB (shortness of breath)

## 2020-05-28 NOTE — H&P ADULT - ASSESSMENT
85 year old pmh of dementia, afib on coumadin, COPD not on home O2, CVA with left side residual weakness, HTN, HfrEF s/pAICD presenting with 2- 3 day hx of SOB and dry cough. Pt is AOx3 but has dementia and is forgetful history corroborated by daughter Goldie who is the primary caretaker ( phone number -[phone]).     #Shortness of breath - can be multifactorial viral pna vs covid 19 with element of acute on chronic chf exacerbation  -increased oxygen requirement now on 3liters nasal cannula satting 98% at home this am was 85% on room air, nonproductive cough at home  -no wbc no fever   -chest xray -Scattered airspace and interstitial opacities viral pna not excluded  - makes chf unlikely but will give one dose lasix 40mg  IV as pt has bilateral crackles at lung bases and LE edema  -f/u covid -19 swab      #UTI  -polyuria  -UA- leuk esterase positive, WBC in urine  -f/u culture  -for now rocephin 1g daily    #hx of chfrEF   -c/w Diltiazem metoprolol and Lasix  -at home lasix 40mg daily will give 40mg Iv once as pt has bilateral edema and crackles at bases    #history of A-fib- rate controlled  -1mg warfarin daily except Sunday  -f/u daily INR    #dementia  -baseline dementia likely worsened acutely in setting of UTI  -was recently switched off trazadone to lorazapam 0.5 daily   -follows with Dr. Sutton   -c/w home meds -zoloft 150mg, zyprexa 7.5mg    #cva with left side residual weakness continue with atorvasatin 80mg daily    #dvt ppx-warfarin  #Gi ppx protonix  #diet - dash dysphagia 3  #dispo- home

## 2020-05-28 NOTE — ED ADULT NURSE NOTE - OBJECTIVE STATEMENT
Patient is a/ox 3 BIBA from home due to SOB. Patient is on 3L NC with sat of 98%. + Expiratory wheezes. Patient temp rectally is 98.8. Patient attached to cardiac and spo2 monitor with NSR. No distress noted at this time, will continue to monitor. Blood, blood cultures and COVID-19 specimen obtained and walked over to lab.

## 2020-05-28 NOTE — ED ADULT TRIAGE NOTE - SOURCE OF INFORMATION
Patient:   LILIANE CRUZ            MRN: [id number]            FIN: [id number]               Age:   89 years     Sex:  FEMALE     :  [birth year]   Associated Diagnoses:   Altered mental status; Potential stroke; Pneumonia; Dementia; Metabolic encephalopathy; Severe malnutrition   Author:   TAL TRAN      HPI: 89y/o female with no known PMH presents via EMS with history that she was found by son with witnessed aphasia. Patient is unable to speak to ED staff but responsive to pain. On floor patient was verbal, althought was not oriented. Stated that she was 25 years old and she is here because she fell out of bed and fractured her riibs. Patietn was ohwever alert, , did not appears in distress had no complaints, but was not able to give intellagle responses to questions,  ROS or history    ROS - see HPI    Unable to obtain full history due to patient cognitive deficits.            Physical Examination   VS/Measurements        Vitals between:   28-MAR-2017 19:20:46   TO   29-MAR-2017 19:20:46                   LAST RESULT MINIMUM MAXIMUM  Temperature 36.0 35 37.0  Heart Rate 71 64 78  Respiratory Rate 20 13 26  NISBP           133 125 183  NIDBP           64 48 111  NIMBP           87 82 115  SpO2                    99 95 100       General:  Alert and oriented, No acute distress.    Eye:  Pupils are equal, round and reactive to light, Extraocular movements are intact, Normal conjunctiva.    HENT:  No pharyngeal erythema.    Neck:  Non-tender, No jugular venous distention, No lymphadenopathy, No thyromegaly.    Respiratory:  Lungs are clear to auscultation, Respirations are non-labored, Breath sounds are equal, Symmetrical chest wall expansion, No chest wall tenderness.    Cardiovascular:  Normal rate, Regular rhythm, No murmur, No gallop, Good pulses equal in all extremities, Normal peripheral perfusion, No edema.    Gastrointestinal:  Soft, Non-tender, Non-distended.    Genitourinary:  No  costovertebral angle tenderness, No inguinal tenderness.    Musculoskeletal     Normal range of motion.     No tenderness.     No swelling.     No deformity.     Normal gait.     Integumentary:  Warm, No rash.    Neurologic:  Alert, Normal motor function, No focal deficits, Cranial Nerves II-XII are grossly intact, patietn believed she was 25 years old, unable to give appropriate responses, in no acute distress, non focal besides mental capacity. .    Psychiatric:  Cooperative.       Impression and Plan     89y/o female presented with EMS as aphasic now, alert but confused.     AMS (2/2 dehydration, metabolic encephalopathy vs infectious process including pneumonia/UTI) vs baseline demenitia   - will attempt to contact next of kinf for further history  - neurology consult placed  - pan culture pending  - CT head - senescent changes, old infarct  - swallow eval           Dx and Plan:  Diagnosis     Altered mental status (YNV45-JA R41.82, Discharge, Medical).     Complaint of Potential stroke (PNED [id number], Reason For Visit, Emergency medicine, Medical).     Altered mental status (ULJ59-AC R41.82, Discharge, Medical).     Pneumonia (VXJ68-RQ J18.9, Discharge, Medical).     Dementia (BFO60-IM F03.90, Diagnosis, Hospitalized, Medical).     Metabolic encephalopathy (UUU30-MU G93.41, Diagnosis, Medical).     Severe malnutrition (KLT11-LV E41, Diagnosis, Medical).     .             Electronically Signed On 03/29/2017 19:24  __________________________________________________   TAL TRAN      Electronically Signed On 04/03/2017 20:49  __________________________________________________   TAL TRAN     EMS

## 2020-05-28 NOTE — H&P ADULT - ATTENDING COMMENTS
**Hx and physical limited due to baseline dementia. Supplemental information obtained from house staff, EMR, and daughter (Goldie) over phone    86 YO F with a PMH of dementia, Afib (coumadin) s/p AICD, COPD (no home O2), CVA (left-side residual weakness), and HTN who presents to the hospital with a c/o progressively worsening SOB for the past several months but worse over the past x 1-3 days. Associated with AMS over the past x 1 week and increased urinary frequency. In the ED, COVID19 swab was negative. Given IV Lasix and IV ABXs (Ceftriaxone/Azithro).     Physical exam shows pt in NAD. VSS, afebrile, not hypoxic on 3L NC (85% on RA). A&Ox1 (name). Left-sided weakness. Bibasilar crackles. RRR, no M/G/R. ABD is soft and non-tender, normoactive BSs. LEs with non-pitting edema B/L. No rashes. Labs and radiology as above.     Dyspnea, likely new-onset heart failure, doubt infectious cause. IV Lasix and transition to PO. Echo. Monitor daily weights, Is&Os, and diet/fluid restriction. Restart home meds. Cardio consult (Dinah) and family asks for Dr. Urrutia to be consulted    UTI. FU urine culture. Start IV ABXs (Ceftriaxone)    Hx of dementia, Afib (coumadin) s/p AICD, COPD (no home O2), CVA (left-side residual weakness), and HTN. Restart home meds. GI and DVT PPX. Inform PCP of pt's admission to hospital. Rest as per above note. **Hx and physical limited due to baseline dementia. Supplemental information obtained from house staff, EMR, and daughter (Goldie) over phone    84 YO F with a PMH of dementia, Afib (coumadin) s/p AICD, COPD (no home O2), CVA (left-side residual weakness), and HTN who presents to the hospital with a c/o progressively worsening SOB for the past several months but worse over the past x 1-3 days. Associated with AMS over the past x 1 week and increased urinary frequency. In the ED, COVID19 swab was negative. Given IV Lasix and IV ABXs (Ceftriaxone/Azithro).     Physical exam shows pt in NAD. VSS, afebrile, not hypoxic on 3L NC (85% on RA). A&Ox1 (name). Left-sided weakness. Bibasilar crackles. RRR, no M/G/R. ABD is soft and non-tender, normoactive BSs. LEs with non-pitting edema B/L. No rashes. Labs and radiology as above.     Dyspnea, likely new-onset heart failure, doubt infectious cause. IV Lasix and transition to PO. Echo. Monitor daily weights, Is&Os, and diet/fluid restriction. Restart home meds. Send procal and CRP/ESR. Cardio consult (Dinah) and family asks for Dr. Urrutia to be consulted    UTI. FU urine culture. Start IV ABXs (Ceftriaxone)    Hx of dementia, Afib (coumadin) s/p AICD, COPD (no home O2), CVA (left-side residual weakness), and HTN. Restart home meds. GI and DVT PPX. Inform PCP of pt's admission to hospital. Rest as per above note.

## 2020-05-28 NOTE — H&P ADULT - NSHPLABSRESULTS_GEN_ALL_CORE
11.3   6.69  )-----------( 197      ( 28 May 2020 07:51 )             34.3       143  |  104  |  19  ----------------------------<  107<H>  4.9   |  26  |  1.1    Ca    9.6      28 May 2020 07:51    TPro  7.5  /  Alb  4.0  /  TBili  0.4  /  DBili  x   /  AST  37  /  ALT  15  /  AlkPhos  98      < from: 12 Lead ECG (20 @ 10:46) >    Ventricular Rate 68 BPM    Atrial Rate 68 BPM    P-R Interval 180 ms    QRS Duration 88 ms    Q-T Interval 416 ms    QTC Calculation(Bezet) 442 ms    P Axis 26 degrees    R Axis 129 degrees    T Axis 0 degrees    Diagnosis Line Atrial-sensed ventricular-paced rhythm  Abnormal ECG    < end of copied text >    < from: Xray Chest 1 View-PORTABLE IMMEDIATE (20 @ 09:40) >    INTERPRETATION:  Clinical History / Reason for exam: Sepsis    Comparison : Chest radiograph 3/8/2019.    Technique/Positioning: Portable AP. Shallow inspiration.    Findings:    Support devices: Stable left-sided dual-chamber AICD    Cardiac/mediastinum/hilum: Stable.    Lung parenchyma/Pleura: Scattered airspace and interstitial opacities. No pneumothorax.    Skeleton/soft tissues: Stable.    Impression:      Scattered airspace and interstitial opacities. Follow-up to resolution is recommended. Viral pneumonia is not excluded.    < end of copied text >    Urinalysis Basic - ( 28 May 2020 07:51 )    Color: Yellow / Appearance: Slightly Turbid / S.022 / pH: x  Gluc: x / Ketone: Negative  / Bili: Negative / Urobili: <2 mg/dL   Blood: x / Protein: 30 mg/dL / Nitrite: Negative   Leuk Esterase: Large / RBC: 2 /HPF /  /HPF   Sq Epi: x / Non Sq Epi: 10 /HPF / Bacteria: Negative

## 2020-05-28 NOTE — H&P ADULT - NSICDXPASTMEDICALHX_GEN_ALL_CORE_FT
PAST MEDICAL HISTORY:  Atrial fibrillation     Cerebrovascular accident (CVA)     COPD (chronic obstructive pulmonary disease)     HTN (hypertension)     Systolic heart failure

## 2020-05-28 NOTE — ED PROVIDER NOTE - PROGRESS NOTE DETAILS
pt resting on stretcher, refused ct head, states she does not want it, pt aaox3, able to recite risks back to us, urine noted and concerning for uti, antibiotics ordered, plan for admission, will discuss with medical admitting team.

## 2020-05-29 NOTE — DISCHARGE NOTE NURSING/CASE MANAGEMENT/SOCIAL WORK - PATIENT PORTAL LINK FT
You can access the FollowMyHealth Patient Portal offered by [facility] by registering at the following website: [email or web address]. By joining [facility]s FollowMyHealth portal, you will also be able to view your health information using other applications (apps) compatible with our system.

## 2020-05-29 NOTE — PROGRESS NOTE ADULT - SUBJECTIVE AND OBJECTIVE BOX
KRISTI SAMUELS 85y Female  MRN#: [id number]   CODE STATUS: FULL     SUBJECTIVE  Patient is a 85y old Female who presents with a chief complaint of SOB (29 May 2020 09:08)  Currently admitted to medicine with the primary diagnosis of UTI (urinary tract infection)    Today is hospital day 1d. This morning patient was agitated and confused, s/p 1 dose of haldol.     OBJECTIVE  PAST MEDICAL & SURGICAL HISTORY  HTN (hypertension)  Cerebrovascular accident (CVA)  COPD (chronic obstructive pulmonary disease)  Atrial fibrillation  Systolic heart failure  AICD (automatic cardioverter/defibrillator) present  S/P appendectomy    ALLERGIES:  No Known Drug Allergies  shellfish (Other)    MEDICATIONS:  STANDING MEDICATIONS  atorvastatin 80 milliGRAM(s) Oral at bedtime  cefTRIAXone   IVPB 1000 milliGRAM(s) IV Intermittent every 24 hours  chlorhexidine 4% Liquid 1 Application(s) Topical <User Schedule>  diltiazem    milliGRAM(s) Oral daily  furosemide    Tablet 40 milliGRAM(s) Oral daily  LORazepam     Tablet 0.5 milliGRAM(s) Oral daily  metoprolol succinate  milliGRAM(s) Oral daily  multivitamin 1 Tablet(s) Oral daily  OLANZapine 7.5 milliGRAM(s) Oral daily  pantoprazole    Tablet 40 milliGRAM(s) Oral before breakfast  sertraline 150 milliGRAM(s) Oral daily    PRN MEDICATIONS  haloperidol    Injectable 2 milliGRAM(s) IntraMuscular every 8 hours PRN    VITAL SIGNS: Last 24 Hours  T(C): 36.3 (29 May 2020 04:44), Max: 36.6 (28 May 2020 22:58)  T(F): 97.3 (29 May 2020 04:44), Max: 97.8 (28 May 2020 22:58)  HR: 69 (29 May 2020 04:44) (66 - 92)  BP: 159/72 (29 May 2020 04:44) (138/86 - 180/76)  BP(mean): --  RR: 20 (29 May 2020 04:44) (18 - 20)  SpO2: 94% (28 May 2020 21:00) (94% - 98%)    LABS:                        11.3   7.34  )-----------( 202      ( 29 May 2020 07:04 )             34.0     05-29    142  |  100  |  15  ----------------------------<  101<H>  3.7   |  28  |  0.9    Ca    9.4      29 May 2020 07:04    TPro  7.1  /  Alb  3.9  /  TBili  0.6  /  DBili  x   /  AST  32  /  ALT  12  /  AlkPhos  103      PT/INR - ( 29 May 2020 07:04 )   PT: 24.00 sec;   INR: 2.09 ratio         PTT - ( 29 May 2020 07:04 )  PTT:35.4 sec  Urinalysis Basic - ( 28 May 2020 07:51 )    Color: Yellow / Appearance: Slightly Turbid / S.022 / pH: x  Gluc: x / Ketone: Negative  / Bili: Negative / Urobili: <2 mg/dL   Blood: x / Protein: 30 mg/dL / Nitrite: Negative   Leuk Esterase: Large / RBC: 2 /HPF /  /HPF   Sq Epi: x / Non Sq Epi: 10 /HPF / Bacteria: Negative    Lactate, Blood: 2.0 mmol/L (20 @ 13:44)    CARDIAC MARKERS ( 28 May 2020 07:55 )  x     / <0.01 ng/mL / x     / x     / x          RADIOLOGY:  none today    PHYSICAL EXAM:  unable to examined at this time 2/2 agitation and confusion     ADMISSION SUMMARY  Patient is a 85y old Female who presents with a chief complaint of SOB (29 May 2020 09:08)  Currently admitted to medicine with the primary diagnosis of UTI (urinary tract infection)    ASSESSMENT & PLAN  85 year old pmh of dementia, Afib on coumadin, COPD not on home O2, CVA with left side residual weakness, HTN, HfrEF s/pAICD presenting with 2- 3 day hx of SOB and dry cough. Pt is AOx3 but has dementia and is forgetful history corroborated by daughter Goldie who is the primary caretaker (phone number #[phone]).     #Shortness of breath possibly 2/2 CHF exacerbation   -CXR- scattered airspace and interstitial opacities, viral PNA not excluded  -patient had crackles & LE edema on admission  -no longer requiring supplemental oxygen (needed 3L in the ED)   -no leukocytosis, afebrile, COVID swab () negative   -, continue lasix for now & f/u echo     #UTI   -patient had complaints of polyuria   -UA + for leukocyte esterase   -f/u urine culture (sent)   -continue with Rocephin for now     #HFrEF, likely in acute exacerbation   -c/w diltiazem, metoprolol and lasix    #A-fib- rate controlled  -1mg warfarin daily except   -f/u daily INR    #Dementia   -baseline dementia likely worsened acutely in setting of UTI  -was recently switched off trazadone to lorazapam 0.5 daily   -follows with Dr. Sutton   -c/w home meds - zoloft 150mg, zyprexa 7.5mg    #CVA w/ L residual weakness   - Atorvastatin 80mg daily    #DVT ppx: coumadin  #GI ppx: protonix     #Diet - dysphagia 3   #Dispo- home KRISTI SAMUELS 85y Female  MRN#: [id number]   CODE STATUS: FULL     SUBJECTIVE  Patient is a 85y old Female who presents with a chief complaint of SOB (29 May 2020 09:08)  Currently admitted to medicine with the primary diagnosis of UTI (urinary tract infection)    Today is hospital day 1d. This morning patient was agitated and confused, s/p 1 dose of haldol.     OBJECTIVE  PAST MEDICAL & SURGICAL HISTORY  HTN (hypertension)  Cerebrovascular accident (CVA)  COPD (chronic obstructive pulmonary disease)  Atrial fibrillation  Systolic heart failure  AICD (automatic cardioverter/defibrillator) present  S/P appendectomy    ALLERGIES:  No Known Drug Allergies  shellfish (Other)    MEDICATIONS:  STANDING MEDICATIONS  atorvastatin 80 milliGRAM(s) Oral at bedtime  cefTRIAXone   IVPB 1000 milliGRAM(s) IV Intermittent every 24 hours  chlorhexidine 4% Liquid 1 Application(s) Topical <User Schedule>  diltiazem    milliGRAM(s) Oral daily  furosemide    Tablet 40 milliGRAM(s) Oral daily  LORazepam     Tablet 0.5 milliGRAM(s) Oral daily  metoprolol succinate  milliGRAM(s) Oral daily  multivitamin 1 Tablet(s) Oral daily  OLANZapine 7.5 milliGRAM(s) Oral daily  pantoprazole    Tablet 40 milliGRAM(s) Oral before breakfast  sertraline 150 milliGRAM(s) Oral daily    PRN MEDICATIONS  haloperidol    Injectable 2 milliGRAM(s) IntraMuscular every 8 hours PRN    VITAL SIGNS: Last 24 Hours  T(C): 36.3 (29 May 2020 04:44), Max: 36.6 (28 May 2020 22:58)  T(F): 97.3 (29 May 2020 04:44), Max: 97.8 (28 May 2020 22:58)  HR: 69 (29 May 2020 04:44) (66 - 92)  BP: 159/72 (29 May 2020 04:44) (138/86 - 180/76)  BP(mean): --  RR: 20 (29 May 2020 04:44) (18 - 20)  SpO2: 94% (28 May 2020 21:00) (94% - 98%)    LABS:                        11.3   7.34  )-----------( 202      ( 29 May 2020 07:04 )             34.0     05-29    142  |  100  |  15  ----------------------------<  101<H>  3.7   |  28  |  0.9    Ca    9.4      29 May 2020 07:04    TPro  7.1  /  Alb  3.9  /  TBili  0.6  /  DBili  x   /  AST  32  /  ALT  12  /  AlkPhos  103      PT/INR - ( 29 May 2020 07:04 )   PT: 24.00 sec;   INR: 2.09 ratio         PTT - ( 29 May 2020 07:04 )  PTT:35.4 sec  Urinalysis Basic - ( 28 May 2020 07:51 )    Color: Yellow / Appearance: Slightly Turbid / S.022 / pH: x  Gluc: x / Ketone: Negative  / Bili: Negative / Urobili: <2 mg/dL   Blood: x / Protein: 30 mg/dL / Nitrite: Negative   Leuk Esterase: Large / RBC: 2 /HPF /  /HPF   Sq Epi: x / Non Sq Epi: 10 /HPF / Bacteria: Negative    Lactate, Blood: 2.0 mmol/L (20 @ 13:44)    CARDIAC MARKERS ( 28 May 2020 07:55 )  x     / <0.01 ng/mL / x     / x     / x          RADIOLOGY:  none today    PHYSICAL EXAM:  unable to examine at this time 2/2 agitation and confusion     ADMISSION SUMMARY  Patient is a 85y old Female who presents with a chief complaint of SOB (29 May 2020 09:08)  Currently admitted to medicine with the primary diagnosis of UTI (urinary tract infection)    ASSESSMENT & PLAN  85 year old F PMHx of dementia, Afib on coumadin, COPD not on home O2, CVA with left side residual weakness, HTN, HfrEF s/p AICD presenting with 2- 3 day hx of SOB and dry cough. Pt is AOx3 but has dementia and is forgetful history corroborated by daughter Goldie who is the primary caretaker (phone number #[phone]).     #Shortness of breath possibly 2/2 CHF exacerbation   -CXR- scattered airspace and interstitial opacities, viral PNA not excluded  -patient had crackles & LE edema on admission  -no longer requiring supplemental oxygen (needed 3L in the ED)   -no leukocytosis, afebrile, COVID swab () negative   -, continue lasix for now & f/u echo     #UTI   -patient had complaints of polyuria   -UA + for leukocyte esterase   -f/u urine culture (sent)   -continue with Rocephin for now     #HFrEF, likely in acute exacerbation   -c/w diltiazem, metoprolol and lasix  -strict I&Os, daily weights     #A-fib- rate controlled  -1mg warfarin daily except   -f/u daily INR & dose coumadin     #Dementia   -baseline dementia likely worsened acutely in setting of UTI  -was recently switched off trazadone to lorazepam 0.5 daily   -follows with Dr. Sutton   -c/w home meds - zoloft 150mg, Zyprexa 7.5mg, ativan     #CVA w/ L residual weakness   - Atorvastatin 80mg daily    #DVT ppx: coumadin  #GI ppx: protonix     #Diet - dysphagia 3   #Dispo- home

## 2020-05-29 NOTE — CONSULT NOTE ADULT - SUBJECTIVE AND OBJECTIVE BOX
history   cardiomyopathy    aicd   cva   afib on  coumadin        copd   dementia    with sob      ce neg    cont present   management    echo

## 2020-05-29 NOTE — PROGRESS NOTE ADULT - SUBJECTIVE AND OBJECTIVE BOX
Patient is a 85y old  Female who presents with a chief complaint of SOB (29 May 2020 11:07)    Patient was seen and examined.  Pt was agitated this AM--> received haldol, drowsy at present    PAST MEDICAL & SURGICAL HISTORY:  HTN (hypertension)  Cerebrovascular accident (CVA)  COPD (chronic obstructive pulmonary disease)  Atrial fibrillation  Systolic heart failure  AICD (automatic cardioverter/defibrillator) present  S/P appendectomy    Allergies  No Known Drug Allergies  shellfish (Other)    MEDICATIONS  (STANDING):  atorvastatin 80 milliGRAM(s) Oral at bedtime  cefTRIAXone   IVPB 1000 milliGRAM(s) IV Intermittent every 24 hours  chlorhexidine 4% Liquid 1 Application(s) Topical <User Schedule>  diltiazem    milliGRAM(s) Oral daily  furosemide    Tablet 40 milliGRAM(s) Oral daily  LORazepam     Tablet 0.5 milliGRAM(s) Oral daily  metoprolol succinate  milliGRAM(s) Oral daily  multivitamin 1 Tablet(s) Oral daily  OLANZapine 7.5 milliGRAM(s) Oral daily  pantoprazole    Tablet 40 milliGRAM(s) Oral before breakfast  sertraline 150 milliGRAM(s) Oral daily    MEDICATIONS  (PRN):    Vital Signs Last 24 Hrs  T(C): 36.3  T(F): 97.3  HR: 69  BP: 159/72  BP(mean): --  RR: 20  SpO2: 94%    O/E: Drowsy, not in distress.  HEENT: atraumatic.  Chest: decreased breath sounds at bases  CVS: SIS2 +, no murmur.  P/A: obese,  BS+  CNS: drowsy, residual left hemiparesis  Ext: mild edema feet.  All systems reviewed positive findings as above.                        11.3<L>  7.34  )-----------( 202      ( 29 May 2020 07:04 )             34.0<L>                        11.3<L>  6.69  )-----------( 197      ( 28 May 2020 07:51 )             34.3<L>        142  |  100  |  15  ----------------------------<  101<H>  3.7   |  28  |  0.9      143  |  104  |  19  ----------------------------<  107<H>  4.9   |  26  |  1.1    Ca    9.4      29 May 2020 07:04  Ca    9.6      28 May 2020 07:51    TPro  7.1  /  Alb  3.9  /  TBili  0.6  /  DBili  x   /  AST  32  /  ALT  12  /  AlkPhos  103  05-29  TPro  7.5  /  Alb  4.0  /  TBili  0.4  /  DBili  x   /  AST  37  /  ALT  15  /  AlkPhos  98  05-28      CARDIAC MARKERS ( 28 May 2020 07:55 )  x     / <0.01 ng/mL / x     / x     / x          PT/INR - ( 29 May 2020 07:04 )   PT: 24.00 sec;   INR: 2.09 ratio         PTT - ( 29 May 2020 07:04 )  PTT:35.4 sec  Urinalysis Basic - ( 28 May 2020 07:51 )    Color: Yellow / Appearance: Slightly Turbid / S.022 / pH: x  Gluc: x / Ketone: Negative  / Bili: Negative / Urobili: <2 mg/dL   Blood: x / Protein: 30 mg/dL / Nitrite: Negative   Leuk Esterase: Large / RBC: 2 /HPF /  /HPF   Sq Epi: x / Non Sq Epi: 10 /HPF / Bacteria: Negative

## 2020-05-29 NOTE — PROGRESS NOTE ADULT - ASSESSMENT
85 year old pmh of dementia, afib on coumadin, COPD not on home O2, CVA with left side residual weakness, HTN, HfeEf s/p AICD presenting with 2- 3 day hx of SOB and dry cough. Pt is AOx3 but has dementia and is forgetful history corroborated by daughter Goldie who is the primary caretaker ( phone number -#[phone]).     # Acute hypoxic respiratory failure sec to acute to chronic systolic CHF, H/o cardiomyopathy S/P AICD-resolving  -  Xray Chest 1 View-PORTABLE IMMEDIATE (05.28.20 @ 09:40) >Scattered airspace and interstitial opacities. Follow-up to resolution is recommended. Viral pneumonia is not excluded.  - 12 Lead ECG (05.28.20 @ 10:46) >Atrial-sensed ventricular-paced rhythm. Abnormal ECG  - Troponin T, Serum: <0.01 ng/mL (05.28.20 @ 07:55)  - COVID-19 PCR: NotDetec:(05.28.20 @ 08:41)  - monitor I/o, daily weight , restrict fluids  - c/w lasix, metoprolol  - echo  - maintain oxygen sats>92    # H/o afib  - c/w cardizem , coumadin  - monitor INR, maintain INR between 2 to 3    # Dementia with agitation  -c/w lorazepam, srtraline, olanzapine  - S/p haldol x1 today    # UTI  - F/u urine cultures  - c/ ceftriaxone    # CVA with left hemiparesis  - C/w coumadin, statin    # Full code. GOC discussed    # Pending: clinical improvement  # will discuss plan of care with daughter  # Disposition: Home when stable 85 year old pmh of dementia, afib on coumadin, COPD not on home O2, CVA with left side residual weakness, HTN, HfeEf s/p AICD presenting with 2- 3 day hx of SOB and dry cough. Pt is AOx3 but has dementia and is forgetful history corroborated by daughter Goldie who is the primary caretaker ( phone number -[phone]).     # Acute hypoxic respiratory failure sec to acute to chronic systolic CHF, H/o cardiomyopathy S/P AICD-resolving  -  Xray Chest 1 View-PORTABLE IMMEDIATE (05.28.20 @ 09:40) >Scattered airspace and interstitial opacities. Follow-up to resolution is recommended. Viral pneumonia is not excluded.  - 12 Lead ECG (05.28.20 @ 10:46) >Atrial-sensed ventricular-paced rhythm. Abnormal ECG  - Troponin T, Serum: <0.01 ng/mL (05.28.20 @ 07:55)  - COVID-19 PCR: NotDetec:(05.28.20 @ 08:41)  - monitor I/o, daily weight , restrict fluids  - c/w lasix, metoprolol  - echo  - maintain oxygen sats>92    # H/o afib  - c/w cardizem , coumadin  - monitor INR, maintain INR between 2 to 3    # Dementia with agitation  -c/w lorazepam, srtraline, olanzapine  - S/p haldol x1 today    # UTI  - F/u urine cultures  - c/ ceftriaxone    # CVA with left hemiparesis  - C/w coumadin, statin    # Full code. GOC discussed    # Pending: clinical improvement, F/u urine cultures  # will discuss plan of care with daughter  # Disposition: Home when stable

## 2020-05-30 NOTE — PROGRESS NOTE ADULT - ATTENDING COMMENTS
Agree with resident's note, HPI, PE, assessment and plan.  Pt was seen and examined independently.     Pt still on 3L NC saturating 94%. She c/o dyspnea, pain in her joints. Denies cough, chest pain, or GI symptoms.     Physical exam:  NAD, frail, looks euvolemic  NECK: supple, no JVD  RESP: b/l basilar crackles, no rhonchi  CVS: S1S2, RRR  GI: abdomen soft NT, ND  Extremities: no edema, no calf tenderness  NEURO: AOx1-2, follows simple commands, answers questions  H/L: no enlarged LN noted     labs reviewed  UCx negative  D-Dimer Assay, Quantitative: 136 (05.30.20 @ 11:51)  Serum Pro-Brain Natriuretic Peptide: 285 pg/mL (05.28.20 @ 08:12)    ECHO: 1. Left ventricular ejection fraction, by visual estimation, is 50 to 55%.   2. Technically difficult study.   3. Normal global left ventricular systolic function.   4. Spectral Doppler shows impaired relaxation pattern of left ventricular myocardial filling (Grade I diastolic dysfunction).   5. Very limited apical views for bevaluation of LV wall motion/function.   6. Structurally normal mitral valve, with normal leaflet excursion.   7. Sclerotic aortic valve with normal opening.   8. Estimated pulmonary artery systolic pressure is 39.7 mmHg assuming a right atrial pressure of 5 mmHg, which is consistent with borderline pulmonary hypertension.    A/P: # SOB, known cardiomyopathy. No signs of COPD exacerbation, no signs of bacterial PNA, ? viral PNA  - continue Lasix 40 mg PO  - pulm eval by dr. Urrutia  - send inflammatory markers  - repeat COVID test  - O2 supplement to keep O2 sat >92%, titrate down when possible     # UTI ruled out, can dc ABx    # Dementia with anxiety and behavioral disturbance - Zyprexa and Ativan changed to QHS    # Hx of CVA with left side hemiparesis    Spoke to daughter Aylin [phone], updates given , plan of care discussed in details.

## 2020-05-30 NOTE — PROGRESS NOTE ADULT - SUBJECTIVE AND OBJECTIVE BOX
KRISTI SAMUELS 85y Female  MRN#: [id number]   CODE STATUS: FULL     SUBJECTIVE  Patient is a 85y old Female who presents with a chief complaint of SOB (29 May 2020 11:29)  Currently admitted to medicine with the primary diagnosis of UTI (urinary tract infection)    Today is hospital day 2d.    OBJECTIVE  PAST MEDICAL & SURGICAL HISTORY  HTN (hypertension)  Cerebrovascular accident (CVA)  COPD (chronic obstructive pulmonary disease)  Atrial fibrillation  Systolic heart failure  AICD (automatic cardioverter/defibrillator) present  S/P appendectomy    ALLERGIES:  No Known Drug Allergies  shellfish (Other)    MEDICATIONS:  STANDING MEDICATIONS  atorvastatin 80 milliGRAM(s) Oral at bedtime  cefTRIAXone   IVPB 1000 milliGRAM(s) IV Intermittent every 24 hours  chlorhexidine 4% Liquid 1 Application(s) Topical <User Schedule>  diltiazem    milliGRAM(s) Oral daily  furosemide    Tablet 40 milliGRAM(s) Oral daily  LORazepam     Tablet 0.5 milliGRAM(s) Oral daily  metoprolol succinate  milliGRAM(s) Oral daily  multivitamin 1 Tablet(s) Oral daily  OLANZapine 7.5 milliGRAM(s) Oral daily  pantoprazole    Tablet 40 milliGRAM(s) Oral before breakfast  sertraline 150 milliGRAM(s) Oral daily  warfarin 1 milliGRAM(s) Oral once    PRN MEDICATIONS      VITAL SIGNS: Last 24 Hours  T(C): 36.3 (30 May 2020 05:00), Max: 36.6 (29 May 2020 22:48)  T(F): 97.3 (30 May 2020 05:00), Max: 97.9 (29 May 2020 22:48)  HR: 79 (30 May 2020 05:00) (71 - 79)  BP: 156/84 (30 May 2020 05:00) (134/59 - 156/84)  BP(mean): --  RR: 18 (30 May 2020 05:00) (18 - 18)  SpO2: 94% (29 May 2020 22:48) (94% - 94%)    LABS:                        10.7   6.08  )-----------( 214      ( 30 May 2020 07:03 )             33.1     05-30    142  |  100  |  17  ----------------------------<  97  3.8   |  27  |  1.2    Ca    9.2      30 May 2020 07:03    TPro  6.9  /  Alb  3.7  /  TBili  0.6  /  DBili  x   /  AST  40  /  ALT  15  /  AlkPhos  98  05-30    PT/INR - ( 30 May 2020 07:03 )   PT: 33.20 sec;   INR: 2.89 ratio         PTT - ( 30 May 2020 07:03 )  PTT:38.8 sec    Culture - Urine (collected 28 May 2020 07:51)  Source: .Urine Clean Catch (Midstream)  Final Report (30 May 2020 07:47):    <10,000 CFU/mL Normal Urogenital Amalia    Culture - Blood (collected 28 May 2020 07:51)  Source: .Blood Blood-Peripheral  Preliminary Report (29 May 2020 13:01):    No growth to date.    Culture - Blood (collected 28 May 2020 07:51)  Source: .Blood Blood-Peripheral  Preliminary Report (29 May 2020 13:01):    No growth to date.          RADIOLOGY:      PHYSICAL EXAM:    GENERAL: NAD, well-developed, AAOx3  HEENT:  Atraumatic, Normocephalic. EOMI, PERRLA, conjunctiva and sclera clear, No JVD  PULMONARY: Clear to auscultation bilaterally; No wheeze  CARDIOVASCULAR: Regular rate and rhythm; No murmurs, rubs, or gallops  GASTROINTESTINAL: Soft, Nontender, Nondistended; Bowel sounds present  MUSCULOSKELETAL:  2+ Peripheral Pulses, No clubbing, cyanosis, or edema  NEUROLOGY: non-focal  SKIN: No rashes or lesions    ADMISSION SUMMARY  Patient is a 85y old Female who presents with a chief complaint of SOB (29 May 2020 11:29)  Currently admitted to medicine with the primary diagnosis of UTI (urinary tract infection)    ASSESSMENT & PLAN KRISTI SAMUELS 85y Female  MRN#: [id number]   CODE STATUS: FULL     SUBJECTIVE  Patient is a 85y old Female who presents with a chief complaint of SOB (29 May 2020 11:29)  Currently admitted to medicine with the primary diagnosis of UTI (urinary tract infection)    Today is hospital day 2d. Patient is less confused today. Requiring supplemental oxygen this AM.     OBJECTIVE  PAST MEDICAL & SURGICAL HISTORY  HTN (hypertension)  Cerebrovascular accident (CVA)  COPD (chronic obstructive pulmonary disease)  Atrial fibrillation  Systolic heart failure  AICD (automatic cardioverter/defibrillator) present  S/P appendectomy    ALLERGIES:  No Known Drug Allergies  shellfish (Other)    MEDICATIONS:  STANDING MEDICATIONS  atorvastatin 80 milliGRAM(s) Oral at bedtime  cefTRIAXone   IVPB 1000 milliGRAM(s) IV Intermittent every 24 hours  chlorhexidine 4% Liquid 1 Application(s) Topical <User Schedule>  diltiazem    milliGRAM(s) Oral daily  furosemide    Tablet 40 milliGRAM(s) Oral daily  LORazepam     Tablet 0.5 milliGRAM(s) Oral daily  metoprolol succinate  milliGRAM(s) Oral daily  multivitamin 1 Tablet(s) Oral daily  OLANZapine 7.5 milliGRAM(s) Oral daily  pantoprazole    Tablet 40 milliGRAM(s) Oral before breakfast  sertraline 150 milliGRAM(s) Oral daily  warfarin 1 milliGRAM(s) Oral once    PRN MEDICATIONS      VITAL SIGNS: Last 24 Hours  T(C): 36.3 (30 May 2020 05:00), Max: 36.6 (29 May 2020 22:48)  T(F): 97.3 (30 May 2020 05:00), Max: 97.9 (29 May 2020 22:48)  HR: 79 (30 May 2020 05:00) (71 - 79)  BP: 156/84 (30 May 2020 05:00) (134/59 - 156/84)  BP(mean): --  RR: 18 (30 May 2020 05:00) (18 - 18)  SpO2: 94% (29 May 2020 22:48) (94% - 94%)    LABS:                        10.7   6.08  )-----------( 214      ( 30 May 2020 07:03 )             33.1     05-30    142  |  100  |  17  ----------------------------<  97  3.8   |  27  |  1.2    Ca    9.2      30 May 2020 07:03    TPro  6.9  /  Alb  3.7  /  TBili  0.6  /  DBili  x   /  AST  40  /  ALT  15  /  AlkPhos  98  05-30    PT/INR - ( 30 May 2020 07:03 )   PT: 33.20 sec;   INR: 2.89 ratio         PTT - ( 30 May 2020 07:03 )  PTT:38.8 sec    Culture - Urine (collected 28 May 2020 07:51)  Source: .Urine Clean Catch (Midstream)  Final Report (30 May 2020 07:47):    <10,000 CFU/mL Normal Urogenital Amalia    Culture - Blood (collected 28 May 2020 07:51)  Source: .Blood Blood-Peripheral  Preliminary Report (29 May 2020 13:01):    No growth to date.    Culture - Blood (collected 28 May 2020 07:51)  Source: .Blood Blood-Peripheral  Preliminary Report (29 May 2020 13:01):    No growth to date.    RADIOLOGY:  < from: Xray Chest 1 View-PORTABLE IMMEDIATE (05.28.20 @ 09:40) >  Impression:    Scattered airspace and interstitial opacities. Follow-up to resolution is recommended. Viral pneumonia is not excluded  < end of copied text >    PHYSICAL EXAM:  GENERAL: NAD, AAOx2  HEENT:  conjunctiva and sclera clear, No JVD  PULMONARY: on supplemental oxygen, good air entry   CARDIOVASCULAR: Regular rate and rhythm  GASTROINTESTINAL: Soft, Nontender, Nondistended  MUSCULOSKELETAL:  2+ Peripheral Pulses, minimal trace edema   SKIN: No rashes     ADMISSION SUMMARY  Patient is a 85y old Female who presents with a chief complaint of SOB (29 May 2020 11:29)  Currently admitted to medicine with the primary diagnosis of UTI (urinary tract infection)    ASSESSMENT & PLAN  85 year old F PMHx of dementia, Afib on coumadin, COPD not on home O2, CVA with left side residual weakness, HTN, HfrEF s/p AICD presenting with 2- 3 day hx of SOB and dry cough. Pt is AOx3 but has dementia and is forgetful history corroborated by daughter Goldie who is the primary caretaker (phone number #[phone]).     #Shortness of breath, likely an exacerbation of pulmonary fibrosis, r/o COVID   -patient requiring oxygen this AM - spoke w/ Dr. Urrutia who says patient has pulmonary fibrosis and should be discharged home w/ supplemental oxygen   -CXR- scattered airspace and interstitial opacities, viral PNA not excluded  -patient had crackles & LE edema on admission - euvolemic on exam today   -no leukocytosis, afebrile, COVID swab (5/28) negative, sent repeat swab today   -, continue lasix for now     #UTI, ruled out   -patient had complaints of polyuria, likely secondary to increased lasix dose   -UA + for leukocyte esterase but urine culture negative  -will d/c rocephin     #HFrEF not in exacerbation  - < from: Transthoracic Echocardiogram (05.29.20 @ 16:21) >   1. Left ventricular ejection fraction, by visual estimation, is 50 to 55%.   2. Technically difficult study.   3. Normal global left ventricular systolic function.   4. Spectral Doppler shows impaired relaxation pattern of left ventricular myocardial filling (Grade I diastolic dysfunction).   5. Very limited apical views for bevaluation of LV wall motion/function.   6. Structurally normal mitral valve, with normal leaflet excursion.   7. Sclerotic aortic valve with normal opening.   8. Estimated pulmonary artery systolic pressure is 39.7 mmHg assuming a right atrial pressure of 5 mmHg, which is consistent with borderline pulmonary hypertension.  < end of copied text >  -c/w diltiazem, metoprolol and lasix  -strict I&Os, daily weights     #A-fib- rate controlled  -1mg warfarin daily except Sunday  -f/u daily INR & dose coumadin     #Dementia   -baseline dementia worsened   -was recently switched off trazadone to lorazepam 0.5 daily   -follows with Dr. Sutton   -c/w home meds - zoloft 150mg, Zyprexa 7.5mg, ativan (at bedtime)     #CVA w/ L residual weakness   - Atorvastatin 80mg daily    #DVT ppx: coumadin  #GI ppx: protonix     #Diet - dysphagia 3   #Dispo- home w/ home care likely Monday    Please call daughter & update her. Please also do not move patient off unit pending COVID swab results.   Pending: please set up home oxygen prior to discharge

## 2020-05-31 NOTE — PROGRESS NOTE ADULT - SUBJECTIVE AND OBJECTIVE BOX
KRISTI SAMUELS    Patient is a 85y old  Female who presents with a chief complaint of SOB (30 May 2020 13:55)    INTERVAL HPI/OVERNIGHT EVENTS: no overnight events reported. Pt is grumpy, states she wants to sleep, denies SOB or chest pain.     ROS: unable to obtain    PHYSICAL EXAM:  T(C): 35.9, Max: 37.5 (05-30-20 @ 20:26)  HR: 87 (72 - 87)  BP: 149/86 (127/60 - 149/86)  RR: 18 (18 - 18)  SpO2: 94% (94% - 94%)    GENERAL: NAD, frail, on 3L supplemental oxygen via NC  PULMONARY/CHEST: b/l crackles,   CARDIOVASC:  irregularly irregular  GI/ABDOMEN: Soft, Nontender, Nondistended; Bowel sounds present  EXTREMITIES:  trace pedal edema. No calf tenderness b/l.  NERVOUS SYSTEM: pt is drowsy, Alert & Oriented X2    LABS:                        11.0   6.72  )-----------( 223      ( 31 May 2020 06:09 )             33.4     05-31    142  |  99  |  19  ----------------------------<  101<H>  3.3<L>   |  29  |  1.2    Ca    9.7      31 May 2020 06:09    TPro  6.9  /  Alb  3.7  /  TBili  0.6  /  DBili  x   /  AST  40  /  ALT  15  /  AlkPhos  98  05-30    PT/INR - ( 31 May 2020 06:09 )   PT: >40.00 sec;   INR: 3.93 ratio    PTT - ( 30 May 2020 07:03 )  PTT:38.8 sec    COVID-19 PCR: NotDetec (05.30.20 @ 10:00)    Procalcitonin, Serum: 0.08 (05.30.20 @ 15:41)    Ferritin, Serum: 31 ng/mL (05.30.20 @ 11:51)    C-Reactive Protein, Serum: 3.36 mg/dL (05.30.20 @ 11:51)      CAPILLARY BLOOD GLUCOSE  POCT Blood Glucose.: 103 mg/dL (30 May 2020 21:10)      RADIOLOGY & ADDITIONAL TESTS:  no new images      MEDICATIONS  (STANDING):  atorvastatin 80 milliGRAM(s) Oral at bedtime  chlorhexidine 4% Liquid 1 Application(s) Topical <User Schedule>  diltiazem    milliGRAM(s) Oral daily  furosemide    Tablet 40 milliGRAM(s) Oral daily  LORazepam     Tablet 0.5 milliGRAM(s) Oral at bedtime  metoprolol succinate  milliGRAM(s) Oral daily  multivitamin 1 Tablet(s) Oral daily  OLANZapine 7.5 milliGRAM(s) Oral at bedtime  pantoprazole    Tablet 40 milliGRAM(s) Oral before breakfast  sertraline 150 milliGRAM(s) Oral daily    MEDICATIONS  (PRN):

## 2020-05-31 NOTE — PROGRESS NOTE ADULT - ASSESSMENT
85 year old F PMHx of dementia, Afib on coumadin, COPD, CVA with left side residual weakness, HTN, HFrEF s/p AICD presenting with 2- 3 day hx of SOB and dry cough. COVID ruled out.     # SOB, known cardiomyopathy. No signs of COPD exacerbation, no signs of bacterial PNA, no viral PNA  - continue Lasix 40 mg PO  - pulm eval by dr. Urrutia, requested by daughter  - O2 supplement to keep O2 sat >92%, titrate down when possible   - ECHO: EF 50-55%, G1DD, structurally normal MV, sclerotic aortic valve with normal opening, borderline pulm HTN    # UTI ruled out, can dc ABx    # Dementia with anxiety and behavioral disturbance - Zyprexa and Ativan changed to QHS    # Hx of CVA with left side hemiparesis    Called daughter Mouna, no response, left voice message.   Anticipated DC in 24-48 hrs. 85 year old F PMHx of dementia, Afib on coumadin, COPD, CVA with left side residual weakness, HTN, HFrEF s/p AICD presenting with 2- 3 day hx of SOB and dry cough. COVID ruled out.     # SOB, known cardiomyopathy. No signs of COPD exacerbation, no signs of bacterial PNA, no viral PNA  - continue Lasix 40 mg PO  - pulm eval by dr. Urrutia, requested by daughter  - O2 supplement to keep O2 sat >92%, titrate down when possible   - ECHO: EF 50-55%, G1DD, structurally normal MV, sclerotic aortic valve with normal opening, borderline pulm HTN    # UTI ruled out, can dc ABx    # Dementia with anxiety and behavioral disturbance - Zyprexa and Ativan changed to QHS    # Hx of CVA with left side hemiparesis    # Chronic A. Fib - INR supratherapeutic today, will hold Coumadin tonight, recheck INR tomorrow and dose Coumadin.     Called daughter Mouna, no response, left voice message.   Anticipated DC in 24-48 hrs.

## 2020-06-01 NOTE — DISCHARGE NOTE PROVIDER - HOSPITAL COURSE
85 year old F PMHx of dementia, Chr Afib on coumadin, COPD not on home O2, CVA with left side residual weakness, HTN, HfrEF s/p dual chambered AICD presenting with 2- 3 day hx of SOB and dry cough. Pt is AOx3 but has dementia and is forgetful history corroborated by daughter Goldie who is the primary caretaker (phone number #[phone]).  COVID ruled out.         # SOB, likely due to exacerabation of  pulmonary fibrosis     - No signs of COPD exacerbation, no signs of bacterial PNA, no viral PNA    - patient requiring oxygen this AM - asn per Dr. Urrutia who says patient has pulmonary fibrosis and should be discharged home w/ supplemental oxygen     - CXR 5/28- scattered airspace and interstitial opacities, viral PNA not excluded    - O2 supplement to keep O2 sat >92%, titrate down when possible ; currently on 2L saturating 96%, room air: 89-92%    - ECHO 5/29 : EF 50-55%, G1DD, structurally normal MV, sclerotic aortic valve with normal opening, borderline pulm HTN    - no leukocytosis, afebrile, COVID swab (5/28) 5/30 negative    - , continue Lasix 40 mg PO    - requires home oxygen set up  prior to discharge 85 year old F PMHx of dementia, Chr Afib on coumadin, COPD not on home O2, CVA with left side residual weakness, HTN, HfrEF s/p dual chambered AICD presenting with 2- 3 day hx of SOB and dry cough. Pt is AOx3 but has dementia and is forgetful history corroborated by daughter Goldie who is the primary caretaker (phone number #[phone]).  COVID ruled out. ECHO 5/29 : EF 50-55%, G1DD, structurally normal MV, sclerotic aortic valve with normal opening, borderline pulm HTN. No signs of COPD exacerbation, no signs of bacterial PNA, no viral PNA. CXR 5/28- scattered airspace and interstitial opacities, viral PNA not excluded. no leukocytosis, afebrile, COVID swab (5/28) 5/30 negative.  , continue Lasix 40 mg PO. SOb likely due to pulmonary fibrosis exacerbation, Dr. Urrutia following. Patient saturating 89-92% on room air, doesn't     require oxygen. INR is supratherauptic , coumadin stopped, adviced to check INR and resume when therauptic. Medically stable for discharge with home care.        Medical Reconciliation has been reviewed with Medical Attending. All consults cleared for discharge. Discharge instructions discussed and patient aware when to seek medical attention. Patient has proper follow up. All resulted including diagnosis and patient aware they require further follow up. Stressed importance of proper follow up. All questions and concerns from patient and family addressed. Understanding of instructions verbalized from the family member 85 year old F PMHx of dementia, Chr Afib on coumadin, COPD not on home O2, CVA with left side residual weakness, HTN, HfrEF s/p dual chambered AICD presenting with 2- 3 day hx of SOB and dry cough. Pt is AOx3 but has dementia and is forgetful history corroborated by daughter Goldie who is the primary caretaker (phone number #[phone]).  COVID ruled out. ECHO 5/29 : EF 50-55%, G1DD, structurally normal MV, sclerotic aortic valve with normal opening, borderline pulm HTN. No signs of COPD exacerbation, no signs of bacterial PNA, no viral PNA. CXR 5/28- scattered airspace and interstitial opacities, viral PNA not excluded. no leukocytosis, afebrile, COVID swab (5/28) 5/30 negative.  , continue Lasix 40 mg PO. SOb likely due to pulmonary fibrosis exacerbation, Dr. Urrutia following. Patient saturating 89-92% on room air, doesn't     require oxygen. INR is supratherauptic , coumadin stopped, adviced to check INR and resume when therauptic. Psych has seen her for behavioural disturbance and agitation recommended 0.5mg zyprexa q8h prn along with other meds. Medically stable for discharge with home care.        Medical Reconciliation has been reviewed with Medical Attending. All consults cleared for discharge. Discharge instructions discussed and patient aware when to seek medical attention. Patient has proper follow up. All resulted including diagnosis and patient aware they require further follow up. Stressed importance of proper follow up. All questions and concerns from patient and family addressed. Understanding of instructions verbalized from the family member 85 year old F PMHx of dementia, Chr Afib on coumadin, COPD not on home O2, CVA with left side residual weakness, HTN, HfrEF s/p dual chambered AICD presenting with 2- 3 day hx of SOB and dry cough. Pt is AOx3 but has dementia and is forgetful history corroborated by daughter Goldie who is the primary caretaker (phone number #[phone]).  COVID ruled out. ECHO 5/29 : EF 50-55%, G1DD, structurally normal MV, sclerotic aortic valve with normal opening, borderline pulm HTN. No signs of COPD exacerbation, no signs of bacterial PNA, no viral PNA. CXR 5/28- scattered airspace and interstitial opacities, viral PNA not excluded. no leukocytosis, afebrile, COVID swab (5/28) 5/30 negative.  , continue Lasix 40 mg PO. SOb likely due to pulmonary fibrosis exacerbation, Dr. Urrutia following. Patient saturating 88% on room air at rest, sending home with home oxygen.     require oxygen. INR is supratherauptic , coumadin stopped,for 4 days, can be restarted today . Psych has seen her for behavioural disturbance and agitation recommended 0.5mg zyprexa q8h prn along with other meds. Medically stable for discharge with home care.        Medical Reconciliation has been reviewed with Medical Attending. All consults cleared for discharge. Discharge instructions discussed and patient aware when to seek medical attention. Patient has proper follow up. All resulted including diagnosis and patient aware they require further follow up. Stressed importance of proper follow up. All questions and concerns from patient and family addressed. Understanding of instructions verbalized from the family member 85 year old F PMHx of dementia, Chr Afib on coumadin, COPD not on home O2, CVA with left side residual weakness, HTN, HfrEF s/p dual chambered AICD presenting with 2- 3 day hx of SOB and dry cough. Pt is AOx3 but has dementia and is forgetful history corroborated by daughter Goldie who is the primary caretaker (phone number #[phone]).  COVID ruled out. ECHO 5/29 : EF 50-55%, G1DD, structurally normal MV, sclerotic aortic valve with normal opening, borderline pulm HTN. No signs of COPD exacerbation, no signs of bacterial PNA, no viral PNA. CXR 5/28- scattered airspace and interstitial opacities, viral PNA not excluded. no leukocytosis, afebrile, COVID swab (5/28) 5/30 negative.  , continue Lasix 40 mg PO. SOb likely due to pulmonary fibrosis exacerbation, Dr. Urrutia following. Patient saturating 88% on room air at rest, sending home with home oxygen.     require oxygen. INR is supratherauptic , coumadin stopped,for 4 days, can be restarted today . Psych has seen her for behavioural disturbance and agitation recommended 0.5mg zyprexa q8h prn along with other meds. Medically stable for discharge with home care.        # Acute hypoxic respiratory failure sec to Pulmonary fibrosis, acute to chronic systolic CHF, H/o cardiomyopathy S/P AICD-resolved    -  Xray Chest 1 View-PORTABLE IMMEDIATE (05.28.20 @ 09:40) >Scattered airspace and interstitial opacities. Follow-up to resolution is recommended. Viral pneumonia is not excluded.    - 12 Lead ECG (05.28.20 @ 10:46) >Atrial-sensed ventricular-paced rhythm. Abnormal ECG    - Troponin T, Serum: <0.01 ng/mL (05.28.20 @ 07:55)    - COVID-19 PCR: NotDetec:(05.28.20 @ 08:41)    - Transthoracic Echocardiogram (05.29.20 @ 16:21) >Left ventricular ejection fraction, by visual estimation, is 50 to 55%.Grade I diastolic dysfunctionborderline pulmonary hypertension.    - monitor I/o, daily weight , restrict fluids    - c/w lasix, metoprolol    - maintain oxygen sats>92        # Hypokalemia- resolved        # H/o afib    - c/w cardizem , coumadin    -INR: 2.45 ratio (06.03.20 @ 05:51)    - monitor INR, maintain INR between 2 to 3        # Dementia with agitation    - S/p haldol     -c/w lorazepam, sertraline, olanzapine    - Psych eval: presentation is consistent with Dementia with behavioral disturbances. Will recommend to continue home doses of Zoloft, Zyprexa and Lorazepam as she reportedly is better on the current med regimen. Will recommend behavioral interventions to redirect pt when agitated. Also recommend to reorient pt frequently and also keep her awake during the day to maintain circadian rhythm. For acute agitation/ aggression not responding to verbal and behavioral interventions can use Zyprexa 2.5 mg PO Q8 PRN        # Ruled out for UTI    -urine cultures neg        # CVA with left hemiparesis    - C/w statin    -  coumadin,         Medical Reconciliation has been reviewed with Medical Attending. All consults cleared for discharge. Discharge instructions discussed and patient aware when to seek medical attention. Patient has proper follow up. All resulted including diagnosis and patient aware they require further follow up. Stressed importance of proper follow up. All questions and concerns from patient and family addressed. Understanding of instructions verbalized from the family member

## 2020-06-01 NOTE — DISCHARGE NOTE PROVIDER - INSTRUCTIONS
You should follow DASH diet with 1.5litres of fluid restriction daily. DASH stands for Dietary Approaches to Stop Hypertension.  DASH lowers high blood pressure and improves levels of cholesterol. This reduces your risk of getting heart disease.The DASH eating planEmphasizes vegetables, fruits, and whole-grains  Includes fat-free or low-fat dairy products, fish, poultry, beans, nuts, and vegetable oils  Limits foods that are high in saturated fat. These foods include fatty meats, full-fat dairy products, and tropical oils such as coconut, palm kernel, and palm oils.  Limits sugar-sweetened beverages and sweets  Along with DASH, other lifestyle changes can help lower your blood pressure. They include staying at a healthy weight, exercising, and not smoking.

## 2020-06-01 NOTE — DISCHARGE NOTE PROVIDER - NSDCCPCAREPLAN_GEN_ALL_CORE_FT
PRINCIPAL DISCHARGE DIAGNOSIS  Diagnosis: SOB (shortness of breath)  Assessment and Plan of Treatment: You came in with shortness of breath. ECHO was normal, COVID pcr twice negative. It is likely due to exacerabtion of underlying pulmonary fibrosis. Continue toprol, lasix and follow up with Dr. Herrmann. Continue to follow up with Dr. Renan hassan 2-4 weeks        SECONDARY DISCHARGE DIAGNOSES  Diagnosis: Atrial fibrillation  Assessment and Plan of Treatment: continue diltiazem. WE stopped coumadin as your INR was supratherauptic. Please check your INR and resume once therauptic ( between 2-3).  Atrial fibrillation is a type of irregular heartbeat (arrhythmia) where the heart quivers continuously in a chaotic pattern that makes the heart unable to pump blood normally. This can increase the risk for stroke, heart failure, and other heart-related conditions. Atrial fibrillation can be caused by a variety of conditions and may be temporary, intermittent, or permanent. Symptoms include feeling that your heart is beating rapidly or irregularly, chest discomfort, shortness of breath, or dizziness/lightheadedness that may be worse with exertion. Treatment is varied but may involve medication or electrical shock (cardioversion).  SEEK IMMEDIATE MEDICAL CARE IF YOU HAVE ANY OF THE FOLLOWING SYMPTOMS: chest pain, shortness of breath, abdominal pain, sweating, vomiting, blood in vomit/bowel movements/urine, dizziness/lightheadedness, weakness or numbness to face/arm/leg, trouble speaking or understanding, facial droop. PRINCIPAL DISCHARGE DIAGNOSIS  Diagnosis: SOB (shortness of breath)  Assessment and Plan of Treatment: You came in with shortness of breath. ECHO was normal, COVID pcr twice negative. It is likely due to exacerabtion of underlying pulmonary fibrosis. Continue toprol, lasix and follow up with Dr. Herrmann. Continue to follow up with Dr. Renan ahssan 2-4 weeks        SECONDARY DISCHARGE DIAGNOSES  Diagnosis: Atrial fibrillation  Assessment and Plan of Treatment: continue diltiazem. WE stopped coumadin as your INR was supratherauptic. Please check your INR and resume once therauptic ( between 2-3).  Atrial fibrillation is a type of irregular heartbeat (arrhythmia) where the heart quivers continuously in a chaotic pattern that makes the heart unable to pump blood normally. This can increase the risk for stroke, heart failure, and other heart-related conditions. Atrial fibrillation can be caused by a variety of conditions and may be temporary, intermittent, or permanent. Symptoms include feeling that your heart is beating rapidly or irregularly, chest discomfort, shortness of breath, or dizziness/lightheadedness that may be worse with exertion. Treatment is varied but may involve medication or electrical shock (cardioversion).  SEEK IMMEDIATE MEDICAL CARE IF YOU HAVE ANY OF THE FOLLOWING SYMPTOMS: chest pain, shortness of breath, abdominal pain, sweating, vomiting, blood in vomit/bowel movements/urine, dizziness/lightheadedness, weakness or numbness to face/arm/leg, trouble speaking or understanding, facial droop. PRINCIPAL DISCHARGE DIAGNOSIS  Diagnosis: SOB (shortness of breath)  Assessment and Plan of Treatment: You came in with shortness of breath. ECHO was normal, COVID pcr twice negative. It is likely due to exacerabtion of underlying pulmonary fibrosis. Continue toprol, lasix and follow up with Dr. Herrmann. Continue to follow up with Dr. Renan hassan 2-4 weeks        SECONDARY DISCHARGE DIAGNOSES  Diagnosis: Dementia with behavioral disturbance  Assessment and Plan of Treatment: You were seen [facility] psychiatry. continue zyprexa 7.5mg bedtime, ativan 0.5mg around 9pm. and zyprexa 2.5mg every 8hours as needed. Follow up with Dr. Sutton    Diagnosis: Atrial fibrillation  Assessment and Plan of Treatment: continue diltiazem. WE stopped coumadin as your INR was supratherauptic and coumadin for held for 4 days. INR today is 2.45 resume coumadin 1mg and recheddck INR in 24to 48 hoiurs and dose accordingly.    Atrial fibrillation is a type of irregular heartbeat (arrhythmia) where the heart quivers continuously in a chaotic pattern that makes the heart unable to pump blood normally. This can increase the risk for stroke, heart failure, and other heart-related conditions. Atrial fibrillation can be caused by a variety of conditions and may be temporary, intermittent, or permanent. Symptoms include feeling that your heart is beating rapidly or irregularly, chest discomfort, shortness of breath, or dizziness/lightheadedness that may be worse with exertion. Treatment is varied but may involve medication or electrical shock (cardioversion).  SEEK IMMEDIATE MEDICAL CARE IF YOU HAVE ANY OF THE FOLLOWING SYMPTOMS: chest pain, shortness of breath, abdominal pain, sweating, vomiting, blood in vomit/bowel movements/urine, dizziness/lightheadedness, weakness or numbness to face/arm/leg, trouble speaking or understanding, facial droop. PRINCIPAL DISCHARGE DIAGNOSIS  Diagnosis: SOB (shortness of breath)  Assessment and Plan of Treatment: You came in with shortness of breath. ECHO was normal, COVID pcr twice negative. It is likely due to exacerabtion of underlying pulmonary fibrosis. Continue toprol, lasix and follow up with Dr. Herrmann. Continue to follow up with Dr. Renan ahssan 2-4 weeks        SECONDARY DISCHARGE DIAGNOSES  Diagnosis: Dementia with behavioral disturbance  Assessment and Plan of Treatment: You were seen buy psychiatry. continue zyprexa 7.5mg bedtime, ativan 0.5mg around 9pm, sertraline 50mg 3tabs per day. and zyprexa 2.5mg every 8hours as needed. Follow up with Dr. Sutton    Diagnosis: Atrial fibrillation  Assessment and Plan of Treatment: continue diltiazem. WE stopped coumadin as your INR was supratherauptic and coumadin for held for 4 days. INR today is 2.45 resume coumadin 1mg and recheddck INR in 24to 48 hoiurs and dose accordingly.    Atrial fibrillation is a type of irregular heartbeat (arrhythmia) where the heart quivers continuously in a chaotic pattern that makes the heart unable to pump blood normally. This can increase the risk for stroke, heart failure, and other heart-related conditions. Atrial fibrillation can be caused by a variety of conditions and may be temporary, intermittent, or permanent. Symptoms include feeling that your heart is beating rapidly or irregularly, chest discomfort, shortness of breath, or dizziness/lightheadedness that may be worse with exertion. Treatment is varied but may involve medication or electrical shock (cardioversion).  SEEK IMMEDIATE MEDICAL CARE IF YOU HAVE ANY OF THE FOLLOWING SYMPTOMS: chest pain, shortness of breath, abdominal pain, sweating, vomiting, blood in vomit/bowel movements/urine, dizziness/lightheadedness, weakness or numbness to face/arm/leg, trouble speaking or understanding, facial droop.

## 2020-06-01 NOTE — DISCHARGE NOTE PROVIDER - CARE PROVIDER_API CALL
Angel Urrutia  Pulmonary Diseases  [address] [place] [id number]  Phone: [phone]  Fax: [phone]  Established Patient  Follow Up Time: 1 month

## 2020-06-01 NOTE — DISCHARGE NOTE PROVIDER - NSDCMRMEDTOKEN_GEN_ALL_CORE_FT
atorvastatin 80 mg oral tablet: 1 tab(s) orally once a day (at bedtime)  dilTIAZem 120 mg/24 hours oral capsule, extended release: 1 cap(s) orally once a day  docusate sodium 100 mg oral capsule: 1 cap(s) orally 3 times a day  furosemide 40 mg oral tablet: 1 tab(s) orally once a day  LORazepam 0.5 mg oral tablet: 1 tab(s) orally once a day (at bedtime)  metoprolol succinate 100 mg oral tablet, extended release: 1 tab(s) orally once a day  Multiple Vitamins oral tablet: 1 tab(s) orally once a day  OLANZapine 7.5 mg oral tablet: 1 tab(s) orally once a day (at bedtime)  pantoprazole 40 mg oral delayed release tablet: 1 tab(s) orally once a day (before a meal)  sertraline 50 mg oral tablet: 3 tab(s) orally once a day atorvastatin 80 mg oral tablet: 1 tab(s) orally once a day (at bedtime)  dilTIAZem 120 mg/24 hours oral capsule, extended release: 1 cap(s) orally once a day  docusate sodium 100 mg oral capsule: 1 cap(s) orally 3 times a day  furosemide 40 mg oral tablet: 1 tab(s) orally once a day  LORazepam 0.5 mg oral tablet: 1 tab(s) orally once a day (at bedtime)  metoprolol succinate 100 mg oral tablet, extended release: 1 tab(s) orally once a day  Multiple Vitamins oral tablet: 1 tab(s) orally once a day  OLANZapine 7.5 mg oral tablet: 1 tab(s) orally once a day (at bedtime)  pantoprazole 40 mg oral delayed release tablet: 1 tab(s) orally once a day (before a meal)  sertraline 50 mg oral tablet: 3 tab(s) orally once a day  ZyPREXA 2.5 mg oral tablet: 1 tab(s) orally every 8 hours MDD:as needed

## 2020-06-01 NOTE — PROGRESS NOTE ADULT - ASSESSMENT
85 year old pmh of dementia, afib on coumadin, COPD not on home O2, CVA with left side residual weakness, HTN, HfeEf s/p AICD presenting with 2- 3 day hx of SOB and dry cough. Pt is AOx3 but has dementia and is forgetful history corroborated by daughter Goldie who is the primary caretaker ( phone number -[phone]).     # Acute hypoxic respiratory failure sec to Pulmonary fibrosis acute to chronic systolic CHF, H/o cardiomyopathy S/P AICD-resolved  -  Xray Chest 1 View-PORTABLE IMMEDIATE (05.28.20 @ 09:40) >Scattered airspace and interstitial opacities. Follow-up to resolution is recommended. Viral pneumonia is not excluded.  - 12 Lead ECG (05.28.20 @ 10:46) >Atrial-sensed ventricular-paced rhythm. Abnormal ECG  - Troponin T, Serum: <0.01 ng/mL (05.28.20 @ 07:55)  - COVID-19 PCR: NotDetec:(05.28.20 @ 08:41)  - Transthoracic Echocardiogram (05.29.20 @ 16:21) >Left ventricular ejection fraction, by visual estimation, is 50 to 55%.Grade I diastolic dysfunctionborderline pulmonary hypertension.  - monitor I/o, daily weight , restrict fluids  - c/w lasix, metoprolol  - maintain oxygen sats>92    # Hypokalemia  - replete k    # H/o afib  - c/w cardizem , hold  coumadin  - INR: 5.21ratio (06.01.20 @ 06:08)  - monitor INR, maintain INR between 2 to 3    # Dementia with agitation  - S/p haldol   -c/w lorazepam, sertraline, olanzapine    # Ruled out for UTI  -urine cultures neg    # CVA with left hemiparesis  - C/w statin  - hold coumadin,     # Full code. GOC discussed    # Pending: awaiting home oxygen delivery  # Plan of care discussed with daughter  # Disposition: Home

## 2020-06-01 NOTE — PROGRESS NOTE ADULT - SUBJECTIVE AND OBJECTIVE BOX
Patient is a 85y old  Female who presents with a chief complaint of SOB (29 May 2020 11:07)    Patient was seen and examined.  Denies any complaints    PAST MEDICAL & SURGICAL HISTORY:  HTN (hypertension)  Cerebrovascular accident (CVA)  COPD (chronic obstructive pulmonary disease)  Atrial fibrillation  Systolic heart failure  AICD (automatic cardioverter/defibrillator) present  S/P appendectomy    Allergies  No Known Drug Allergies  shellfish (Other)    MEDICATIONS  (STANDING):  atorvastatin 80 milliGRAM(s) Oral at bedtime  chlorhexidine 4% Liquid 1 Application(s) Topical <User Schedule>  diltiazem    milliGRAM(s) Oral daily  furosemide    Tablet 40 milliGRAM(s) Oral daily  LORazepam     Tablet 0.5 milliGRAM(s) Oral at bedtime  metoprolol succinate  milliGRAM(s) Oral daily  multivitamin 1 Tablet(s) Oral daily  OLANZapine 7.5 milliGRAM(s) Oral at bedtime  pantoprazole    Tablet 40 milliGRAM(s) Oral before breakfast  sertraline 150 milliGRAM(s) Oral daily    MEDICATIONS  (PRN):    Vital Signs Last 24 Hrs  T(C): 35.7  T(F): 96.3  HR: 64  BP: 146/69  BP(mean): --  RR: 18  SpO2: 88%    O/E: Awake, alert,  not in distress.  HEENT: atraumatic.  Chest: decreased breath sounds at bases  CVS: SIS2 +, no murmur.  P/A: obese,  BS+  CNS: Awake, alert, residual left hemiparesis  Ext: mild edema feet.  All systems reviewed positive findings as above.                           10.9<L>  6.71  )-----------( 211      ( 01 Jun 2020 06:08 )             34.4<L>                        11.0<L>  6.72  )-----------( 223      ( 31 May 2020 06:09 )             33.4<L>  06-01    140  |  98  |  22<H>  ----------------------------<  93  3.2<L>   |  28  |  1.2  05-31    142  |  99  |  19  ----------------------------<  101<H>  3.3<L>   |  29  |  1.2    Ca    9.5      01 Jun 2020 06:08  Ca    9.7      31 May 2020 06:09

## 2020-06-01 NOTE — PROGRESS NOTE ADULT - SUBJECTIVE AND OBJECTIVE BOX
KRISTI SAMUELS 85y Female  MRN#: [id number]   CODE STATUS: FULL      SUBJECTIVE  Patient is a 85y old Female who presents with a chief complaint of SOB (31 May 2020 11:11)    Currently admitted to medicine with the primary diagnosis of SOB likely due to exacerbation of pulm fibrosis    Today is hospital day 4d,   INTERVAL HPI/OVERNIGHT EVENTS: None; patient stable    This morning she is laying in bed comfortably .   Denies chest pain, shortness of breath, abdominal pain, nausea, vomiting or changes in bowel habits. As per RN saturating 89-92% on room air, currently on 2L saturating 96%    Urinating and stooling appropriately.    Present Today:           Krishnamurthy Catheter (x)No/ ()Yes?   Indication:             Central Line (x)No/ ()Yes?   Indication:          IV Fluids (x)No/ ()Yes? Type:  Rate:  Indication:    OBJECTIVE  PAST MEDICAL & SURGICAL HISTORY  HTN (hypertension)  Cerebrovascular accident (CVA)  COPD (chronic obstructive pulmonary disease)  Atrial fibrillation  Systolic heart failure  AICD (automatic cardioverter/defibrillator) present  S/P appendectomy    ALLERGIES:  No Known Drug Allergies  shellfish (Other)    HOME MEDICATIONS:  Home Medications:  atorvastatin 80 mg oral tablet: 1 tab(s) orally once a day (at bedtime) (13 Mar 2019 13:21)  dilTIAZem 120 mg/24 hours oral capsule, extended release: 1 cap(s) orally once a day (13 Mar 2019 13:21)  docusate sodium 100 mg oral capsule: 1 cap(s) orally 3 times a day (13 Mar 2019 13:21)  furosemide 40 mg oral tablet: 1 tab(s) orally once a day (28 May 2020 15:34)  LORazepam 0.5 mg oral tablet: 1 tab(s) orally once a day (28 May 2020 15:35)  metoprolol succinate 100 mg oral tablet, extended release: 1 tab(s) orally once a day (13 Mar 2019 13:21)  Multiple Vitamins oral tablet: 1 tab(s) orally once a day (13 Mar 2019 13:21)  pantoprazole 40 mg oral delayed release tablet: 1 tab(s) orally once a day (before a meal) (13 Mar 2019 13:21)  warfarin 1 mg oral tablet: 1 tab(s) orally once a day (at bedtime) (06 Aug 2018 10:57)  Zoloft: 150 milligram(s) orally once a day (28 May 2020 15:35)  ZyPREXA 7.5 mg oral tablet: 1 tab(s) orally once a day (28 May 2020 15:59)    MEDICATIONS:  STANDING MEDICATIONS  atorvastatin 80 milliGRAM(s) Oral at bedtime  chlorhexidine 4% Liquid 1 Application(s) Topical <User Schedule>  diltiazem    milliGRAM(s) Oral daily  furosemide    Tablet 40 milliGRAM(s) Oral daily  LORazepam     Tablet 0.5 milliGRAM(s) Oral at bedtime  metoprolol succinate  milliGRAM(s) Oral daily  multivitamin 1 Tablet(s) Oral daily  OLANZapine 7.5 milliGRAM(s) Oral at bedtime  pantoprazole    Tablet 40 milliGRAM(s) Oral before breakfast  sertraline 150 milliGRAM(s) Oral daily    PRN MEDICATIONS      VITAL SIGNS: Last 24 Hours  T(C): 35.4 (01 Jun 2020 05:31), Max: 36.2 (31 May 2020 10:00)  T(F): 95.7 (01 Jun 2020 05:31), Max: 97.2 (31 May 2020 10:00)  HR: 64 (01 Jun 2020 05:31) (61 - 78)  BP: 146/69 (01 Jun 2020 05:31) (100/53 - 146/69)  RR: 18 (01 Jun 2020 07:52) (18 - 20)  SpO2: 95% (01 Jun 2020 07:52) (87% - 97%)    LABS:                        10.9   6.71  )-----------( 211      ( 01 Jun 2020 06:08 )             34.4     05-31    142  |  99  |  19  ----------------------------<  101<H>  3.3<L>   |  29  |  1.2    Ca    9.7      31 May 2020 06:09      PT/INR - ( 01 Jun 2020 06:08 )   PT: >40.00 sec;   INR: 5.21 ratio        RADIOLOGY:  < from: Xray Chest 1 View-PORTABLE IMMEDIATE (05.28.20 @ 09:40) >  Scattered airspace and interstitial opacities. Follow-up to resolution is recommended. Viral pneumonia is not excluded.    ECHO:  Transthoracic Echocardiogram:    EXAM:  2-D ECHO (TTE) COMPLETE        PROCEDURE DATE:  05/29/2020      INTERPRETATION:  REPORT:    TRANSTHORACIC ECHOCARDIOGRAM REPORT         Patient Name:   KRISTI SAMUELS Accession #: [id number]  Medical Rec #:  [id number]     Height:      61.0 in 155.0 cm  YOB: 1934      Weight:      95.0 lb 43.09 kg  Patient Age:    85 years      BSA:         1.38 m²  Patient Gender: F             BP:          159/72 mmHg       Date of Exam:        5/29/2020 4:21:20 PM  Referring Physician: [id number] CHICA GOMEZ  Sonographer:         Miracle Doan  Reading Physician:   Lupe Ashley M.D.    Procedure:     2D Echo/Doppler/Color Doppler Complete.  Indications:   R06.00 - Dyspnea, unspecified  Diagnosis:     Dyspnea, unspecified - R06.00  Study Details: Technically difficult study. Study quality was adversely affected                 due to the patient being uncooperative and COPD.         Summary:   1. Left ventricular ejection fraction, by visual estimation, is 50 to 55%.   2. Technically difficult study.   3. Normal global left ventricular systolic function.   4. Spectral Doppler shows impaired relaxation pattern of left ventricular myocardial filling (Grade I diastolic dysfunction).   5. Very limited apical views for bevaluation of LV wall motion/function.   6. Structurally normal mitral valve, with normal leaflet excursion.   7. Sclerotic aortic valve with normal opening.   8. Estimated pulmonary artery systolic pressure is 39.7 mmHg assuming a right atrial pressure of 5 mmHg, which is consistent with borderline pulmonary hypertension.    PHYSICIAN INTERPRETATION:  Left Ventricle: The left ventricle was not well visualized. The left ventricular internal cavity size is normal. Global LV systolic function was normal. Left ventricular ejection fraction, by visual estimation, is 50 to 55%. Spectral Doppler shows impaired relaxation pattern of left ventricular myocardial filling (Grade I diastolic dysfunction). Very limited apical views for bevaluation of LV wall motion/function.  Right Ventricle: The right ventricle was not well visualized. The right ventricular size is normal.  Left Atrium: Normal left atrial size.  Right Atrium: Normal right atrial size.  Pericardium: There is no evidence of pericardial effusion.  Mitral Valve: Structurally normal mitral valve, with normal leaflet excursion. The mitral valve is normal in structure. No evidence of mitral valve regurgitation is seen.  Tricuspid Valve: Structurally normal tricuspid valve, with normal leaflet excursion. The tricuspid valve is normal in structure. Mild tricuspid regurgitation is visualized. Estimated pulmonary artery systolic pressure is 39.7 mmHg assuming a right atrial pressure of 5 mmHg, which is consistent with borderline pulmonary hypertension.  Aortic Valve: The aortic valve was not well visualized. The aortic valve is trileaflet. No evidence of aortic stenosis. Sclerotic aortic valve with normal opening. No evidence of aortic valve regurgitation is seen.  Pulmonic Valve: The pulmonic valve was not well visualized. The pulmonic valve is normal. No indication of pulmonic valve regurgitation.  Aorta: The aortic root and ascending aorta are structurally normal, with no evidence of dilitation.  Pulmonary Artery: The main pulmonary artery is normal in size.  Additional Comments: A pacer wire is visualized in the right atrium and right ventricle.       2D AND M-MODE MEASUREMENTS (normal ranges within parentheses):  Left                  Normal   Aorta/Left             Normal  Ventricle:                     Atrium:  IVSd (2D):  0.85 cm  (0.7-1.1) AoV Cusp       2.17  (1.5-2.6)  LVPWd       0.75 cm  (0.7-1.1) Separation:     cm  (2D):                          Left Atrium    2.95  (1.9-4.0)  LVIDd       4.92 cm  (3.4-5.7) (Mmode):        cm  (2D):                          Right  LVIDs       2.27 cm            Ventricle:  (2D):                          RVd (2D):      2.77 cm  LV FS       53.9 %    (>25%)  (2D):  IVSd        0.90 cm  (0.7-1.1)  (Mmode):  LVPWd       0.97 cm  (0.7-1.1)  (Mmode):  LVIDd       4.31 cm  (3.4-5.7)  (Mmode):  LVIDs       3.03 cm  (Mmode):  LV FS       29.9 %    (>25%)  (Mmode):  Relative     0.31     (<0.42)  Wall  Thickness  Rel. Wall    0.45     (<0.42)  Thickness  Mm  LV Mass    94.6 g/m²  Index:  Mmode    SPECTRAL DOPPLER ANALYSIS:  LV DIASTOLIC FUNCTION:  MV Peak E: 0.51 m/s Decel Time: 162 msec  MV Peak A: 0.81 m/s  E/A Ratio: 0.63    Aortic Valve:  AoV VMax:    1.02 m/s AoV Area, Vmax: 1.59 cm² Vmax Indx: 1.16 cm²/m²  AoV Pk Grad:4.2 mmHg    LVOT Vmax: 0.54 m/s  LVOT VTI:  0.12 m  LVOT Diam: 1.96 cm    Mitral Valve:  MV P1/2 Time: 46.89 msec  MV Area, PHT: 4.69 cm²    Tricuspid Valve and PA/RV Systolic Pressure: TR Max Velocity: 2.95 m/s RA Pressure: 5 mmHg RVSP/PASP: 39.7 mmHg       [id number] Lupe Ashley M.D., Electronically signed on 5/29/2020 at 6:21:43 PM    *** Final ***  LUPE ASHLEY M.D., RESIDENT RADIOLOGIST  This document has been electronically signed.  LUPE ASHLEY M.D., RESIDENT RADIOLOGIST  This document has been electronically signed. May 29 2020  4:21PM(05-29-20 @ 16:21)      PHYSICAL EXAM:    GENERAL: NAD, well-developed, AAOx3  HEENT:  Atraumatic, Normocephalic. EOMI, PERRLA, conjunctiva and sclera clear, No JVD  PULMONARY: Crackles on exam ; No wheeze  CARDIOVASCULAR: left side AICD Regular rate and rhythm; No murmurs, rubs, or gallops  GASTROINTESTINAL: Soft, Nontender, Nondistended; Bowel sounds present  MUSCULOSKELETAL: 2+ LE edema pitting;  2+ Peripheral Pulses, No clubbing, cyanosis  NEUROLOGY: non-focal  SKIN: No rashes or lesions    ASSESSMENT & PLAN    85 year old F PMHx of dementia, Chr Afib on coumadin, COPD not on home O2, CVA with left side residual weakness, HTN, HfrEF s/p dual chambered AICD presenting with 2- 3 day hx of SOB and dry cough. Pt is AOx3 but has dementia and is forgetful history corroborated by daughter Goldie who is the primary caretaker (phone number #[phone]).  COVID ruled out.     # SOB, likely due to exacerabation of  pulmonary fibrosis   - No signs of COPD exacerbation, no signs of bacterial PNA, no viral PNA  - patient requiring oxygen this AM - asn per Dr. Urrutia who says patient has pulmonary fibrosis and should be discharged home w/ supplemental oxygen   - CXR 5/28- scattered airspace and interstitial opacities, viral PNA not excluded  - O2 supplement to keep O2 sat >92%, titrate down when possible ; currently on 2L saturating 96%, room air: 89-92%  - ECHO 5/29 : EF 50-55%, G1DD, structurally normal MV, sclerotic aortic valve with normal opening, borderline pulm HTN  - no leukocytosis, afebrile, COVID swab (5/28) 5/30 negative  - , continue Lasix 40 mg PO  - requires home oxygen set up  prior to discharge     # UTI ruled out  - off ABx    #HFpEF/ cardiomyopathy s/p dual chambered AICD  - c/w  toprol 100mg daily and lasix 40 mg PO  - strict I&Os, daily weights     # Chronic A. Fib   - INR supratherapeutic today 5.21  - c/w diltiazem 120 CD daily, lopressor    # Dementia with anxiety and behavioral disturbance   - Zyprexa and Ativan changed to QHS  - baseline dementia worsened, but currently AAOx3   - was recently switched off trazadone to lorazepam 0.5 daily   - follows with Dr. Sutton   - c/w home meds - zoloft 150mg, Zyprexa 7.5mg, ativan (at bedtime)     # Hx of CVA with left side hemiparesis residual  - Atorvastatin 80mg daily      Called daughter Mouna, no response, left voice message.   Anticipated DC in 24-48 hrs.    PT/REHAB   ACTIVITY: Increase as tolerated   DVT PPX:   GI PPX: none  DIET: DASH TLC  CODE: Full   DIsposition: from home with home care  Pending: KRISTI SAMUELS 85y Female  MRN#: [id number]   CODE STATUS: FULL      SUBJECTIVE  Patient is a 85y old Female who presents with a chief complaint of SOB (31 May 2020 11:11)    Currently admitted to medicine with the primary diagnosis of SOB likely due to exacerbation of pulm fibrosis    Today is hospital day 4d,   INTERVAL HPI/OVERNIGHT EVENTS: None; patient stable    This morning she is laying in bed comfortably .   Denies chest pain, shortness of breath, abdominal pain, nausea, vomiting or changes in bowel habits. As per RN saturating 89-92% on room air, currently on 2L saturating 96%    Urinating and stooling appropriately.    Present Today:           Krishnamurthy Catheter (x)No/ ()Yes?   Indication:             Central Line (x)No/ ()Yes?   Indication:          IV Fluids (x)No/ ()Yes? Type:  Rate:  Indication:    OBJECTIVE  PAST MEDICAL & SURGICAL HISTORY  HTN (hypertension)  Cerebrovascular accident (CVA)  COPD (chronic obstructive pulmonary disease)  Atrial fibrillation  Systolic heart failure  AICD (automatic cardioverter/defibrillator) present  S/P appendectomy    ALLERGIES:  No Known Drug Allergies  shellfish (Other)    HOME MEDICATIONS:  Home Medications:  atorvastatin 80 mg oral tablet: 1 tab(s) orally once a day (at bedtime) (13 Mar 2019 13:21)  dilTIAZem 120 mg/24 hours oral capsule, extended release: 1 cap(s) orally once a day (13 Mar 2019 13:21)  docusate sodium 100 mg oral capsule: 1 cap(s) orally 3 times a day (13 Mar 2019 13:21)  furosemide 40 mg oral tablet: 1 tab(s) orally once a day (28 May 2020 15:34)  LORazepam 0.5 mg oral tablet: 1 tab(s) orally once a day (28 May 2020 15:35)  metoprolol succinate 100 mg oral tablet, extended release: 1 tab(s) orally once a day (13 Mar 2019 13:21)  Multiple Vitamins oral tablet: 1 tab(s) orally once a day (13 Mar 2019 13:21)  pantoprazole 40 mg oral delayed release tablet: 1 tab(s) orally once a day (before a meal) (13 Mar 2019 13:21)  warfarin 1 mg oral tablet: 1 tab(s) orally once a day (at bedtime) (06 Aug 2018 10:57)  Zoloft: 150 milligram(s) orally once a day (28 May 2020 15:35)  ZyPREXA 7.5 mg oral tablet: 1 tab(s) orally once a day (28 May 2020 15:59)    MEDICATIONS:  STANDING MEDICATIONS  atorvastatin 80 milliGRAM(s) Oral at bedtime  chlorhexidine 4% Liquid 1 Application(s) Topical <User Schedule>  diltiazem    milliGRAM(s) Oral daily  furosemide    Tablet 40 milliGRAM(s) Oral daily  LORazepam     Tablet 0.5 milliGRAM(s) Oral at bedtime  metoprolol succinate  milliGRAM(s) Oral daily  multivitamin 1 Tablet(s) Oral daily  OLANZapine 7.5 milliGRAM(s) Oral at bedtime  pantoprazole    Tablet 40 milliGRAM(s) Oral before breakfast  sertraline 150 milliGRAM(s) Oral daily    PRN MEDICATIONS      VITAL SIGNS: Last 24 Hours  T(C): 35.4 (01 Jun 2020 05:31), Max: 36.2 (31 May 2020 10:00)  T(F): 95.7 (01 Jun 2020 05:31), Max: 97.2 (31 May 2020 10:00)  HR: 64 (01 Jun 2020 05:31) (61 - 78)  BP: 146/69 (01 Jun 2020 05:31) (100/53 - 146/69)  RR: 18 (01 Jun 2020 07:52) (18 - 20)  SpO2: 95% (01 Jun 2020 07:52) (87% - 97%)    LABS:                        10.9   6.71  )-----------( 211      ( 01 Jun 2020 06:08 )             34.4     05-31    142  |  99  |  19  ----------------------------<  101<H>  3.3<L>   |  29  |  1.2    Ca    9.7      31 May 2020 06:09      PT/INR - ( 01 Jun 2020 06:08 )   PT: >40.00 sec;   INR: 5.21 ratio        RADIOLOGY:  < from: Xray Chest 1 View-PORTABLE IMMEDIATE (05.28.20 @ 09:40) >  Scattered airspace and interstitial opacities. Follow-up to resolution is recommended. Viral pneumonia is not excluded.    ECHO:  Transthoracic Echocardiogram:    EXAM:  2-D ECHO (TTE) COMPLETE        PROCEDURE DATE:  05/29/2020      INTERPRETATION:  REPORT:    TRANSTHORACIC ECHOCARDIOGRAM REPORT         Patient Name:   KRISTI SAMUELS Accession #: [id number]  Medical Rec #:  [id number]     Height:      61.0 in 155.0 cm  YOB: 1934      Weight:      95.0 lb 43.09 kg  Patient Age:    85 years      BSA:         1.38 m²  Patient Gender: F             BP:          159/72 mmHg       Date of Exam:        5/29/2020 4:21:20 PM  Referring Physician: [id number] CHICA GOMEZ  Sonographer:         Miracle Doan  Reading Physician:   Lupe Ashley M.D.    Procedure:     2D Echo/Doppler/Color Doppler Complete.  Indications:   R06.00 - Dyspnea, unspecified  Diagnosis:     Dyspnea, unspecified - R06.00  Study Details: Technically difficult study. Study quality was adversely affected                 due to the patient being uncooperative and COPD.         Summary:   1. Left ventricular ejection fraction, by visual estimation, is 50 to 55%.   2. Technically difficult study.   3. Normal global left ventricular systolic function.   4. Spectral Doppler shows impaired relaxation pattern of left ventricular myocardial filling (Grade I diastolic dysfunction).   5. Very limited apical views for bevaluation of LV wall motion/function.   6. Structurally normal mitral valve, with normal leaflet excursion.   7. Sclerotic aortic valve with normal opening.   8. Estimated pulmonary artery systolic pressure is 39.7 mmHg assuming a right atrial pressure of 5 mmHg, which is consistent with borderline pulmonary hypertension.    PHYSICIAN INTERPRETATION:  Left Ventricle: The left ventricle was not well visualized. The left ventricular internal cavity size is normal. Global LV systolic function was normal. Left ventricular ejection fraction, by visual estimation, is 50 to 55%. Spectral Doppler shows impaired relaxation pattern of left ventricular myocardial filling (Grade I diastolic dysfunction). Very limited apical views for bevaluation of LV wall motion/function.  Right Ventricle: The right ventricle was not well visualized. The right ventricular size is normal.  Left Atrium: Normal left atrial size.  Right Atrium: Normal right atrial size.  Pericardium: There is no evidence of pericardial effusion.  Mitral Valve: Structurally normal mitral valve, with normal leaflet excursion. The mitral valve is normal in structure. No evidence of mitral valve regurgitation is seen.  Tricuspid Valve: Structurally normal tricuspid valve, with normal leaflet excursion. The tricuspid valve is normal in structure. Mild tricuspid regurgitation is visualized. Estimated pulmonary artery systolic pressure is 39.7 mmHg assuming a right atrial pressure of 5 mmHg, which is consistent with borderline pulmonary hypertension.  Aortic Valve: The aortic valve was not well visualized. The aortic valve is trileaflet. No evidence of aortic stenosis. Sclerotic aortic valve with normal opening. No evidence of aortic valve regurgitation is seen.  Pulmonic Valve: The pulmonic valve was not well visualized. The pulmonic valve is normal. No indication of pulmonic valve regurgitation.  Aorta: The aortic root and ascending aorta are structurally normal, with no evidence of dilitation.  Pulmonary Artery: The main pulmonary artery is normal in size.  Additional Comments: A pacer wire is visualized in the right atrium and right ventricle.       2D AND M-MODE MEASUREMENTS (normal ranges within parentheses):  Left                  Normal   Aorta/Left             Normal  Ventricle:                     Atrium:  IVSd (2D):  0.85 cm  (0.7-1.1) AoV Cusp       2.17  (1.5-2.6)  LVPWd       0.75 cm  (0.7-1.1) Separation:     cm  (2D):                          Left Atrium    2.95  (1.9-4.0)  LVIDd       4.92 cm  (3.4-5.7) (Mmode):        cm  (2D):                          Right  LVIDs       2.27 cm            Ventricle:  (2D):                          RVd (2D):      2.77 cm  LV FS       53.9 %    (>25%)  (2D):  IVSd        0.90 cm  (0.7-1.1)  (Mmode):  LVPWd       0.97 cm  (0.7-1.1)  (Mmode):  LVIDd       4.31 cm  (3.4-5.7)  (Mmode):  LVIDs       3.03 cm  (Mmode):  LV FS       29.9 %    (>25%)  (Mmode):  Relative     0.31     (<0.42)  Wall  Thickness  Rel. Wall    0.45     (<0.42)  Thickness  Mm  LV Mass    94.6 g/m²  Index:  Mmode    SPECTRAL DOPPLER ANALYSIS:  LV DIASTOLIC FUNCTION:  MV Peak E: 0.51 m/s Decel Time: 162 msec  MV Peak A: 0.81 m/s  E/A Ratio: 0.63    Aortic Valve:  AoV VMax:    1.02 m/s AoV Area, Vmax: 1.59 cm² Vmax Indx: 1.16 cm²/m²  AoV Pk Grad:4.2 mmHg    LVOT Vmax: 0.54 m/s  LVOT VTI:  0.12 m  LVOT Diam: 1.96 cm    Mitral Valve:  MV P1/2 Time: 46.89 msec  MV Area, PHT: 4.69 cm²    Tricuspid Valve and PA/RV Systolic Pressure: TR Max Velocity: 2.95 m/s RA Pressure: 5 mmHg RVSP/PASP: 39.7 mmHg       [id number] Lupe Ashley M.D., Electronically signed on 5/29/2020 at 6:21:43 PM    *** Final ***  LUPE ASHLEY M.D., RESIDENT RADIOLOGIST  This document has been electronically signed.  LUPE ASHLEY M.D., RESIDENT RADIOLOGIST  This document has been electronically signed. May 29 2020  4:21PM(05-29-20 @ 16:21)      PHYSICAL EXAM:    GENERAL: NAD, well-developed, AAOx3  HEENT:  Atraumatic, Normocephalic. EOMI, PERRLA, conjunctiva and sclera clear, No JVD  PULMONARY: Crackles on exam ; No wheeze  CARDIOVASCULAR: left side AICD Regular rate and rhythm; No murmurs, rubs, or gallops  GASTROINTESTINAL: Soft, Nontender, Nondistended; Bowel sounds present  MUSCULOSKELETAL: 2+ LE edema pitting;  2+ Peripheral Pulses, No clubbing, cyanosis  NEUROLOGY: non-focal  SKIN: No rashes or lesions    ASSESSMENT & PLAN    85 year old F PMHx of dementia, Chr Afib on coumadin, COPD not on home O2, CVA with left side residual weakness, HTN, HfrEF s/p dual chambered AICD presenting with 2- 3 day hx of SOB and dry cough. Pt is AOx3 but has dementia and is forgetful history corroborated by daughter Goldie who is the primary caretaker (phone number #[phone]).  COVID ruled out.     # SOB, likely due to exacerabation of  pulmonary fibrosis   - No signs of COPD exacerbation, no signs of bacterial PNA, no viral PNA  - patient requiring oxygen this AM - asn per Dr. Urrutia who says patient has pulmonary fibrosis and should be discharged home w/ supplemental oxygen   - CXR 5/28- scattered airspace and interstitial opacities, viral PNA not excluded  - O2 supplement to keep O2 sat >92%, titrate down when possible ; currently on 2L saturating 96%, room air: 89-92% will check pulse on ambulation if possible  - ECHO 5/29 : EF 50-55%, G1DD, structurally normal MV, sclerotic aortic valve with normal opening, borderline pulm HTN  - no leukocytosis, afebrile, COVID swab (5/28) 5/30 negative  - , continue Lasix 40 mg PO  - daughter Jagdish insists on requirement home oxygen set up  prior to discharge     # UTI ruled out  - off ABx    #HFpEF/ cardiomyopathy s/p dual chambered AICD  - c/w  toprol 100mg daily and lasix 40 mg PO  - strict I&Os, daily weights     # Chronic A. Fib   - INR supratherapeutic today 5.21  - hold coumadin   - c/w diltiazem 120 CD daily, lopressor    # Dementia with anxiety and behavioral disturbance   - Zyprexa and Ativan changed to QHS  - baseline dementia worsened, but currently AAOx3   - was recently switched off trazadone to lorazepam 0.5 daily   - follows with Dr. Sutton   - c/w home meds - zoloft 150mg, Zyprexa 7.5mg, ativan (at bedtime)     # Hx of CVA with left side hemiparesis residual  - Atorvastatin 80mg daily    PT/REHAB   ACTIVITY: Increase as tolerated   DVT PPX:   GI PPX: none  DIET: DASH TLC  CODE: Full   DIsposition: from home with home care; discussed plan with vianey TRIVEDI KRISTI SAMUELS 85y Female  MRN#: [id number]   CODE STATUS: FULL      SUBJECTIVE  Patient is a 85y old Female who presents with a chief complaint of SOB (31 May 2020 11:11)    Currently admitted to medicine with the primary diagnosis of SOB likely due to exacerbation of pulm fibrosis    Today is hospital day 4d,   INTERVAL HPI/OVERNIGHT EVENTS: None; patient stable    This morning she is laying in bed comfortably .   Denies chest pain, shortness of breath, abdominal pain, nausea, vomiting or changes in bowel habits. As per RN saturating 89-92% on room air, currently on 2L saturating 96%    Urinating and stooling appropriately.    Present Today:           Krishnamurthy Catheter (x)No/ ()Yes?   Indication:             Central Line (x)No/ ()Yes?   Indication:          IV Fluids (x)No/ ()Yes? Type:  Rate:  Indication:    OBJECTIVE  PAST MEDICAL & SURGICAL HISTORY  HTN (hypertension)  Cerebrovascular accident (CVA)  COPD (chronic obstructive pulmonary disease)  Atrial fibrillation  Systolic heart failure  AICD (automatic cardioverter/defibrillator) present  S/P appendectomy    ALLERGIES:  No Known Drug Allergies  shellfish (Other)    HOME MEDICATIONS:  Home Medications:  atorvastatin 80 mg oral tablet: 1 tab(s) orally once a day (at bedtime) (13 Mar 2019 13:21)  dilTIAZem 120 mg/24 hours oral capsule, extended release: 1 cap(s) orally once a day (13 Mar 2019 13:21)  docusate sodium 100 mg oral capsule: 1 cap(s) orally 3 times a day (13 Mar 2019 13:21)  furosemide 40 mg oral tablet: 1 tab(s) orally once a day (28 May 2020 15:34)  LORazepam 0.5 mg oral tablet: 1 tab(s) orally once a day (28 May 2020 15:35)  metoprolol succinate 100 mg oral tablet, extended release: 1 tab(s) orally once a day (13 Mar 2019 13:21)  Multiple Vitamins oral tablet: 1 tab(s) orally once a day (13 Mar 2019 13:21)  pantoprazole 40 mg oral delayed release tablet: 1 tab(s) orally once a day (before a meal) (13 Mar 2019 13:21)  warfarin 1 mg oral tablet: 1 tab(s) orally once a day (at bedtime) (06 Aug 2018 10:57)  Zoloft: 150 milligram(s) orally once a day (28 May 2020 15:35)  ZyPREXA 7.5 mg oral tablet: 1 tab(s) orally once a day (28 May 2020 15:59)    MEDICATIONS:  STANDING MEDICATIONS  atorvastatin 80 milliGRAM(s) Oral at bedtime  chlorhexidine 4% Liquid 1 Application(s) Topical <User Schedule>  diltiazem    milliGRAM(s) Oral daily  furosemide    Tablet 40 milliGRAM(s) Oral daily  LORazepam     Tablet 0.5 milliGRAM(s) Oral at bedtime  metoprolol succinate  milliGRAM(s) Oral daily  multivitamin 1 Tablet(s) Oral daily  OLANZapine 7.5 milliGRAM(s) Oral at bedtime  pantoprazole    Tablet 40 milliGRAM(s) Oral before breakfast  sertraline 150 milliGRAM(s) Oral daily    PRN MEDICATIONS      VITAL SIGNS: Last 24 Hours  T(C): 35.4 (01 Jun 2020 05:31), Max: 36.2 (31 May 2020 10:00)  T(F): 95.7 (01 Jun 2020 05:31), Max: 97.2 (31 May 2020 10:00)  HR: 64 (01 Jun 2020 05:31) (61 - 78)  BP: 146/69 (01 Jun 2020 05:31) (100/53 - 146/69)  RR: 18 (01 Jun 2020 07:52) (18 - 20)  SpO2: 95% (01 Jun 2020 07:52) (87% - 97%)    LABS:                        10.9   6.71  )-----------( 211      ( 01 Jun 2020 06:08 )             34.4     05-31    142  |  99  |  19  ----------------------------<  101<H>  3.3<L>   |  29  |  1.2    Ca    9.7      31 May 2020 06:09      PT/INR - ( 01 Jun 2020 06:08 )   PT: >40.00 sec;   INR: 5.21 ratio        RADIOLOGY:  < from: Xray Chest 1 View-PORTABLE IMMEDIATE (05.28.20 @ 09:40) >  Scattered airspace and interstitial opacities. Follow-up to resolution is recommended. Viral pneumonia is not excluded.    ECHO:  Transthoracic Echocardiogram:    EXAM:  2-D ECHO (TTE) COMPLETE        PROCEDURE DATE:  05/29/2020      INTERPRETATION:  REPORT:    TRANSTHORACIC ECHOCARDIOGRAM REPORT         Patient Name:   KRISTI SAMUELS Accession #: [id number]  Medical Rec #:  [id number]     Height:      61.0 in 155.0 cm  YOB: 1934      Weight:      95.0 lb 43.09 kg  Patient Age:    85 years      BSA:         1.38 m²  Patient Gender: F             BP:          159/72 mmHg       Date of Exam:        5/29/2020 4:21:20 PM  Referring Physician: [id number] CHICA GOMEZ  Sonographer:         Miracle Doan  Reading Physician:   Lupe Ashley M.D.    Procedure:     2D Echo/Doppler/Color Doppler Complete.  Indications:   R06.00 - Dyspnea, unspecified  Diagnosis:     Dyspnea, unspecified - R06.00  Study Details: Technically difficult study. Study quality was adversely affected                 due to the patient being uncooperative and COPD.         Summary:   1. Left ventricular ejection fraction, by visual estimation, is 50 to 55%.   2. Technically difficult study.   3. Normal global left ventricular systolic function.   4. Spectral Doppler shows impaired relaxation pattern of left ventricular myocardial filling (Grade I diastolic dysfunction).   5. Very limited apical views for bevaluation of LV wall motion/function.   6. Structurally normal mitral valve, with normal leaflet excursion.   7. Sclerotic aortic valve with normal opening.   8. Estimated pulmonary artery systolic pressure is 39.7 mmHg assuming a right atrial pressure of 5 mmHg, which is consistent with borderline pulmonary hypertension.    PHYSICIAN INTERPRETATION:  Left Ventricle: The left ventricle was not well visualized. The left ventricular internal cavity size is normal. Global LV systolic function was normal. Left ventricular ejection fraction, by visual estimation, is 50 to 55%. Spectral Doppler shows impaired relaxation pattern of left ventricular myocardial filling (Grade I diastolic dysfunction). Very limited apical views for bevaluation of LV wall motion/function.  Right Ventricle: The right ventricle was not well visualized. The right ventricular size is normal.  Left Atrium: Normal left atrial size.  Right Atrium: Normal right atrial size.  Pericardium: There is no evidence of pericardial effusion.  Mitral Valve: Structurally normal mitral valve, with normal leaflet excursion. The mitral valve is normal in structure. No evidence of mitral valve regurgitation is seen.  Tricuspid Valve: Structurally normal tricuspid valve, with normal leaflet excursion. The tricuspid valve is normal in structure. Mild tricuspid regurgitation is visualized. Estimated pulmonary artery systolic pressure is 39.7 mmHg assuming a right atrial pressure of 5 mmHg, which is consistent with borderline pulmonary hypertension.  Aortic Valve: The aortic valve was not well visualized. The aortic valve is trileaflet. No evidence of aortic stenosis. Sclerotic aortic valve with normal opening. No evidence of aortic valve regurgitation is seen.  Pulmonic Valve: The pulmonic valve was not well visualized. The pulmonic valve is normal. No indication of pulmonic valve regurgitation.  Aorta: The aortic root and ascending aorta are structurally normal, with no evidence of dilitation.  Pulmonary Artery: The main pulmonary artery is normal in size.  Additional Comments: A pacer wire is visualized in the right atrium and right ventricle.       2D AND M-MODE MEASUREMENTS (normal ranges within parentheses):  Left                  Normal   Aorta/Left             Normal  Ventricle:                     Atrium:  IVSd (2D):  0.85 cm  (0.7-1.1) AoV Cusp       2.17  (1.5-2.6)  LVPWd       0.75 cm  (0.7-1.1) Separation:     cm  (2D):                          Left Atrium    2.95  (1.9-4.0)  LVIDd       4.92 cm  (3.4-5.7) (Mmode):        cm  (2D):                          Right  LVIDs       2.27 cm            Ventricle:  (2D):                          RVd (2D):      2.77 cm  LV FS       53.9 %    (>25%)  (2D):  IVSd        0.90 cm  (0.7-1.1)  (Mmode):  LVPWd       0.97 cm  (0.7-1.1)  (Mmode):  LVIDd       4.31 cm  (3.4-5.7)  (Mmode):  LVIDs       3.03 cm  (Mmode):  LV FS       29.9 %    (>25%)  (Mmode):  Relative     0.31     (<0.42)  Wall  Thickness  Rel. Wall    0.45     (<0.42)  Thickness  Mm  LV Mass    94.6 g/m²  Index:  Mmode    SPECTRAL DOPPLER ANALYSIS:  LV DIASTOLIC FUNCTION:  MV Peak E: 0.51 m/s Decel Time: 162 msec  MV Peak A: 0.81 m/s  E/A Ratio: 0.63    Aortic Valve:  AoV VMax:    1.02 m/s AoV Area, Vmax: 1.59 cm² Vmax Indx: 1.16 cm²/m²  AoV Pk Grad:4.2 mmHg    LVOT Vmax: 0.54 m/s  LVOT VTI:  0.12 m  LVOT Diam: 1.96 cm    Mitral Valve:  MV P1/2 Time: 46.89 msec  MV Area, PHT: 4.69 cm²    Tricuspid Valve and PA/RV Systolic Pressure: TR Max Velocity: 2.95 m/s RA Pressure: 5 mmHg RVSP/PASP: 39.7 mmHg       [id number] Lupe Ashley M.D., Electronically signed on 5/29/2020 at 6:21:43 PM    *** Final ***  LUPE ASHLEY M.D., RESIDENT RADIOLOGIST  This document has been electronically signed.  LUPE ASHLEY M.D., RESIDENT RADIOLOGIST  This document has been electronically signed. May 29 2020  4:21PM(05-29-20 @ 16:21)      PHYSICAL EXAM:    GENERAL: NAD, well-developed, AAOx3  HEENT:  Atraumatic, Normocephalic. EOMI, PERRLA, conjunctiva and sclera clear, No JVD  PULMONARY: Crackles on exam ; No wheeze  CARDIOVASCULAR: left side AICD Regular rate and rhythm; No murmurs, rubs, or gallops  GASTROINTESTINAL: Soft, Nontender, Nondistended; Bowel sounds present  MUSCULOSKELETAL: 2+ LE edema pitting;  2+ Peripheral Pulses, No clubbing, cyanosis  NEUROLOGY: non-focal  SKIN: No rashes or lesions    ASSESSMENT & PLAN    85 year old F PMHx of dementia, Chr Afib on coumadin, COPD not on home O2, CVA with left side residual weakness, HTN, HfrEF s/p dual chambered AICD presenting with 2- 3 day hx of SOB and dry cough. Pt is AOx3 but has dementia and is forgetful history corroborated by daughter Goldie who is the primary caretaker (phone number #[phone]).  COVID ruled out.     # SOB, likely due to exacerabation of  pulmonary fibrosis   - No signs of COPD exacerbation, no signs of bacterial PNA, no viral PNA  - patient requiring oxygen this AM - asn per Dr. Urrutia who says patient has pulmonary fibrosis and should be discharged home w/ supplemental oxygen   - CXR 5/28- scattered airspace and interstitial opacities, viral PNA not excluded  - O2 supplement to keep O2 sat >92%, titrate down when possible ; currently on 2L saturating 96%, room air: 88%  - ECHO 5/29 : EF 50-55%, G1DD, structurally normal MV, sclerotic aortic valve with normal opening, borderline pulm HTN  - no leukocytosis, afebrile, COVID swab (5/28) 5/30 negative  - , continue Lasix 40 mg PO  - daughter Jagdish insists on home o2 as Dr. Urrutia suggested  - Despite treatment with lasix, patient is still hypoxic due to pulmonary fibrosis  O2%RA: 88%  %O2 on 2L 96%  Patient tested in chronic stable state  Daughter aware going home on oxygen      # UTI ruled out  - off ABx    #HFpEF/ cardiomyopathy s/p dual chambered AICD  - c/w  toprol 100mg daily and lasix 40 mg PO  - strict I&Os, daily weights     # Chronic A. Fib   - INR supratherapeutic today 5.21  - hold coumadin   - c/w diltiazem 120 CD daily, lopressor    # Dementia with anxiety and behavioral disturbance   - Zyprexa and Ativan changed to QHS  - baseline dementia worsened, but currently AAOx3   - was recently switched off trazadone to lorazepam 0.5 daily   - follows with Dr. Sutton   - c/w home meds - zoloft 150mg, Zyprexa 7.5mg, ativan (at bedtime)     # Hx of CVA with left side hemiparesis residual  - Atorvastatin 80mg daily    PT/REHAB   ACTIVITY: Increase as tolerated   DVT PPX:   GI PPX: none  DIET: DASH TLC  CODE: Full   DIsposition: from home with home care; discussed plan with vianey TRIVEDI KRISTI SAMUELS 85y Female  MRN#: [id number]   CODE STATUS: FULL      SUBJECTIVE  Patient is a 85y old Female who presents with a chief complaint of SOB (31 May 2020 11:11)    Currently admitted to medicine with the primary diagnosis of SOB likely due to exacerbation of pulm fibrosis    Today is hospital day 4d,   INTERVAL HPI/OVERNIGHT EVENTS: None; patient stable    This morning she is laying in bed comfortably .   Denies chest pain, shortness of breath, abdominal pain, nausea, vomiting or changes in bowel habits. As per RN saturating 89-92% on room air, currently on 2L saturating 96%    Urinating and stooling appropriately.    Present Today:           Krishnamurthy Catheter (x)No/ ()Yes?   Indication:             Central Line (x)No/ ()Yes?   Indication:          IV Fluids (x)No/ ()Yes? Type:  Rate:  Indication:    OBJECTIVE  PAST MEDICAL & SURGICAL HISTORY  HTN (hypertension)  Cerebrovascular accident (CVA)  COPD (chronic obstructive pulmonary disease)  Atrial fibrillation  Systolic heart failure  AICD (automatic cardioverter/defibrillator) present  S/P appendectomy    ALLERGIES:  No Known Drug Allergies  shellfish (Other)    HOME MEDICATIONS:  Home Medications:  atorvastatin 80 mg oral tablet: 1 tab(s) orally once a day (at bedtime) (13 Mar 2019 13:21)  dilTIAZem 120 mg/24 hours oral capsule, extended release: 1 cap(s) orally once a day (13 Mar 2019 13:21)  docusate sodium 100 mg oral capsule: 1 cap(s) orally 3 times a day (13 Mar 2019 13:21)  furosemide 40 mg oral tablet: 1 tab(s) orally once a day (28 May 2020 15:34)  LORazepam 0.5 mg oral tablet: 1 tab(s) orally once a day (28 May 2020 15:35)  metoprolol succinate 100 mg oral tablet, extended release: 1 tab(s) orally once a day (13 Mar 2019 13:21)  Multiple Vitamins oral tablet: 1 tab(s) orally once a day (13 Mar 2019 13:21)  pantoprazole 40 mg oral delayed release tablet: 1 tab(s) orally once a day (before a meal) (13 Mar 2019 13:21)  warfarin 1 mg oral tablet: 1 tab(s) orally once a day (at bedtime) (06 Aug 2018 10:57)  Zoloft: 150 milligram(s) orally once a day (28 May 2020 15:35)  ZyPREXA 7.5 mg oral tablet: 1 tab(s) orally once a day (28 May 2020 15:59)    MEDICATIONS:  STANDING MEDICATIONS  atorvastatin 80 milliGRAM(s) Oral at bedtime  chlorhexidine 4% Liquid 1 Application(s) Topical <User Schedule>  diltiazem    milliGRAM(s) Oral daily  furosemide    Tablet 40 milliGRAM(s) Oral daily  LORazepam     Tablet 0.5 milliGRAM(s) Oral at bedtime  metoprolol succinate  milliGRAM(s) Oral daily  multivitamin 1 Tablet(s) Oral daily  OLANZapine 7.5 milliGRAM(s) Oral at bedtime  pantoprazole    Tablet 40 milliGRAM(s) Oral before breakfast  sertraline 150 milliGRAM(s) Oral daily    PRN MEDICATIONS      VITAL SIGNS: Last 24 Hours  T(C): 35.4 (01 Jun 2020 05:31), Max: 36.2 (31 May 2020 10:00)  T(F): 95.7 (01 Jun 2020 05:31), Max: 97.2 (31 May 2020 10:00)  HR: 64 (01 Jun 2020 05:31) (61 - 78)  BP: 146/69 (01 Jun 2020 05:31) (100/53 - 146/69)  RR: 18 (01 Jun 2020 07:52) (18 - 20)  SpO2: 95% (01 Jun 2020 07:52) (87% - 97%)    LABS:                        10.9   6.71  )-----------( 211      ( 01 Jun 2020 06:08 )             34.4     05-31    142  |  99  |  19  ----------------------------<  101<H>  3.3<L>   |  29  |  1.2    Ca    9.7      31 May 2020 06:09      PT/INR - ( 01 Jun 2020 06:08 )   PT: >40.00 sec;   INR: 5.21 ratio        RADIOLOGY:  < from: Xray Chest 1 View-PORTABLE IMMEDIATE (05.28.20 @ 09:40) >  Scattered airspace and interstitial opacities. Follow-up to resolution is recommended. Viral pneumonia is not excluded.    ECHO:  Transthoracic Echocardiogram:    EXAM:  2-D ECHO (TTE) COMPLETE        PROCEDURE DATE:  05/29/2020      INTERPRETATION:  REPORT:    TRANSTHORACIC ECHOCARDIOGRAM REPORT         Patient Name:   KRISTI SAMUELS Accession #: [id number]  Medical Rec #:  [id number]     Height:      61.0 in 155.0 cm  YOB: 1934      Weight:      95.0 lb 43.09 kg  Patient Age:    85 years      BSA:         1.38 m²  Patient Gender: F             BP:          159/72 mmHg       Date of Exam:        5/29/2020 4:21:20 PM  Referring Physician: [id number] CHICA GOMEZ  Sonographer:         Miracle Doan  Reading Physician:   Lupe Ashley M.D.    Procedure:     2D Echo/Doppler/Color Doppler Complete.  Indications:   R06.00 - Dyspnea, unspecified  Diagnosis:     Dyspnea, unspecified - R06.00  Study Details: Technically difficult study. Study quality was adversely affected                 due to the patient being uncooperative and COPD.         Summary:   1. Left ventricular ejection fraction, by visual estimation, is 50 to 55%.   2. Technically difficult study.   3. Normal global left ventricular systolic function.   4. Spectral Doppler shows impaired relaxation pattern of left ventricular myocardial filling (Grade I diastolic dysfunction).   5. Very limited apical views for bevaluation of LV wall motion/function.   6. Structurally normal mitral valve, with normal leaflet excursion.   7. Sclerotic aortic valve with normal opening.   8. Estimated pulmonary artery systolic pressure is 39.7 mmHg assuming a right atrial pressure of 5 mmHg, which is consistent with borderline pulmonary hypertension.    PHYSICIAN INTERPRETATION:  Left Ventricle: The left ventricle was not well visualized. The left ventricular internal cavity size is normal. Global LV systolic function was normal. Left ventricular ejection fraction, by visual estimation, is 50 to 55%. Spectral Doppler shows impaired relaxation pattern of left ventricular myocardial filling (Grade I diastolic dysfunction). Very limited apical views for bevaluation of LV wall motion/function.  Right Ventricle: The right ventricle was not well visualized. The right ventricular size is normal.  Left Atrium: Normal left atrial size.  Right Atrium: Normal right atrial size.  Pericardium: There is no evidence of pericardial effusion.  Mitral Valve: Structurally normal mitral valve, with normal leaflet excursion. The mitral valve is normal in structure. No evidence of mitral valve regurgitation is seen.  Tricuspid Valve: Structurally normal tricuspid valve, with normal leaflet excursion. The tricuspid valve is normal in structure. Mild tricuspid regurgitation is visualized. Estimated pulmonary artery systolic pressure is 39.7 mmHg assuming a right atrial pressure of 5 mmHg, which is consistent with borderline pulmonary hypertension.  Aortic Valve: The aortic valve was not well visualized. The aortic valve is trileaflet. No evidence of aortic stenosis. Sclerotic aortic valve with normal opening. No evidence of aortic valve regurgitation is seen.  Pulmonic Valve: The pulmonic valve was not well visualized. The pulmonic valve is normal. No indication of pulmonic valve regurgitation.  Aorta: The aortic root and ascending aorta are structurally normal, with no evidence of dilitation.  Pulmonary Artery: The main pulmonary artery is normal in size.  Additional Comments: A pacer wire is visualized in the right atrium and right ventricle.       2D AND M-MODE MEASUREMENTS (normal ranges within parentheses):  Left                  Normal   Aorta/Left             Normal  Ventricle:                     Atrium:  IVSd (2D):  0.85 cm  (0.7-1.1) AoV Cusp       2.17  (1.5-2.6)  LVPWd       0.75 cm  (0.7-1.1) Separation:     cm  (2D):                          Left Atrium    2.95  (1.9-4.0)  LVIDd       4.92 cm  (3.4-5.7) (Mmode):        cm  (2D):                          Right  LVIDs       2.27 cm            Ventricle:  (2D):                          RVd (2D):      2.77 cm  LV FS       53.9 %    (>25%)  (2D):  IVSd        0.90 cm  (0.7-1.1)  (Mmode):  LVPWd       0.97 cm  (0.7-1.1)  (Mmode):  LVIDd       4.31 cm  (3.4-5.7)  (Mmode):  LVIDs       3.03 cm  (Mmode):  LV FS       29.9 %    (>25%)  (Mmode):  Relative     0.31     (<0.42)  Wall  Thickness  Rel. Wall    0.45     (<0.42)  Thickness  Mm  LV Mass    94.6 g/m²  Index:  Mmode    SPECTRAL DOPPLER ANALYSIS:  LV DIASTOLIC FUNCTION:  MV Peak E: 0.51 m/s Decel Time: 162 msec  MV Peak A: 0.81 m/s  E/A Ratio: 0.63    Aortic Valve:  AoV VMax:    1.02 m/s AoV Area, Vmax: 1.59 cm² Vmax Indx: 1.16 cm²/m²  AoV Pk Grad:4.2 mmHg    LVOT Vmax: 0.54 m/s  LVOT VTI:  0.12 m  LVOT Diam: 1.96 cm    Mitral Valve:  MV P1/2 Time: 46.89 msec  MV Area, PHT: 4.69 cm²    Tricuspid Valve and PA/RV Systolic Pressure: TR Max Velocity: 2.95 m/s RA Pressure: 5 mmHg RVSP/PASP: 39.7 mmHg       [id number] Lupe Ashley M.D., Electronically signed on 5/29/2020 at 6:21:43 PM    *** Final ***  LUPE ASHLEY M.D., RESIDENT RADIOLOGIST  This document has been electronically signed.  LUPE ASHLEY M.D., RESIDENT RADIOLOGIST  This document has been electronically signed. May 29 2020  4:21PM(05-29-20 @ 16:21)      PHYSICAL EXAM:    GENERAL: NAD, well-developed, AAOx3  HEENT:  Atraumatic, Normocephalic. EOMI, PERRLA, conjunctiva and sclera clear, No JVD  PULMONARY: Crackles on exam ; No wheeze  CARDIOVASCULAR: left side AICD Regular rate and rhythm; No murmurs, rubs, or gallops  GASTROINTESTINAL: Soft, Nontender, Nondistended; Bowel sounds present  MUSCULOSKELETAL: 2+ LE edema pitting;  2+ Peripheral Pulses, No clubbing, cyanosis  NEUROLOGY: non-focal  SKIN: No rashes or lesions    ASSESSMENT & PLAN    85 year old F PMHx of dementia, Chr Afib on coumadin, COPD not on home O2, CVA with left side residual weakness, HTN, HfrEF s/p dual chambered AICD presenting with 2- 3 day hx of SOB and dry cough. Pt is AOx3 but has dementia and is forgetful history corroborated by daughter Goldie who is the primary caretaker (phone number #[phone]).  COVID ruled out.     # SOB, likely due to exacerabation of  pulmonary fibrosis   - No signs of COPD exacerbation, no signs of bacterial PNA, no viral PNA  - patient requiring oxygen this AM - asn per Dr. Urrutia who says patient has pulmonary fibrosis and should be discharged home w/ supplemental oxygen   - CXR 5/28- scattered airspace and interstitial opacities, viral PNA not excluded  - O2 supplement to keep O2 sat >92%, titrate down when possible ; currently on 2L saturating 96%, room air: 88%  - ECHO 5/29 : EF 50-55%, G1DD, structurally normal MV, sclerotic aortic valve with normal opening, borderline pulm HTN  - no leukocytosis, afebrile, COVID swab (5/28) 5/30 negative  - , continue Lasix 40 mg PO  - daughter Jagdish insists on home o2 as Dr. Urrutia suggested  - Despite treatment with lasix, patient is still hypoxic due to pulmonary fibrosis  O2%RA: 88% at rest  %O2 on 2L 96% at rest  Patient tested in chronic stable state  Daughter aware going home on oxygen      # UTI ruled out  - off ABx    #HFpEF/ cardiomyopathy s/p dual chambered AICD  - c/w  toprol 100mg daily and lasix 40 mg PO  - strict I&Os, daily weights     # Chronic A. Fib   - INR supratherapeutic today 5.21  - hold coumadin   - c/w diltiazem 120 CD daily, lopressor    # Dementia with anxiety and behavioral disturbance   - Zyprexa and Ativan changed to QHS  - baseline dementia worsened, but currently AAOx3   - was recently switched off trazadone to lorazepam 0.5 daily   - follows with Dr. Sutton   - c/w home meds - zoloft 150mg, Zyprexa 7.5mg, ativan (at bedtime)     # Hx of CVA with left side hemiparesis residual  - Atorvastatin 80mg daily    PT/REHAB   ACTIVITY: Increase as tolerated   DVT PPX:   GI PPX: none  DIET: DASH TLC  CODE: Full   DIsposition: from home with home care; discussed plan with vianey TRIVEDI

## 2020-06-02 NOTE — PROGRESS NOTE ADULT - SUBJECTIVE AND OBJECTIVE BOX
Patient is a 85y old  Female who presents with a chief complaint of SOB (29 May 2020 11:07)    Patient was seen and examined.  Pt was agitated last night, received ativan  This am, pt is drowsy, but responds to oral commands    PAST MEDICAL & SURGICAL HISTORY:  HTN (hypertension)  Cerebrovascular accident (CVA)  COPD (chronic obstructive pulmonary disease)  Atrial fibrillation  Systolic heart failure  AICD (automatic cardioverter/defibrillator) present  S/P appendectomy    Allergies  No Known Drug Allergies  shellfish (Other)    Home Medications:  atorvastatin 80 mg oral tablet: 1 tab(s) orally once a day (at bedtime) (01 Jun 2020 10:15)  dilTIAZem 120 mg/24 hours oral capsule, extended release: 1 cap(s) orally once a day (01 Jun 2020 10:15)  docusate sodium 100 mg oral capsule: 1 cap(s) orally 3 times a day (13 Mar 2019 13:21)  furosemide 40 mg oral tablet: 1 tab(s) orally once a day (01 Jun 2020 10:15)  LORazepam 0.5 mg oral tablet: 1 tab(s) orally once a day (at bedtime) (01 Jun 2020 10:15)  metoprolol succinate 100 mg oral tablet, extended release: 1 tab(s) orally once a day (01 Jun 2020 10:15)  Multiple Vitamins oral tablet: 1 tab(s) orally once a day (01 Jun 2020 10:15)  OLANZapine 7.5 mg oral tablet: 1 tab(s) orally once a day (at bedtime) (01 Jun 2020 10:15)  pantoprazole 40 mg oral delayed release tablet: 1 tab(s) orally once a day (before a meal) (01 Jun 2020 10:15)  sertraline 50 mg oral tablet: 3 tab(s) orally once a day (01 Jun 2020 10:15)      MEDICATIONS  (STANDING):  atorvastatin 80 milliGRAM(s) Oral at bedtime  chlorhexidine 4% Liquid 1 Application(s) Topical <User Schedule>  diltiazem    milliGRAM(s) Oral daily  furosemide    Tablet 40 milliGRAM(s) Oral daily  LORazepam     Tablet 0.5 milliGRAM(s) Oral at bedtime  metoprolol succinate  milliGRAM(s) Oral daily  multivitamin 1 Tablet(s) Oral daily  OLANZapine 7.5 milliGRAM(s) Oral at bedtime  pantoprazole    Tablet 40 milliGRAM(s) Oral before breakfast  sertraline 150 milliGRAM(s) Oral daily    MEDICATIONS  (PRN):  haloperidol    Injectable 2.5 milliGRAM(s) IntraMuscular once PRN Agitation    T(C): 36.3 (06-02-20 @ 05:19), Max: 36.3 (06-02-20 @ 05:19)  HR: 77 (06-02-20 @ 05:19) (68 - 77)  BP: 139/77 (06-02-20 @ 05:19) (119/73 - 139/77)  RR: 18 (06-02-20 @ 08:12) (18 - 20)  SpO2: 95% (06-02-20 @ 08:12) (88% - 97%)    O/E:  Drowsy  not in distress.  HEENT: atraumatic.  Chest: decreased breath sounds at bases  CVS: SIS2 +, no murmur.  P/A: obese,  BS+  CNS: Drowsy, responds to oral commands. residual left hemiparesis  Ext: mild edema feet.  All systems reviewed positive findings as above.                                    11.4<L>  6.96  )-----------( 238      ( 02 Jun 2020 05:53 )             35.3<L>                        10.9<L>  6.71  )-----------( 211      ( 01 Jun 2020 06:08 )             34.4<L>  06-02    143  |  100  |  24<H>  ----------------------------<  101<H>  3.6   |  27  |  1.4  06-01    140  |  98  |  22<H>  ----------------------------<  93  3.2<L>   |  28  |  1.2    Ca    9.7      02 Jun 2020 05:53  Ca    9.5      01 Jun 2020 06:08  Mg     2.1     06-02    TPro  7.3  /  Alb  3.9  /  TBili  0.7  /  DBili  x   /  AST  45<H>  /  ALT  22  /  AlkPhos  99  06-02

## 2020-06-02 NOTE — DIETITIAN INITIAL EVALUATION ADULT. - PHYSICAL APPEARANCE
obese/BMI 39.8; AAOx3; +1 edema to R/L foot and ankle; no chewing/swallowing difficulties noted; no GI issues noted, LBM 6/1; skin- ecchymosis.

## 2020-06-02 NOTE — BEHAVIORAL HEALTH ASSESSMENT NOTE - NSBHCONSULTFOLLOWAFTERCARE_PSY_A_CORE FT
Patient to follow up with existing outpatient geriatric psychiatrist Dr. Stephanie Sutton at [facility]

## 2020-06-02 NOTE — BEHAVIORAL HEALTH ASSESSMENT NOTE - NSBHCHARTREVIEWVS_PSY_A_CORE FT
Vital Signs Last 24 Hrs  T(C): 36.3 (02 Jun 2020 05:19), Max: 36.3 (02 Jun 2020 05:19)  T(F): 97.3 (02 Jun 2020 05:19), Max: 97.3 (02 Jun 2020 05:19)  HR: 77 (02 Jun 2020 05:19) (77 - 77)  BP: 139/77 (02 Jun 2020 05:19) (124/58 - 139/77)  RR: 18 (02 Jun 2020 08:12) (18 - 20)  SpO2: 95% (02 Jun 2020 08:12) (95% - 97%)

## 2020-06-02 NOTE — BEHAVIORAL HEALTH ASSESSMENT NOTE - SUMMARY
KRISTI SAMUELS is a 85y old  Female, living with her daughter, with no known psychiatric history, PMH of dementia, who was admitted for acute hypoxic respiratory failure. Psychiatry consulted for management of patient's recurrent agitation.    On evaluation, patient's agitation appears related to her pre-existing dementia. This is best managed by behavioral intervention, with frequent re-orientation, ensuring patient is not sleeping throughout the day so as to not disrupt the normal sleep-wake cycle. Patient is most likely to benefit from continuation of current standing psychotropic medication regimen at this time, with further adjustments best managed on an outpatient basis.   In the event of severe agitation that does not respond to genuine behavioral intervention, patient's agitation may respond to small dose of additional prn zyprexa, 2.5 mg q8h prn offered PO. If patient refusing PO and remains danger to self/others, may then give zyprexa 2.5 mg IM instead.

## 2020-06-02 NOTE — BEHAVIORAL HEALTH ASSESSMENT NOTE - NSBHCHARTREVIEWLAB_PSY_A_CORE FT
11.4   6.96  )-----------( 238      ( 02 Jun 2020 05:53 )             35.3     06-02    143  |  100  |  24<H>  ----------------------------<  101<H>  3.6   |  27  |  1.4    Ca    9.7      02 Jun 2020 05:53  Mg     2.1     06-02    TPro  7.3  /  Alb  3.9  /  TBili  0.7  /  DBili  x   /  AST  45<H>  /  ALT  22  /  AlkPhos  99  06-02      Prothrombin Time and INR, Plasma in AM (06.02.20 @ 05:53)    Prothrombin Time, Plasma: >40.00: TYPE:(C=Critical, N=Notification, A=Abnormal) c  TESTS: _pt,inr,ptt  DATE/TIME CALLED: 06/02/20 06:42_  CALLED TO: avrli  READ BACK (2 Patient Identifiers)(Y/N):y _  READ BACK VALUES (Y/N): _y  CALLED BY: _sc sec    INR: 7.13: TYPE:(C=Critical, N=Notification, A=Abnormal) c  TESTS: _pt,inr,ptt  DATE/TIME CALLED: 06/02/20 06:42_  CALLED TO: avril  READ BACK (2 Patient Identifiers)(Y/N):y _  READ BACK VALUES (Y/N): _y  CALLED BY: _sc  Recommended ranges for therapeutic INR:    2.0-3.0 for most medical and surgical thromboembolic states    2.0-3.0 for atrial fibrillation    2.0-3.0 for bileaflet mechanical valve in aortic position    2.5-3.5 for mechanical heart valves    Chest 2004;[id number]  The presence of direct thrombin inhibitors (argatroban, refludan)  may falsely increase results. ratio

## 2020-06-02 NOTE — BEHAVIORAL HEALTH ASSESSMENT NOTE - HPI (INCLUDE ILLNESS QUALITY, SEVERITY, DURATION, TIMING, CONTEXT, MODIFYING FACTORS, ASSOCIATED SIGNS AND SYMPTOMS)
KRISTI SAMUELS is a 85y old  Female, living with her daughter, with no known psychiatric history, PMH of dementia, who was admitted for acute hypoxic respiratory failure. Psychiatry consulted for management of patient's recurrent agitation.    Per nursing staff, patient had been agitated yesterday all day, yelling and cursing at staff.  Upon approach, patient was resting in bed comfortably, somewhat lethargic. She reports no current issues, stating that she does not recall any incident overnight or yesterday, is not aware of anything that bothered or upset her, though did state that some of the ancillary staff do give her "attitude". Reports she does see Dr. Sutton but has never had any psychiatric issues. Patient aware that she is in a hospital and that it is June 2020. Reports she is feeling tired currently. Denies any symptoms suggestive of psychosis. Denies any current feelings of anxiety or depression, but expressed some frustration at not being back home. Denies any SI or HI.    For collateral, spoke to patient's daughter who reports patient has been sundowning for "a long time" and that patient was recently switched off of trazodone qhs to ativan qhs. Patient on zoloft 150 mg daily, zyprexa 7.5 mg qhs, and ativan 0.5 mg qhs currently. Reports that patient had previously been given xanax for agitation but that this made her more agitated shortly thereafter. Reports patient had been given haldol for agitation during a prior admission however this had left her sleeping for the entire day, which daughter wanted to avoid.    Spoke to patient's geriatric psychiatrist Dr. Stephanie Sutton, who states patient's agitation is often aggravated by her interactions with her daughter. Reports no other known psychiatric comorbidities in patient, current medication regimen is in place to target patient's recurrent agitation. Recommends no changes to standing doses of patient's medication and primarily behavioral intervention to manage patient's agitation as much as possible. Failing that, patient may benefit from a small prn dose of zyprexa.

## 2020-06-02 NOTE — BEHAVIORAL HEALTH ASSESSMENT NOTE - NSBHREFERDETAILS_PSY_A_CORE_FT
recurrent agitation in pt with dementia, consult for recs for any medication adjustments for agitation

## 2020-06-02 NOTE — DIETITIAN INITIAL EVALUATION ADULT. - RD TO REMAIN AVAILABLE
yes/Interventions: meals and snacks, medical food supplement. Monitor/Evaluate: RD to monitor energy intake, diet order, body comp, NFPF. Interventions: meals and snacks, medical food supplement, coordination of care. Monitor/Evaluate: RD to monitor energy intake, diet order, body comp, NFPF./yes

## 2020-06-02 NOTE — DIETITIAN INITIAL EVALUATION ADULT. - OTHER INFO
Pertinent Medical Information: Acute hypoxic respiratory failure 2/2 Pulmonary fibrosis acute to chronic systolic CHF, H/o cardiomyopathy S/P AICD-resolved; covid r/o and on O2. Hypokalemia resolved.    Pertinent Subjective Information: Unable to interview pt due to pt confused. RD spoke to pt's daughter who reports, pt is very picky eater and eats only certain foods; CA notified of pt'd food preferences. Pt had good appetite PTA.  lbs few years ago; denies knowing recent wt but suspects wt gain 2/2 fluid overload. No hx of wt found in EMR. Pt is allergic to seafood and shellfish. Pt is lactose intolerance. No supplement use reported PTA. Pt followed regular diet PTA. Pt currently has poor appetite per RN; consuming <50% of meals.

## 2020-06-02 NOTE — BEHAVIORAL HEALTH ASSESSMENT NOTE - CASE SUMMARY
Pt is a 84 yo CF with a hx of Dementia (possibly vascular dementia) who is admitted to medical floor for SOB likely s/t CHF. Psychiatry has been consulted to evaluate pt as she has been having behavioral disturbance and agitation s/t dementia.         ON evaluation pt is alert and oriented to person, place and situation but not to time. She denies any acute psychiatric symptoms. She reports that she gets agitated when some one gives her an attitude but does not remember getting agitated yesterday. She notes some intermittent dysphoria because she is bored but other wise denies any acute psychiatric symptoms. Currently follows OP with Dr Sutton for medication management.         Her presentation is consistent with Dementia with behavioral disturbances. Will recommend to continue home doses of Zoloft, Zyprexa and Lorazepam as she reportedly is better on the current med regimen. Will recommend behavioral interventions to redirect pt when agitated. Also recommend to reorient pt frequently and also keep her awake during the day to maintain circadian rhythm. For acute agitation/ aggression not responding to verbal and behavioral interventions can use Zyprexa 2.5 mg PO Q8 PRN. RAB reviewed with pt and primary team.

## 2020-06-02 NOTE — DIETITIAN INITIAL EVALUATION ADULT. - ADD RECOMMEND
Add ensure enlive daily and ensure pudding daily to current diet order. Add ensure enlive daily and ensure pudding daily to current diet order. Provide assistance with meals and encourage adequate po intake.

## 2020-06-02 NOTE — BEHAVIORAL HEALTH ASSESSMENT NOTE - NSBHCHARTREVIEWINVESTIGATE_PSY_A_CORE FT
< from: 12 Lead ECG (05.28.20 @ 10:46) >      Ventricular Rate 68 BPM    Atrial Rate 68 BPM    P-R Interval 180 ms    QRS Duration 88 ms    Q-T Interval 416 ms    QTC Calculation(Bezet) 442 ms    P Axis 26 degrees    R Axis 129 degrees    T Axis 0 degrees    Diagnosis Line Atrial-sensed ventricular-paced rhythm  Abnormal ECG    Confirmed by Earl Cardenas ([id number]) on 5/28/2020 12:05:18 PM    < end of copied text >

## 2020-06-02 NOTE — BEHAVIORAL HEALTH ASSESSMENT NOTE - NSBHCONSULTMEDSEVERE_PSY_A_CORE FT
behavioral intervention first. If unsuccessful and patient acute danger to self or others, may give zyprexa 2.5 mg po q8h prn. If patient refusing PO and remains danger to self/others, may give zyprexa 2.5 mg IM instead.

## 2020-06-02 NOTE — PROGRESS NOTE ADULT - ASSESSMENT
85 year old pmh of dementia, afib on coumadin, COPD not on home O2, CVA with left side residual weakness, HTN, HfeEf s/p AICD presenting with 2- 3 day hx of SOB and dry cough. Pt is AOx3 but has dementia and is forgetful history corroborated by daughter Goldie who is the primary caretaker ( phone number -[phone]).     # Acute hypoxic respiratory failure sec to Pulmonary fibrosis acute to chronic systolic CHF, H/o cardiomyopathy S/P AICD-resolved  -  Xray Chest 1 View-PORTABLE IMMEDIATE (05.28.20 @ 09:40) >Scattered airspace and interstitial opacities. Follow-up to resolution is recommended. Viral pneumonia is not excluded.  - 12 Lead ECG (05.28.20 @ 10:46) >Atrial-sensed ventricular-paced rhythm. Abnormal ECG  - Troponin T, Serum: <0.01 ng/mL (05.28.20 @ 07:55)  - COVID-19 PCR: NotDetec:(05.28.20 @ 08:41)  - Transthoracic Echocardiogram (05.29.20 @ 16:21) >Left ventricular ejection fraction, by visual estimation, is 50 to 55%.Grade I diastolic dysfunctionborderline pulmonary hypertension.  - monitor I/o, daily weight , restrict fluids  - c/w lasix, metoprolol  - maintain oxygen sats>92    # Hypokalemia- resolved    # H/o afib  - c/w cardizem , hold  coumadin  - INR: 7.13ratio (06.02.20 @ 05:53)  - monitor INR, maintain INR between 2 to 3    # Dementia with agitation  - S/p haldol   -c/w lorazepam, sertraline, olanzapine  - Psych eval    # Ruled out for UTI  -urine cultures neg    # CVA with left hemiparesis  - C/w statin  - hold coumadin,     # Full code. GOC discussed    # Pending: awaiting home oxygen delivery, psych eval, monitor INR  # Plan of care discussed with daughter, GOC discussed, all questions answered . Pt anticipated for discharge in AM  # Disposition: Home

## 2020-06-02 NOTE — DIETITIAN INITIAL EVALUATION ADULT. - ENERGY NEEDS
Calories: 8803-8214 kcal/day (25-30 kcal/kg IBW for BMI considered and limited physical activity)  Protein: 48-58 g/day (1-1.2 g/kg IBW for reason)  Fluids: 1 ml/kcal or per LIP

## 2020-06-02 NOTE — PROGRESS NOTE ADULT - SUBJECTIVE AND OBJECTIVE BOX
KRISTI SAMUELS 85y Female  MRN#: [id number]   CODE STATUS: FULL      SUBJECTIVE  Patient is a 85y old Female who presents with a chief complaint of SOB and increased agitation(02 Jun 2020 10:59)    Currently admitted to medicine with the primary diagnosis of SOB likely due to exacerbation of pulm fibrosis      Today is hospital day 5d,   INTERVAL HPI/OVERNIGHT EVENTS: bursts of agitation throughout day and night despite frequent re orientations    This morning she is laying in bed , sleeping    Urinating and stooling appropriately.    Present Today:           Krishnamurthy Catheter (x)No/ ()Yes?   Indication:             Central Line (x)No/ ()Yes?   Indication:          IV Fluids (x)No/ ()Yes? Type:  Rate:  Indication:    OBJECTIVE  PAST MEDICAL & SURGICAL HISTORY  HTN (hypertension)  Cerebrovascular accident (CVA)  COPD (chronic obstructive pulmonary disease)  Atrial fibrillation  Systolic heart failure  AICD (automatic cardioverter/defibrillator) present  S/P appendectomy    ALLERGIES:  No Known Drug Allergies  Seafood (Unknown)  shellfish (Other)    HOME MEDICATIONS:  Home Medications:  atorvastatin 80 mg oral tablet: 1 tab(s) orally once a day (at bedtime) (01 Jun 2020 10:15)  dilTIAZem 120 mg/24 hours oral capsule, extended release: 1 cap(s) orally once a day (01 Jun 2020 10:15)  docusate sodium 100 mg oral capsule: 1 cap(s) orally 3 times a day (13 Mar 2019 13:21)  furosemide 40 mg oral tablet: 1 tab(s) orally once a day (01 Jun 2020 10:15)  LORazepam 0.5 mg oral tablet: 1 tab(s) orally once a day (at bedtime) (01 Jun 2020 10:15)  metoprolol succinate 100 mg oral tablet, extended release: 1 tab(s) orally once a day (01 Jun 2020 10:15)  Multiple Vitamins oral tablet: 1 tab(s) orally once a day (01 Jun 2020 10:15)  OLANZapine 7.5 mg oral tablet: 1 tab(s) orally once a day (at bedtime) (01 Jun 2020 10:15)  pantoprazole 40 mg oral delayed release tablet: 1 tab(s) orally once a day (before a meal) (01 Jun 2020 10:15)  sertraline 50 mg oral tablet: 3 tab(s) orally once a day (01 Jun 2020 10:15)    MEDICATIONS:  STANDING MEDICATIONS  atorvastatin 80 milliGRAM(s) Oral at bedtime  chlorhexidine 4% Liquid 1 Application(s) Topical <User Schedule>  diltiazem    milliGRAM(s) Oral daily  furosemide    Tablet 40 milliGRAM(s) Oral daily  LORazepam     Tablet 0.5 milliGRAM(s) Oral at bedtime  metoprolol succinate  milliGRAM(s) Oral daily  multivitamin 1 Tablet(s) Oral daily  OLANZapine 7.5 milliGRAM(s) Oral at bedtime  pantoprazole    Tablet 40 milliGRAM(s) Oral before breakfast  sertraline 150 milliGRAM(s) Oral daily    PRN MEDICATIONS  haloperidol    Injectable 2.5 milliGRAM(s) IntraMuscular once PRN      VITAL SIGNS: Last 24 Hours  T(C): 36.3 (02 Jun 2020 05:19), Max: 36.3 (02 Jun 2020 05:19)  T(F): 97.3 (02 Jun 2020 05:19), Max: 97.3 (02 Jun 2020 05:19)  HR: 77 (02 Jun 2020 05:19) (77 - 77)  BP: 139/77 (02 Jun 2020 05:19) (124/58 - 139/77)  RR: 18 (02 Jun 2020 08:12) (18 - 20)  SpO2: 95% (02 Jun 2020 08:12) (95% - 97%)    LABS:                        11.4   6.96  )-----------( 238      ( 02 Jun 2020 05:53 )             35.3     06-02    143  |  100  |  24<H>  ----------------------------<  101<H>  3.6   |  27  |  1.4    Ca    9.7      02 Jun 2020 05:53  Mg     2.1     06-02    TPro  7.3  /  Alb  3.9  /  TBili  0.7  /  DBili  x   /  AST  45<H>  /  ALT  22  /  AlkPhos  99  06-02    PT/INR - ( 02 Jun 2020 05:53 )   PT: >40.00 sec;   INR: 7.13 ratio         PTT - ( 02 Jun 2020 05:53 )  PTT:53.9 sec  RADIOLOGY:    < from: Xray Chest 1 View-PORTABLE IMMEDIATE (05.28.20 @ 09:40) >  Scattered airspace and interstitial opacities. Follow-up to resolution is recommended. Viral pneumonia is not excluded.    < from: CT Head No Cont (03.11.19 @ 12:45) >  1.  No evidence of acute intracranial hemorrhage or large territory   infarct. Stable exam since 3/8/2019.    2.  Unchanged chronic right frontoparietal infarct and mild chronic   microvascular changes.    3. Unchanged right maxillary, ethmoid and frontal sinus opacification.      ECHO:  Transthoracic Echocardiogram:    EXAM:  2-D ECHO (TTE) COMPLETE        PROCEDURE DATE:  05/29/2020      INTERPRETATION:  REPORT:    TRANSTHORACIC ECHOCARDIOGRAM REPORT      Patient Name:   KRISTI SAMUELS Accession #: [id number]  Medical Rec #:  [id number]     Height:      61.0 in 155.0 cm  YOB: 1934      Weight:      95.0 lb 43.09 kg  Patient Age:    85 years      BSA:         1.38 m²  Patient Gender: F             BP:          159/72 mmHg       Date of Exam:        5/29/2020 4:21:20 PM  Referring Physician: [id number] CHICA GOMEZ  Sonographer:         Miracle Doan  Reading Physician:   Lupe Ashley M.D.    Procedure:     2D Echo/Doppler/Color Doppler Complete.  Indications:   R06.00 - Dyspnea, unspecified  Diagnosis:     Dyspnea, unspecified - R06.00  Study Details: Technically difficult study. Study quality was adversely affected                 due to the patient being uncooperative and COPD.    Summary:   1. Left ventricular ejection fraction, by visual estimation, is 50 to 55%.   2. Technically difficult study.   3. Normal global left ventricular systolic function.   4. Spectral Doppler shows impaired relaxation pattern of left ventricular myocardial filling (Grade I diastolic dysfunction).   5. Very limited apical views for bevaluation of LV wall motion/function.   6. Structurally normal mitral valve, with normal leaflet excursion.   7. Sclerotic aortic valve with normal opening.   8. Estimated pulmonary artery systolic pressure is 39.7 mmHg assuming a right atrial pressure of 5 mmHg, which is consistent with borderline pulmonary hypertension.    PHYSICIAN INTERPRETATION:  Left Ventricle: The left ventricle was not well visualized. The left ventricular internal cavity size is normal. Global LV systolic function was normal. Left ventricular ejection fraction, by visual estimation, is 50 to 55%. Spectral Doppler shows impaired relaxation pattern of left ventricular myocardial filling (Grade I diastolic dysfunction). Very limited apical views for bevaluation of LV wall motion/function.  Right Ventricle: The right ventricle was not well visualized. The right ventricular size is normal.  Left Atrium: Normal left atrial size.  Right Atrium: Normal right atrial size.  Pericardium: There is no evidence of pericardial effusion.  Mitral Valve: Structurally normal mitral valve, with normal leaflet excursion. The mitral valve is normal in structure. No evidence of mitral valve regurgitation is seen.  Tricuspid Valve: Structurally normal tricuspid valve, with normal leaflet excursion. The tricuspid valve is normal in structure. Mild tricuspid regurgitation is visualized. Estimated pulmonary artery systolic pressure is 39.7 mmHg assuming a right atrial pressure of 5 mmHg, which is consistent with borderline pulmonary hypertension.  Aortic Valve: The aortic valve was not well visualized. The aortic valve is trileaflet. No evidence of aortic stenosis. Sclerotic aortic valve with normal opening. No evidence of aortic valve regurgitation is seen.  Pulmonic Valve: The pulmonic valve was not well visualized. The pulmonic valve is normal. No indication of pulmonic valve regurgitation.  Aorta: The aortic root and ascending aorta are structurally normal, with no evidence of dilitation.  Pulmonary Artery: The main pulmonary artery is normal in size.  Additional Comments: A pacer wire is visualized in the right atrium and right ventricle.       2D AND M-MODE MEASUREMENTS (normal ranges within parentheses):  Left                  Normal   Aorta/Left             Normal  Ventricle:                     Atrium:  IVSd (2D):  0.85 cm  (0.7-1.1) AoV Cusp       2.17  (1.5-2.6)  LVPWd       0.75 cm  (0.7-1.1) Separation:     cm  (2D):                          Left Atrium    2.95  (1.9-4.0)  LVIDd       4.92 cm  (3.4-5.7) (Mmode):        cm  (2D):                          Right  LVIDs       2.27 cm            Ventricle:  (2D):                          RVd (2D):      2.77 cm  LV FS       53.9 %    (>25%)  (2D):  IVSd        0.90 cm  (0.7-1.1)  (Mmode):  LVPWd       0.97 cm  (0.7-1.1)  (Mmode):  LVIDd       4.31 cm  (3.4-5.7)  (Mmode):  LVIDs       3.03 cm  (Mmode):  LV FS       29.9 %    (>25%)  (Mmode):  Relative     0.31     (<0.42)  Wall  Thickness  Rel. Wall    0.45     (<0.42)  Thickness  Mm  LV Mass    94.6 g/m²  Index:  Mmode    SPECTRAL DOPPLER ANALYSIS:  LV DIASTOLIC FUNCTION:  MV Peak E: 0.51 m/s Decel Time: 162 msec  MV Peak A: 0.81 m/s  E/A Ratio: 0.63    Aortic Valve:  AoV VMax:    1.02 m/s AoV Area, Vmax: 1.59 cm² Vmax Indx: 1.16 cm²/m²  AoV Pk Grad:4.2 mmHg    LVOT Vmax: 0.54 m/s  LVOT VTI:  0.12 m  LVOT Diam: 1.96 cm    Mitral Valve:  MV P1/2 Time: 46.89 msec  MV Area, PHT: 4.69 cm²    Tricuspid Valve and PA/RV Systolic Pressure: TR Max Velocity: 2.95 m/s RA Pressure: 5 mmHg RVSP/PASP: 39.7 mmHg       [id number] Lupe Ashley M.D., Electronically signed on 5/29/2020 at 6:21:43 PM   *** Final ***  LUPE ASHLEY M.D., RESIDENT RADIOLOGIST  This document has been electronically signed.  LUPE ASHLEY M.D., RESIDENT RADIOLOGIST  This document has been electronically signed. May 29 2020  4:21PM (05-29-20 @ 16:21)      PHYSICAL EXAM:    GENERAL: NAD, well-developed, AAOx3  HEENT:  Atraumatic, Normocephalic. EOMI, PERRLA, conjunctiva and sclera clear, No JVD  PULMONARY: Crackles on exam ; No wheeze  CARDIOVASCULAR: left side AICD Regular rate and rhythm; No murmurs, rubs, or gallops  GASTROINTESTINAL: Soft, Nontender, Nondistended; Bowel sounds present  MUSCULOSKELETAL: 2+ LE edema pitting;  2+ Peripheral Pulses, No clubbing, cyanosis  NEUROLOGY: non-focal  SKIN: No rashes or lesions    ASSESSMENT & PLAN    85 year old F PMHx of dementia, Chr Afib on coumadin, COPD not on home O2, CVA with left side residual weakness, HTN, HfrEF s/p dual chambered AICD presenting with 2- 3 day hx of SOB and dry cough. Pt is AOx3 but has dementia and is forgetful history corroborated by daughter Goldie who is the primary caretaker (phone number #[phone]).  COVID ruled out.     # SOB, likely due to exacerabation of  pulmonary fibrosis   - No signs of COPD exacerbation, no signs of bacterial PNA, no viral PNA  - patient requiring oxygen this AM - asn per Dr. Urrutia who says patient has pulmonary fibrosis and should be discharged home w/ supplemental oxygen   - CXR 5/28- scattered airspace and interstitial opacities, viral PNA not excluded  - O2 supplement to keep O2 sat >92%, titrate down when possible ; currently on 2L saturating 96%, room air: 88%  - ECHO 5/29 : EF 50-55%, G1DD, structurally normal MV, sclerotic aortic valve with normal opening, borderline pulm HTN  - no leukocytosis, afebrile, COVID swab (5/28) 5/30 negative  - , continue Lasix 40 mg PO  - Jagdish winters insists on home o2 as Dr. Urrutia suggested  - Despite treatment with lasix, patient is still hypoxic due to pulmonary fibrosis  O2%RA: 88% at rest  %O2 on 2L 96% at rest  Patient tested in chronic stable state  Daughter aware going home on oxygen      # UTI ruled out  - off ABx    #HFpEF/ cardiomyopathy s/p dual chambered AICD  - c/w  toprol 100mg daily and lasix 40 mg PO  - strict I&Os, daily weights     # Chronic A. Fib   - INR supratherapeutic today 7, fu am INR  - hold coumadin   - c/w diltiazem 120 CD daily, lopressor    # Dementia with anxiety and behavioral disturbance   - Zyprexa and Ativan changed to QHS  - baseline dementia worsened, but currently AAOx3   - was recently switched off trazadone to lorazepam 0.5 daily   - follows with Dr. Sutton   - c/w home meds - zoloft 150mg, Zyprexa 7.5mg, ativan (at bedtime)   - consulted psych for any dosage adjustments as patient has burst of agitation    # Hx of CVA with left side hemiparesis residual  - Atorvastatin 80mg daily    PT/REHAB   ACTIVITY: Increase as tolerated   DVT PPX: hold coumadin , SCD for now  GI PPX: none  DIET: DASH TLC  CODE: Full   DIsposition: from home with home care; discussed plan with vianey TRIVEDI  Pending: home O2 delivery, psych, INR fu

## 2020-06-02 NOTE — BEHAVIORAL HEALTH ASSESSMENT NOTE - NSBHCHARTREVIEWIMAGING_PSY_A_CORE FT
< from: Xray Chest 1 View-PORTABLE IMMEDIATE (05.28.20 @ 09:40) >    EXAM:  XR CHEST PORTABLE IMMED 1V            PROCEDURE DATE:  05/28/2020        INTERPRETATION:  Clinical History / Reason for exam: Sepsis    Comparison : Chest radiograph 3/8/2019.    Technique/Positioning: Portable AP. Shallow inspiration.    Findings:    Support devices: Stable left-sided dual-chamber AICD    Cardiac/mediastinum/hilum: Stable.    Lung parenchyma/Pleura: Scattered airspace and interstitial opacities. No pneumothorax.    Skeleton/soft tissues: Stable.    Impression:      Scattered airspace and interstitial opacities. Follow-up to resolution is recommended. Viral pneumonia is not excluded.      RANDY DAILY M.D., ATTENDING RADIOLOGIST  This document has been electronically signed. May 28 2020 10:30AM      < end of copied text >

## 2020-06-02 NOTE — BEHAVIORAL HEALTH ASSESSMENT NOTE - DESCRIPTION
dementia, CVA, dementia, afib on coumadin, COPD not on home O2, CVA with left side residual weakness, HTN, HfeEf s/p AICD

## 2020-06-02 NOTE — BEHAVIORAL HEALTH ASSESSMENT NOTE - RISK ASSESSMENT
Low Acute Suicide Risk patient has no past or present suicidal ideation or history of depression or suicide attempts, and thus does not warrant inpatient psychiatric hospitalization at this time

## 2020-08-25 NOTE — BEHAVIORAL HEALTH ASSESSMENT NOTE - NSBHCONSULTMEDAGITATION_PSY_A_CORE FT
Recommend Zyprexa 2.5mg Q 12 hrs P.O Q 6 hrs PRN for agitation. Please note that this medication can be given via the iontramuscular route if patient is sever Recommend Zyprexa 2.5mg Q 12 hrs P.O Q 6 hrs PRN for agitation. Please note that this medication can be given via the intramuscular route if patient is severely agitated and does not respond to re-direction or refuses the oral Zyprexa. Please check patient's QTC to make sure it is < 500  We do not recommend any benzodiazepines in this patient as they worsen delirium, can cause paradoxical agitation and predispose to falls in geriatric patients

## 2020-08-25 NOTE — ED ADULT NURSE NOTE - NSIMPLEMENTINTERV_GEN_ALL_ED
Implemented All Fall Risk Interventions:  [place] to call system. Call bell, personal items and telephone within reach. Instruct patient to call for assistance. Room bathroom lighting operational. Non-slip footwear when patient is off stretcher. Physically safe environment: no spills, clutter or unnecessary equipment. Stretcher in lowest position, wheels locked, appropriate side rails in place. Provide visual cue, wrist band, yellow gown, etc. Monitor gait and stability. Monitor for mental status changes and reorient to person, place, and time. Review medications for side effects contributing to fall risk. Reinforce activity limits and safety measures with patient and family.

## 2020-08-25 NOTE — BEHAVIORAL HEALTH ASSESSMENT NOTE - NSBHCONSULTRECOMMENDOTHER_PSY_A_CORE FT
Recommend social work eval and possible transfer to the Geriatric floor at [facility] as per daughter  Recommend encouraging patient to be more awake during the day so she can sleep at night

## 2020-08-25 NOTE — BEHAVIORAL HEALTH ASSESSMENT NOTE - NSBHCONSULTMEDS_PSY_A_CORE FT
Recommend   - Increasing to Depakote 250mg p.o TID  - Continue Trazodone 100mg bedtime  -Continue Zoloft 150mg daily  - Continue Zyprexa 7.5mg Q bedtime    NO BENZODIAZEPINES RECOMMENDED

## 2020-08-25 NOTE — ED PROVIDER NOTE - CLINICAL SUMMARY MEDICAL DECISION MAKING FREE TEXT BOX
worsening dementia, pt c/o cp & sob - sig cardio-pulm history - vss on baseline suppl O2 in ED, no signs of resp distress - ekg, cxr, labs unremarkable - see HPI for discussion w/pts daughter re: worsening dementia - admitted for inpatient SW consult & further mgmt

## 2020-08-25 NOTE — H&P ADULT - ATTENDING COMMENTS
Patient seen and examined independently. I agree with the resident's note, physical exam, and plan except as below.  Vital Signs Last 24 Hrs  T(C): 37.1 (25 Aug 2020 06:13), Max: 37.1 (25 Aug 2020 06:13)  T(F): 98.7 (25 Aug 2020 06:13), Max: 98.7 (25 Aug 2020 06:13)  HR: 63 (25 Aug 2020 12:55) (63 - 77)  BP: 160/70 (25 Aug 2020 12:55) (137/70 - 164/76)  BP(mean): --  RR: 16 (25 Aug 2020 06:13) (16 - 17)  SpO2: 99% (25 Aug 2020 06:13) (98% - 99%)  Penad  aaox2  [id number]  ctabl  mild edema bilat  left weankess    #dementia with behavioral disturbance - agitation - currently calm - had altercation with HHA at home  - psych eval - sees Dr dumont as oupt - need med titration   at home takes  zoloft 150, zyprexa 7.5, trazadone 125, depakote 125 q12(recently started a few weeks ago) and was started on seroquel 25mg 2 days ago  hold depakote and seroquel for now    #chronic afib - on coumadin - cont to monitor inr with coumadin  #chronic resp failure - copd - on home o2 at baseline  #chronic diastolic CHF - no decompensation - cont lasix po  #chronic cVA - cont home meds      discussed with daughter over phone- agitated at home and was sob/cp - all resolved now - no acs, no chf  need medication titration for behavioral disturbance then daughter will decide on home with 24 hr hha or placement   discussed with team

## 2020-08-25 NOTE — H&P ADULT - NSHPLABSRESULTS_GEN_ALL_CORE
LAB RESULTS:                        10.2   7.17  )-----------( 199      ( 25 Aug 2020 02:40 )             32.0         141  |  102  |  19  ----------------------------<  101<H>  3.6   |  30  |  1.0    Ca    9.1      25 Aug 2020 02:40    TPro  6.8  /  Alb  3.8  /  TBili  0.4  /  DBili  x   /  AST  21  /  ALT  11  /  AlkPhos  83  08    PT/INR - ( 25 Aug 2020 02:40 )   PT: 29.70 sec;   INR: 2.58 ratio         PTT - ( 25 Aug 2020 02:40 )  PTT:37.1 sec  Urinalysis Basic - ( 25 Aug 2020 04:55 )    Color: Yellow / Appearance: Clear / S.020 / pH: x  Gluc: x / Ketone: Negative  / Bili: Negative / Urobili: <2 mg/dL   Blood: x / Protein: 30 mg/dL / Nitrite: Negative   Leuk Esterase: Large / RBC: 2 /HPF / WBC 20 /HPF   Sq Epi: x / Non Sq Epi: 7 /HPF / Bacteria: Negative      Troponin T, Serum: <0.01 ng/mL (20 @ 02:40)    CARDIAC MARKERS ( 25 Aug 2020 02:40 )  x     / <0.01 ng/mL / x     / x     / x          RADIOLOGY:   Xray Chest 1 View-PORTABLE IMMEDIATE (20 @ 03:01) >Stable bilateral opacities. No pleural effusion or pneumothorax.      ALLERGIES: No Known Drug Allergies  Seafood (Unknown)  shellfish (Other)

## 2020-08-25 NOTE — BEHAVIORAL HEALTH ASSESSMENT NOTE - NSBHCHARTREVIEWVS_PSY_A_CORE FT
Vital Signs Last 24 Hrs  T(C): 37.1 (25 Aug 2020 06:13), Max: 37.1 (25 Aug 2020 06:13)  T(F): 98.7 (25 Aug 2020 06:13), Max: 98.7 (25 Aug 2020 06:13)  HR: 63 (25 Aug 2020 12:55) (63 - 77)  BP: 160/70 (25 Aug 2020 12:55) (137/70 - 164/76)  BP(mean): --  RR: 16 (25 Aug 2020 06:13) (16 - 17)  SpO2: 99% (25 Aug 2020 06:13) (98% - 99%)

## 2020-08-25 NOTE — BEHAVIORAL HEALTH ASSESSMENT NOTE - NSBHCONSULTFOLLOWAFTERCARE_PSY_A_CORE FT
Consider transfer to the Geriatric unit at [facility] as per daughter .  However if patient is discharged home, she can follow up with her outpatient psychiatrist Dr Melgoza at [facility] , [address] [place] [address], [place] [address], Phone number- [phone]

## 2020-08-25 NOTE — H&P ADULT - NSHPREVIEWOFSYSTEMS_GEN_ALL_CORE
CONSTITUTIONAL: No weakness, fevers or chills; No headaches  EYES: No visual changes, eye pain, or discharge  ENT: No vertigo; No ear pain or change in hearing; No sore throat or difficulty swallowing  NECK: No pain or stiffness  RESPIRATORY: No cough, wheezing, or hemoptysis; No shortness of breath  CARDIOVASCULAR: No chest pain or palpitations  GASTROINTESTINAL: No abdominal or epigastric pain; No nausea, vomiting, or hematemesis; No diarrhea or constipation; No melena or hematochezia  GENITOURINARY: No dysuria, frequency or hematuria  MUSCULOSKELETAL: No joint pain, no muscle pain, no weakness  NEUROLOGICAL: No numbness or weakness  SKIN: No itching or rashes

## 2020-08-25 NOTE — ED ADULT TRIAGE NOTE - CHIEF COMPLAINT QUOTE
BIBA from home   family called EMS for worsening dementia, states pt becoming combative at home and refusing to keep home oxygen NC in place and is worried pt will become SOB without oxygen so sent to ED for eval  pt uses 2LPM via NC at home

## 2020-08-25 NOTE — H&P ADULT - NSHPSOCIALHISTORY_GEN_ALL_CORE
Living Situation: Lives at daughter's house with HHA  Occupation: Retired  Tobacco Use: None  Alcohol Use: None  Drug Use: None

## 2020-08-25 NOTE — ED PROVIDER NOTE - PHYSICAL EXAMINATION
PE:  elderly f aaox3 calm, cooperative  skin warm, dry  ncat  neck supple no jvd  nl wob, good air entry bl, ctab rrr nl s1s2 no mrg  ctab no wrr  abd soft ntnd no palpable masses no rgr  back non-tender no cvat  ext no cce dpi  neuro aaox3 grossly nf exam   psych denies s/i, h/i, avh

## 2020-08-25 NOTE — ED ADULT NURSE NOTE - OBJECTIVE STATEMENT
Presents to ED from home via EMS for worsening dementia, fam states pt becoming combative at home and refusing to keep home oxygen NC in place and is worried pt will become SOB without oxygen so sent to ED for eval  pt uses 2LPM via NC at home.

## 2020-08-25 NOTE — BEHAVIORAL HEALTH ASSESSMENT NOTE - HPI (INCLUDE ILLNESS QUALITY, SEVERITY, DURATION, TIMING, CONTEXT, MODIFYING FACTORS, ASSOCIATED SIGNS AND SYMPTOMS)
Ms Duron is an 86 year old Citizen of [place] American woman, retired insurance worker , , has 1 adult daughter , with whom she resides with a history of Vascular dementia with behavioral disturbance,  who was admitted to the medical service for worsening agitation. Psychiatry consult was called for medication management of agitation.     On approach of the patient, she is observed to be lying on her bed yelling " somebody help me". When asked what she needs help with, she reports that she wants to get out of her bed and is looking for her daughter. Patient was informed that her daughter was at home and she asked if she could speak to her on the phone. patient reports that she does not know why she is here,. She reports that she has not been sleeping well and is does not feel comfortable. Patient reports feeling depressed with poor sleep but denies other symptoms suggestive of a decompensation of a major depressive disorder including current suicidal thoughts intent or plan . She denies acute symptoms of beverly or psychosis. She denies  current use of illicit drugs or alcohol.     Collateral information was obtained from patient's outpatient psychiatrist Dr Melgoza who reports that she saw patient about 2 weeks ago and she was at her baseline mental status. She reports that patient has vascular dementia with behavioral disturbance with episodes of yelling and verbal aggressiveness. She reports that patient however is not physically aggressive or violent. She reports that patient has been her current medication regimen for a few months and she was considering increasing the Depakote. She also reports that she had informed the patient's daughter about the possibility of admitting patient to the  geriatric unit at [facility].     Collateral information was also obtained from patient's daughter  who reports that patient has episodic insomnia and would walk around the house yelling in the middle of the night disturbing the neighbors. She reports that patient became verbally aggressive and inappropriate since she had her stroke and her behavior has significantly worsened. She reports that patient's medication does not seem to be controlling her agitation and she is not able to sleep. She reports that it is becoming difficult taking care of her however she does not want to place her in a nursing home . She reports that she feels her mother's medication needs to be adjusted. 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 Psychiatry consult was called for medication management of agitation.     On approach of the patient, she is observed to be lying on her bed yelling " somebody help me". When asked what she needs help with, she reports that she wants to get out of her bed and is looking for her daughter. Patient was informed that her daughter was at home and she asked if she could speak to her on the phone. patient reports that she does not know why she is here,. She reports that she has not been sleeping well and is does not feel comfortable. Patient reports feeling depressed with poor sleep but denies other symptoms suggestive of a decompensation of a major depressive disorder including current suicidal thoughts intent or plan . She denies acute symptoms of beverly or psychosis. She denies  current use of illicit drugs or alcohol.     Collateral information was obtained from patient's outpatient psychiatrist Dr Melgoza who reports that she saw patient about 2 weeks ago and she was at her baseline mental status. She reports that patient has vascular dementia with behavioral disturbance with episodes of yelling and verbal aggressiveness. She reports that patient can be physically aggressive . She reports that patient has been her current medication regimen for a few months and she was considering increasing the Depakote. She also reports that she had informed the patient's daughter about the possibility of admitting patient to the  geriatric unit at [facility].     Collateral information was also obtained from patient's daughter  who reports that patient has episodic insomnia and would be yelling in the middle of the night disturbing the neighbors. She reports that patient became verbally aggressive and inappropriate since she had her stroke and her behavior has significantly worsened. She reports that patient's medication does not seem to be controlling her agitation and she is not able to sleep. She reports that it is becoming difficult taking care of her despite 24 hour home health aides however she does not want to place her in a nursing home . She reports that she feels her mother's medication needs to be adjusted.

## 2020-08-25 NOTE — ED ADULT NURSE REASSESSMENT NOTE - NS ED NURSE REASSESS COMMENT FT1
pt assessed. pt severely agitated w. dementia. md nunez aware. gave haldol as per order. unaffective. pt still agitated screaming and yelling. attempted other measures to calm patient down also unsuccessful. as per md nunez he does not want to give patient other meds to calm patient down. will continue to monitor.
Bed alarm placed under pt and call bell placed within pts reach and advised to use it if help was needed.
No complaints of chest pain. Tele monitoring maintained. Vitals within patients normal. Transferred to oncoming shift in safe and stable condition with bed low, call bell within reach, bed rails up and bed alarm on. Toileting offered q4. Hourly rounding performed. Turned and positioned every 2 hours. Afebrile. Will continue to monitor.
Pt resting at this time. Linen changed and perineal care provided. Explained p.o.c to patient. Cardiac monitoring maintained. Will continue to monitor.

## 2020-08-25 NOTE — CHART NOTE - NSCHARTNOTEFT_GEN_A_CORE
Patient has known dementia with behavioral disturbance. Patient seen by behavioral health and on medications.     Depakote dose increased for now Patient has known dementia with behavioral disturbance. Patient seen by behavioral health and on medications.     Depakote dose increased for now to 250mg q8. The patient is also going to be getting Zyprexa 2.5mg PRN PO for acute agitation. IF she refuses it Zyprexa can be given IM instead.     Benzodiazepams are to be avoided. If needed a 1:1 constant obs can be used for redirection. Of note patient's behavior is just verbal calling out. She has not shown any physical aggression towards myself or other staff.     Clear sign out to be given on handoff to ensure regimen is adhered to and no benzos are given.

## 2020-08-25 NOTE — ED PROVIDER NOTE - OBJECTIVE STATEMENT
86F from home h/o dementia, AF on coumadin, CHF s/p aicd on lasix, cva w/mild resid L weakness, copd & pulm htn on home O2 2lnc, htn p/w sob. Per EMS pt's daughter called 911 for worsening dementia & c/o SOB. S/w pt's daughter (tel # in chart), lives w/pt and confirms worsening dementia over last few weeks, incl outburts & agitated/combative behavior. Has been an ongoing issue, pt currently has 24h HHA coverage, followed by [facility] Dr. Sutton who recently has depakote & seroquel added to her list of dementia meds (already on zoloft & zyprexa, not on Ativan since June 2020). Daughter rpts min improvement w/addition of 2 meds. Today pt was agitated toward her & HHA, was yelling that she felt sob & could not breathe, accomp by chest pain, and was refusing to keep O2 on. In ED pt AAOx3, wearing nc, endorses that she did have some sob & short episode of mid-sternal cp @ rest lasting few min. Admits to not wearing NC throughout today bc it was irritating her nose. No other complaints. No f/c, cough, nv, abd pain, edema. PMD [facility] Mulligan / Cardio Dr. Herrmann / Pulm Dr. Urrutia / [facility] Dr. Sutton.

## 2020-08-25 NOTE — H&P ADULT - HISTORY OF PRESENT ILLNESS
Patient is an 85yo F with PMHx of AFib on coumadin, CHF (s/p AICD on lasix, EF50-55%), CVA, COPD (home O2 at night 2L), HTN and pulmonary HTN who was brought in for worsening dementia and shortness of breath. According to daughter she has had worsening sundowning and agitation which came to a head last night in a verbal altercation with the home health aid and daughter. The patient is followed by [facility] who added Depakote to the patient's regimen 3 weeks ago, although the patient's daughter feels this has not improved her agitation.    Last night, patient complained of shortness of breath with a short episode of substernal CP at rest which has now resolved. Became agitated and removed nasal canula. The daughter also noticed some gurgling. Patient denies any specific complaints in the ED.

## 2020-08-25 NOTE — BEHAVIORAL HEALTH ASSESSMENT NOTE - SUMMARY
Ms Duron is an 86 year old woman with a history of Vascular dementia with behavioral disturbance who was was admitted to the medical service for worsening agitation. Psychiatry consult was called for medication management of agitation.   It appears that patient's current medication regiment is not adequately control patient's agitation. However it is important to note that patient's agitation does not involve physical aggression but mor verbal aggression yelling, disinhibition and  inappropriate behavior all which seem to be in the context of worsening neurocognitive impairment. She does not appear to be acutely psychotic , manic or depressed and denies current suicidal ideations, intent or plan.   At this time, patient is not considered an imminent danger to herself or others and does not need inpatient psychiatric hospitalization. She will however benefit from medication adjustment to target her disinhibitions and agitation. For now , we recommend increasing Depakote to 250mg p.o TID and continuing all her other psychotropic medications as prescribed by her outpatient psychiatrist. We also recommend social work evaluation and a potential transfer to the geriatric unit in [facility] . We do not recommend the use of any benzodiazepines in this patient as they worsen delirium, can cause paradoxical agitation and predispose to falls in geriatric patients. However if patient is severely agitated, and does not respond to re-direction, we recommend Zyprexa 2.5mg P.O Q 6 hrs PRN. We also recommend behavioral interventions and environmental modifications to help her orientation and help her feels safe. Ms Duron is an 86 year old woman with a history of Vascular dementia with behavioral disturbance who was was admitted to the medical service for worsening agitation. Psychiatry consult was called for medication management of agitation.   It appears that patient's current medication regimen is not adequately controlling patient's agitation. However it is important to note that patient's agitation does not involve physical aggression but mor verbal aggression yelling, disinhibition and  inappropriate behavior all which seem to be in the context of worsening neurocognitive impairment. She does not appear to be acutely psychotic , manic or depressed and denies current suicidal ideations, intent or plan.   At this time, patient is not considered an imminent danger to herself or others and does not need inpatient psychiatric hospitalization. She will however benefit from medication adjustment to target her disinhibitions and agitation. For now , we recommend increasing Depakote to 250mg p.o TID and continuing all her other psychotropic medications as prescribed by her outpatient psychiatrist. We also recommend social work evaluation and a potential transfer to the geriatric unit in [facility] . We do not recommend the use of any benzodiazepines in this patient as they worsen delirium, can cause paradoxical agitation and predispose to falls in geriatric patients. However if patient is severely agitated, and does not respond to re-direction, we recommend Zyprexa 2.5mg P.O Q 6 hrs PRN. We also recommend behavioral interventions and environmental modifications to help her orientation and help her feels safe.

## 2020-08-25 NOTE — BEHAVIORAL HEALTH ASSESSMENT NOTE - NSBHCHARTREVIEWLAB_PSY_A_CORE FT
10.2   7.17  )-----------( 199      ( 25 Aug 2020 02:40 )             32.0     08-25    141  |  102  |  19  ----------------------------<  101<H>  3.6   |  30  |  1.0    Ca    9.1      25 Aug 2020 02:40    TPro  6.8  /  Alb  3.8  /  TBili  0.4  /  DBili  x   /  AST  21  /  ALT  11  /  AlkPhos  83  08-25    Urinalysis (08.25.20 @ 04:55)    pH Urine: 6.5    Glucose Qualitative, Urine: Negative    Blood, Urine: Trace    Color: Yellow    Urine Appearance: Clear    Bilirubin: Negative    Ketone - Urine: Negative    Specific Gravity: 1.020    Protein, Urine: 30 mg/dL    Urobilinogen: <2 mg/dL    Nitrite: Negative    Leukocyte Esterase Concentration: Large

## 2020-08-25 NOTE — BEHAVIORAL HEALTH ASSESSMENT NOTE - RISK ASSESSMENT
Low Acute Suicide Risk Although patient has chronic medical illnesses, she does not have a primary psychiatric illness , has no history of suicide attempts , denies current suicidal ideations and has a supportive family, is compliant with her medication

## 2020-08-25 NOTE — BEHAVIORAL HEALTH ASSESSMENT NOTE - NSBHMEDSOTHERFT_PSY_A_CORE
As per Dr Melgoza patient is on Zyprexa 7.5mg P.O bedtime, Trazodone 100mg bedtime, Zoloft 150mg p.o Daily and Depakote 150mg P.O TID

## 2020-08-25 NOTE — H&P ADULT - NSHPPHYSICALEXAM_GEN_ALL_CORE
CONSTITUTIONAL: No acute distress, well-developed, well-groomed, AAOx3  HEAD: Atraumatic, normocephalic  EYES: EOM intact, PERRLA, conjunctiva and sclera clear  ENT: Supple, no masses, no thyromegaly, no bruits, no JVD; moist mucous membranes  PULMONARY: Clear to auscultation bilaterally; no wheezes, rales, or rhonchi  CARDIOVASCULAR: Regular rate and rhythm; no murmurs, rubs, or gallops  GASTROINTESTINAL: Soft, non-tender, non-distended; bowel sounds present  MUSCULOSKELETAL: 2+ peripheral pulses; no clubbing, no cyanosis, no edema  NEUROLOGY: Residual L sided weakness in upper and lower extremity  SKIN: No rashes or lesions; warm and dry

## 2020-08-25 NOTE — BEHAVIORAL HEALTH ASSESSMENT NOTE - DESCRIPTION
As per chart, patient has Atrial fibrillation, CHF s/p AICD , CVA, COPD on Home oxygen , hypertension, pulmonary HTN, bilateral bed sores

## 2020-08-25 NOTE — ED PROVIDER NOTE - NS ED ROS FT
Constitutional: No fever, chills, unintended weight loss.  Eyes:  No visual changes, eye pain or discharge.  ENMT:  No hearing changes, pain, no sore throat or runny nose, no difficulty swallowing  Cardiac:  see hpi  Respiratory:  No cough or respiratory distress. No hemoptysis. +copd/pulm htn.  GI:  No nausea, vomiting, diarrhea or abdominal pain.  :  No dysuria, frequency or burning.  MS:  No myalgia, muscle weakness, joint pain or back pain.  Neuro:  No headache or weakness.  No LOC.  Skin:  No skin rash.   Endocrine: No history of thyroid disease or diabetes.

## 2020-08-25 NOTE — ED PROVIDER NOTE - CARE PLAN
Principal Discharge DX:	Dementia  Secondary Diagnosis:	SOB (shortness of breath)  Secondary Diagnosis:	Chest pain

## 2020-08-26 NOTE — PROGRESS NOTE ADULT - SUBJECTIVE AND OBJECTIVE BOX
Patient is a 86y old  Female who presents with a chief complaint of Agitation (25 Aug 2020 11:02)      HPI:  Patient is an 87yo F with PMHx of AFib on coumadin, CHF (s/p AICD on lasix, EF50-55%), CVA, COPD (home O2 at night 2L), HTN and pulmonary HTN who was brought in for worsening dementia and shortness of breath. According to daughter she has had worsening sundowning and agitation which came to a head last night in a verbal altercation with the home health aid and daughter. The patient is followed by [facility] who added Depakote to the patient's regimen 3 weeks ago, although the patient's daughter feels this has not improved her agitation.    Last night, patient complained of shortness of breath with a short episode of substernal CP at rest which has now resolved. Became agitated and removed nasal canula. The daughter also noticed some gurgling. Patient denies any specific complaints in the ED. (25 Aug 2020 11:02)      PAST MEDICAL & SURGICAL HISTORY:  HTN (hypertension)  Cerebrovascular accident (CVA)  COPD (chronic obstructive pulmonary disease)  Atrial fibrillation  Systolic heart failure  AICD (automatic cardioverter/defibrillator) present  S/P appendectomy      Home Medications:  atorvastatin 80 mg oral tablet: 1 tab(s) orally once a day (at bedtime) (01 Jun 2020 10:15)  Depakote Sprinkles 125 mg oral delayed release capsule: 1 cap(s) orally 2 times a day (25 Aug 2020 10:45)  dilTIAZem 120 mg/24 hours oral capsule, extended release: 1 cap(s) orally once a day (01 Jun 2020 10:15)  docusate sodium 100 mg oral capsule: 1 cap(s) orally 3 times a day (13 Mar 2019 13:21)  furosemide 40 mg oral tablet: 1 tab(s) orally once a day (01 Jun 2020 10:15)  metoprolol succinate 100 mg oral tablet, extended release: 1 tab(s) orally once a day (01 Jun 2020 10:15)  Multiple Vitamins oral tablet: 1 tab(s) orally once a day (01 Jun 2020 10:15)  OLANZapine 7.5 mg oral tablet: 1 tab(s) orally once a day (at bedtime) (01 Jun 2020 10:15)  pantoprazole 40 mg oral delayed release tablet: 1 tab(s) orally once a day (before a meal) (01 Jun 2020 10:15)  sertraline 50 mg oral tablet: 3 tab(s) orally once a day (25 Aug 2020 11:00)      .  Tobacco use:   EtOH use:  Illicit drug use:    Living situation:    lactose (Unknown)  No Known Drug Allergies  Seafood (Unknown)  shellfish (Other)      FAMILY HISTORY:  No pertinent family history in first degree relatives      atorvastatin 80 milliGRAM(s) Oral at bedtime  chlorhexidine 4% Liquid 1 Application(s) Topical <User Schedule>  diltiazem    milliGRAM(s) Oral daily  diVALproex  milliGRAM(s) Oral every 8 hours  furosemide    Tablet 40 milliGRAM(s) Oral daily  metoprolol tartrate 50 milliGRAM(s) Oral two times a day  multivitamin 1 Tablet(s) Oral daily  OLANZapine 7.5 milliGRAM(s) Oral at bedtime  OLANZapine 2.5 milliGRAM(s) Oral every 12 hours PRN  pantoprazole    Tablet 40 milliGRAM(s) Oral before breakfast  senna 2 Tablet(s) Oral at bedtime  sertraline 150 milliGRAM(s) Oral daily  traZODone 100 milliGRAM(s) Oral at bedtime      Vital Signs Last 24 Hrs  T(C): 35.9 (26 Aug 2020 05:08), Max: 36.2 (25 Aug 2020 19:10)  T(F): 96.7 (26 Aug 2020 05:08), Max: 97.2 (25 Aug 2020 19:10)  HR: 68 (26 Aug 2020 05:08) (63 - 70)  BP: 147/70 (26 Aug 2020 05:08) (147/70 - 176/80)  BP(mean): --  RR: 18 (26 Aug 2020 05:08) (18 - 20)  SpO2: 97% (25 Aug 2020 19:41) (97% - 99%)    I&O's Summary    25 Aug 2020 07:01  -  26 Aug 2020 07:00  --------------------------------------------------------  IN: 100 mL / OUT: 0 mL / NET: 100 mL                              10.9   7.85  )-----------( 215      ( 26 Aug 2020 07:53 )             33.8       08-26    143  |  100  |  15  ----------------------------<  98  3.9   |  28  |  0.9    Ca    9.4      26 Aug 2020 07:53    TPro  6.8  /  Alb  3.8  /  TBili  0.4  /  DBili  x   /  AST  21  /  ALT  11  /  AlkPhos  83  08-25      CARDIAC MARKERS ( 25 Aug 2020 02:40 )  x     / <0.01 ng/mL / x     / x     / x                PHYSICAL EXAM:  GENERAL: NAD, lying in bed comfortably, asleep but arousable   HEAD:  Atraumatic, Normocephalic  ENT: Moist mucous membranes  NECK: Supple, No JVD  CHEST/LUNG: Clear to auscultation bilaterally; No rales, rhonchi, wheezing, or rubs. Unlabored respirations  HEART: Regular rate and rhythm; No murmurs, rubs, or gallops  ABDOMEN: Bowel sounds present; Soft, Nontender, Nondistended. No hepatomegaly   EXTREMITIES:  2+ Peripheral Pulses, brisk capillary refill. No clubbing, cyanosis, or edema  NERVOUS SYSTEM:  Alert & Oriented X3, speech clear. No deficits   SKIN: No rashes or lesions    IMAGES:  < from: 12 Lead ECG (08.25.20 @ 05:02) >  Ventricular Rate 64 BPM    Atrial Rate 64 BPM    P-R Interval 172 ms    QRS Duration 92 ms    Q-T Interval 446 ms    QTC Calculation(Bezet) 460 ms    P Axis 38 degrees    R Axis 114 degrees    T Axis 9 degrees    Diagnosis Line Atrial-sensed ventricular-paced rhythm  Abnormal ECG      < from: Xray Chest 1 View-PORTABLE IMMEDIATE (08.25.20 @ 03:01) >  Impression:    Stable bilateral opacities. No pleural effusion or pneumothorax. Patient is a 86y old  Female who presents with a chief complaint of Agitation (25 Aug 2020 11:02)      HPI:  Patient is an 87yo F with PMHx of AFib on coumadin, CHF (s/p AICD on lasix, EF50-55%), CVA, COPD (home O2 at night 2L), HTN and pulmonary HTN who was brought in for worsening dementia and shortness of breath. According to daughter she has had worsening sundowning and agitation which came to a head last night in a verbal altercation with the home health aid and daughter. The patient is followed by [facility] who added Depakote to the patient's regimen 3 weeks ago, although the patient's daughter feels this has not improved her agitation.    Last night, patient complained of shortness of breath with a short episode of substernal CP at rest which has now resolved. Became agitated and removed nasal canula. The daughter also noticed some gurgling. Patient denies any specific complaints in the ED. (25 Aug 2020 11:02)      PAST MEDICAL & SURGICAL HISTORY:  HTN (hypertension)  Cerebrovascular accident (CVA)  COPD (chronic obstructive pulmonary disease)  Atrial fibrillation  Systolic heart failure  AICD (automatic cardioverter/defibrillator) present  S/P appendectomy      Home Medications:  atorvastatin 80 mg oral tablet: 1 tab(s) orally once a day (at bedtime) (01 Jun 2020 10:15)  Depakote Sprinkles 125 mg oral delayed release capsule: 1 cap(s) orally 2 times a day (25 Aug 2020 10:45)  dilTIAZem 120 mg/24 hours oral capsule, extended release: 1 cap(s) orally once a day (01 Jun 2020 10:15)  docusate sodium 100 mg oral capsule: 1 cap(s) orally 3 times a day (13 Mar 2019 13:21)  furosemide 40 mg oral tablet: 1 tab(s) orally once a day (01 Jun 2020 10:15)  metoprolol succinate 100 mg oral tablet, extended release: 1 tab(s) orally once a day (01 Jun 2020 10:15)  Multiple Vitamins oral tablet: 1 tab(s) orally once a day (01 Jun 2020 10:15)  OLANZapine 7.5 mg oral tablet: 1 tab(s) orally once a day (at bedtime) (01 Jun 2020 10:15)  pantoprazole 40 mg oral delayed release tablet: 1 tab(s) orally once a day (before a meal) (01 Jun 2020 10:15)  sertraline 50 mg oral tablet: 3 tab(s) orally once a day (25 Aug 2020 11:00)      lactose (Unknown)  No Known Drug Allergies  Seafood (Unknown)  shellfish (Other)      FAMILY HISTORY:  No pertinent family history in first degree relatives      atorvastatin 80 milliGRAM(s) Oral at bedtime  chlorhexidine 4% Liquid 1 Application(s) Topical <User Schedule>  diltiazem    milliGRAM(s) Oral daily  diVALproex  milliGRAM(s) Oral every 8 hours  furosemide    Tablet 40 milliGRAM(s) Oral daily  metoprolol tartrate 50 milliGRAM(s) Oral two times a day  multivitamin 1 Tablet(s) Oral daily  OLANZapine 7.5 milliGRAM(s) Oral at bedtime  OLANZapine 2.5 milliGRAM(s) Oral every 12 hours PRN  pantoprazole    Tablet 40 milliGRAM(s) Oral before breakfast  senna 2 Tablet(s) Oral at bedtime  sertraline 150 milliGRAM(s) Oral daily  traZODone 100 milliGRAM(s) Oral at bedtime      Vital Signs Last 24 Hrs  T(C): 35.9 (26 Aug 2020 05:08), Max: 36.2 (25 Aug 2020 19:10)  T(F): 96.7 (26 Aug 2020 05:08), Max: 97.2 (25 Aug 2020 19:10)  HR: 68 (26 Aug 2020 05:08) (63 - 70)  BP: 147/70 (26 Aug 2020 05:08) (147/70 - 176/80)  BP(mean): --  RR: 18 (26 Aug 2020 05:08) (18 - 20)  SpO2: 97% (25 Aug 2020 19:41) (97% - 99%)    I&O's Summary    25 Aug 2020 07:01  -  26 Aug 2020 07:00  --------------------------------------------------------  IN: 100 mL / OUT: 0 mL / NET: 100 mL                              10.9   7.85  )-----------( 215      ( 26 Aug 2020 07:53 )             33.8       08-26    143  |  100  |  15  ----------------------------<  98  3.9   |  28  |  0.9    Ca    9.4      26 Aug 2020 07:53    TPro  6.8  /  Alb  3.8  /  TBili  0.4  /  DBili  x   /  AST  21  /  ALT  11  /  AlkPhos  83  08-25      CARDIAC MARKERS ( 25 Aug 2020 02:40 )  x     / <0.01 ng/mL / x     / x     / x                PHYSICAL EXAM:  GENERAL: NAD, lying in bed comfortably, asleep but arousable   HEAD:  Atraumatic, Normocephalic  ENT: Moist mucous membranes  NECK: Supple, No JVD  CHEST/LUNG: Clear to auscultation bilaterally; No rales, rhonchi, wheezing, or rubs. Unlabored respirations  HEART: Regular rate and rhythm; No murmurs, rubs, or gallops  ABDOMEN: Bowel sounds present; Soft, Nontender, Nondistended. No hepatomegaly   EXTREMITIES:  2+ Peripheral Pulses, brisk capillary refill. No clubbing, cyanosis, or edema  NERVOUS SYSTEM:  Alert & Oriented X3, speech clear. No deficits   SKIN: No rashes or lesions    IMAGES:  < from: 12 Lead ECG (08.25.20 @ 05:02) >  Ventricular Rate 64 BPM    Atrial Rate 64 BPM    P-R Interval 172 ms    QRS Duration 92 ms    Q-T Interval 446 ms    QTC Calculation(Bezet) 460 ms    P Axis 38 degrees    R Axis 114 degrees    T Axis 9 degrees    Diagnosis Line Atrial-sensed ventricular-paced rhythm  Abnormal ECG      < from: Xray Chest 1 View-PORTABLE IMMEDIATE (08.25.20 @ 03:01) >  Impression:    Stable bilateral opacities. No pleural effusion or pneumothorax.

## 2020-08-26 NOTE — PROGRESS NOTE ADULT - ASSESSMENT
85 yo F with PMHx of dementia, Afib, HTN, CHF, AICD placed, CVA, COPD presents for worsening agitation.    #Agitation  - Holding depakote for now due to increased agitation after getting it  - Haldol PRN for agitation  - valproate level 13  - on Depakote 250mg p.o TID  - Continue Trazodone 100mg bedtime  - Continue Zoloft 150mg daily  - Continue Zyprexa 7.5mg Q bedtime  - zyprexa 2.5 PRN q12 for agitation (can be given IM if pt is not taking PO)  - check QTC, currently <500  NO BENZODIAZEPINES RECOMMENDED     #AFib  - On coumadin  - check PT/INR  - hold warfarin for INR >3    #HTN  - C/w home meds    #CHF (EF 50-55%)  - C/w home lasix  - S/p AICD    #COPD  - 2L NC at home    #L Sided Weakness, residual from past CVA    GI ppx: [x] Not indicated   /   [] Pantoprazole 40mg PO Daily  DVT ppx: [x] Not indicated / [] Heparin 5000mg SubQ / [] Lovenox 40mg SubQ / [] SCDs  Fluids:  [x] PO  /  [] IVF  DISPO:  Patient to be discharged when condition(s) optimized: [x] Home  /  [] SNF  /  [] Long Term     Code: DNR/DNI per daughter (healthcare proxy) 87 yo F with PMHx of dementia, Afib, HTN, CHF, AICD placed, CVA, COPD presents for worsening agitation.    #Agitation  - Holding depakote for now due to increased agitation after getting it  - Haldol PRN for agitation  - valproate level 13  - on Depakote 250mg p.o TID  - Continue Trazodone 100mg bedtime  - Continue Zoloft 150mg daily  - Continue Zyprexa 7.5mg Q bedtime  - zyprexa 2.5 PRN q12 for agitation (can be given IM if pt is not taking PO)  - check QTC, currently <500  NO BENZODIAZEPINES RECOMMENDED     #AFib  - On coumadin  - check PT/INR  - hold warfarin for INR >3    #HTN  - C/w home meds    #CHF (EF 50-55%)  - C/w home lasix  - S/p AICD    #COPD  - 2L NC at home    #L Sided Weakness, residual from CVA 3 years ago    GI ppx: [x] Not indicated   /   [] Pantoprazole 40mg PO Daily  DVT ppx: [x] Not indicated / [] Heparin 5000mg SubQ / [] Lovenox 40mg SubQ / [] SCDs  Fluids:  [x] PO  /  [] IVF  DISPO:  Patient to be discharged when condition(s) optimized: [x] Home  /  [] SNF  /  [] Long Term     Code: DNR/DNI per daughter (healthcare proxy)

## 2020-08-26 NOTE — GOALS OF CARE CONVERSATION - ADVANCED CARE PLANNING - CONVERSATION DETAILS
Talked to patient's daughter Mouna Duron who is the healthcare proxy over the phone. Updated her on her mother's condition. She is medically stable but is here for increased agitation and SOB. The daughter is well aware of her mother's condition and she takes care of her at home. She says "if it comes to it that her heart stops, I want her to be DNR/DNI". I discussed at length what DNR/DNI means and the healthcare proxy wishes to make her DNR/DNI. Won discussed, verbal consent obtained and signed.

## 2020-08-26 NOTE — PROGRESS NOTE ADULT - ASSESSMENT
87 yo F with PMHx of dementia, Afib, HTN, CHF, AICD placed, CVA, COPD presents for worsening agitation.                                                                                                                                                                     DNR/DNI  Dementia with behavioral disturbance   sundowning overnight, this AM calm and confused  no evidence of infection, blood cx/ucx neg  psych eval noted, current meds:Depakote 25 Q8H, Trazodone 100 QHS, Zoloft 150 daily, Zyprexa 7.5 QHS, Zyprexa 2.5 Q6H PRN for agitation    monitor mental status     Chronic Afib  INR 3.5 today, hold coumadin tonight  check AM INR and dose coumadin if <3    chronic resp failure/History of COPD  oxygen requirements at baseline (has home oxygen    chronic diastolic CHF - no decompensation - cont lasix po    #Progress Note Handoff  Pending (specify):   monitor mental status with med titration  Family discussion: Plan of care discussed with patient's daughter, aware and agreeable   Disposition:  home with 24H HHA versus SNF    Aleyda Leach MD  s. [id number]

## 2020-08-26 NOTE — PROGRESS NOTE ADULT - SUBJECTIVE AND OBJECTIVE BOX
KRISTI SAMUELS  86y Female    CHIEF COMPLAINT:    Patient is a 86y old  Female who presents with a chief complaint of Agitation (26 Aug 2020 10:56)      INTERVAL HPI/OVERNIGHT EVENTS:    Patient seen and examined. Agitated overnight    ROS: All other systems are negative.    Vital Signs:    T(F): 96.7 (20 @ 05:08), Max: 97.2 (20 @ 19:10)  HR: 68 (20 @ 05:08) (66 - 70)  BP: 147/70 (20 @ 05:08) (147/70 - 176/80)  RR: 18 (20 @ 05:08) (18 - 20)  SpO2: 97% (20 @ 19:41) (97% - 99%)    25 Aug 2020 07:01  -  26 Aug 2020 07:00  --------------------------------------------------------  IN: 100 mL / OUT: 0 mL / NET: 100 mL      Daily Height in cm: 160.02 (25 Aug 2020 19:10)    Daily Weight in k (26 Aug 2020 05:04)    PHYSICAL EXAM:    GENERAL:  NAD  SKIN: No rashes or lesions  HEENT: Atraumatic. Normocephalic.  NECK: Supple, No JVD.   PULMONARY: CTA B/L. No wheezing. No rales  CVS: Normal S1, S2. Rate and Rhythm are regular.   ABDOMEN/GI: Soft, Nontender, Nondistended;  MSK:  Trace edema B/L LE. No clubbing or cyanosis  NEUROLOGIC: moves all extremities  PSYCH: Alert & oriented x 1, answers simple questions appropriately     Consultant(s) Notes Reviewed:  [x ] YES  [ ] NO  Care Discussed with Consultants/Other Providers [ x] YES  [ ] NO    LABS:                        10.9   7.85  )-----------( 215      ( 26 Aug 2020 07:53 )             33.8    143  |  100  |  15  ----------------------------<  98  3.9   |  28  |  0.9    Ca    9.4      26 Aug 2020 07:53    TPro  6.8  /  Alb  3.8  /  TBili  0.4  /  DBili  x   /  AST  21  /  ALT  11  /  AlkPhos  83      PT/INR - ( 26 Aug 2020 07:53 )   PT: >40.00 sec;   INR: 3.49 ratio       PTT - ( 25 Aug 2020 02:40 )  PTT:37.1 sec  Serum Pro-Brain Natriuretic Peptide: 372 pg/mL (20 @ 02:40)    Trop <0.01, CKMB --, CK --, 20 @ 02:40    RADIOLOGY & ADDITONAL TESTS:  Imaging or report Personally Reviewed:  [x] YES  [ ] NO  EKG reviewed: [x] YES  [ ] NO    Medications:  Standing  atorvastatin 80 milliGRAM(s) Oral at bedtime  chlorhexidine 4% Liquid 1 Application(s) Topical <User Schedule>  diltiazem    milliGRAM(s) Oral daily  diVALproex  milliGRAM(s) Oral every 8 hours  furosemide    Tablet 40 milliGRAM(s) Oral daily  metoprolol tartrate 50 milliGRAM(s) Oral two times a day  multivitamin 1 Tablet(s) Oral daily  OLANZapine 7.5 milliGRAM(s) Oral at bedtime  pantoprazole    Tablet 40 milliGRAM(s) Oral before breakfast  senna 2 Tablet(s) Oral at bedtime  sertraline 150 milliGRAM(s) Oral daily  traZODone 100 milliGRAM(s) Oral at bedtime    PRN Meds  OLANZapine 2.5 milliGRAM(s) Oral every 12 hours PRN

## 2020-08-27 NOTE — PROGRESS NOTE ADULT - SUBJECTIVE AND OBJECTIVE BOX
Patient is a 86y old  Female who presents with a chief complaint of Agitation (26 Aug 2020 13:24)      HPI:  Patient is an 85yo F with PMHx of AFib on coumadin, CHF (s/p AICD on lasix, EF50-55%), CVA, COPD (home O2 at night 2L), HTN and pulmonary HTN who was brought in for worsening dementia and shortness of breath. According to daughter she has had worsening sundowning and agitation which came to a head last night in a verbal altercation with the home health aid and daughter. The patient is followed by [facility] who added Depakote to the patient's regimen 3 weeks ago, although the patient's daughter feels this has not improved her agitation.    Last night, patient complained of shortness of breath with a short episode of substernal CP at rest which has now resolved. Became agitated and removed nasal canula. The daughter also noticed some gurgling. Patient denies any specific complaints in the ED. (25 Aug 2020 11:02)      PAST MEDICAL & SURGICAL HISTORY:  HTN (hypertension)  Cerebrovascular accident (CVA)  COPD (chronic obstructive pulmonary disease)  Atrial fibrillation  Systolic heart failure  AICD (automatic cardioverter/defibrillator) present  S/P appendectomy      Home Medications:  atorvastatin 80 mg oral tablet: 1 tab(s) orally once a day (at bedtime) (01 Jun 2020 10:15)  Depakote Sprinkles 125 mg oral delayed release capsule: 1 cap(s) orally 2 times a day (25 Aug 2020 10:45)  dilTIAZem 120 mg/24 hours oral capsule, extended release: 1 cap(s) orally once a day (01 Jun 2020 10:15)  docusate sodium 100 mg oral capsule: 1 cap(s) orally 3 times a day (13 Mar 2019 13:21)  furosemide 40 mg oral tablet: 1 tab(s) orally once a day (01 Jun 2020 10:15)  metoprolol succinate 100 mg oral tablet, extended release: 1 tab(s) orally once a day (01 Jun 2020 10:15)  Multiple Vitamins oral tablet: 1 tab(s) orally once a day (01 Jun 2020 10:15)  OLANZapine 7.5 mg oral tablet: 1 tab(s) orally once a day (at bedtime) (01 Jun 2020 10:15)  pantoprazole 40 mg oral delayed release tablet: 1 tab(s) orally once a day (before a meal) (01 Jun 2020 10:15)  sertraline 50 mg oral tablet: 3 tab(s) orally once a day (25 Aug 2020 11:00)      .  Tobacco use:   EtOH use:  Illicit drug use:    Living situation:    lactose (Unknown)  No Known Drug Allergies  Seafood (Unknown)  shellfish (Other)      FAMILY HISTORY:  No pertinent family history in first degree relatives      atorvastatin 80 milliGRAM(s) Oral at bedtime  chlorhexidine 4% Liquid 1 Application(s) Topical <User Schedule>  diltiazem    milliGRAM(s) Oral daily  diVALproex  milliGRAM(s) Oral every 8 hours  furosemide    Tablet 40 milliGRAM(s) Oral daily  metoprolol tartrate 50 milliGRAM(s) Oral two times a day  multivitamin 1 Tablet(s) Oral daily  OLANZapine 7.5 milliGRAM(s) Oral at bedtime  OLANZapine 2.5 milliGRAM(s) Oral every 12 hours PRN  pantoprazole    Tablet 40 milliGRAM(s) Oral before breakfast  senna 2 Tablet(s) Oral at bedtime  sertraline 150 milliGRAM(s) Oral daily  traZODone 100 milliGRAM(s) Oral at bedtime      Vital Signs Last 24 Hrs  T(C): 36.1 (27 Aug 2020 05:26), Max: 36.4 (26 Aug 2020 20:57)  T(F): 96.9 (27 Aug 2020 05:26), Max: 97.5 (26 Aug 2020 20:57)  HR: 62 (27 Aug 2020 05:26) (62 - 67)  BP: 119/67 (27 Aug 2020 05:26) (119/67 - 157/82)  BP(mean): --  RR: 18 (27 Aug 2020 05:26) (18 - 19)  SpO2: --    I&O's Summary    26 Aug 2020 07:01  -  27 Aug 2020 07:00  --------------------------------------------------------  IN: 200 mL / OUT: 0 mL / NET: 200 mL                              11.6   8.14  )-----------( 242      ( 27 Aug 2020 08:09 )             36.3       08-27    140  |  98  |  18  ----------------------------<  109<H>  4.0   |  30  |  1.1    Ca    9.4      27 Aug 2020 08:09            PHYSICAL EXAM:  GENERAL: NAD, lying in bed comfortably, asleep but arousable. Confused.   HEAD:  Atraumatic, Normocephalic  ENT: Moist mucous membranes  NECK: Supple, No JVD  CHEST/LUNG: Clear to auscultation bilaterally; No rales, rhonchi, wheezing, or rubs. Unlabored respirations  HEART: Regular rate and rhythm; No murmurs, rubs, or gallops  ABDOMEN: Bowel sounds present; Soft, Nontender, Nondistended. No hepatomegaly   EXTREMITIES:  2+ Peripheral Pulses, brisk capillary refill. No clubbing, cyanosis, or edema  NERVOUS SYSTEM:  Alert & Oriented X2, speech clear. No deficits   MSK: L sided weakness   SKIN: No rashes or lesions    IMAGES:  < from: 12 Lead ECG (08.25.20 @ 05:02) >  Ventricular Rate 64 BPM  Atrial Rate 64 BPM  P-R Interval 172 ms  QRS Duration 92 ms  Q-T Interval 446 ms  QTC Calculation(Bezet) 460 ms  P Axis 38 degrees  R Axis 114 degrees  T Axis 9 degrees  Diagnosis Line Atrial-sensed ventricular-paced rhythm  Abnormal ECG  < from: Xray Chest 1 View-PORTABLE IMMEDIATE (08.25.20 @ 03:01) >  Impression:  Stable bilateral opacities. No pleural effusion or pneumothorax.

## 2020-08-27 NOTE — PROGRESS NOTE BEHAVIORAL HEALTH - CASE SUMMARY
Ms Duron is an 86 year old woman with a history of vascular dementia with behavioral disturbance who was admitted to the medical service for worsening agitation. Psychiatry consult was called for medication management of agitation.   It appears patient is in better behavioral control and is reported to be sleeping better with good tolerability of the medication adjustment’s made. Although patient continues to be verbally inappropriate and yell at staff, patient is not said to be agitated or physically aggressive. Her behavior continues to be as a result of her neurocognitive impairment and not because of an acute psychotic, beverly or depressive episode.   At this time, patient is not considered an imminent danger to herself or others and does not need inpatient psychiatric hospitalization. For now, patient will benefit from continuation of Depakote 250mg p.o TID , Zyprexa 7.5mg bedtime, Zoloft 150mg daily and trazodone 150mg daily in addition to  Zyprexa 2.5mg P.O Q 6 hrs PRN for severe agitation . We also recommend behavioral interventions and environmental modifications to help her orientation and help her feels safe. Patient’s daughter and outpatient psychiatrist were informed of the medication changes prior to onset and she was agreeable to the changes.

## 2020-08-27 NOTE — PROGRESS NOTE ADULT - SUBJECTIVE AND OBJECTIVE BOX
KRISTI SAMUELS  86y Female    CHIEF COMPLAINT:    Patient is a 86y old  Female who presents with a chief complaint of Agitation (27 Aug 2020 10:27)      INTERVAL HPI/OVERNIGHT EVENTS:    Patient seen and examined. No acute events overnight. more agitated this morning    ROS: All other systems are negative.    Vital Signs:    T(F): 96.9 (08-27-20 @ 05:26), Max: 97.5 (08-26-20 @ 20:57)  HR: 62 (08-27-20 @ 05:26) (62 - 67)  BP: 119/67 (08-27-20 @ 05:26) (119/67 - 157/82)  RR: 18 (08-27-20 @ 05:26) (18 - 19)    26 Aug 2020 07:01  -  27 Aug 2020 07:00  --------------------------------------------------------  IN: 200 mL / OUT: 0 mL / NET: 200 mL    PHYSICAL EXAM:    GENERAL:  NAD  SKIN: No rashes or lesions  HEENT: Atraumatic. Normocephalic.   NECK: Supple, No JVD.   PULMONARY: CTA B/L. No wheezing. No rales  CVS: Normal S1, S2. Rate and Rhythm are regular.  ABDOMEN/GI: Soft, Nontender, Nondistended; BS present  MSK:  No edema B/L LE. no clubbing or cyanosis  NEUROLOGIC: moves all extremities  PSYCH: Alert & oriented x 1    Consultant(s) Notes Reviewed:  [x ] YES  [ ] NO  Care Discussed with Consultants/Other Providers [ x] YES  [ ] NO    LABS:                        11.6   8.14  )-----------( 242      ( 27 Aug 2020 08:09 )             36.3     140  |  98  |  18  ----------------------------<  109<H>  4.0   |  30  |  1.1    Ca    9.4      27 Aug 2020 08:09      PT/INR - ( 27 Aug 2020 08:09 )   PT: >40.00 sec;   INR: 6.03 ratio      Serum Pro-Brain Natriuretic Peptide: 372 pg/mL (08-25-20 @ 02:40)    Trop <0.01, CKMB --, CK --, 08-25-20 @ 02:40    RADIOLOGY & ADDITIONAL TESTS:  Imaging or report Personally Reviewed:  [x] YES  [ ] NO  EKG reviewed: [x] YES  [ ] NO    Medications:  Standing  atorvastatin 80 milliGRAM(s) Oral at bedtime  chlorhexidine 4% Liquid 1 Application(s) Topical <User Schedule>  diltiazem    milliGRAM(s) Oral daily  diVALproex  milliGRAM(s) Oral every 8 hours  furosemide    Tablet 40 milliGRAM(s) Oral daily  metoprolol tartrate 50 milliGRAM(s) Oral two times a day  multivitamin 1 Tablet(s) Oral daily  OLANZapine 7.5 milliGRAM(s) Oral at bedtime  pantoprazole    Tablet 40 milliGRAM(s) Oral before breakfast  senna 2 Tablet(s) Oral at bedtime  sertraline 150 milliGRAM(s) Oral daily  traZODone 100 milliGRAM(s) Oral at bedtime    PRN Meds  OLANZapine 2.5 milliGRAM(s) Oral every 12 hours PRN

## 2020-08-27 NOTE — PROGRESS NOTE BEHAVIORAL HEALTH - DETAILS
Patient reports difficulty urinating Patient is unable to walk depakote has her sleeping during the day

## 2020-08-27 NOTE — PROGRESS NOTE ADULT - ASSESSMENT
87 yo F with PMHx of dementia, Afib, HTN, CHF, AICD placed, CVA, COPD presents for worsening agitation.                                                                                                                                                                     DNR/DNI  Dementia with behavioral disturbance   sundowning overnight, this AM agitated as well  no evidence of infection, blood cx neg, pending urine cx  psych eval noted, current meds:Depakote 25 Q8H, Trazodone 100 QHS, Zoloft 150 daily, Zyprexa 7.5 QHS, Zyprexa 2.5 Q6H PRN for agitation    discussed with psychiatry - recommend geriatric unit in [facility]  monitor mental status     Chronic Afib  INR 6 today, hold coumadin tonight  check AM INR and dose coumadin if <3  give vit K 5mg  if INR >10 or bleeding    chronic resp failure/History of COPD  oxygen requirements at baseline (has home oxygen    chronic diastolic CHF - no decompensation - cont lasix po    #Progress Note Handoff  Pending (specify):   monitor mental status with med titration  Family discussion: Plan of care discussed with patient's daughter, aware and agreeable   Disposition:  home with 24H HHA versus SNF versus geriatric unit in [facility], CM aware    Aleyda Leach MD  s. [phone]

## 2020-08-27 NOTE — PROGRESS NOTE BEHAVIORAL HEALTH - NSBHFUPINTERVALHXFT_PSY_A_CORE
Chart reviewed. patient seen and evaluated at bedtime. Per nursing staff and primary care team, patient appears to have improved in sleep and agitation but only mildly. Nursing staff reports patient was able to sleep last night after receiving zyprexa.     Upon approach patient found to be sleeping comfortably in bed. Patient easily woken up with verbal stimulation. During interview patient was calm and cooperative. Patient reports that she is doing well but that she was upset with staff. Ms. Duron reports that her sleep has been okay and is unsure if medication adjustment has helped her sleep as she believes she is sleeping about the same as she was before medication adjustment. She reports that she has mild difficulty falling asleep as she spends much time watch television. She also reports she often wakes up at night due to urge to go to the bathroom. She reports she was upset last night as she was requesting water     Ms. Duron denies having any suicidal ideations , thoughts or plans with intention. Ms. Duron denies homicidal ideations. She denies paranoya, denies hearing voice or seeing things other people have not.     Daughter Mouna ([phone]) reached via phone. Per daughter, daughter has been unable to see mother as Mouna is scared of anne marie covid, however, she has been able to FaceTime Evelyn with the help of Nurse. Mouna reports that she understands her mother continues to be altered and out of control at night described as patient being aggitated and screaming at staff.   At night called, reported patient sleeping on and off. lately has been closing her eyes and just keeping them closed. Xanax never helped her. has been on lorazepam, nothing. she needs a medication that will help her sleep during the night and awake during the day. Trazadone 100 worked for a while but no longer helps.   Zyprexa 10, became bitter angry, nasty screaming. Chart reviewed. patient seen and evaluated at bedtime. Per nursing staff and primary care team, patient appears to have improved in sleep and agitation but only mildly. Nursing staff reports patient was able to sleep last night after receiving zyprexa.     Upon approach patient found to be sleeping comfortably in bed. Patient easily woken up with verbal stimulation. During interview patient was calm and cooperative. Patient reports that she is doing well but that she was upset with staff. Ms. Duron reports that her sleep has been okay and is unsure if medication adjustment has helped her sleep as she believes she is sleeping about the same as she was before medication adjustment. She reports that she has mild difficulty falling asleep as she spends much time watch television. She also reports she often wakes up at night due to urge to go to the bathroom. Ms. Duron reports she did become upset last night as she was requesting water but reports that staff ignored request and called her "crazy". Ms. Duron continues to ruminate about episode troughtout interview and frequently reports " I'm not that kind of person"     's working to have patient transferred to [facility] upon medical clearance.   Ms. Duron denies having any suicidal ideations , thoughts or plans with intention. Ms. Duron denies homicidal ideations. She denies fear that anybody is after her or trying to hurt her. She denies hearing voice or seeing things other people have not.     Nursing staff reports patient now sleeping during the day and reports she becomes agitated during the evening and at night. Nurse Iliana reports patient was able to sleep after receiving zyprexa last night. Per Dr. Carias, she believes patient sleep and agitation have improved mildly.     Daughter Mouna ([phone]) reached via phone. Per daughter, daughter has been unable to see mother as Mouna is scared of anne marie covid, however, she has been able to FaceTime Evelyn with the help of Nurse. Mouna reports that she understands her mother continues to be altered and "out of control" at night described as patient being agitated and screaming at staff.   Daughter reports that since medication adjustment two days ago, she has found that her mother is sleeping more during the day. Mouna reports she would like to have a medication that would keep her awake during the day and sleeping at night. Mouna reports that Xanax and lorazepam have both never helped Ms. Rivas. Mouna reports Trazadone 100 used to work but had gradually lost effectiveness during the past 3 weeks. Concerning agitation, Ms. Freire reports she understands that her aggression is likely due to dementia as it all began after the patient's stroke. Chart reviewed. patient seen and evaluated at bedtime. Per nursing staff and primary care team, patient appears to have improved in sleep and agitation. Nursing staff reports patient was able to sleep last night after receiving standing zyprexa.     Upon approach patient found to be sleeping comfortably in bed. Patient easily woken up with verbal stimulation. During interview patient was calm and cooperative. Patient reports that she is doing well but that she was upset with staff. Ms. Duron reports that her sleep has been okay and is unsure if medication adjustment has helped her sleep as she believes she is sleeping about the same as she was before medication adjustment. She reports that she has mild difficulty falling asleep as she spends much time watch television. She also reports she often wakes up at night due to urge to go to the bathroom. Ms. Duron reports she did become upset last night as she was requesting water but reports that staff ignored request and called her "crazy". Ms. Duron continues to ruminate about episode throughout interview and frequently reports " I'm not that kind of person"     Ms. Duron denies having any suicidal ideations , thoughts or plans with intention. Ms. Duron denies homicidal ideations. She denies fear that anybody is after her or trying to hurt her. She denies hearing voice or seeing things other people have not.     Nursing staff reports patient was able to sleep last night after standing zyprexa given. Nursing staff reports at times patient becomes agitated during the evening and at night but not-violent. Per Dr. Carias, she believes patient sleep and agitation have improved mildly.     's working to have patient transferred to [facility] upon medical clearance.     Daughter Mouna ([phone]) reached via phone. Per daughter, daughter has been unable to see mother as Mouna is scared of anne marie covid, however, she has been able to FaceTime Evelyn with the help of Nurse. Mouna reports that she understands her mother continues to be altered and "out of control" at night described as patient being agitated and screaming at staff.  Daughter reports that since medication adjustment two days ago, she has found that her mother is sleeping more during the day. Mouna reports she would like to have a medication that would keep her awake during the day and sleeping at night. Mouna reports that Xanax and lorazepam have both never helped Ms. Rivas. Mouna reports Trazadone 100 used to work but had gradually lost effectiveness during the past 3 weeks. Concerning agitation, Ms. Freire reports she understands that her aggression is likely due to dementia as it all began after the patient's stroke.

## 2020-08-27 NOTE — PROGRESS NOTE BEHAVIORAL HEALTH - SUMMARY
Ms Duron is an 86 year old woman with a history of Vascular dementia with behavioral disturbance who was admitted to the medical service for worsening agitation. Psychiatry consult was called for medication management of agitation.   On initial evaluation, it was found that patient's agitation and sleep were inadequately controlled. As agitation was non-physical and likely as a result of vascular dementia, Medication adjustment was suggested. Evaluation today found that new regiment of depakote 250po TID has caused patient to be sleeping throughout the day. Agitation has only mildly decreased but it may be due to her sleeping during the day instead of being agitated.     Plan:   -   - recommend social work evaluation and a potential transfer to the geriatric unit in [facility]  - Avoid use of any benzodiazepines in this patient as they worsen delirium, can cause paradoxical agitation and predispose to falls in geriatric patients.   - If patient is agitated, recommend behavioral interventions and environmental modifications to help her orientation and help her feels safe. If severely agitated, and does not respond to re-direction, we recommend Zyprexa 2.5mg P.O Q 6 hrs PRN. Ms Duron is an 86 year old woman with a history of Vascular dementia with behavioral disturbance who was admitted to the medical service for worsening agitation. Psychiatry consult was called for medication management of agitation.   On initial evaluation, it was found that patient's agitation and sleep were inadequately controlled. As agitation was non-physical and likely as a result of vascular dementia, Medication adjustment was suggested. Evaluation today found that new regiment of depakote 250po TID has caused patient to be sleeping throughout the day. Agitation has only mildly decreased but it may be due to her sleeping during the day instead of being agitated. Ms. Duron is of no immanent danger to self or others and therefore not a candidate for IPP.    Plan:   - No IPP  - continue standing medication of: Depakote 250mg p.o TID, Trazodone 100mg bedtime, Zoloft 150mg daily, Zyprexa 7.5mg Q bedtime  - recommend social work evaluation and a potential transfer to the geriatric unit in [facility]  - Avoid use of any benzodiazepines in this patient as they worsen delirium, can cause paradoxical agitation and predispose to falls in geriatric patients.   - If patient is agitated, recommend behavioral interventions and environmental modifications to help her orientation and help her feels safe. If severely agitated, and does not respond to re-direction, we recommend Zyprexa 2.5mg P.O Q 6 hrs PRN.

## 2020-08-27 NOTE — PROGRESS NOTE BEHAVIORAL HEALTH - NSBHCONSULTMEDS_PSY_A_CORE FT
- Continue Depakote 250mg p.o TID  - Continue Trazodone 100mg bedtime  - Continue Zoloft 150mg daily  - Continue Zyprexa 7.5mg Q bedtime

## 2020-08-27 NOTE — PROGRESS NOTE ADULT - ASSESSMENT
85 yo F with PMHx of dementia, Afib, HTN, CHF, AICD placed, CVA, COPD presents for worsening agitation.    #Agitation  - Holding depakote for now due to increased agitation after getting it  - Haldol PRN for agitation  - valproate level 13  - on Depakote 250mg p.o TID  - Continue Trazodone 100mg bedtime  - Continue Zoloft 150mg daily  - Continue Zyprexa 7.5mg Q bedtime  - zyprexa 2.5 PRN q12 for agitation (can be given IM if pt is not taking PO)  - check QTC, currently <500  NO BENZODIAZEPINES RECOMMENDED     #AFib  - On coumadin  - check PT/INR  - hold warfarin for INR >3    #HTN  - C/w home meds    #CHF (EF 50-55%)  - C/w home lasix  - S/p AICD    #COPD  - 2L NC at home    #L Sided Weakness, 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

## 2020-08-28 NOTE — CHART NOTE - NSCHARTNOTEFT_GEN_A_CORE
Spoke with patient's daughter, Ms. Mouna Duron, regarding patient's current medication regimen. Will continue current regimen, encourage taking medications as prescribed.

## 2020-08-28 NOTE — PROGRESS NOTE ADULT - ASSESSMENT
87 yo F with PMHx of dementia, Afib, HTN, CHF, AICD placed, CVA, COPD presents for worsening agitation.                                                                                                                                                                     DNR/DNI  Dementia with behavioral disturbance   sundowning overnight, calmer in AM  no evidence of infection, blood cx neg  Urine cx - 10-49K Gm + orgs  psych eval noted, current meds:Depakote 25 Q8H, Trazodone 100 QHS, Zoloft 150 daily, Zyprexa 7.5 QHS, Zyprexa 2.5 Q6H PRN for agitation    discussed with psychiatry - recommend geriatric unit in [facility]  no beds available  start Keflex for possible UTI as the cause of worsening agitation  monitor mental status     Chronic Afib  INR 3.2 today, s/p vit K yesterday  check AM INR and dose coumadin if <3    chronic resp failure/History of COPD  oxygen requirements at baseline (has home oxygen    chronic diastolic CHF - no decompensation - cont lasix po    #Progress Note Handoff  Pending (specify):   monitor mental status, urine sensitivities  Family discussion: will contact daughter for dc planning today  Disposition:  home with 24H HHA, anticipate weekend (initially planned for [facility] unit at [facility] but not accepting pt at this time)    Aleyda Leach MD  s. [phone]

## 2020-08-28 NOTE — PROGRESS NOTE ADULT - ASSESSMENT
87 yo F with PMHx of dementia, Afib, HTN, CHF, AICD placed, CVA, COPD presents for worsening agitation.    #Agitation  - Holding depakote for now due to increased agitation after getting it  - Haldol PRN for agitation  - valproate level 13  - on Depakote 250mg p.o TID  - Continue Trazodone 100mg bedtime  - Continue Zoloft 150mg daily  - Continue Zyprexa 7.5mg Q bedtime  - zyprexa 2.5 PRN q12 for agitation (can be given IM if pt is not taking PO)  - check QTC, <500 on last EKG  NO BENZODIAZEPINES RECOMMENDED     #AFib  - On coumadin  - check PT/INR  - hold warfarin for INR >3  - INR 3 today after 5 PO vitamin K yesterday (INR was 6)    #HTN  - C/w home meds    #CHF (EF 50-55%)  - C/w home lasix  - S/p AICD    #COPD  - 2L NC at home    #L Sided Weakness, 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

## 2020-08-28 NOTE — DISCHARGE NOTE PROVIDER - NSDCCPCAREPLAN_GEN_ALL_CORE_FT
PRINCIPAL DISCHARGE DIAGNOSIS  Diagnosis: Dementia  Assessment and Plan of Treatment: Maintain a daily routine – keep a regular schedule for sleeping, eating and other normal activities. Routines help soothe patients with dementia. Involve the patient in exercise to keep them energized during the day, which will help them sleep at night. PRINCIPAL DISCHARGE DIAGNOSIS  Diagnosis: Dementia  Assessment and Plan of Treatment: "Dementia" is a general term for when a person has developed difficulties with reasoning, judgment, and memory. People who have dementia usually have some memory loss as well as difficulty in at least one other area, such as:  ?Speaking or writing coherently (or understanding what is said or written)  ?Recognizing familiar surroundings  ?Planning and carrying out complex or multi-step tasks  In order to be considered dementia these issues must be severe enough to interfere with a person's independence and daily activities.  Dementia can be caused by several diseases that affect the brain.   Maintain a daily routine – keep a regular schedule for sleeping, eating and other normal activities. Routines help soothe patients with dementia. Involve the patient in exercise to keep them energized during the day, which will help them sleep at night.

## 2020-08-28 NOTE — DISCHARGE NOTE PROVIDER - NSDCMRMEDTOKEN_GEN_ALL_CORE_FT
atorvastatin 80 mg oral tablet: 1 tab(s) orally once a day (at bedtime)  Depakote Sprinkles 125 mg oral delayed release capsule: 1 cap(s) orally 2 times a day  dilTIAZem 120 mg/24 hours oral capsule, extended release: 1 cap(s) orally once a day  docusate sodium 100 mg oral capsule: 1 cap(s) orally 3 times a day  furosemide 40 mg oral tablet: 1 tab(s) orally once a day  metoprolol succinate 100 mg oral tablet, extended release: 1 tab(s) orally once a day  Multiple Vitamins oral tablet: 1 tab(s) orally once a day  OLANZapine 7.5 mg oral tablet: 1 tab(s) orally once a day (at bedtime)  pantoprazole 40 mg oral delayed release tablet: 1 tab(s) orally once a day (before a meal)  sertraline 50 mg oral tablet: 3 tab(s) orally once a day atorvastatin 80 mg oral tablet: 1 tab(s) orally once a day (at bedtime)  Depakote 125 mg oral delayed release tablet: 3 tab(s) orally every 12 hours  Depakote 250 mg oral delayed release tablet: 1 tab(s) orally once a day at noon  dilTIAZem 120 mg/24 hours oral capsule, extended release: 1 cap(s) orally once a day  docusate sodium 100 mg oral capsule: 1 cap(s) orally 3 times a day  furosemide 40 mg oral tablet: 1 tab(s) orally once a day  metoprolol tartrate 50 mg oral tablet: 1 tab(s) orally 2 times a day  Multiple Vitamins oral tablet: 1 tab(s) orally once a day  OLANZapine 2.5 mg oral tablet: 1 tab(s) orally every 12 hours, As needed, agitation  OLANZapine 7.5 mg oral tablet: 1 tab(s) orally once a day (at bedtime)  pantoprazole 40 mg oral delayed release tablet: 1 tab(s) orally once a day (before a meal)  sertraline 50 mg oral tablet: 3 tab(s) orally once a day  traZODone 150 mg oral tablet: 1 tab(s) orally once a day (at bedtime)  warfarin 2 mg oral tablet: 1 tab(s) orally once a day, target INR 2-3 atorvastatin 80 mg oral tablet: 1 tab(s) orally once a day (at bedtime)  Depakote 125 mg oral delayed release tablet: 3 tab(s) orally every 12 hours. Morning and bedtime.  Depakote 250 mg oral delayed release tablet: 1 tab(s) orally once a day at noon  dilTIAZem 120 mg/24 hours oral capsule, extended release: 1 cap(s) orally once a day  docusate sodium 100 mg oral capsule: 1 cap(s) orally 3 times a day  furosemide 40 mg oral tablet: 1 tab(s) orally once a day  metoprolol tartrate 50 mg oral tablet: 1 tab(s) orally 2 times a day  Multiple Vitamins oral tablet: 1 tab(s) orally once a day  OLANZapine 2.5 mg oral tablet: 1 tab(s) orally every 12 hours, As needed, agitation  OLANZapine 7.5 mg oral tablet: 1 tab(s) orally once a day (at bedtime)  pantoprazole 40 mg oral delayed release tablet: 1 tab(s) orally once a day (before a meal)  sertraline 50 mg oral tablet: 3 tab(s) orally once a day  traZODone 150 mg oral tablet: 1 tab(s) orally once a day (at bedtime)  warfarin 2 mg oral tablet: 1 tab(s) orally once a day, target INR 2-3

## 2020-08-28 NOTE — DISCHARGE NOTE PROVIDER - CARE PROVIDER_API CALL
Junior Herrmann  Cardiovascular Disease  [address] [place] [address]  Phone: [phone]  Fax: [phone]  Established Patient  Follow Up Time: Routine

## 2020-08-28 NOTE — PROGRESS NOTE ADULT - SUBJECTIVE AND OBJECTIVE BOX
KRISTI SAMUELS  86y Female    CHIEF COMPLAINT:    Patient is a 86y old  Female who presents with a chief complaint of Agitation (28 Aug 2020 11:49)      INTERVAL HPI/OVERNIGHT EVENTS:    Patient seen and examined. Very agitated overnight, calm this AM    ROS: All other systems are negative.    Vital Signs:    T(F): 97.5 (08-27-20 @ 22:01), Max: 97.9 (08-27-20 @ 15:19)  HR: 66 (08-27-20 @ 22:01) (66 - 67)  BP: 149/78 (08-27-20 @ 22:01) (133/76 - 149/78)  RR: 19 (08-27-20 @ 22:01) (19 - 19)    PHYSICAL EXAM:    GENERAL:  NAD  SKIN: No rashes or lesions  HEENT: Atraumatic. Normocephalic.   NECK: Supple, No JVD.   PULMONARY: CTA B/L. No wheezing. No rales  CVS: Normal S1, S2. Rate and Rhythm are regular  ABDOMEN/GI: Soft, Nontender, Nondistended; BS present  MSK:  No edema B/L LE. No clubbing or cyanosis  NEUROLOGIC:  No motor or sensory deficit.  PSYCH: Alert & oriented x 1 confused    Consultant(s) Notes Reviewed:  [x ] YES  [ ] NO  Care Discussed with Consultants/Other Providers [ x] YES  [ ] NO    LABS:                        11.6   8.14  )-----------( 242      ( 27 Aug 2020 08:09 )             36.3     140  |  98  |  18  ----------------------------<  109<H>  4.0   |  30  |  1.1    Ca    9.4      27 Aug 2020 08:09      PT/INR - ( 28 Aug 2020 06:09 )   PT: 37.20 sec;   INR: 3.23 ratio      PTT - ( 28 Aug 2020 06:09 )  PTT:43.5 sec  Serum Pro-Brain Natriuretic Peptide: 372 pg/mL (08-25-20 @ 02:40)    RADIOLOGY & ADDITIONAL TESTS:  Imaging or report Personally eviewed:  [x] YES  [ ] NO  EKG reviewed: [x] YES  [ ] NO    Medications:  Standing  atorvastatin 80 milliGRAM(s) Oral at bedtime  chlorhexidine 4% Liquid 1 Application(s) Topical <User Schedule>  diltiazem    milliGRAM(s) Oral daily  diVALproex  milliGRAM(s) Oral every 8 hours  furosemide    Tablet 40 milliGRAM(s) Oral daily  metoprolol tartrate 50 milliGRAM(s) Oral two times a day  multivitamin 1 Tablet(s) Oral daily  OLANZapine 7.5 milliGRAM(s) Oral at bedtime  pantoprazole    Tablet 40 milliGRAM(s) Oral before breakfast  senna 2 Tablet(s) Oral at bedtime  sertraline 150 milliGRAM(s) Oral daily  traZODone 100 milliGRAM(s) Oral at bedtime    PRN Meds  OLANZapine 2.5 milliGRAM(s) Oral every 12 hours PRN

## 2020-08-28 NOTE — PROGRESS NOTE ADULT - SUBJECTIVE AND OBJECTIVE BOX
Patient is a 86y old  Female who presents with a chief complaint of Agitation (27 Aug 2020 12:33)      HPI:  Patient is an 87yo F with PMHx of AFib on coumadin, CHF (s/p AICD on lasix, EF50-55%), CVA, COPD (home O2 at night 2L), HTN and pulmonary HTN who was brought in for worsening dementia and shortness of breath. According to daughter she has had worsening sundowning and agitation which came to a head last night in a verbal altercation with the home health aid and daughter. The patient is [facility] who added Depakote to the patient's regimen 3 weeks ago, although the patient's daughter feels this has not improved her agitation.    Last night, patient complained of shortness of breath with a short episode of substernal CP at rest which has now resolved. Became agitated and removed nasal canula. The daughter also noticed some gurgling. Patient denies any specific complaints in the ED. (25 Aug 2020 11:02)      PAST MEDICAL & SURGICAL HISTORY:  HTN (hypertension)  Cerebrovascular accident (CVA)  COPD (chronic obstructive pulmonary disease)  Atrial fibrillation  Systolic heart failure  AICD (automatic cardioverter/defibrillator) present  S/P appendectomy      Home Medications:  atorvastatin 80 mg oral tablet: 1 tab(s) orally once a day (at bedtime) (01 Jun 2020 10:15)  Depakote Sprinkles 125 mg oral delayed release capsule: 1 cap(s) orally 2 times a day (25 Aug 2020 10:45)  dilTIAZem 120 mg/24 hours oral capsule, extended release: 1 cap(s) orally once a day (01 Jun 2020 10:15)  docusate sodium 100 mg oral capsule: 1 cap(s) orally 3 times a day (13 Mar 2019 13:21)  furosemide 40 mg oral tablet: 1 tab(s) orally once a day (01 Jun 2020 10:15)  metoprolol succinate 100 mg oral tablet, extended release: 1 tab(s) orally once a day (01 Jun 2020 10:15)  Multiple Vitamins oral tablet: 1 tab(s) orally once a day (01 Jun 2020 10:15)  OLANZapine 7.5 mg oral tablet: 1 tab(s) orally once a day (at bedtime) (01 Jun 2020 10:15)  pantoprazole 40 mg oral delayed release tablet: 1 tab(s) orally once a day (before a meal) (01 Jun 2020 10:15)  sertraline 50 mg oral tablet: 3 tab(s) orally once a day (25 Aug 2020 11:00)      .  Tobacco use:   EtOH use:  Illicit drug use:    Living situation:    lactose (Unknown)  No Known Drug Allergies  Seafood (Unknown)  shellfish (Other)      FAMILY HISTORY:  No pertinent family history in first degree relatives      atorvastatin 80 milliGRAM(s) Oral at bedtime  chlorhexidine 4% Liquid 1 Application(s) Topical <User Schedule>  diltiazem    milliGRAM(s) Oral daily  diVALproex  milliGRAM(s) Oral every 8 hours  furosemide    Tablet 40 milliGRAM(s) Oral daily  metoprolol tartrate 50 milliGRAM(s) Oral two times a day  multivitamin 1 Tablet(s) Oral daily  OLANZapine 7.5 milliGRAM(s) Oral at bedtime  OLANZapine 2.5 milliGRAM(s) Oral every 12 hours PRN  pantoprazole    Tablet 40 milliGRAM(s) Oral before breakfast  senna 2 Tablet(s) Oral at bedtime  sertraline 150 milliGRAM(s) Oral daily  traZODone 100 milliGRAM(s) Oral at bedtime      Vital Signs Last 24 Hrs  T(C): 36.4 (27 Aug 2020 22:01), Max: 36.6 (27 Aug 2020 15:19)  T(F): 97.5 (27 Aug 2020 22:01), Max: 97.9 (27 Aug 2020 15:19)  HR: 66 (27 Aug 2020 22:01) (66 - 67)  BP: 149/78 (27 Aug 2020 22:01) (133/76 - 149/78)  BP(mean): --  RR: 19 (27 Aug 2020 22:01) (19 - 19)  SpO2: --    I&O's Summary                            11.6   8.14  )-----------( 242      ( 27 Aug 2020 08:09 )             36.3       08-27    140  |  98  |  18  ----------------------------<  109<H>  4.0   |  30  |  1.1    Ca    9.4      27 Aug 2020 08:09            PHYSICAL EXAM:  GENERAL: NAD, lying in bed comfortably, asleep but arousable. Confused.   HEAD:  Atraumatic, Normocephalic  ENT: Moist mucous membranes  NECK: Supple, No JVD  CHEST/LUNG: Clear to auscultation bilaterally; No rales, rhonchi, wheezing, or rubs. Unlabored respirations  HEART: Regular rate and rhythm; No murmurs, rubs, or gallops  ABDOMEN: Bowel sounds present; Soft, Nontender, Nondistended. No hepatomegaly   EXTREMITIES:  2+ Peripheral Pulses, brisk capillary refill. No clubbing, cyanosis, or edema  NERVOUS SYSTEM:  Alert & Oriented X1, speech clear. No deficits   MSK: L sided weakness   SKIN: No rashes or lesions    IMAGES:  < from: 12 Lead ECG (08.25.20 @ 05:02) >  Ventricular Rate 64 BPM  Atrial Rate 64 BPM  P-R Interval 172 ms  QRS Duration 92 ms  Q-T Interval 446 ms  QTC Calculation(Bezet) 460 ms  P Axis 38 degrees  R Axis 114 degrees  T Axis 9 degrees  Diagnosis Line Atrial-sensed ventricular-paced rhythm  Abnormal ECG  < from: Xray Chest 1 View-PORTABLE IMMEDIATE (08.25.20 @ 03:01) >  Impression:  Stable bilateral opacities. No pleural effusion or pneumothorax.

## 2020-08-28 NOTE — DISCHARGE NOTE PROVIDER - HOSPITAL COURSE
85 yo F with PMHx of dementia, Afib, HTN, CHF, AICD placed, CVA, COPD presents for worsening agitation.        #Agitation    - Holding depakote for now due to increased agitation after getting it    - Haldol PRN for agitation    - valproate level 13    - on Depakote 250mg p.o TID    - Continue Trazodone 100mg bedtime    - Continue Zoloft 150mg daily    - Continue Zyprexa 7.5mg Q bedtime    - zyprexa 2.5 PRN q12 for agitation (can be given IM if pt is not taking PO) Patient is an 87yo F with PMHx of AFib on coumadin, CHF (s/p AICD on lasix, EF50-55%), CVA, COPD (home O2 at night 2L), HTN and pulmonary HTN who was brought in for worsening dementia and shortness of breath. According to daughter she has had worsening sundowning and agitation which came to a head last night in a verbal altercation with the home health aid and daughter. The patient is [facility] who added Depakote to the patient's regimen 3 weeks ago, although the patient's daughter feels this has not improved her agitation.        Last night, patient complained of shortness of breath with a short episode of substernal CP at rest which has now resolved. Became agitated and removed nasal canula. The daughter also noticed some gurgling. Patient denies any specific complaints in the ED.        Pt admitted for worsening behavioral disturbance likely secondary to dementia    -blood cx neg    -Urine cx -  E faecalis    -Psychiatry team evaluated pt on 8.27 and recommended following regimen as below for management of behavioral disturbances 2/2 vascular dementia. Depakote 250mg p.o TID, Trazodone 100mg QHS, Zoloft 150mg po daily, Zyprexa 7.5mg po bedtime, Zyprexa po q6 PRN for acute agitation.     -Valproic Acid Level, Serum: 13.0 ug/mL (08.25.20 @ 11:15)    -    -c/w macrobid - last dose today    -monitor mental status         #Chronic Afib    INR 6.1 during admission, s/p vit K    INR 1.17 last night, coumadin 5mg last night     check daily INR Patient is an 87yo F with PMHx of AFib on coumadin, CHF (s/p AICD on lasix, EF50-55%), CVA, COPD (home O2 at night 2L), HTN and pulmonary HTN who was brought in for worsening dementia and shortness of breath. According to daughter she has had worsening sundowning and agitation which came to a head last night in a verbal altercation with the home health aid and daughter. The patient is [facility] who added Depakote to the patient's regimen 3 weeks ago, although the patient's daughter feels this has not improved her agitation.        Last night, patient complained of shortness of breath with a short episode of substernal CP at rest which has now resolved. Became agitated and removed nasal canula. The daughter also noticed some gurgling. Patient denies any specific complaints in the ED.        #Dementia with behavioral disturbance     -very agitated during the night, but improves during the day; patient is calm today    -blood cx -  neg    -Urine cx -  E faecalis    -Psychiatry team evaluated pt on 8.27 and recommended following regimen as below for management of behavioral disturbances 2/2 vascular dementia. Trazodone 100mg QHS, Zoloft 150mg po daily, Zyprexa 7.5mg po bedtime, Zyprexa po q6 PRN for acute agitation.     - Continue Depakote 375mg AM and Bedtime and 250mg at noon.     - Valproic Acid Level, Serum: 13.0 ug/mL (08.25.20 @ 11:15)    -     - monitor mental status         #Chronic Afib    INR 6.1 during admission, s/p vit K    INR 6.43 this AM, Last coumadin 2 days ago, can give 2mg Coumadin x 1 today    check daily INR        #chronic resp failure/History of COPD    oxygen requirements at baseline (home O2 at night 2L)        #chronic diastolic CHF     - no decompensation    - cont lasix po

## 2020-08-29 NOTE — PROGRESS NOTE ADULT - ASSESSMENT
87 yo F with PMHx of dementia, Afib, HTN, CHF, AICD placed, CVA, COPD presents for worsening agitation.                                                                                                                                                                     DNR/DNI  Dementia with behavioral disturbance   very agitated during the night, but improves during the day  blood cx neg  Urine cx - 10-49K E faecalis  psych eval noted, current meds:Depakote 25 Q8H, Trazodone 150 QHS, Zoloft 150 daily, Zyprexa 7.5 QHS, Zyprexa 2.5 Q6H PRN for agitation    c/w macrobid  monitor mental status     Chronic Afib  INR 3.2 yesterday, s/p vit K  pending INR today    chronic resp failure/History of COPD  oxygen requirements at baseline (has home oxygen    chronic diastolic CHF - no decompensation - cont lasix po    #Progress Note Handoff  Pending (specify): [facility] available [facility] and [facility] cannot be reinstated at this time per CM, as pt case currently under review  Family discussion: daughter aware of plan  Disposition: unclear at this time, CM working on it    Aleyda Leach MD  s. [phone]

## 2020-08-30 NOTE — CHART NOTE - NSCHARTNOTEFT_GEN_A_CORE
86 year old woman with a history of Vascular dementia with behavioral disturbance who was admitted to the medical service for worsening agitation. Psychiatry consult was called for medication management of agitation.    Psychiatry team evaluated pt on 8.27 and recommended following regimen as below for management of behavioral disturbances 2/2 vascular dementia. Depakote 250mg p.o TID, Trazodone 100mg QHS, Zoloft 150mg po daily, Zyprexa 7.5mg po bedtime, Zyprexa po q6 PRN for acute agitation.     Consult for follow up received today on 8.30 at 0608pm. Consult for "follow up for medication management and family meeting with case management and PMD" placed by Yamila Mayen. Psychiatry team spoke w/ primary attending Dr. Leach [phone] who was not aware of the consult that was placed. Stated likely requested by case management. Asked psychiatry team to follow up on Monday as case management and primary team would be available at this time. No acute indication for psychiatry at this time as per Dr. Leach. 86 year old woman with a history of Vascular dementia with behavioral disturbance who was admitted to the medical service for worsening agitation. Psychiatry consult was called for medication management of agitation.    Psychiatry team evaluated pt on 8.27 and recommended following regimen as below for management of behavioral disturbances 2/2 vascular dementia. Depakote 250mg p.o TID, Trazodone 100mg QHS, Zoloft 150mg po daily, Zyprexa 7.5mg po bedtime, Zyprexa po q6 PRN for acute agitation.     Consult for follow up received today on 8.30 at 0608pm. Consult for "follow up for medication management and family meeting with case management and PMD" placed by Yamila Mayen. Psychiatry team spoke w/ primary attending Dr. Leach [phone] who was not aware of the consult that was placed. Stated likely requested by case management. Asked psychiatry team to follow up on Monday as case management and primary team would be available at this time. No acute indication for psychiatry at this time as per Dr. Leach.    Attempted to reach Yamila Mayen at [phone]; no answer, unable to leave voice mail.

## 2020-08-30 NOTE — PROGRESS NOTE ADULT - SUBJECTIVE AND OBJECTIVE BOX
ARVIND KRISTI  86y Female    CHIEF COMPLAINT:    Patient is a 86y old  Female who presents with a chief complaint of Agitation (29 Aug 2020 13:28)      INTERVAL HPI/OVERNIGHT EVENTS:    Patient seen and examined. Agitated overnight, currently resting in bed, calm    ROS: All other systems are negative.    Vital Signs:    T(F): 98.1 (08-30-20 @ 05:08), Max: 98.1 (08-30-20 @ 05:08)  HR: 81 (08-30-20 @ 05:08) (81 - 85)  BP: 129/59 (08-30-20 @ 05:08) (129/59 - 139/77)  RR: 18 (08-30-20 @ 05:08) (18 - 19)  SpO2: 92% (08-30-20 @ 07:37) (92% - 92%)    29 Aug 2020 07:01  -  30 Aug 2020 07:00  --------------------------------------------------------  IN: 100 mL / OUT: 0 mL / NET: 100 mL    PHYSICAL EXAM:    GENERAL:  NAD  SKIN: No rashes or lesions  HEENT: Atraumatic. Normocephalic.   NECK: Supple, No JVD.   PULMONARY: coarse breath sounds. No wheezing. No rales  CVS: Normal S1, S2. Rate and Rhythm are regular.    ABDOMEN/GI: Soft, Nontender, Nondistended; BS present  MSK:  No edema B/L LE. No clubbing or cyanosis  NEUROLOGIC: moves all extremities  PSYCH: Alert & oriented x1    LABS:  PT/INR - ( 30 Aug 2020 06:47 )   PT: 13.50 sec;   INR: 1.17 ratio      Serum Pro-Brain Natriuretic Peptide: 372 pg/mL (08-25-20 @ 02:40)    RADIOLOGY & ADDITIONAL TESTS:  Imaging or report Personally Reviewed:  [x] YES  [ ] NO  EKG reviewed: [x] YES  [ ] NO    Medications:  Standing  atorvastatin 80 milliGRAM(s) Oral at bedtime  chlorhexidine 4% Liquid 1 Application(s) Topical <User Schedule>  diltiazem    milliGRAM(s) Oral daily  diVALproex  milliGRAM(s) Oral every 8 hours  furosemide    Tablet 40 milliGRAM(s) Oral daily  metoprolol tartrate 50 milliGRAM(s) Oral two times a day  multivitamin 1 Tablet(s) Oral daily  nitrofurantoin monohydrate/macrocrystals (MACROBID) 100 milliGRAM(s) Oral two times a day with meals  OLANZapine 7.5 milliGRAM(s) Oral at bedtime  pantoprazole    Tablet 40 milliGRAM(s) Oral before breakfast  senna 2 Tablet(s) Oral at bedtime  sertraline 150 milliGRAM(s) Oral daily  traZODone 150 milliGRAM(s) Oral at bedtime    PRN Meds  OLANZapine 2.5 milliGRAM(s) Oral every 12 hours PRN

## 2020-08-30 NOTE — PROGRESS NOTE ADULT - ASSESSMENT
85 yo F with PMHx of dementia, Afib, HTN, CHF, AICD placed, CVA, COPD presents for worsening agitation.                                                                                                                                                                     DNR/DNI  Dementia with behavioral disturbance   very agitated during the night, but improves during the day  blood cx neg  Urine cx - 10-49K E faecalis  psych eval noted, current meds:Depakote 25 Q8H, Trazodone 150 QHS, Zoloft 150 daily, Zyprexa 7.5 QHS, Zyprexa 2.5 Q6H PRN for agitation    c/w macrobid for 2 more days  monitor mental status     Chronic Afib  INR 6.1 during admission, s/p vit K  INR 1.17 today, give coumadin tonight  check daily INR    chronic resp failure/History of COPD  oxygen requirements at baseline (has home oxygen    chronic diastolic CHF - no decompensation - cont lasix po    #Progress Note Handoff  Pending (specify): [facility] available [facility] and [facility] cannot be reinstated at this time per CM, as pt case currently under review  Family discussion: daughter aware of plan  Disposition: unclear at this time, CM working on it    Aleyda Leach MD  s. [phone]

## 2020-08-31 NOTE — PROGRESS NOTE ADULT - ASSESSMENT
87 yo F with PMHx of dementia, Afib, HTN, CHF, AICD placed, CVA, COPD presents for worsening agitation.                                                                                                                                                                     DNR/DNI  Dementia with behavioral disturbance   very agitated during the night, but improves during the day  blood cx neg  Urine cx - 10-49K E faecalis  psych eval noted, current meds:Depakote 25 Q8H, Trazodone 150 QHS, Zoloft 150 daily, Zyprexa 7.5 QHS, Zyprexa 2.5 Q6H PRN for agitation    c/w macrobid, last dose today  monitor mental status   psychiatry follow up today     Chronic Afib  INR 6.1 during admission, s/p vit K  INR 1.17 yesterday coumadin restarted  check daily INR/dose coumafin     chronic resp failure/History of COPD  oxygen requirements at baseline (has home oxygen)    chronic diastolic CHF - no decompensation - cont lasix po    #Progress Note Handoff  Pending (specify): [facility] available [facility] and [facility] cannot be reinstated at this time per CM, as pt case currently under review  Family discussion: daughter aware of plan  Disposition: unclear at this time, CM working on it    Aleyda Leach MD  s. [phone]

## 2020-08-31 NOTE — PROGRESS NOTE ADULT - SUBJECTIVE AND OBJECTIVE BOX
ARVIND KRISTI  86y Female    CHIEF COMPLAINT:    Patient is a 86y old  Female who presents with a chief complaint of Agitation (31 Aug 2020 10:20)      INTERVAL HPI/OVERNIGHT EVENTS:    Patient seen and examined. Agitated overnight, currently calm in bed     ROS: All other systems are negative.    Vital Signs:    T(F): 97.3 (08-31-20 @ 05:18), Max: 97.8 (08-30-20 @ 13:10)  HR: 73 (08-31-20 @ 05:18) (65 - 85)  BP: 118/76 (08-31-20 @ 05:18) (118/76 - 136/60)  RR: 18 (08-31-20 @ 04:40) (18 - 18)  SpO2: 95% (08-30-20 @ 17:20) (88% - 95%)    30 Aug 2020 07:01  -  31 Aug 2020 07:00  --------------------------------------------------------  IN: 300 mL / OUT: 0 mL / NET: 300 mL    PHYSICAL EXAM:    GENERAL:  NAD  SKIN: No rashes or lesions  HEENT: Atraumatic. Normocephalic.   NECK: Supple, No JVD. No lymphadenopathy.  PULMONARY: Coarse breath sounds. No wheezing. No rales  CVS: Normal S1, S2. Rate and Rhythm are regular. No murmurs.  ABDOMEN/GI: Soft, Nontender, Nondistended; BS present  MSK:  No edema B/L LE. No clubbing or cyanosis  NEUROLOGIC:  moves all extremities  PSYCH: Alert & oriented x 1    Consultant(s) Notes Reviewed:  [x ] YES  [ ] NO  Care Discussed with Consultants/Other Providers [ x] YES  [ ] NO    LABS:                        11.3   6.96  )-----------( 189      ( 31 Aug 2020 11:17 )             35.2     PT/INR - ( 30 Aug 2020 06:47 )   PT: 13.50 sec;   INR: 1.17 ratio    Serum Pro-Brain Natriuretic Peptide: 372 pg/mL (08-25-20 @ 02:40)    RADIOLOGY & ADDITIONAL TESTS:      Medications:  Standing  atorvastatin 80 milliGRAM(s) Oral at bedtime  chlorhexidine 4% Liquid 1 Application(s) Topical <User Schedule>  diltiazem    milliGRAM(s) Oral daily  diVALproex  milliGRAM(s) Oral every 8 hours  furosemide    Tablet 40 milliGRAM(s) Oral daily  metoprolol tartrate 50 milliGRAM(s) Oral two times a day  multivitamin 1 Tablet(s) Oral daily  nitrofurantoin monohydrate/macrocrystals (MACROBID) 100 milliGRAM(s) Oral two times a day with meals  OLANZapine 7.5 milliGRAM(s) Oral at bedtime  pantoprazole    Tablet 40 milliGRAM(s) Oral before breakfast  senna 2 Tablet(s) Oral at bedtime  sertraline 150 milliGRAM(s) Oral daily  traZODone 150 milliGRAM(s) Oral at bedtime    PRN Meds  OLANZapine 2.5 milliGRAM(s) Oral every 12 hours PRN

## 2020-08-31 NOTE — PROGRESS NOTE ADULT - ASSESSMENT
85 yo F with PMHx of dementia, Afib, HTN, CHF, AICD placed, CVA, COPD presents for worsening agitation.    #Dementia with behavioral disturbance   -very agitated during the night, but improves during the day  -blood cx neg  -Urine cx -  E faecalis  -Psychiatry team evaluated pt on 8.27 and recommended following regimen as below for management of behavioral disturbances 2/2 vascular dementia. Depakote 250mg p.o TID, Trazodone 100mg QHS, Zoloft 150mg po daily, Zyprexa 7.5mg po bedtime, Zyprexa po q6 PRN for acute agitation.   -Valproic Acid Level, Serum: 13.0 ug/mL (08.25.20 @ 11:15)  -  -c/w macrobid - last dose today  -monitor mental status     #Chronic Afib  INR 6.1 during admission, s/p vit K  INR 1.17 last night, coumadin 5mg last night   check daily INR    #chronic resp failure/History of COPD  oxygen requirements at baseline (home O2 at night 2L)    #chronic diastolic CHF   - no decompensation  - cont lasix po    #L Sided Weakness, residual from CVA 3 years ago            GI PPX: protonix daily	                                                                                                                                                             DNR/DNI      Pending (specify): no beds available at [facility] and [facility] cannot be reinstated at this time per CM, as pt case currently under review  Disposition: unclear at this time, CM working on it

## 2020-08-31 NOTE — PROGRESS NOTE ADULT - SUBJECTIVE AND OBJECTIVE BOX
DAILY PROGRESS NOTE  ===========================================================    Patient Information:  KRISTI SAMUELS  /  86y  /  Female  /  MRN#: [id number]    Hospital Day: 6d     |:::::::::::::::::::::::::::| SUBJECTIVE |:::::::::::::::::::::::::::|    OVERNIGHT EVENTS: None  TODAY: Patient was seen today at bedside. Pt appears comfortable. Review of systems is otherwise negative.    |:::::::::::::::::::::::::::| OBJECTIVE |:::::::::::::::::::::::::::|    VITAL SIGNS: Last 24 Hours  T(C): 36.3 (31 Aug 2020 05:18), Max: 36.6 (30 Aug 2020 13:10)  T(F): 97.3 (31 Aug 2020 05:18), Max: 97.8 (30 Aug 2020 13:10)  HR: 73 (31 Aug 2020 05:18) (65 - 85)  BP: 118/76 (31 Aug 2020 05:18) (118/76 - 136/60)  BP(mean): --  RR: 18 (31 Aug 2020 04:40) (18 - 18)  SpO2: 95% (30 Aug 2020 17:20) (88% - 95%)    08-30-20 @ 07:01  -  08-31-20 @ 07:00  --------------------------------------------------------  IN: 300 mL / OUT: 0 mL / NET: 300 mL      PHYSICAL EXAM:  GENERAL:    NAD.  HEENT:  Head NC/AT; Conjunctivae pink, Sclera anicteric; Oral mucosa moist.  CARDIO:   Regular rate; Regular rhythm; S1 & S2.  RESP:   No rales, wheezing, or rhonchi appreciated.  GI:   Soft; NT/ND; BS; No guarding; No rebound tenderness.  EXT:   Stregnth UE 5/5; Strength LE 5/5; No edema.   SKIN:   Intact.    LAB RESULTS:          PT/INR - ( 30 Aug 2020 06:47 )   PT: 13.50 sec;   INR: 1.17 ratio                     MICROBIOLOGY:    RADIOLOGY:    ALLERGIES:  No Known Drug Allergies  Seafood (Unknown)  shellfish (Other)      ===========================================================

## 2020-09-01 NOTE — DIETITIAN INITIAL EVALUATION ADULT. - ENERGY NEEDS
calories: 1267 - 1393 kcals/day (MSJ x 1.0 - 1.1 AF obesity)  protein: 52 - 62 gms/day (1.0 - 1.2 gm/kg IBW obesity)  fluid: 1ml/kcal or per LIP

## 2020-09-01 NOTE — PROGRESS NOTE ADULT - ASSESSMENT
87 yo F with PMHx of dementia, Afib, HTN, CHF, AICD placed, CVA, COPD presents for worsening agitation.                                                                                                                                                                     DNR/DNI  Dementia with behavioral disturbance   Mild UTI  agitated during the night, but improves during the day  blood cx neg  Urine cx - 10-49K E faecalis - s/p tx  psych eval noted, current meds:  Depakote 25 Q8H, Trazodone 150 QHS, Zoloft 150 daily, Zyprexa 7.5 QHS, Zyprexa 2.5 Q6H PRN for agitation    monitor mental status     Chronic Afib  INR 6.1 during admission, s/p vit K  INR 2.5 today  Give Coumadin 3mg today     chronic resp failure/History of COPD  oxygen requirements at baseline (has home oxygen)    chronic diastolic CHF - no decompensation - cont lasix po    #Progress Note Handoff  Pending (specify): [facility] beds available [facility] and 24HHA cannot be reinstated at this time per CM, as pt case currently under review. CM requested a meeting with myself, psychiatry and daughter to discuss dc plans, but psychiatry unavailable.   Family discussion: daughter aware of plan  Disposition: unclear at this time, CM working on it    Aleyda Leach MD  s. [id number]

## 2020-09-01 NOTE — PROGRESS NOTE BEHAVIORAL HEALTH - SUMMARY
86 year old woman with a history of Vascular dementia with behavioral disturbance who was admitted to the medical service for worsening agitation. Psychiatry consult was called for medication management of agitation.   Currently on depakote 250po TID and Zyprexa 7.5mg po hs and 2.5mg po daily. Recently there has been mild increasing in agitated episode during the day, which might be a component of both worsening dementia with superimposed delirium. During the evaluation, she was uncooperative, but irritable, but there was no exhibition of  threatening gesture. She is remains at no imminent danger to self. Meds reviewed along with EKG. In lieu of increasing Zyprexa given prolonged QTc of 535ms, will increase Depakote to address underlying extreme agitated episode.   Plan:   - No indication for  IPP  -Increase Depakote to 375mg po AM and 375 mg po at night.  -Continue depakote  250mg p.o in afternoon  Continue Trazodone 100mg bedtime, Zoloft 150mg daily, Zyprexa 7.5mg Q bedtime  - Social work on board for potential placement.   - Avoid use of any benzodiazepines in this patient as they worsen delirium, can cause paradoxical agitation and predispose to falls in geriatric patients.   - If patient is agitated, recommend behavioral interventions and environmental modifications to help her orientation and help her feels safe. If severely agitated, and does not respond to re-direction, we recommend Zyprexa 2.5mg P.O Q 6 hrs PRN.

## 2020-09-01 NOTE — PROGRESS NOTE BEHAVIORAL HEALTH - NSBHCHARTREVIEWLAB_PSY_A_CORE FT
11.6   8.14  )-----------( 242      ( 27 Aug 2020 08:09 )             36.3   08-27    140  |  98  |  18  ----------------------------<  109<H>  4.0   |  30  |  1.1    Ca    9.4      27 Aug 2020 08:09
11.3   6.96  )-----------( 189      ( 31 Aug 2020 11:17 )             35.2   09-01    140  |  99  |  23<H>  ----------------------------<  86  3.5   |  31  |  1.1    Ca    9.9      01 Sep 2020 06:43    TPro  6.9  /  Alb  3.5  /  TBili  0.3  /  DBili  x   /  AST  34  /  ALT  16  /  AlkPhos  81  09-01

## 2020-09-01 NOTE — PROGRESS NOTE BEHAVIORAL HEALTH - NSBHCHARTREVIEWVS_PSY_A_CORE FT
Vital Signs Last 24 Hrs  T(C): 36.1 (27 Aug 2020 05:26), Max: 36.4 (26 Aug 2020 20:57)  T(F): 96.9 (27 Aug 2020 05:26), Max: 97.5 (26 Aug 2020 20:57)  HR: 62 (27 Aug 2020 05:26) (62 - 65)  BP: 119/67 (27 Aug 2020 05:26) (119/67 - 152/88)  BP(mean): --  RR: 18 (27 Aug 2020 05:26) (18 - 18)  SpO2: --
Vital Signs Last 24 Hrs  T(C): 36.3 (31 Aug 2020 20:23), Max: 36.3 (31 Aug 2020 20:23)  T(F): 97.4 (31 Aug 2020 20:23), Max: 97.4 (31 Aug 2020 20:23)  HR: 79 (01 Sep 2020 05:44) (61 - 79)  BP: 146/75 (01 Sep 2020 05:44) (119/74 - 146/75)  BP(mean): --  RR: 18 (31 Aug 2020 20:23) (18 - 18)  SpO2: 95% (31 Aug 2020 14:20) (95% - 95%)

## 2020-09-01 NOTE — DIETITIAN INITIAL EVALUATION ADULT. - OTHER INFO
Pt adm for agitation. Dementia with behavioral disturbance - pt seen by behavioral health. Chronic Afib. chronic diastolic CHF - no decompensation. L Sided Weakness, residual from CVA 3 years ago. Pending placement.

## 2020-09-01 NOTE — DIETITIAN INITIAL EVALUATION ADULT. - ADD RECOMMEND
Order Ensure Enlive q24hrs. Continue DASH/TLC diet. Order Ensure Enlive q24hrs. Continue DASH/TLC diet. Spoke w MD [id number]

## 2020-09-01 NOTE — PROGRESS NOTE ADULT - SUBJECTIVE AND OBJECTIVE BOX
NUSALTYEDD KRISTI  86y Female    CHIEF COMPLAINT:    Patient is a 86y old  Female who presents with a chief complaint of Agitation (01 Sep 2020 09:53)      INTERVAL HPI/OVERNIGHT EVENTS:    Patient seen and examined. Less agitated overnight     ROS: All other systems are negative.    Vital Signs:    T(F): 97.4 (08-31-20 @ 20:23), Max: 97.4 (08-31-20 @ 20:23)  HR: 79 (09-01-20 @ 05:44) (61 - 79)  BP: 146/75 (09-01-20 @ 05:44) (119/74 - 146/75)  RR: 18 (08-31-20 @ 20:23) (18 - 18)  SpO2: 95% (08-31-20 @ 14:20) (95% - 95%)    31 Aug 2020 07:01  -  01 Sep 2020 07:00  --------------------------------------------------------  IN: 200 mL / OUT: 0 mL / NET: 200 mL    PHYSICAL EXAM:    GENERAL:  NAD  SKIN: No rashes or lesions  HEENT: Atraumatic. Normocephalic.  NECK: Supple, No JVD.  PULMONARY: Coarse breath sounds. No wheezing. No rales  CVS: Normal S1, S2. Rate and Rhythm are regular.    ABDOMEN/GI: Soft, Nontender, Nondistended; BS present  MSK:  No edema B/L LE. No clubbing or cyanosis  NEUROLOGIC: grossly intact  PSYCH: aaox1, currently calm, answers simple questions    Consultant(s) Notes Reviewed:  [x ] YES  [ ] NO  Care Discussed with Consultants/Other Providers [ x] YES  [ ] NO    LABS:                        11.3   6.96  )-----------( 189      ( 31 Aug 2020 11:17 )             35.2     140  |  99  |  23<H>  ----------------------------<  86  3.5   |  31  |  1.1    Ca    9.9      01 Sep 2020 06:43    TPro  6.9  /  Alb  3.5  /  TBili  0.3  /  DBili  x   /  AST  34  /  ALT  16  /  AlkPhos  81  09-01    PT/INR - ( 01 Sep 2020 06:43 )   PT: 29.20 sec;   INR: 2.54 ratio      PTT - ( 01 Sep 2020 06:43 )  PTT:33.7 sec    RADIOLOGY & ADDITIONAL TESTS:  Imaging or report Personally Reviewed:  [x] YES  [ ] NO  EKG reviewed: [x] YES  [ ] NO    Medications:  Standing  atorvastatin 80 milliGRAM(s) Oral at bedtime  chlorhexidine 4% Liquid 1 Application(s) Topical <User Schedule>  diltiazem    milliGRAM(s) Oral daily  diVALproex  milliGRAM(s) Oral every 8 hours  furosemide    Tablet 40 milliGRAM(s) Oral daily  metoprolol tartrate 50 milliGRAM(s) Oral two times a day  multivitamin 1 Tablet(s) Oral daily  nitrofurantoin monohydrate/macrocrystals (MACROBID) 100 milliGRAM(s) Oral two times a day with meals  OLANZapine 7.5 milliGRAM(s) Oral at bedtime  pantoprazole    Tablet 40 milliGRAM(s) Oral before breakfast  senna 2 Tablet(s) Oral at bedtime  sertraline 150 milliGRAM(s) Oral daily  traZODone 150 milliGRAM(s) Oral at bedtime    PRN Meds  OLANZapine 2.5 milliGRAM(s) Oral every 12 hours PRN

## 2020-09-01 NOTE — DIETITIAN INITIAL EVALUATION ADULT. - PHYSICAL APPEARANCE
BMI 33.3  IBW: 115# Pt weighed 95.6 kg on 5/29/2020 per weight records. Pt is on diuretic therapy. Skin intact. No edema per flow sheets./obese

## 2020-09-01 NOTE — PROGRESS NOTE BEHAVIORAL HEALTH - AXIS III
N/A
Atrial fibrillation, CHF s/p AICD , CVA, COPD on Home oxygen , hypertension, pulmonary HTN, bilateral bed sores

## 2020-09-01 NOTE — PROGRESS NOTE ADULT - SUBJECTIVE AND OBJECTIVE BOX
DAILY PROGRESS NOTE  ===========================================================    Patient Information:  KRISTI SAMUELS  /  86y  /  Female  /  MRN#: [id number]    Hospital Day: 7d     |:::::::::::::::::::::::::::| SUBJECTIVE |:::::::::::::::::::::::::::|    OVERNIGHT EVENTS:   TODAY: Patient was seen today at bedside, appears comfortable.  Pt did not wish to communicate.    |:::::::::::::::::::::::::::| OBJECTIVE |:::::::::::::::::::::::::::|    VITAL SIGNS: Last 24 Hours  T(C): 36.3 (31 Aug 2020 20:23), Max: 36.3 (31 Aug 2020 20:23)  T(F): 97.4 (31 Aug 2020 20:23), Max: 97.4 (31 Aug 2020 20:23)  HR: 79 (01 Sep 2020 05:44) (61 - 79)  BP: 146/75 (01 Sep 2020 05:44) (119/74 - 146/75)  BP(mean): --  RR: 18 (31 Aug 2020 20:23) (18 - 18)  SpO2: 95% (31 Aug 2020 14:20) (95% - 95%)    08-31-20 @ 07:01  -  09-01-20 @ 07:00  --------------------------------------------------------  IN: 200 mL / OUT: 0 mL / NET: 200 mL      PHYSICAL EXAM:  GENERAL:   Awake, NAD.  HEENT:  Head NC/AT; Conjunctivae pink, Sclera anicteric; Oral mucosa moist.  CARDIO:   Regular rate; Regular rhythm; S1 & S2.  RESP:   No rales, wheezing, or rhonchi appreciated.  GI:   Soft; NT/ND; BS; No guarding; No rebound tenderness.  EXT:   Stregnth UE 5/5; Strength LE 5/5; No edema.   SKIN:   Intact.    LAB RESULTS:                        11.3   6.96  )-----------( 189      ( 31 Aug 2020 11:17 )             35.2     09-01    140  |  99  |  23<H>  ----------------------------<  86  3.5   |  31  |  1.1    Ca    9.9      01 Sep 2020 06:43    TPro  6.9  /  Alb  3.5  /  TBili  0.3  /  DBili  x   /  AST  34  /  ALT  16  /  AlkPhos  81  09-01    PT/INR - ( 01 Sep 2020 06:43 )   PT: 29.20 sec;   INR: 2.54 ratio         PTT - ( 01 Sep 2020 06:43 )  PTT:33.7 sec            MICROBIOLOGY:    RADIOLOGY:    ALLERGIES:  No Known Drug Allergies  Seafood (Unknown)  shellfish (Other)      ===========================================================

## 2020-09-01 NOTE — PROGRESS NOTE BEHAVIORAL HEALTH - NSBHFUPINTERVALHXFT_PSY_A_CORE
Notes and med reviewed, patient has been receiving prn medications of Zyprexa 2.5mg. This morning, patient was documented as being agitated, primarily screaming and uncooperative. On evaluation she appeared confused, very irritable and uncooperative, symptoms that appear to be component of underlying dementia and related behavioral disturbances. No over psychosis is noted or reported. She did not expressed any suicidality during the evaluation, there was no threatening gesture toward interviewer other than loud irritable statements.

## 2020-09-01 NOTE — PROGRESS NOTE BEHAVIORAL HEALTH - NSBHCONSULTFOLLOWAFTERCARE_PSY_A_CORE FT
Social work on board for placement
On discharge, Have patient follow up with outpatient psychiatrist: Dr Melgoza ([phone])

## 2020-09-01 NOTE — DIETITIAN INITIAL EVALUATION ADULT. - FACTORS AFF FOOD INTAKE
Pt is confused. Did not answer my questions during interview. Fair appetite reported by PCA. Pt recently discharged from [facility] in June 2020. Nutrition hx obtained from note on 6/2/2020 - good appetite PTA, picky eater, allergy to shellfish, seafood, lactose intolerant. Does not take nutrition supplement at home. No issues chewing/swallowing.

## 2020-09-01 NOTE — PROGRESS NOTE ADULT - ASSESSMENT
87 yo F with PMHx of dementia, Afib, HTN, CHF, AICD placed, CVA, COPD presents for worsening agitation.    #Dementia with behavioral disturbance   -very agitated during the night, but improves during the day  -blood cx neg  -Urine cx -  E faecalis  -Psychiatry team evaluated pt on 8.27 and recommended following regimen as below for management of behavioral disturbances 2/2 vascular dementia. Depakote 250mg p.o TID, Trazodone 100mg QHS, Zoloft 150mg po daily, Zyprexa 7.5mg po bedtime, Zyprexa po q6 PRN for acute agitation.   -Valproic Acid Level, Serum: 13.0 ug/mL (08.25.20 @ 11:15)  -  -c/w macrobid - last dose yesterday  -monitor mental status     #Chronic Afib  INR 6.1 during admission, s/p vit K  INR 2.54 this AM, coumadin 5mg last night   check daily INR    #chronic resp failure/History of COPD  oxygen requirements at baseline (home O2 at night 2L)    #chronic diastolic CHF   - no decompensation  - cont lasix po    #L Sided Weakness, residual from CVA 3 years ago            GI PPX: protonix daily	                                                                                                                                                             DNR/DNI      Pending (specify): [facility] beds available at [facility] and [facility] cannot be reinstated at this time per CM, as pt case currently under review  Disposition: unclear at this time, CM working on it 87 yo F with PMHx of dementia, Afib, HTN, CHF, AICD placed, CVA, COPD presents for worsening agitation.    #Dementia with behavioral disturbance   -very agitated during the night, but improves during the day  -blood cx neg  -Urine cx -  E faecalis  -Psychiatry team evaluated pt on 8.27 and recommended following regimen as below for management of behavioral disturbances 2/2 vascular dementia. Trazodone 100mg QHS, Zoloft 150mg po daily, Zyprexa 7.5mg po bedtime, Zyprexa po q6 PRN for acute agitation.   - Psych reevaluated pt today, recommend changed from Depakote 250mg p.o TID to 375mg AM and Bedtime and 250mg at noon.   -Valproic Acid Level, Serum: 13.0 ug/mL (08.25.20 @ 11:15)  -  -c/w macrobid - last dose yesterday  -monitor mental status     #Chronic Afib  INR 6.1 during admission, s/p vit K  INR 2.54 this AM, coumadin 5mg last night   check daily INR    #chronic resp failure/History of COPD  oxygen requirements at baseline (home O2 at night 2L)    #chronic diastolic CHF   - no decompensation  - cont lasix po    #L Sided Weakness, residual from CVA 3 years ago            GI PPX: protonix daily	                                                                                                                                                             DNR/DNI      Pending (specify): [facility] available [facility] and [facility]HA cannot be reinstated at this time per CM, as pt case currently under review  Disposition: unclear at this time, CM working on it

## 2020-09-02 NOTE — PROGRESS NOTE ADULT - ASSESSMENT
85 yo F with PMHx of dementia, Afib, HTN, CHF, AICD placed, CVA, COPD presents for worsening agitation.    #Dementia with behavioral disturbance   -very agitated during the night, but improves during the day  -blood cx neg  -Urine cx -  E faecalis  -Psychiatry team evaluated pt on 8.27 and recommended following regimen as below for management of behavioral disturbances 2/2 vascular dementia. Trazodone 100mg QHS, Zoloft 150mg po daily, Zyprexa 7.5mg po bedtime, Zyprexa po q6 PRN for acute agitation.   - Psych reevaluated pt today, recommend changed from Depakote 250mg p.o TID to 375mg AM and Bedtime and 250mg at noon.   -Valproic Acid Level, Serum: 13.0 ug/mL (08.25.20 @ 11:15)  -  -c/w macrobid - last dose yesterday  -monitor mental status     #Chronic Afib  INR 6.1 during admission, s/p vit K  INR 4.48 this AM, Last coumadin 2 days ago, hold coumadin  check daily INR    #chronic resp failure/History of COPD  oxygen requirements at baseline (home O2 at night 2L)    #chronic diastolic CHF   - no decompensation  - cont lasix po    #L Sided Weakness, residual from CVA 3 years ago            GI PPX: protonix daily	                                                                                                                                                             DNR/DNI  Dispo - Pending Long term care placement, CM and SW following

## 2020-09-02 NOTE — PROGRESS NOTE ADULT - SUBJECTIVE AND OBJECTIVE BOX
DAILY PROGRESS NOTE  ===========================================================    Patient Information:  KRISTI SAMUELS  /  86y  /  Female  /  MRN#: [id number]    Hospital Day: 8d     |:::::::::::::::::::::::::::| SUBJECTIVE |:::::::::::::::::::::::::::|    OVERNIGHT EVENTS:   TODAY: Patient was seen today at bedside. Pt agitated this morning.   |:::::::::::::::::::::::::::| OBJECTIVE |:::::::::::::::::::::::::::|    VITAL SIGNS: Last 24 Hours  T(C): 37.2 (02 Sep 2020 04:20), Max: 37.2 (02 Sep 2020 04:20)  T(F): 98.9 (02 Sep 2020 04:20), Max: 98.9 (02 Sep 2020 04:20)  HR: 74 (02 Sep 2020 04:20) (60 - 74)  BP: 135/58 (02 Sep 2020 04:20) (124/64 - 135/58)  BP(mean): --  RR: 19 (02 Sep 2020 04:20) (18 - 19)  SpO2: 96% (02 Sep 2020 08:30) (95% - 96%)    PHYSICAL EXAM:  GENERAL:   Awake, alert; NAD.  HEENT:  Head NC/AT; Conjunctivae pink, Sclera anicteric; Oral mucosa moist.  CARDIO:   Regular rate; Regular rhythm; S1 & S2.  RESP:   No rales, wheezing, or rhonchi appreciated.  GI:   Soft; NT/ND; BS; No guarding; No rebound tenderness.  EXT:   Stregnth UE 5/5; Strength LE 5/5; No edema.   SKIN:   Intact.    LAB RESULTS:                        11.3   6.96  )-----------( 189      ( 31 Aug 2020 11:17 )             35.2     09-01    140  |  99  |  23<H>  ----------------------------<  86  3.5   |  31  |  1.1    Ca    9.9      01 Sep 2020 06:43    TPro  6.9  /  Alb  3.5  /  TBili  0.3  /  DBili  x   /  AST  34  /  ALT  16  /  AlkPhos  81  09-01    PT/INR - ( 02 Sep 2020 06:11 )   PT: >40.00 sec;   INR: 4.68 ratio         PTT - ( 02 Sep 2020 06:11 )  PTT:44.4 sec            MICROBIOLOGY:    RADIOLOGY:    ALLERGIES:  No Known Drug Allergies  Seafood (Unknown)  shellfish (Other)      ===========================================================

## 2020-09-02 NOTE — PROGRESS NOTE ADULT - ATTENDING COMMENTS
Pt was seen and examined at bedside independently, pt is calm today, opens eyes to voice, she was agitated this morning.   I agree with medical resident's findings, assessment and plan above, will c/w current medical management , hold Coumadin tonight, f/u repeat INR in AM, she has no active medical issues, awaiting for a long term placement.     #Progress Note Handoff  Pending (specify):  placement is pending, hold Coumadin tonight, f/u repeat INR in AM    Family discussion: n/a, family requested placement   Disposition: Home___/SNF___/Other________/Unknown at this time____x ____

## 2020-09-03 NOTE — PROGRESS NOTE ADULT - ASSESSMENT
85 yo F with PMHx of dementia, Afib, HTN, CHF, AICD placed, CVA, COPD presents for worsening agitation.    #Dementia with behavioral disturbance   -very agitated during the night, but improves during the day  -blood cx -  neg  -Urine cx -  E faecalis  -Psychiatry team evaluated pt on 8.27 and recommended following regimen as below for management of behavioral disturbances 2/2 vascular dementia. Trazodone 100mg QHS, Zoloft 150mg po daily, Zyprexa 7.5mg po bedtime, Zyprexa po q6 PRN for acute agitation.   - Psych reevaluated pt today, recommend changed from Depakote 250mg p.o TID to 375mg AM and Bedtime and 250mg at noon.   -Valproic Acid Level, Serum: 13.0 ug/mL (08.25.20 @ 11:15)  -  -Macrobid discontinued today  -monitor mental status     #Chronic Afib  INR 6.1 during admission, s/p vit K  INR 6.43 this AM, Last coumadin 2 days ago, hold coumadin  Vit K 2.5 today  check daily INR    #chronic resp failure/History of COPD  oxygen requirements at baseline (home O2 at night 2L)    #chronic diastolic CHF   - no decompensation  - cont lasix po    #L Sided Weakness, residual from CVA 3 years ago            GI PPX: protonix daily	                                                                                                                                                             DNR/DNI  Dispo - Pending Long term care placement, CM and SW following, family meeting today

## 2020-09-03 NOTE — PROGRESS NOTE ADULT - SUBJECTIVE AND OBJECTIVE BOX
DAILY PROGRESS NOTE  ===========================================================    Patient Information:  KRISTI SAMUELS  /  86y  /  Female  /  MRN#: [id number]    Hospital Day: 9d     |:::::::::::::::::::::::::::| SUBJECTIVE |:::::::::::::::::::::::::::|    OVERNIGHT EVENTS:   TODAY: Patient was seen today at bedside. Review of systems is otherwise negative.    |:::::::::::::::::::::::::::| OBJECTIVE |:::::::::::::::::::::::::::|    VITAL SIGNS: Last 24 Hours  T(C): 35.9 (03 Sep 2020 05:20), Max: 35.9 (03 Sep 2020 05:20)  T(F): 96.7 (03 Sep 2020 05:20), Max: 96.7 (03 Sep 2020 05:20)  HR: 60 (03 Sep 2020 08:06) (60 - 88)  BP: 128/61 (03 Sep 2020 05:20) (105/61 - 147/77)  BP(mean): --  RR: 18 (03 Sep 2020 05:20) (18 - 19)  SpO2: 95% (03 Sep 2020 08:06) (95% - 99%)    09-02-20 @ 07:01  -  09-03-20 @ 07:00  --------------------------------------------------------  IN: 0 mL / OUT: 1350 mL / NET: -1350 mL      PHYSICAL EXAM:  GENERAL:   Awake, alert; NAD.  HEENT:  Head NC/AT; Conjunctivae pink, Sclera anicteric; Oral mucosa moist.  CARDIO:   Regular rate; Regular rhythm; S1 & S2.  RESP:   No rales, wheezing, or rhonchi appreciated.  GI:   Soft; NT/ND; BS; No guarding; No rebound tenderness.  EXT:   Stregnth UE 5/5; Strength LE 5/5; No edema.   SKIN:   Intact.    LAB RESULTS:          PT/INR - ( 03 Sep 2020 05:49 )   PT: >40.00 sec;   INR: 6.43 ratio         PTT - ( 03 Sep 2020 05:49 )  PTT:48.9 sec            MICROBIOLOGY:    RADIOLOGY:    ALLERGIES:  No Known Drug Allergies  Seafood (Unknown)  shellfish (Other)      ===========================================================

## 2020-09-03 NOTE — PROGRESS NOTE ADULT - ATTENDING COMMENTS
Pt was seen and examined at bedside independently, pt is calm, gets agitated at times, she has advanced dementia with behavioral disturbance, awaiting for placement.  I agree with medical resident's findings, assessment and plan above, today will give one dose of Vitamin K 2.5 mg to treat coagulopathy, continue to hold Coumadin, monitor INR.    #Progress Note Handoff  Pending (specify):  give one dose of Vitamin K today, monitor INR, hold Coumadin   Family discussion: n/a, case management had a three way call with pt's daughter today   Disposition: Home___/SNF___/Other________/Unknown at this time___x _____

## 2020-09-04 NOTE — PROGRESS NOTE ADULT - SUBJECTIVE AND OBJECTIVE BOX
<<<RESIDENT DISCHARGE NOTE>>>     KRISTI SAMUELS  MRN-[id number]    Pt seen today at bedside, appears comfortable.     VITAL SIGNS:  T(F): 96.9 (09-04-20 @ 13:09), Max: 97.7 (09-03-20 @ 21:25)  HR: 85 (09-04-20 @ 13:09)  BP: 128/60 (09-04-20 @ 15:54)  SpO2: 96% (09-04-20 @ 09:13)      PHYSICAL EXAMINATION:  GENERAL:   Awake, alert; NAD.  HEENT:  Head NC/AT; Conjunctivae pink, Sclera anicteric; Oral mucosa moist.  CARDIO:   Regular rate; Regular rhythm; S1 & S2.  RESP:   No rales, wheezing, or rhonchi appreciated.  GI:   Soft; NT/ND; BS; No guarding; No rebound tenderness.  EXT:   Stregnth UE 5/5; Strength LE 5/5; No edema.   SKIN:   Intact.      85 yo F with PMHx of dementia, Afib, HTN, CHF, AICD placed, CVA, COPD presents for worsening agitation.    #Dementia with behavioral disturbance   -blood cx -  neg  -Urine cx -  E faecalis  - pt was consulted by psychiatry   - on  Trazodone 100mg QHS, Zoloft 150mg po daily, Zyprexa 7.5mg po bedtime, Zyprexa po q6 PRN for acute agitation.   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 Cyr___)    DISPOSITION:   [x ] Home,    [  ] Home with Visiting Nursing Services,   [    ]  SNF/ NH,    [   ] Acute Rehab (4A),   [   ] Other (Specify:_________)

## 2020-09-04 NOTE — CHART NOTE - NSCHARTNOTEFT_GEN_A_CORE
Registered Dietitian Follow-Up     Patient Profile Reviewed                           Yes [x]   No []     Nutrition History Previously Obtained        Yes [x]  No []       Pertinent Subjective Information: Pt combative and aggressive. Observed lunch tray not eaten. Pt is on 1:1 feed. Documented po intake 0-10%.      Pertinent Medical Interventions: Dementia with behavioral disturbance. Chronic Afib - daily INR checks. L Sided Weakness. Family meeting today     Diet order: DASH/TLC     Anthropometrics:  - Ht. 160 cm   - Wt. 85.2 kg (8/25) vs. 82.9 kg (9/3) ? related to poor po intake vs. diuretic therapy, will cont to monitor  - %wt change -2.7%   - BMI 32.2 using 82.9 kg   - IBW      Pertinent Lab Data: (9/1) H/H 11.3/35.2 BUN 23  GFR 45      Pertinent Meds: coumadin, lipitor, lasix, protonix, senna     Physical Findings:  - Appearance: confused, agitated   - GI function: LBM 9/3  - Tubes:  - Oral/Mouth cavity: No issues reported   - Skin: redness blanchable to sacrum     Nutrition Requirements  Weight Used: 85.2 kg, IBW: 52.3 kg, cont from RD initial note      calories: 1267 - 1393 kcals/day (MSJ x 1.0 - 1.1 AF obesity)  protein: 52 - 62 gms/day (1.0 - 1.2 gm/kg IBW obesity)  fluid: 1ml/kcal or per LIP     Nutrient Intake: not meeting needs         [] Previous Nutrition Diagnosis:  Inadequate Oral Intake            [x] Ongoing          [] Resolved    [] No active nutrition diagnosis identified at this time     Nutrition Intervention meal/snack, med food supplement, vitamin/minerals     Goal/Expected Outcome: Pt to consume >50% of meal tray in 4 days     Indicator/Monitoring: RD to monitor energy intake, diet order, body comp, NFPF    Recommendation:   - Order Ensure Enlive BID and MVI daily. Continue DASH/TLC diet and 1:1 feeds.

## 2020-09-04 NOTE — PROGRESS NOTE ADULT - NSHPATTENDINGPLANDISCUSS_GEN_ALL_CORE
house staff, residents during rounds
house staff, residents and case management
house staff, residents during rounds

## 2020-09-04 NOTE — PROGRESS NOTE ADULT - REASON FOR ADMISSION
Agitation

## 2020-09-04 NOTE — DISCHARGE NOTE NURSING/CASE MANAGEMENT/SOCIAL WORK - PATIENT PORTAL LINK FT
You can access the FollowMyHealth Patient Portal offered by [facility] by registering at the following website: [email or web address]. By joining [employer]’s FollowMyHealth portal, you will also be able to view your health information using other applications (apps) compatible with our system.

## 2020-09-04 NOTE — PROGRESS NOTE ADULT - SUBJECTIVE AND OBJECTIVE BOX
87 yo F with PMHx of dementia, Afib, HTN, CHF, AICD placed, CVA, COPD presents for worsening agitation. Pt has a vascular dementia with behavioral disturbance.   Today pt is very pleasant, answering questions, able to have a meaningful conversation about family.     VITAL SIGNS: Last 24 Hours  T(C): 36.1 (04 Sep 2020 13:09), Max: 36.5 (03 Sep 2020 21:25)  T(F): 96.9 (04 Sep 2020 13:09), Max: 97.7 (03 Sep 2020 21:25)  HR: 85 (04 Sep 2020 13:09) (63 - 85)  BP: 96/54 (04 Sep 2020 13:09) (96/54 - 138/66)  BP(mean): --  RR: 18 (04 Sep 2020 13:09) (18 - 19)  SpO2: 96% (04 Sep 2020 09:13) (95% - 96%)      PHYSICAL EXAM:  GENERAL:   Awake, alert; NAD.  HEENT:  Head NC/AT; Conjunctivae pink, Sclera anicteric; Oral mucosa moist.  CARDIO:   Regular rate; Regular rhythm; S1 & S2.  RESP:   No rales, wheezing, or rhonchi appreciated.  GI:   Soft; NT/ND; BS; No guarding; No rebound tenderness.  EXT:   Stregnth UE 5/5; Strength LE 5/5; No edema.   SKIN:   Intact.    LAB RESULTS:    PT/INR - ( 04 Sep 2020 11:43 )   PT: 29.00 sec;   INR: 2.52 ratio         PTT - ( 04 Sep 2020 11:43 )  PTT:45.3 sec    MICROBIOLOGY:  Culture - Urine (08.25.20 @ 04:55)    -  Ampicillin: S <=2 Predicts results to ampicillin/sulbactam, amoxacillin-clavulanate and  piperacillin-tazobactam.    -  Ciprofloxacin: S <=1    -  Levofloxacin: S <=1    -  Nitrofurantoin: S <=32 Should not be used to treat pyelonephritis.    -  Tetra/Doxy: R >8    -  Vancomycin: S 2    Specimen Source: .Urine Catheterized    Culture Results:   10,000 - 49,000 CFU/mL Enterococcus faecalis    Organism Identification: Enterococcus faecalis    Organism: Enterococcus faecalis    Method Type: NABILA      RADIOLOGY:  < from: Xray Chest 1 View-PORTABLE IMMEDIATE (08.25.20 @ 03:01) >  Impression:    Stable bilateral opacities. No pleural effusion or pneumothorax.    < end of copied text >  < from: Transthoracic Echocardiogram (05.29.20 @ 16:21) >  Summary:   1. Left ventricular ejection fraction, by visual estimation, is 50 to 55%.   2. Technically difficult study.   3. Normal global left ventricular systolic function.   4. Spectral Doppler shows impaired relaxation pattern of left ventricular myocardial filling (Grade I diastolic dysfunction).   5. Very limited apical views for bevaluation of LV wall motion/function.   6. Structurally normal mitral valve, with normal leaflet excursion.   7. Sclerotic aortic valve with normal opening.   8. Estimated pulmonary artery systolic pressure is 39.7 mmHg assuming a right atrial pressure of 5 mmHg, which is consistent with borderline pulmonary hypertension.    < end of copied text >    ALLERGIES:  No Known Drug Allergies  Seafood (Unknown)  shellfish (Other)        MEDICATIONS  (STANDING):  atorvastatin 80 milliGRAM(s) Oral at bedtime  chlorhexidine 4% Liquid 1 Application(s) Topical <User Schedule>  diltiazem    milliGRAM(s) Oral daily  diVALproex  milliGRAM(s) Oral every 12 hours  diVALproex  milliGRAM(s) Oral <User Schedule>  furosemide    Tablet 40 milliGRAM(s) Oral daily  metoprolol tartrate 50 milliGRAM(s) Oral two times a day  multivitamin 1 Tablet(s) Oral daily  OLANZapine 7.5 milliGRAM(s) Oral at bedtime  pantoprazole    Tablet 40 milliGRAM(s) Oral before breakfast  senna 2 Tablet(s) Oral at bedtime  sertraline 150 milliGRAM(s) Oral daily  traZODone 150 milliGRAM(s) Oral at bedtime  warfarin 2 milliGRAM(s) Oral once    MEDICATIONS  (PRN):  OLANZapine 2.5 milliGRAM(s) Oral every 12 hours PRN agitation

## 2020-09-04 NOTE — PROGRESS NOTE ADULT - ASSESSMENT
85 yo F with PMHx of dementia, Afib, HTN, CHF, AICD placed, CVA, COPD presents for worsening agitation.      A/P   #Dementia with behavioral disturbance   - pt is cooperative today   -blood cx -  neg  - pt was consulted by psychiatry   - on  Trazodone 100mg QHS, Zoloft 150mg po daily, Zyprexa 7.5mg po bedtime, Zyprexa po q6 PRN for acute agitation.   -  Depakote 250mg p.o TID  was changed to 375mg AM and Bedtime and 250mg at noon.     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

## 2020-09-18 NOTE — ED PROVIDER NOTE - ATTENDING CONTRIBUTION TO CARE
I personally evaluated the patient. I reviewed the Resident’s or Physician Assistant’s note (as assigned above), and agree with the findings and plan except as documented in my note.  86 F with hx of dementia, Afib on coumadin, CVA with left sided residual weakness, CHF on 40 mg Lasix, AICD, HTN, pulmonary HTN on home o2 was sent in by cardiologist Dr. Lunsford for elevation of INR found to be 26 done yesterday. Daughter at bedside states that pt's last coumadin dose was 3 days ago. No trauma, rectal bleeding, hemoptysis/hematemasis, no LOC. no trauma. VS reviewed, pt non-toxic appearing, NAD. Head ncat, MMM, neck supple, normal ROM, normal s1s2 without any murmurs, Lungs CTAB with normal work of breathing. abd +BS, s/nd/nt, extremities  with distal pulses intact, AAO x 2, + LUE and LLE paresis chronically. + ecchymosis of b/l LE. Plan is INR check, labs, imaging and dispo accordingly.

## 2020-09-18 NOTE — ED PROVIDER NOTE - PROGRESS NOTE DETAILS
Familia: Pt's cardiologist Dr. Gerber called because pt's daughter called him. He stated that pt has a hx of elevated INR. [place]: INR greater than 15. Will give vit K po as no active bleeding

## 2020-09-18 NOTE — ED ADULT NURSE NOTE - OBJECTIVE STATEMENT
Pt sent by PCP c/o abnormal labs , elevated INR 26 . Pt HX AFIB , on coumadin . Pt confused , alert, oriented to person , disoriented to place , time , situation , HX dementia ,. frequent orientation done . Pt noted with bilateral leg swelling/bruising  , noted pressure ulcer /redness non blanchable  at bilateral buttock , sacral area , frequent repositioning / turning done ,noted left sided weakness HX CVA 3 years ago with left sided residual from CVA , HX chronic AFIB , on coumadin , hX AICD, CHF , HTN , denies any chest pain , denies headache , denies dizziness , denies abdominal pain , denies nausea , no vomiting noted , HX COPD , on home oxygen 3 L NC , no accessory muscles used , no labored breathing noted. Pts daughter at the bedside Pt sent by PCP c/o abnormal labs , elevated INR 26 . Pt HX AFIB , on coumadin . Pt confused , alert, oriented to person , disoriented to place , time , situation , HX dementia ,. frequent orientation done . Pt noted with bilateral leg swelling/bruising  , noted pressure ulcer /redness non blanchable  at bilateral buttock , sacral area ,coccyx area , noted redness at the right hip ,noted ecchymotic bilateral lower extremities with swelling ,  frequent repositioning / turning done ,noted left sided weakness HX CVA 3 years ago with left sided residual from CVA , HX chronic AFIB , on coumadin , hX AICD, CHF , HTN , denies any chest pain , denies headache , denies dizziness , denies abdominal pain , denies nausea , no vomiting noted , HX COPD , on home oxygen 3 L NC , no accessory muscles used , no labored breathing noted. Pts daughter at the bedside . Cleanse sacral wound right hip wound , repositioned pt

## 2020-09-18 NOTE — ED ADULT TRIAGE NOTE - CHIEF COMPLAINT QUOTE
pt was admitted to for two weeks because "all her blood levels were elevated" as per family; pt was sent here by PMD for bloodwork; family member his poor historian

## 2020-09-18 NOTE — ED PROVIDER NOTE - OBJECTIVE STATEMENT
Evelyn is an 85 y/o F with a PMHx of Dementia, Afib on Coumadin, HTN, CHF, AICD, CVA w/ L sided residual weakness who is coming to the ED due to her INR being 26 a/p her daughter who is at bedside. The pt was recently admitted for CP/SOB complicated by a UTI and development of a sacral pressure wound upon d/c. Her daughter states that she has not been c/o anything except pain on her buttock, Evelyn is an 85 y/o F with a PMHx of Dementia, Afib on Coumadin, HTN, CHF, AICD, COPD on home 02 CVA w/ L sided residual weakness who is coming to the ED sent in by cardiologist Dr. Lunsford for her INR being 26 . The pt was recently admitted for CP/SOB complicated by a UTI and development of a sacral pressure wound upon d/c. Her daughter states that she has not been c/o anything except pain on her buttock. Daughter at bedside states that pt's last coumadin dose was 3 days ago. No trauma, rectal bleeding, hemoptysis/hematemasis, no LOC. no trauma.

## 2020-09-18 NOTE — ED PROVIDER NOTE - CLINICAL SUMMARY MEDICAL DECISION MAKING FREE TEXT BOX
Pt with hx of afib on coumadin, brittle INR difficult to normalize presented due to supratherapeutic INR found. Required labs, ekg, imaging, meds. Will be admitted for further management.

## 2020-09-18 NOTE — ED ADULT NURSE NOTE - INTERVENTIONS DEFINITIONS
[place] to call system/Instruct patient to call for assistance/Stretcher in lowest position, wheels locked, appropriate side rails in place/Reinforce activity limits and safety measures with patient and family/Room bathroom lighting operational/Non-slip footwear when patient is off stretcher/Physically safe environment: no spills, clutter or unnecessary equipment/Provide visual clues: red socks/Call bell, personal items and telephone within reach/Provide visual cue, wrist band, yellow gown, etc./Monitor for mental status changes and reorient to person, place, and time/Review medications for side effects contributing to fall risk/Monitor gait and stability

## 2020-09-18 NOTE — ED PROVIDER NOTE - PHYSICAL EXAMINATION
Vital Signs: I have reviewed the initial vital signs.  Constitutional: WDWN in nad. Laying on stretcher speaking full sentences.   Integumentary: No rash.  NECK: Supple, non-tender, no meningeal signs.  Cardiovascular: irregularly irregular, radial pulses 2/4 b/l. No JVD.  Respiratory: BS present b/l, ctabl, no wheezing or crackle, no accessory muscle use, no stridor.  Gastrointestinal: BS present throughout all 4 quadrants, soft, nd, nt no rebound tenderness or guarding, no cvat.  Musculoskeletal: FROM, (+) edema, no calf pain/swelling/erythema.  Neurologic: AAOx2, motor 4/5 and sensation intact throughout upper and lower ext, No facial droop or slurring of speech. No focal deficits.

## 2020-09-18 NOTE — ED PROVIDER NOTE - NS ED ROS FT
Constitutional:  No fevers/chills.   Head: No injury, headache, LOC, dizziness/LH.   Eyes:  No eye pain, redness, or discharge; no injury;   ENMT:  No ear pain or discharge   Neck:  No pain, stiffness, injury   Cardiac: No chest pain, palpitations, diaphoresis.   Chest/respiratory: (+) cough   GI: No nausea, vomiting, diarrhea or abdominal pain; no injuries. No changes in PO intake. No melena/brbpr.   :  No dysuria, hematuria, urgency, or injuries. No changes in urine output.    MS: No pain, weakness, injury, numbness or tingling; (+) edema.   Back:  No pain or injury.   Skin:  No rashes  no lacerations or abrasions.  Social: No sick contacts, recent travel or rash.

## 2020-09-19 NOTE — H&P ADULT - ATTENDING COMMENTS
HPI:  87 YO F with PMHx of AFib on coumadin, HFpEF (s/p AICD, CVA with residual weakness, COPD (on home O2 at night 2L), HTN, DLD, Dementia presented to the ED after being sent by Dr Herrmann for INR of 26.  Patient was discharged from the hospital on September 4. Patient had high INR during that hospital stay and was given Vit K. As per the daughter at the bedside in the last 2 weeks only 1 mg of coumadin was given 2 times. The visiting nurse would come every Wednesday and the sample she took on recently showed an INR of 26  Daughter also reported that recently they would hear gargling noises so they sent a video to her doctor who prescribed Levofloxacin.   In the ED vitals were unremarkable. Saturating 94% on 4L NC  Patient received 5 mg PO Vitamin K  (19 Sep 2020 04:27)    REVIEW OF SYSTEMS:    CONSTITUTIONAL: No weakness, fevers or chills  EYES/ENT: No visual changes;  No vertigo or throat pain   NECK: No pain or stiffness  RESPIRATORY: No cough, wheezing, hemoptysis; No shortness of breath  CARDIOVASCULAR: No chest pain or palpitations  GASTROINTESTINAL: No abdominal or epigastric pain. No nausea, vomiting, or hematemesis; No diarrhea or constipation. No melena or hematochezia.  GENITOURINARY: No dysuria, frequency or hematuria  NEUROLOGICAL: No numbness or weakness  SKIN: No itching, rashes    Physical Exam:  General: WN/WD NAD  Neurology: A&Ox3, nonfocal, follows commands  Eyes: PERRLA/ EOMI  ENT/Neck: Neck supple, trachea midline, No JVD  Respiratory: CTA B/L, No wheezing, rales, rhonchi  CV: Normal rate regular rhythm, S1S2, no murmurs, rubs or gallops  Abdominal: Soft, NT, ND +BS,   Extremities: No edema, + peripheral pulses  Skin: No Rashes, Hematoma, Ecchymosis  Incisions:   Tubes:    A/P  Supra- therapeutic INR / H/o A. Fib   Suspected gram negative pneumonia -  HFpEF / HTN   Dyslipidemia HPI:  85 YO F with PMHx of AFib on coumadin, HFpEF (s/p AICD, CVA with residual weakness, COPD (on home O2 at night 2L), HTN, DLD, Dementia presented to the ED after being sent by Dr Herrmann for INR of 26.  Patient was discharged from the hospital on September 4. Patient had high INR during that hospital stay and was given Vit K. As per the daughter at the bedside in the last 2 weeks only 1 mg of coumadin was given 2 times. The visiting nurse would come every Wednesday and the sample she took on recently showed an INR of 26  Daughter also reported that recently they would hear gargling noises so they sent a video to her doctor who prescribed Levofloxacin.   In the ED vitals were unremarkable. Saturating 94% on 4L NC  Patient received 5 mg PO Vitamin K  (19 Sep 2020 04:27)    REVIEW OF SYSTEMS:    CONSTITUTIONAL: No weakness, fevers or chills  EYES/ENT: No visual changes;  No vertigo or throat pain   NECK: No pain or stiffness  RESPIRATORY: No cough, wheezing, hemoptysis; No shortness of breath  CARDIOVASCULAR: No chest pain or palpitations  GASTROINTESTINAL: No abdominal or epigastric pain. No nausea, vomiting, or hematemesis; No diarrhea or constipation. No melena or hematochezia.  GENITOURINARY: No dysuria, frequency or hematuria  NEUROLOGICAL: No numbness or weakness  SKIN: No itching, rashes    Physical Exam:  General: WN/WD NAD  Neurology: A&Ox3, nonfocal, follows commands  Eyes: PERRLA/ EOMI  ENT/Neck: Neck supple, trachea midline, No JVD  Respiratory: CTA B/L, No wheezing, rales, rhonchi  CV: Normal rate regular rhythm, S1S2, no murmurs, rubs or gallops  Abdominal: Soft, NT, ND +BS,   Extremities: No edema, + peripheral pulses  Skin: No Rashes, Hematoma, large area of ecchymosis on LE and buttock area  Incisions: n/a  Tubes: n/a    A/P  Supra- therapeutic INR / H/o A. Fib - large area of ecchymosis on LE and buttock area   -hold Coumadin and repeat INR     Suspected gram negative pneumonia vs. aspiration pneumonia  -IV abx   -check blood Cx and sputum cultures  -f/u official CXR reprt  -speech and swallow / NPO for now  -pulse ox monitoring     COPD on home O2   -O2 supplementation and pulse ox monitoring   -PRN MDI    HFpEF / HTN   -c/w current Rx and monitor for fluid overload    Dyslipidemia  -statin    Sacral decubitus - local wound care

## 2020-09-19 NOTE — DIETITIAN INITIAL EVALUATION ADULT. - PHYSICAL APPEARANCE
BMI 32.6 IBW: 47.7 kg On last adm to [facility] (8/25/2020) pt's dosing weight was 85.2 kg. Using CBW, 8.4% weight loss in 1 month - unsure if this is accurate ( possible bed scale errors). BS 14. 3+ edema b/l leg. Excoriation on buttocks per RN./obese

## 2020-09-19 NOTE — H&P ADULT - NSICDXPASTSURGICALHX_GEN_ALL_CORE_FT
PAST SURGICAL HISTORY:  AICD (automatic cardioverter/defibrillator) present     S/P appendectomy

## 2020-09-19 NOTE — H&P ADULT - HISTORY OF PRESENT ILLNESS
87 YO F with PMHx of AFib on coumadin, HFpEF (s/p AICD, CVA with residual weakness, COPD (on home O2 at night 2L), HTN, DLD presented to the ED after being sent by Dr Herrmann for INR of 26.  Patient was discharged from the hospital on September 4. Patient had high INR during that hospital stay and was given Vit K. As per the daughter at the bedside in the last 2 weeks only 1 mg of coumadin was given 2 times. The visiting nurse would come every Wednesday and the sample she took on recently showed an INR of 26  Daughter also reported that recently they would hear gargling noises so they sent a video to her doctor who prescribed Levofloxacin.   In the ED vitals were unremarkable. Saturating 94% on 4L NC  Patient received 5 mg PO Vitamin K 85 YO F with PMHx of AFib on coumadin, HFpEF (s/p AICD, CVA with residual weakness, COPD (on home O2 at night 2L), HTN, DLD, Dementia presented to the ED after being sent by Dr Herrmann for INR of 26.  Patient was discharged from the hospital on September 4. Patient had high INR during that hospital stay and was given Vit K. As per the daughter at the bedside in the last 2 weeks only 1 mg of coumadin was given 2 times. The visiting nurse would come every Wednesday and the sample she took on recently showed an INR of 26  Daughter also reported that recently they would hear gargling noises so they sent a video to her doctor who prescribed Levofloxacin.   In the ED vitals were unremarkable. Saturating 94% on 4L NC  Patient received 5 mg PO Vitamin K

## 2020-09-19 NOTE — H&P ADULT - NSHPPHYSICALEXAM_GEN_ALL_CORE
CONSTITUTIONAL: AAOx0  HEAD: Atraumatic, normocephalic  EYES: EOM intact, PERRLA, conjunctiva and sclera clear  ENT: JVD +ve  PULMONARY: b/l crackles worse on the right   CARDIOVASCULAR: irregular   GASTROINTESTINAL: Soft, non-tender, non-distended; bowel sounds present  SKIN: Grade II sacral ulcer. Ecchymosis on b/l LE

## 2020-09-19 NOTE — PATIENT PROFILE ADULT - ARRIVAL FROM
Final Anesthesia Post-op Assessment    Patient: Saulo Cardona  Procedure(s) Performed: COLONOSCOPY  Anesthesia type: Monitor Anesthesia Care    Vitals Value Taken Time   Temp 36.3 °C (97.4 °F) 4/26/2019 11:43 AM   Pulse 78 4/26/2019 11:48 AM   Resp 16 4/26/2019 11:43 AM   /90 4/26/2019 11:43 AM   SpO2 96 % 4/26/2019 11:48 AM   Vitals shown include unvalidated device data.    Last 24 I/O:     Intake/Output Summary (Last 24 hours) at 4/26/2019 1204  Last data filed at 4/26/2019 1026  Gross per 24 hour   Intake 400 ml   Output --   Net 400 ml       PATIENT LOCATION: PACU Phase 2  POST-OP VITAL SIGNS: stable  LEVEL OF CONSCIOUSNESS: participates in exam, awake, oriented, answers questions appropriately and alert  RESPIRATORY STATUS: spontaneous ventilation and unassisted  CARDIOVASCULAR: blood pressure returned to baseline  HYDRATION: euvolemic    PAIN MANAGEMENT: adequately controlled  NAUSEA: None  AIRWAY PATENCY: patent  POST-OP ASSESSMENT: no complications, patient tolerated procedure well with no complications, no evidence of recall and sufficiently recovered from acute administration of anesthesia effects and able to participate in evaluation  COMPLICATIONS: none      
Home

## 2020-09-19 NOTE — DIETITIAN INITIAL EVALUATION ADULT. - WEIGHT IN KG
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 Pt also reports hx of spinal stenosis.     SUBJECTIVE   Pt reports knee is really starting to feel better. States no pain really with activities just soreness and stiffness primarily.     Current Pain (0-10 scale): 2  Functional Change: Pt reports he has been really working hard on achieving full extension with HEP.     OBJECTIVE   No formal objective measures taken this session     Treatment   Therapeutic Exercise:  Exercises   Prone Knee Extension Hang - 3 mins hold with 10# cuff weight   Prone Quadriceps Set - 10 reps - 2 sets    Quad Set, Heel Lift - 10 reps - 2 sets -  Seated Long Arc Quad - 10 reps - 2 sets -   Walking With High Knees - 50 feet x4  Backwards Walking - 50 feet x4   Standing to Half Kneeling Transitions onto 3 stacked foam pads x10 each leg   1 Forward Step with SLS x10  1 Side Step with x10  Rotational Step with SLS x10   Slow chair squats - 5 reps - 3 sets -  Forward Step Down with Counter Support at Side - 5 reps - 3 sets -  Heel Raise on step with SLOW LOWER - 10 reps - 3 sets - 3x weekly   Partial Lunge with Chair - 5 reps - 3 sets - 3x weekly   Leg Press, Single Leg- 75#, 3x10     Skilled input: Pt required min-mod cueing during  25-50% of there-ex for proper form/mechanics. Focus on keeping core strong, slight bend in knee and hips during step and SLS exercises    Home Program:   Access Code: [email or web address]    URL: [email or web address]   Date: 12/05/2018   Prepared by: Jonathan Lobato     Exercises   Prone Knee Extension Hang - 3-5 mins hold - 1x daily   Prone Quadriceps Set - 10 reps - 3 sets - 1x daily   Quad Squeeze, Heel Lift - 10 reps - 3 sets - 1x daily   Supine Heel Slide with Strap - 10 reps - 3 sets - 1x daily   Long Sitting Quad Set with Towel Roll Under Heel - 10 reps - 3 sets - 1x daily   Seated Long Arc Quad - 10 reps - 3 sets - 1x daily   Hooklying Clamshell with Resistance - 10 reps - 3 sets - 1x daily   Supine Bridge with Resistance Band - 5-10 reps - 2-3 sets - 1x daily   Walking With High Knees - 10 reps - 3 sets - 4-7x weekly   Backwards Walking - 10 reps - 3 sets - 4-7x weekly   Single leg balance - 10 reps - 3 sets - 4-7x weekly   Slow chair squats - 5-10 reps - 3 sets - 3x weekly   Forward Step Down with Counter Support at Side - 5-10 reps - 3 sets - 3x weekly   Heel Raise on step with SLOW LOWER - 10 reps - 3 sets - 3x weekly   Partial Lunge with Chair - 10 reps - 3 sets - 3x weekly   Side Stepping with Resistance at Ankles - 10 reps - 3 sets - 3x weekly        Writer verbally educated the patient and received verbal consent from the patient on hand placement, positioning of patient, and techniques to be performed today including interventions above and how they are pertinent to the patient's plan of care.      Suggestions for next session as indicated: continue to progress CKC, sled, farmers carry, resisted walking cable, HK transitions     ASSESSMENT   Pt continues to present to physical therapy with signs and symptoms consistent with s/p 7 1/2 weeks right TKA. Pt is overall progressing very well in therapy to date. He has demonstrated ability to perform transfers/transitions from standing to half kneeling onto elevated foam pads. Also continuing to demonstrate improved CKC quad/eccentric control with squats and step downs.   Pt continues to have impairments in , impaired strength,  impaired range of motion, increased swelling, impaired joint mobility/play, impaired gait, impaired activity tolerance, impaired motor function/performance/coordination, impaired body mechanics. Pt to continue to benefit from skilled physical therapy address these signs, symptoms, impairments and functional limitations.     Pain after treatment (patient reported, 0-10 scale): 3  Result of above outlined education: Verbalizes understanding and Demonstrates understanding    THERAPY DAILY BILLING   Insurance: MEDICARE 2. [employer]    Evaluation Procedures:  No evaluation codes were used on this date of service    Timed Procedures:  Therapeutic Exercise, 45 minutes    Untimed Procedures:  No untimed codes were used on this date of service    Total Treatment Time: 45 minutes    G-Code:  G-Code Score ABN form  reporting not required this treatment session  Modifier based on outcome measure(s)/functional testing/clinical judgement as listed above    The referring provider's electronic or written signature on the evaluation authorizes the therapy plan of care and certifies the need for these services, furnished under this plan of care while under their care.         78.1

## 2020-09-19 NOTE — DIETITIAN INITIAL EVALUATION ADULT. - OTHER INFO
Pt adm for high INR. Atrial Fibrillation on Coumadin. Suspected Aspiration Pneumonia - SLP eval. Advanced dementia. COPD on home oxygen. HFpEF / HTN - monitor for fluid overload.

## 2020-09-19 NOTE — H&P ADULT - NSHPLABSRESULTS_GEN_ALL_CORE
VITAL SIGNS: Last 24 Hours  T(C): 36.3 (18 Sep 2020 21:50), Max: 36.3 (18 Sep 2020 21:50)  T(F): 97.4 (18 Sep 2020 21:50), Max: 97.4 (18 Sep 2020 21:50)  HR: 82 (18 Sep 2020 21:50) (82 - 82)  BP: 112/60 (18 Sep 2020 21:50) (112/60 - 112/60)  BP(mean): --  RR: 18 (18 Sep 2020 21:50) (18 - 18)  SpO2: 95% (18 Sep 2020 21:50) (95% - 95%)    LABS:                        11.6   9.05  )-----------( 182      ( 18 Sep 2020 23:25 )             36.7     09-18    140  |  97<L>  |  35<H>  ----------------------------<  108<H>  3.5   |  30  |  1.3    Ca    9.5      18 Sep 2020 23:25  Mg     2.1     09-18    TPro  6.4  /  Alb  3.0<L>  /  TBili  0.3  /  DBili  x   /  AST  40  /  ALT  17  /  AlkPhos  72  09-18    PT/INR - ( 18 Sep 2020 22:51 )   PT: >40.00 sec;   INR: >15.00 ratio         PTT - ( 18 Sep 2020 22:51 )  PTT:121.4 sec      Troponin T, Serum: <0.01 ng/mL (09-18-20 @ 23:25)      CARDIAC MARKERS ( 18 Sep 2020 23:25 )  x     / <0.01 ng/mL / x     / x     / x          RADIOLOGY:

## 2020-09-19 NOTE — SWALLOW BEDSIDE ASSESSMENT ADULT - COMMENTS
+toleration of nectar thick liquids, pureed solids, and mechanical soft solids c/b no overt clinical s/s of penetration/aspiration at this time.

## 2020-09-19 NOTE — H&P ADULT - ASSESSMENT
87 YO F with PMHx of AFib on coumadin, HFpEF (s/p AICD, CVA with residual weakness, COPD (on home O2 at night 2L), HTN, DLD, Dementia presented to the ED after being sent by Dr Herrmann for INR of 26.    #Supra-therapeutic INR  #Atrial Fibrillation on Coumadin   - INR 26 outpatient. Upon presentation to ED INR > 15  - No active bleeding. However ecchymosis on b/l LE  - s/p 5mg PO Vit K in ED. Follow INR at 11am.  - No indication for PCC or FFP as of now  - Avoid meds that increase warfarin. Levofloxacin associated with increasing INR in patients on Warfarin   - Continue Cardizem 120mg PO QD    #Suspected Aspiration Pneumonia  - Chest X-ray compared to the previous one showing RLL opacity  - Cough +ve. Sputum Production +ve. b/l basal crackles worse on the right. Gargling noise with cough  - Will cover with Unasyn  - Speech and swallow eval. Nutrition eval. Calorie count     #Grade II Sacral Ulcer  - As per daughter, noticed sacral ulcers after her recent discharge from the hospital   - Much improved now than from what it was before  - Low suspicion for infection   - Barrier cream     #HFpEF s/p AICD  #HTN  - trops negative.   - Exacerbation less likely. Mild fluid overload   - Follow chest X-ray official read  - Echo 05/2020 showing EF 55-55%. GIDD.  - Continue Lasix 40mg PO QD and Metoprolol succinate 100mg PO QD     #COPD on home oxygen  - As per the daughter increase in oxygen requirement recently  - Likely due to aspiration. COPD exacerbation less likely   - Duonebs and BiPaP PRN  - Monitor O2 sat. Keep between 88 - 92 %    #Advanced Dementia; AAO x 0  - Continue Depakote 250mg PO in AM and 375mg PO in PM  - Continue Trazodone 150mg PO QHS  - Continue Olanzapine 7.5mg PO QHS    #CVA with residual weakness  - Continue atorvastatin 80mg PO QHS. Warfarin on hold for now     #Misc  - DVT Prophylaxis: Not Indicated   - Diet: NPO pending speech and swallow eval   - GI Prophylaxis: Pantoprazole   - Activity: Bedrest  - Code Status: DNI/DNR as per last admission     Dispo: Pending cardio eval 87 YO F with PMHx of AFib on coumadin, HFpEF (s/p AICD, CVA with residual weakness, COPD (on home O2 at night 2L), HTN, DLD, Dementia presented to the ED after being sent by Dr Herrmann for INR of 26.    #Supra-therapeutic INR  #Atrial Fibrillation on Coumadin   - INR 26 outpatient. Upon presentation to ED INR > 15  - No active bleeding. However ecchymosis on b/l LE  - s/p 5mg PO Vit K in ED. Follow INR at 11am.  - No indication for PCC or FFP as of now  - Avoid meds that increase warfarin. Patient received Levofloxacin as outpatient for suspected pneumonia. Levofloxacin associated with increasing INR in patients on Warfarin   - Continue Cardizem 120mg PO QD and Metoprolol succinate 100mg PO QD for rate control     #Suspected Aspiration Pneumonia  - Chest X-ray compared to the previous one showing RLL opacity. Follow official read   - Cough +ve. Sputum Production +ve. b/l basal crackles worse on the right. Gargling noise with cough  - Received Levofloxacin as outpatient. Will cover with Unasyn 3g Q12 for now (renal dosing)  - Speech and swallow eval. Nutrition eval. Calorie count     #Grade II Sacral Ulcer  - As per daughter, noticed sacral ulcers after her recent discharge from the hospital   - Much improved now than from what it was before  - Low suspicion for infection   - Barrier cream     #HFpEF s/p AICD  #HTN  - trops negative.   - Exacerbation less likely. Mild fluid overload   - Follow chest X-ray official read  - Echo 05/2020 showing EF 55-55%. GIDD.  - Continue Lasix 40mg PO QD and Metoprolol succinate 100mg PO QD     #COPD on home oxygen  - As per the daughter increase in oxygen requirement recently  - Likely due to aspiration. COPD exacerbation less likely   - Duonebs and BiPaP PRN  - Monitor O2 sat. Keep between 88 - 92 %    #Advanced Dementia; AAO x 0  - Continue Depakote 250mg PO in AM and 375mg PO in PM  - Continue Trazodone 150mg PO QHS  - Continue Olanzapine 7.5mg PO QHS    #CVA with residual weakness  - Continue atorvastatin 80mg PO QHS. Warfarin on hold for now     #Misc  - DVT Prophylaxis: Not Indicated   - Diet: NPO pending speech and swallow eval   - GI Prophylaxis: Pantoprazole   - Activity: Bedrest  - Code Status: DNI/DNR as per last admission     Dispo: Pending cardio eval

## 2020-09-19 NOTE — SWALLOW BEDSIDE ASSESSMENT ADULT - PHARYNGEAL PHASE
Cough post oral intake/Wet vocal quality post oral intake/Throat clear post oral intake Within functional limits

## 2020-09-19 NOTE — DIETITIAN INITIAL EVALUATION ADULT. - ENERGY NEEDS
calorie: 1164 - 1280 kcals/day (MSJ x 1.0 - 1.1 AF for obesity and age)  protein: 48- 58 gms/day (1.0 - 1.2 gm/kg IBW for obesity and age)  fluid: 1ml/kcal or per LIP

## 2020-09-19 NOTE — DIETITIAN INITIAL EVALUATION ADULT. - FACTORS AFF FOOD INTAKE
Pt confused and sleeping upon visit. Attempted to call pt's daughter ([phone]) but no answer. Pt recently discharged from [facility] in June 2020. Nutrition hx obtained from note on 6/2/2020 - good appetite PTA, picky eater, allergy to shellfish, seafood, lactose intolerant. Does not take nutrition supplement at home. No issues chewing/swallowing. Now pt is on dys2 nectar thick per SLP eval today. RN notes pt had 2 nectar juices this morning, calorie count starting today.

## 2020-09-19 NOTE — CHART NOTE - NSCHARTNOTEFT_GEN_A_CORE
Pt was seen at bedside, she is very drowsy, looks comfortable.   Spoke to daughter in great details about current management.  INR 4.12, s/p Vit K 5 mg earlier this AM.  Will continue management started by admitting team.

## 2020-09-20 NOTE — PROGRESS NOTE ADULT - ASSESSMENT
85 YO F with PMHx of AFib on coumadin, HFpEF (s/p AICD, CVA with residual weakness, COPD (on home O2 at night 2L), HTN, DLD, Dementia presented to the ED after being sent by Dr Herrmann for INR of 26.    #Supra-therapeutic INR  #Atrial Fibrillation on Coumadin   - INR 26 outpatient. Upon presentation to ED INR > 15  - No active bleeding. However ecchymosis on b/l LE  - s/p 5mg PO Vit K in ED. Follow INR at 11am.  - No indication for PCC or FFP as of now  - Avoid meds that increase warfarin. Patient received Levofloxacin as outpatient for suspected pneumonia. Levofloxacin associated with increasing INR in patients on Warfarin   - Continue Cardizem 120mg PO QD and Metoprolol succinate 100mg PO QD for rate control   - Trend INR. hold coumadin for now    #Suspected Aspiration Pneumonia  - Chest X-ray compared to the previous one showing RLL opacity. Follow official read   - Cough +ve. Sputum Production +ve. b/l basal crackles worse on the right. Gargling noise with cough  - Received Levofloxacin as outpatient. Will cover with Unasyn 3g Q12 for now (renal dosing)  - Speech and swallow eval. Nutrition eval. Calorie count     #Grade II Sacral Ulcer  - As per daughter, noticed sacral ulcers after her recent discharge from the hospital   - Much improved now than from what it was before  - Low suspicion for infection   - Barrier cream     #HFpEF s/p AICD  #HTN  - trops negative.   - Exacerbation less likely. Mild fluid overload   - Follow chest X-ray official read  - Echo 05/2020 showing EF 55-55%. GIDD.  - Continue Lasix 40mg PO QD and Metoprolol succinate 100mg PO QD     #COPD on home oxygen  - As per the daughter increase in oxygen requirement recently  - Likely due to aspiration. COPD exacerbation less likely   - Duonebs and BiPaP PRN  - Monitor O2 sat. Keep between 88 - 92 %    #Advanced Dementia; AAO x 0  - Continue Depakote 250mg PO in AM and 375mg PO in PM  - Continue Trazodone 150mg PO QHS  - Continue Olanzapine 7.5mg PO QHS    #CVA with residual weakness  - Continue atorvastatin 80mg PO QHS. Warfarin on hold for now     #Misc  - DVT Prophylaxis: Not Indicated   - Diet: NPO pending speech and swallow eval   - GI Prophylaxis: Pantoprazole   - Activity: Bedrest  - Code Status: DNI/DNR as per last admission    85 YO F with PMHx of AFib on coumadin, HFpEF (s/p AICD, CVA with residual weakness, COPD (on home O2 at night 2L), HTN, DLD, Dementia presented to the ED after being sent by Dr Herrmann for INR of 26.    #Supra-therapeutic INR  #Atrial Fibrillation on Coumadin   - INR 26 outpatient. Upon presentation to ED INR > 15  - No active bleeding. However ecchymosis on b/l LE  - s/p 5mg PO Vit K in ED. Follow INR at 11am.  - No indication for PCC or FFP as of now  - Avoid meds that increase warfarin. Patient received Levofloxacin as outpatient for suspected pneumonia. Levofloxacin associated with increasing INR in patients on Warfarin   - Continue Cardizem 120mg PO QD and Metoprolol succinate 100mg PO QD for rate control   - Trend INR. hold coumadin for now    #Suspected Aspiration Pneumonia  - Chest X-ray compared to the previous one showing RLL opacity. Follow official read   - Cough +ve. Sputum Production +ve. b/l basal crackles worse on the right. Gargling noise with cough  - Received Levofloxacin as outpatient. Will cover with Unasyn 3g Q12 for now (renal dosing)  - Speech and swallow eval. Nutrition eval. Calorie count     #Grade II Sacral Ulcer  - As per daughter, noticed sacral ulcers after her recent discharge from the hospital   - Much improved now than from what it was before  - Low suspicion for infection   - Barrier cream     #HFpEF s/p AICD  #HTN  - trops negative.   - Exacerbation less likely. Mild fluid overload   - Follow chest X-ray official read  - Echo 05/2020 showing EF 55-55%. GIDD.  - Continue Lasix 40mg PO QD and Metoprolol succinate 100mg PO QD     #COPD, chronic respiratory failure, on home oxygen  - As per the daughter increase in oxygen requirement recently  - Likely due to aspiration. COPD exacerbation less likely   - Duonebs and BiPaP PRN  - Monitor O2 sat. Keep between 88 - 92 %    #Advanced Dementia; AAO x 0  - Continue Depakote 250mg PO in AM and 375mg PO in PM  - Continue Trazodone 150mg PO QHS  - Continue Olanzapine 7.5mg PO QHS    #CVA with residual weakness  - Continue atorvastatin 80mg PO QHS. Warfarin on hold for now     #Misc  - DVT Prophylaxis: Not Indicated   - Diet: NPO pending speech and swallow eval   - GI Prophylaxis: Pantoprazole   - Activity: Bedrest  - Code Status: DNI/DNR as per last admission

## 2020-09-20 NOTE — PROGRESS NOTE ADULT - SUBJECTIVE AND OBJECTIVE BOX
24H events:    Patient is a 86y old Female who presents with a chief complaint of High INR (19 Sep 2020 04:27)    Primary diagnosis of INR (international normal ratio) abnormal       Today is hospital day 2. This morning patient was seen and examined at bedside, resting comfortably in bed.    No acute or major events overnights.    PAST MEDICAL & SURGICAL HISTORY  HTN (hypertension)    Cerebrovascular accident (CVA)    COPD (chronic obstructive pulmonary disease)    Atrial fibrillation    Systolic heart failure    AICD (automatic cardioverter/defibrillator) present    S/P appendectomy      SOCIAL HISTORY:  Social History:      ALLERGIES:  No Known Drug Allergies  Seafood (Unknown)  shellfish (Other)    MEDICATIONS:  STANDING MEDICATIONS  ampicillin/sulbactam  IVPB      ampicillin/sulbactam  IVPB 1.5 Gram(s) IV Intermittent every 12 hours  atorvastatin 80 milliGRAM(s) Oral <User Schedule>  diltiazem    milliGRAM(s) Oral <User Schedule>  diVALproex  milliGRAM(s) Oral <User Schedule>  diVALproex  milliGRAM(s) Oral <User Schedule>  furosemide    Tablet 40 milliGRAM(s) Oral daily  influenza   Vaccine 0.5 milliLiter(s) IntraMuscular once  metoprolol succinate  milliGRAM(s) Oral <User Schedule>  OLANZapine 7.5 milliGRAM(s) Oral <User Schedule>  pantoprazole    Tablet 40 milliGRAM(s) Oral before breakfast  sertraline 150 milliGRAM(s) Oral daily    PRN MEDICATIONS  traZODone 150 milliGRAM(s) Oral <User Schedule> PRN    VITALS:   T(F): 96.6  HR: 79  BP: 131/72  RR: 18  SpO2: 92%    PHYSICAL EXAM:  GENERAL: NAD, well-groomed, well-developed  HEAD:  Atraumatic, Normocephalic  EYES: EOMI  NECK: Supple  NERVOUS SYSTEM:  Alert & Oriented X3, non focal   CHEST/LUNG: Clear to auscultation bilaterally; No rales, rhonchi, wheezing, or rubs  HEART: Regular rate and rhythm; No murmurs, rubs, or gallops  ABDOMEN: Soft, Nontender, Nondistended; Bowel sounds present  EXTREMITIES:  2+ Peripheral Pulses, No clubbing, cyanosis, or edema  LYMPH: No lymphadenopathy noted  SKIN: No rashes or lesions  LABS:                        10.4   8.79  )-----------( 160      ( 20 Sep 2020 07:26 )             32.4     09-20    145  |  104  |  40<H>  ----------------------------<  112<H>  3.9   |  33<H>  |  1.2    Ca    9.3      20 Sep 2020 07:26  Phos  2.5     09-19  Mg     2.2     09-20    TPro  6.0  /  Alb  2.8<L>  /  TBili  0.6  /  DBili  x   /  AST  30  /  ALT  14  /  AlkPhos  64  09-19    PT/INR - ( 19 Sep 2020 11:00 )   PT: >40.00 sec;   INR: 4.12 ratio         PTT - ( 19 Sep 2020 11:00 )  PTT:46.2 sec          CARDIAC MARKERS ( 18 Sep 2020 23:25 )  x     / <0.01 ng/mL / x     / x     / x          RADIOLOGY:

## 2020-09-21 NOTE — SWALLOW BEDSIDE ASSESSMENT ADULT - SWALLOW EVAL: DIAGNOSIS
Pt presents with ken-pharyngeal dysphagia c/b +overt s/s of penetration/aspiration on thin liquids
+overt s/s penetration/aspiration w/ min amounts of thin liquids. +toleration for nectar thick liquids. pt expectorated all trials of solids presented to her.

## 2020-09-21 NOTE — PROGRESS NOTE ADULT - ASSESSMENT
87 YO F with PMHx of AFib on coumadin, HFpEF (s/p AICD, CVA with residual weakness, COPD (on home O2 at night 2L), HTN, DLD, Dementia presented to the ED after being sent by Dr Herrmann for INR of 26.    # Supra-therapeutic INR, most likely due to drug interaction Levaquin-Coumadin, and underlying Vitamin K depletion   - s/p Vit K 5 mg PO, INR subtherapeutic now, daughter and primary cardiology does not want to resume Coumadin   # Atrial Fibrillation, persistent, not on AC anymore  - Continue Cardizem 120mg PO QD and Metoprolol succinate 100mg PO QD for rate control   # Suspected Aspiration Pneumonia  - Chest X-ray with worsening of b/l infiltrate, pt with cough according to daughter and elevated WBC  - cont Unasyn for now  - Speech and swallow eval   # Stage II Sacral Ulcer, POA  - offload pressure points, frequent turns  - Barrier cream   # HFpEF s/p AICD - possible component of fluid overload, daughter decreased dose of diuretics at home recently as per cardio recs  # HTN - cont briana meds  # COPD, chronic respiratory failure, on home oxygen  - As per the daughter increase in oxygen requirement recently  - Likely due to aspiration. COPD exacerbation less likely   - Duonebs and BiPaP PRN  - Monitor O2 sat. Keep between 88 - 92 %    # Advanced Dementia; AAO x 0  - Continue Depakote 250mg PO in AM and 375mg PO in PM  - Continue Trazodone 150mg PO QHS  - Continue Olanzapine 7.5mg PO QHS    # CVA with residual weakness  - Continue atorvastatin 80mg PO QHS. Warfarin on hold for now     Daughter agreed with hospice, pt will be discharge with home hospice on Wednesday and admitted by hospice on Wednesday afternoon.    DVT ppx Hep SQ

## 2020-09-21 NOTE — PROGRESS NOTE ADULT - SUBJECTIVE AND OBJECTIVE BOX
SUBJECTIVE:    Patient is a 86y old Female who presents with a chief complaint of High INR (21 Sep 2020 13:56). Currently admitted to medicine with the primary diagnosis of INR (international normal ratio) abnormality ( possibly due to interaction between levofloxacin and warfarin)  Today is hospital day 2d.    PAST MEDICAL & SURGICAL HISTORY  HTN (hypertension)    Cerebrovascular accident (CVA)    COPD (chronic obstructive pulmonary disease)    Atrial fibrillation    Systolic heart failure    AICD (automatic cardioverter/defibrillator) present    S/P appendectomy      SOCIAL HISTORY:  Negative for smoking/alcohol/drug use.     ALLERGIES:  No Known Drug Allergies  Seafood (Unknown)  shellfish (Other)    MEDICATIONS:  STANDING MEDICATIONS  ampicillin/sulbactam  IVPB      ampicillin/sulbactam  IVPB 1.5 Gram(s) IV Intermittent every 12 hours  atorvastatin 80 milliGRAM(s) Oral <User Schedule>  bisacodyl Suppository 10 milliGRAM(s) Rectal daily  diltiazem    milliGRAM(s) Oral <User Schedule>  diVALproex  milliGRAM(s) Oral <User Schedule>  diVALproex  milliGRAM(s) Oral <User Schedule>  diVALproex  milliGRAM(s) Oral daily  furosemide    Tablet 40 milliGRAM(s) Oral daily  metoprolol succinate  milliGRAM(s) Oral <User Schedule>  OLANZapine 7.5 milliGRAM(s) Oral <User Schedule>  pantoprazole    Tablet 40 milliGRAM(s) Oral before breakfast  polyethylene glycol 3350 17 Gram(s) Oral two times a day  polyethylene glycol 3350 17 Gram(s) Oral once  sertraline 150 milliGRAM(s) Oral daily  traZODone 150 milliGRAM(s) Oral at bedtime    PRN MEDICATIONS    VITALS:   T(F): 96.3  HR: 103  BP: 137/65  RR: 18  SpO2: --    LABS:                        9.9    12.05 )-----------( 175      ( 21 Sep 2020 06:18 )             31.0     09-21    147<H>  |  106  |  33<H>  ----------------------------<  105<H>  3.9   |  33<H>  |  1.1    Ca    9.5      21 Sep 2020 06:18  Mg     2.2     09-21    TPro  5.8<L>  /  Alb  2.7<L>  /  TBili  0.4  /  DBili  x   /  AST  25  /  ALT  11  /  AlkPhos  69  09-21    PT/INR - ( 21 Sep 2020 06:18 )   PT: 14.90 sec;   INR: 1.30 ratio         PTT - ( 21 Sep 2020 06:18 )  PTT:30.5 sec      RADIOLOGY:    < from: Xray Chest 1 View- PORTABLE-Routine (Xray Chest 1 View- PORTABLE-Routine in AM.) (09.21.20 @ 10:59) >  FINDINGS:    Support devices: Stable left ICD.    Cardiac/mediastinum/hilum: Stable.    Lung parenchyma/Pleura: Worsening bilateral opacities. There is no pneumothorax.    Skeleton/soft tissues: Stable.    IMPRESSION:    Worsening bilateral opacities.    < end of copied text >      PHYSICAL EXAM:  GEN: The patient is either asleep or agitated when awake  LUNGS: Clear to auscultation bilaterally   HEART: S1/S2 present. RRR.   ABD: Soft, non-tender, non-distended. Bowel sounds present  EXT: NC/NC/NE/2+PP/HARRISON/Skin Intact.   NEURO: Awake and Alert. Orientation not assessed.

## 2020-09-21 NOTE — SWALLOW BEDSIDE ASSESSMENT ADULT - CONSISTENCIES ADMINISTERED
thin liquid/1oz nectar thick/1oz puree/soft solid/pt expectorated all trials w/ no attempts to swallow

## 2020-09-21 NOTE — PROGRESS NOTE ADULT - SUBJECTIVE AND OBJECTIVE BOX
KRISTI SAMUELS    Patient is a 86y old  Female who presents with a chief complaint of High INR (20 Sep 2020 11:31)    INTERVAL HPI/OVERNIGHT EVENTS: no events overnight, pt looks at baseline, she agitated at times if she misses her Depakote or Trazadone. Daughter states if pt does not her medications to call her and she will convince pt to take her meds.    ROS: unable to obtain due to dementia    PHYSICAL EXAM:  T(C): 35.8, Max: 36.5 (09-20-20 @ 21:26)  HR: 104 (90 - 104)  BP: 133/77 (116/55 - 133/77)  RR: 18 (18 - 20)  SpO2: --    GENERAL: NAD,   NECK: Supple, No JVD  PULMONARY/CHEST: b/l crackles   CARDIOVASC:  irregularly irregular   GI/ABDOMEN: Soft, Nontender, Nondistended; Bowel sounds present  EXTREMITIES: patchy hematomas on UE and LE. Trace pitting edema on LE. No calf tenderness b/l.  NERVOUS SYSTEM: awake, does not follow commands, no focal deficit     LABS:                        9.9    12.05 )-----------( 175      ( 21 Sep 2020 06:18 )             31.0     09-21    147<H>  |  106  |  33<H>  ----------------------------<  105<H>  3.9   |  33<H>  |  1.1    Ca    9.5      21 Sep 2020 06:18  Mg     2.2     09-21    TPro  5.8<L>  /  Alb  2.7<L>  /  TBili  0.4  /  DBili  x   /  AST  25  /  ALT  11  /  AlkPhos  69  09-21    PT/INR - ( 21 Sep 2020 06:18 )   PT: 14.90 sec;   INR: 1.30 ratio         PTT - ( 21 Sep 2020 06:18 )  PTT:30.5 sec    RADIOLOGY & ADDITIONAL TESTS:  < from: Xray Chest 1 View- PORTABLE-Routine (Xray Chest 1 View- PORTABLE-Routine in AM.) (09.21.20 @ 10:59) >  IMPRESSION:  Worsening bilateral opacities.  < end of copied text >        MEDICATIONS  (STANDING):  ampicillin/sulbactam  IVPB      ampicillin/sulbactam  IVPB 1.5 Gram(s) IV Intermittent every 12 hours  atorvastatin 80 milliGRAM(s) Oral <User Schedule>  bisacodyl Suppository 10 milliGRAM(s) Rectal daily  diltiazem    milliGRAM(s) Oral <User Schedule>  diVALproex  milliGRAM(s) Oral <User Schedule>  diVALproex  milliGRAM(s) Oral daily  diVALproex  milliGRAM(s) Oral <User Schedule>  furosemide    Tablet 40 milliGRAM(s) Oral daily  metoprolol succinate  milliGRAM(s) Oral <User Schedule>  OLANZapine 7.5 milliGRAM(s) Oral <User Schedule>  pantoprazole    Tablet 40 milliGRAM(s) Oral before breakfast  polyethylene glycol 3350 17 Gram(s) Oral two times a day  sertraline 150 milliGRAM(s) Oral daily    MEDICATIONS  (PRN):  traZODone 150 milliGRAM(s) Oral <User Schedule> PRN insomnia

## 2020-09-21 NOTE — SWALLOW BEDSIDE ASSESSMENT ADULT - NS SPL SWALLOW CLINIC TRIAL FT
+overt s/s penetration/aspiration w/ thin liquids
Fly-pharyngeal dysphagia present as judged by overt s/s of penetration/aspiration on thin liquids

## 2020-09-21 NOTE — SWALLOW BEDSIDE ASSESSMENT ADULT - SLP PERTINENT HISTORY OF CURRENT PROBLEM
This is a 85 YO F with suspected aspiration pna. Pt presents with +cough, +b/l basal crackles, +gurgly vocal quality. PMHx of AFib on coumadin, HFpEF (s/p AICD, CVA with residual weakness, COPD (on home O2 at night 2L), HTN, DLD, Dementia.
85 y/o F presented w/ elevated INR. Pt lives at home w/ daughter, who is her caretaker. PMHx: Advanced dementia, AFib, CVA w/ residual L sided weakness, COPD (on home O2 at night 2L), HTN, DLD. CXR 9/19: new R opacity, 9/21: worsening BL opacities.

## 2020-09-21 NOTE — SWALLOW BEDSIDE ASSESSMENT ADULT - SLP GENERAL OBSERVATIONS
Pt received awake, alert, and communicative. Pt +cough at baseline and occ wet vocal quality
Pt repositioned upright in bed, yelling for her daughter. unable to follow commands. pt w/ min acceptance of Po intake.

## 2020-09-21 NOTE — SWALLOW BEDSIDE ASSESSMENT ADULT - COMMENTS
Pt known to acute SLP service March 2019 w/ recs for dysphagia 3 (soft, cut up) w/ nectar thick liquids. Per patient's daughter this admission, pt w/ decreased Po intake x2 weeks prior to admission. Pt w/ recent SLP home care evaluation (in the past 2 weeks) completed, who recommended nectar thick liquids. +toleration for nectar thick liquids w/o overt s/s penetration/aspiration

## 2020-09-21 NOTE — PROGRESS NOTE ADULT - ASSESSMENT
86 year-old-female with PMHx of AFib on coumadin, HFpEF (s/p AICD), CVA with residual weakness, COPD (on home O2 at night 2L), HTN, DLD, Dementia presented to the ED after being sent by Dr Herrmann for INR of 26.    # Supra-therapeutic INR, most likely due to drug interaction Levaquin-Coumadin, and underlying Vitamin K depletion due to poor nutritional status   - s/p Vit K 5 mg PO, INR subtherapeutic now, daughter and primary cardiology does not want to resume Coumadin     # Atrial Fibrillation  - Continue Cardizem 120mg PO QD and Metoprolol succinate 100mg PO QD for rate control   - tare control only, daughter and primary cardiologist decided to stop warfarin and other forms of anticoagulation    # Suspected Aspiration Pneumonia  - Chest X-ray with worsening of b/l infiltrate, pt with cough according to daughter and elevated WBC  - cont Unasyn for now  - Speech and swallow eval   - Chest xray shows worsening bilateral infiltrates    # Stage II Sacral Ulcer, POA  - offload pressure points, frequent turns  - Barrier cream     # HFpEF s/p AICD - possible component of fluid overload, daughter decreased dose of diuretics at home recently as per cardio recs    # HTN - cont briana meds    # COPD, chronic respiratory failure, on home oxygen  - As per the daughter increase in oxygen requirement recently  - Likely due to aspiration. COPD exacerbation less likely   - Duonebs and BiPaP PRN  - Monitor O2 sat. Keep between 88 - 92 %    # Advanced Dementia; AAO x 0  - Continue Depakote 250mg PO in AM and 375mg PO in PM  - Continue Trazodone 150mg PO QHS  - Continue Olanzapine 7.5mg PO QHS    # CVA with residual weakness  - Continue atorvastatin 80mg PO QHS. Warfarin and all other forms of Anticoagulation     #Constipation:   - Miralax   - Dulcolax Suppositors  - Fleet Enema     #Home Hospice:   - Discussed and agreed by the daughter   - Will Stop Anticoagulation for afib   - EP Consult ( Dr. Roe), to discuss the possibility/ethical act of shutting off the ICD    DVT ppx Hep SQ 86 year-old-female with PMHx of AFib on coumadin, HFpEF (s/p AICD), CVA with residual weakness, COPD (on home O2 at night 2L), HTN, DLD, Dementia presented to the ED after being sent by Dr Herrmann for INR of 26.    # Supra-therapeutic INR, most likely due to drug interaction Levaquin-Coumadin, and underlying Vitamin K depletion due to poor nutritional status   - s/p Vit K 5 mg PO, INR subtherapeutic now, daughter and primary cardiology does not want to resume Coumadin     # Atrial Fibrillation  - Continue Cardizem 120mg PO QD and Metoprolol succinate 100mg PO QD for rate control   - Rate control only, daughter and primary cardiologist decided to stop warfarin and other forms of anticoagulation    # Suspected Aspiration Pneumonia  - Chest X-ray with worsening of b/l infiltrate, pt with cough according to daughter and elevated WBC  - cont Unasyn for now  - Speech and swallow eval   - Chest xray shows worsening bilateral infiltrates    # Stage II Sacral Ulcer, POA  - offload pressure points, frequent turns  - Barrier cream     # HFpEF s/p AICD - possible component of fluid overload, daughter decreased dose of diuretics at home recently as per cardio recs    # HTN - cont briana meds    # COPD, chronic respiratory failure, on home oxygen  - As per the daughter increase in oxygen requirement recently  - Likely due to aspiration. COPD exacerbation less likely   - Duonebs and BiPaP PRN  - Monitor O2 sat. Keep between 88 - 92 %    # Advanced Dementia; AAO x 0  - Continue Depakote 250mg PO in AM and 375mg PO in PM  - Continue Trazodone 150mg PO QHS  - Continue Olanzapine 7.5mg PO QHS    # CVA with residual weakness  - Continue atorvastatin 80mg PO QHS. Warfarin and all other forms of Anticoagulation     #Constipation:   - Miralax   - Dulcolax Suppositors  - Fleet Enema     #Home Hospice:   - Discussed and agreed by the daughter   - Will Stop Anticoagulation for afib   - EP Consult ( Dr. Roe), to discuss the possibility/ethical act of shutting off the ICD    DVT ppx Hep SQ

## 2020-09-21 NOTE — SWALLOW BEDSIDE ASSESSMENT ADULT - SWALLOW EVAL: PROGNOSIS
Pt will be d/c home w/ hospice care on Wednesday. Refer to hospice upon transition for further diet recs.

## 2020-09-22 NOTE — DISCHARGE NOTE PROVIDER - HOSPITAL COURSE
86 year-old-female with PMHx of AFib on coumadin, HFpEF (s/p AICD), CVA with residual weakness, COPD (on home O2 at night 2L), HTN, DLD, Dementia presented to the ED after being sent by Dr Herrmann for INR of 26.  Due to the patient's underlying condition and poor prognosis, Hospice care was in contact with the family and the patient was decided on hospice.     #Home Hospice:   Will be discharge on:   - Depakine 250mg liquid po in am   - Depakine 125mg liquid po in the afternoon and evening   - Morphine     # Supra-therapeutic INR, most likely due to drug interaction Levaquin-Coumadin, and underlying Vitamin K depletion due to poor nutritional status   - s/p Vit K 5 mg PO, INR subtherapeutic now, daughter and primary cardiology does not want to resume Coumadin.  - Home hospice will stop meds     # Atrial Fibrillation  - Home hospice will stop meds ( rate control and anticoagulation both stopped)     # Suspected Aspiration Pneumonia  - Completed in hospital course of unasyn   - Will be off antibiotics in home hospice     #Hypoxemia   - on Oxygen 4L/min by NC, will be provided by home hospice    # HFpEF s/p AICD:   - Stop meds at home hospice   - AICD was turned off by Jyothi Aly (EP) but pacemaker kept!    # HTN:   - Stop meds, Home hospice     # COPD, chronic respiratory failure, on home oxygen  -Oxygen at home by home hospice    # Advanced Dementia; AAO x 0  - Stop Meds, Home Hospice     # CVA with residual weakness:   - Stop meds ( Atorvastatin), Home Hospice

## 2020-09-22 NOTE — PROGRESS NOTE ADULT - CONVERSATION/DISCUSSION
Holistic/Diagnosis/Hospice Referral/Palliative Care Referral/Treatment Options/Prognosis/MOLST Discussed

## 2020-09-22 NOTE — PROGRESS NOTE ADULT - SUBJECTIVE AND OBJECTIVE BOX
SUBJECTIVE:    Patient is a 86y old Female who presents with a chief complaint of High INR (22 Sep 2020 09:39) secondary to warfarin use ( possible Levofloxacin interaction with Warfarin).Currently admitted to medicine with the primary diagnosis of INR (international normality, which now resolved ( INR back to normal) and all forms of Anticoagulation were stopped as per her daughter the health care proxy.   Today is hospital day 3d. The patient is agitated at baseline.    PAST MEDICAL & SURGICAL HISTORY  HTN (hypertension)    Cerebrovascular accident (CVA)    COPD (chronic obstructive pulmonary disease)    Atrial fibrillation    Systolic heart failure    AICD (automatic cardioverter/defibrillator) present    S/P appendectomy      SOCIAL HISTORY:  Negative for smoking/alcohol/drug use.     ALLERGIES:  No Known Drug Allergies  Seafood (Unknown)  shellfish (Other)    MEDICATIONS:  STANDING MEDICATIONS  ampicillin/sulbactam  IVPB      ampicillin/sulbactam  IVPB 1.5 Gram(s) IV Intermittent every 12 hours  atorvastatin 80 milliGRAM(s) Oral <User Schedule>  diltiazem    milliGRAM(s) Oral <User Schedule>  diVALproex  milliGRAM(s) Oral <User Schedule>  diVALproex  milliGRAM(s) Oral daily  diVALproex Sprinkle 125 milliGRAM(s) Oral daily  furosemide    Tablet 40 milliGRAM(s) Oral daily  guaiFENesin  milliGRAM(s) Oral every 12 hours  metoprolol succinate  milliGRAM(s) Oral <User Schedule>  morphine  - Injectable 2 milliGRAM(s) IV Push once  OLANZapine 7.5 milliGRAM(s) Oral <User Schedule>  pantoprazole    Tablet 40 milliGRAM(s) Oral before breakfast  saline laxative (FLEET) Rectal Enema 1 Enema Rectal once  sertraline 150 milliGRAM(s) Oral daily  traZODone 150 milliGRAM(s) Oral at bedtime    PRN MEDICATIONS  morphine Concentrate 5 milliGRAM(s) Oral every 6 hours PRN    VITALS:   T(F): 99.8  HR: 106  BP: 129/79  RR: 18  SpO2: 94%    LABS:                        10.2   11.85 )-----------( 214      ( 22 Sep 2020 06:33 )             32.3     09-22    152<H>  |  109  |  33<H>  ----------------------------<  144<H>  4.0   |  32  |  1.2    Ca    9.5      22 Sep 2020 06:33  Mg     2.3     09-22    TPro  5.8<L>  /  Alb  2.9<L>  /  TBili  0.4  /  DBili  x   /  AST  25  /  ALT  11  /  AlkPhos  73  09-22    PT/INR - ( 21 Sep 2020 06:18 )   PT: 14.90 sec;   INR: 1.30 ratio    PTT - ( 21 Sep 2020 06:18 )  PTT:30.5 sec      PHYSICAL EXAM:  GEN: Minimal agitation   LUNGS: Cough with Ronchi heard on auscultation  HEART: S1/S2 present. Irregular rhythm   ABD: Soft, non-tender, non-distended. Bowel sounds present  EXT: NC/NC/NE/2+PP/HARRISON/Skin Intact.   NEURO: Dementia

## 2020-09-22 NOTE — DISCHARGE NOTE PROVIDER - NSDCCPCAREPLAN_GEN_ALL_CORE_FT
PRINCIPAL DISCHARGE DIAGNOSIS  Diagnosis: Coumadin toxicity  Assessment and Plan of Treatment: Coumadin stopped      SECONDARY DISCHARGE DIAGNOSES  Diagnosis: Aspiration pneumonitis  Assessment and Plan of Treatment: hospice care    Diagnosis: Advanced dementia  Assessment and Plan of Treatment: pt transitioned to hospice care

## 2020-09-22 NOTE — CHART NOTE - NSCHARTNOTEFT_GEN_A_CORE
Registered Dietitian Follow-Up     Patient Profile Reviewed                           Yes [x]   No []     Nutrition History Previously Obtained        Yes [x]  No []       Pertinent Subjective Information: Per calorie count pt is not eating. 0% intake reported for all 3 meals for 3 days. Family at bedside said she is only able to eat a spoon of applesauce to be able to take her medication. Daughter at bedside does not express need for any oral nutrition supplements or food preferences.     Pertinent Medical Interventions:  Supra-therapeutic INR, not resuming coumadin   Advanced Dementia; AAO x 0  Pt to go home with hospice care        Diet order: Dysphagia 3 Soft Nectar      Anthropometrics:  - Ht. 61"  - Wt.   174.3lbs (9/22)   172.1lbs (9/19)   - %wt change  - BMI 32.6 -- based on 174.3lbs   - IBW 105lbs      Pertinent Lab Data: (9/22) H/H 10.2/32.3, Na 152, BUN 33, , eGFR 41     Pertinent Meds: abx, dulcolax, miralax, fleet enema, lipitor, protonix      Physical Findings:  - Appearance: disoriented and confused. +2 R+L leg edema noted per RN flow sheets  - GI function: LBM (9/22) Family notes pt is not complaining of any GI distress   - Tubes: N/A   - Oral/Mouth cavity: s/s bedside swallow evaluation (9/21) continues to recommend dysphagia 3 (soft, cut up) w/ nectar thick liquids  - Skin: WDL      Nutrition Requirements  Weight Used: dosing  78.1 kg, IBW: 47.7 kg -- continued from initial RD assessment (9/19)      Estimated Energy Needs    Continue [x]  Adjust []  calorie: 1164 - 1280 kcals/day (MSJ x 1.0 - 1.1 AF for obesity and age)        Estimated Protein Needs    Continue [x]  Adjust []  protein: 48- 58 gms/day (1.0 - 1.2 gm/kg IBW for obesity and age)        Estimated Fluid Needs        Continue [x]  Adjust []  fluid: 1ml/kcal or per LIP     Nutrient Intake: 0% per calorie count         [x] Previous Nutrition Diagnosis: Inadequate Oral Intake            [x] Ongoing          [] Resolved       Nutrition Intervention: meals and snacks      Goal/Expected Outcome: D/t GOC with hospice, pt not expected to meet protein/energy needs.      Indicator/Monitoring: Tolerance to current diet order     Recommendations:   Continue current diet order as tolerated Registered Dietitian Follow-Up     Patient Profile Reviewed                           Yes [x]   No []     Nutrition History Previously Obtained        Yes [x]  No []       Pertinent Subjective Information: Per calorie count pt is not eating. 0% intake reported for all 3 meals for 3 days. Family at bedside said she is only able to eat a spoon of applesauce to be able to take her medication. Daughter at bedside does not express need for any oral nutrition supplements or food preferences.     Pertinent Medical Interventions:  Supra-therapeutic INR, not resuming coumadin   Advanced Dementia; AAO x 0  Pt to go home with hospice care        Diet order: Dysphagia 3 Soft Nectar      Anthropometrics:  - Ht. 61"  - Wt.   174.3lbs (9/22)   172.1lbs (9/19)   - %wt change  - BMI 32.6 -- based on 174.3lbs   - IBW 105lbs      Pertinent Lab Data: (9/22) H/H 10.2/32.3, Na 152, BUN 33, , eGFR 41     Pertinent Meds: abx, dulcolax, miralax, fleet enema, lipitor, protonix      Physical Findings:  - Appearance: disoriented and confused. +2 R+L leg edema noted per RN flow sheets  - GI function: LBM (9/22) Family notes pt is not complaining of any GI distress   - Tubes: N/A   - Oral/Mouth cavity: s/s bedside swallow evaluation (9/21) continues to recommend dysphagia 3 (soft, cut up) w/ nectar thick liquids  - Skin: WDL      Nutrition Requirements  Weight Used: dosing  78.1 kg, IBW: 47.7 kg -- continued from initial RD assessment (9/19)      Estimated Energy Needs    Continue [x]  Adjust []  calorie: 1164 - 1280 kcals/day (MSJ x 1.0 - 1.1 AF for obesity and age)        Estimated Protein Needs    Continue [x]  Adjust []  protein: 48- 58 gms/day (1.0 - 1.2 gm/kg IBW for obesity and age)        Estimated Fluid Needs        Continue [x]  Adjust []  fluid: 1ml/kcal or per LIP     Nutrient Intake: 0% per calorie count         [x] Previous Nutrition Diagnosis: Inadequate Oral Intake            [x] Ongoing          [] Resolved       Nutrition Intervention: meals and snacks      Goal/Expected Outcome: D/t GOC with hospice, pt not expected to meet protein/energy needs. Will f/u in 7 days      Indicator/Monitoring: Tolerance to current diet order     Recommendations:   Continue current diet order as tolerated

## 2020-09-22 NOTE — CHART NOTE - NSCHARTNOTEFT_GEN_A_CORE
3 DAY CALORIE COUNT   Diet: Dysphagia 2 Mechanical Soft- Nectar     DAY 1: (9/19)   Breakfast: 60kcal   Lunch: 30kcal   Dinner: 0% intake reported     DAY 2: (9/20)   Breakfast: 0% intake reported   Lunch: 0% intake reported  Dinner: 0% intake reported    DAY 3: (9/21)   Breakfast: 0% intake reported   Lunch: 0% intake reported  Dinner: 0% intake reported    Pt not meeting kcal/protein needs.     Janet Hdz, RD x [phone]

## 2020-09-22 NOTE — DISCHARGE NOTE PROVIDER - NSDCMRMEDTOKEN_GEN_ALL_CORE_FT
atorvastatin 80 mg oral tablet: 1 tab(s) orally once a day (at bedtime)  Depakote 125 mg oral delayed release tablet: 1 tab(s) orally once a day (at bedtime)  Depakote 125 mg oral delayed release tablet: 2 tab(s) orally once a day (at bedtime)  Depakote 250 mg oral delayed release tablet: 1 tab(s) orally once a day at noon  dilTIAZem 120 mg/24 hours oral capsule, extended release: 1 cap(s) orally once a day  docusate sodium 100 mg oral capsule: 1 cap(s) orally 3 times a day  furosemide 40 mg oral tablet: 1 tab(s) orally once a day  metoprolol succinate 100 mg oral tablet, extended release: 1 tab(s) orally once a day  Multiple Vitamins oral tablet: 1 tab(s) orally once a day  OLANZapine 2.5 mg oral tablet: 1 tab(s) orally every 12 hours, As needed, agitation  OLANZapine 7.5 mg oral tablet: 1 tab(s) orally once a day (at bedtime)  pantoprazole 40 mg oral delayed release tablet: 1 tab(s) orally once a day (before a meal)  sertraline 50 mg oral tablet: 3 tab(s) orally once a day  traZODone 150 mg oral tablet: 1 tab(s) orally once a day (at bedtime)  warfarin 2 mg oral tablet: 1 tab(s) orally once a day, target INR 2-3   atorvastatin 80 mg oral tablet: 1 tab(s) orally   dilTIAZem 120 mg/24 hours oral capsule, extended release: 1 cap(s) orally   divalproex sodium 125 mg oral delayed release capsule: 1 cap(s) orally once a day  divalproex sodium 125 mg oral delayed release tablet: 1 tab(s) orally   divalproex sodium 250 mg oral delayed release tablet: 1 tab(s) orally once a day  docusate sodium 100 mg oral capsule: 1 cap(s) orally 3 times a day  metoprolol succinate 100 mg oral tablet, extended release: 1 tab(s) orally   morphine 20 mg/mL oral concentrate: 0.25 milliliter(s) orally every 6 hours, As needed, Severe Pain (7 - 10)  Multiple Vitamins oral tablet: 1 tab(s) orally once a day  OLANZapine 7.5 mg oral tablet: 1 tab(s) orally   pantoprazole 40 mg oral delayed release tablet: 1 tab(s) orally once a day (before a meal)  scopolamine: 1 patch transdermally every 3 days  sertraline 50 mg oral tablet: 3 tab(s) orally once a day  traZODone 150 mg oral tablet: 1 tab(s) orally once a day (at bedtime)   docusate sodium 100 mg oral capsule: 1 cap(s) orally 3 times a day  morphine 20 mg/mL oral concentrate: 0.25 milliliter(s) orally every 6 hours, As needed, Severe Pain (7 - 10)  scopolamine: 1 patch transdermally every 3 days  valproic acid 250 mg/5 mL oral liquid: 250 milligram(s) orally once a day 8am  valproic acid 250 mg/5 mL oral liquid: 2.5 milliliter(s) orally once a day 3pm  valproic acid 250 mg/5 mL oral liquid: 2.5 milliliter(s) orally once a day 10pm

## 2020-09-22 NOTE — PROGRESS NOTE ADULT - ASSESSMENT
· Assessment	  86 year-old-female with PMHx of AFib on coumadin, HFpEF (s/p AICD), CVA with residual weakness, COPD (on home O2 at night 2L), HTN, DLD, Dementia presented to the ED after being sent by Dr Herrmann for INR of 26.    # Supra-therapeutic INR, most likely due to drug interaction Levaquin-Coumadin, and underlying Vitamin K depletion due to poor nutritional status   - s/p Vit K 5 mg PO, INR subtherapeutic now, daughter and primary cardiology does not want to resume Coumadin     # Atrial Fibrillation  - Continue Cardizem 120mg PO QD and Metoprolol succinate 100mg PO QD for rate control   - Rate control only, daughter and primary cardiologist decided to stop warfarin and other forms of anticoagulation    # Suspected Aspiration Pneumonia  - Chest X-ray with worsening of b/l infiltrate, pt with cough according to daughter and elevated WBC  - cont Unasyn for now  - Speech and swallow eval   - Chest xray shows worsening bilateral infiltrates yesterday additional 20mg po of lasix were given yesterday.  - Willl consider an additional 20mg po once now too  - Add Mucinex 625mg q12hrs    #Hypoxemia   - Yesterday had an episode of hypoxemia, SaO2 dropped to 82%.   - Used BiPAP   - Now off BiPAP on Oxygen 4L/min by Nasal Canula      # Stage II Sacral Ulcer, POA  - offload pressure points, frequent turns  - Barrier cream     # HFpEF s/p AICD - possible component of fluid overload, daughter decreased dose of diuretics at home recently as per cardio recs    # HTN - cont briana meds    # COPD, chronic respiratory failure, on home oxygen  - As per the daughter increase in oxygen requirement recently  - Likely due to aspiration. COPD exacerbation less likely   - Duonebs and BiPaP PRN  - Monitor O2 sat. Keep between 88 - 92 %    # Advanced Dementia; AAO x 0  - Continue Depakote 250mg PO in AM and 375mg PO in PM  - Continue Trazodone 150mg PO QHS  - Continue Olanzapine 7.5mg PO QHS    # CVA with residual weakness  - Continue atorvastatin 80mg PO QHS. Warfarin and all other forms of Anticoagulation has been stopped and the daughter ( Health care proxy does not wish to continue any form of anti-coagulation)     #Constipation:   - Miralax 17g po given once yesterday   - Dulcolax Suppositors 10mg given once yesterday   - Fleet Enema given once yesterday   - The patient's constipation resolved and she had 3 bowel movements two of which were major    #Home Hospice:   - Discussed and agreed by the daughter. Was supposed to go to home hospice tomorrow but the daughter wants to push hospice until  Friday 09/24/2020.  - Will Stop Anticoagulation for afib   - EP Consult Dr. Roe came in today and shut off the AICD but NOT the pacemaker     DVT ppx Hep SQ

## 2020-09-22 NOTE — CHART NOTE - NSCHARTNOTEFT_GEN_A_CORE
[employer] DC AICD: therapies and monitoring options turned OFF by Dr Roe 9/22/20  Dr Roe spoke with daughter at length prior to doing this    Full report placed in chart  Please recall EP as needed [id number]

## 2020-09-23 NOTE — PROGRESS NOTE ADULT - SUBJECTIVE AND OBJECTIVE BOX
SUBJECTIVE:    Patient is a 86y old Female who presents with a chief complaint of High INR (22 Sep 2020 16:20). Currently admitted to medicine with the primary diagnosis of INR (international normal ratio) abnormal due to interaction between warfarin and levofloxacin. Currently resolved. Patient is off Anticoagulation and decided to be on hospice care as of Friday. All forms of Anticoagulation has been stopped as per her health care proxy ( Daughter) except for DVT prophy. The patient has not been eating or drinking for the past 3 days.    PAST MEDICAL & SURGICAL HISTORY  HTN (hypertension)    Cerebrovascular accident (CVA)    COPD (chronic obstructive pulmonary disease)    Atrial fibrillation    Systolic heart failure    AICD (automatic cardioverter/defibrillator) present    S/P appendectomy      SOCIAL HISTORY:  Negative for smoking/alcohol/drug use.     ALLERGIES:  No Known Drug Allergies  Seafood (Unknown)  shellfish (Other)    MEDICATIONS:  STANDING MEDICATIONS  ampicillin/sulbactam  IVPB 1.5 Gram(s) IV Intermittent every 6 hours  atorvastatin 80 milliGRAM(s) Oral <User Schedule>  diltiazem    milliGRAM(s) Oral <User Schedule>  diVALproex  milliGRAM(s) Oral <User Schedule>  diVALproex  milliGRAM(s) Oral daily  diVALproex Sprinkle 125 milliGRAM(s) Oral daily  guaiFENesin  milliGRAM(s) Oral every 12 hours  heparin   Injectable 5000 Unit(s) SubCutaneous every 8 hours  metoprolol succinate  milliGRAM(s) Oral <User Schedule>  morphine  - Injectable 2 milliGRAM(s) IV Push once  OLANZapine 7.5 milliGRAM(s) Oral <User Schedule>  pantoprazole    Tablet 40 milliGRAM(s) Oral before breakfast  saline laxative (FLEET) Rectal Enema 1 Enema Rectal once  sertraline 150 milliGRAM(s) Oral daily  traZODone 150 milliGRAM(s) Oral at bedtime    PRN MEDICATIONS  morphine Concentrate 5 milliGRAM(s) Oral every 6 hours PRN    VITALS:   T(F): 97.6  HR: 85  BP: 153/72  RR: 20  SpO2: 94%    LABS:                        10.5   10.81 )-----------( 233      ( 23 Sep 2020 06:52 )             33.9     09-23    157<H>  |  110  |  42<H>  ----------------------------<  108<H>  3.1<L>   |  36<H>  |  1.2    Ca    9.6      23 Sep 2020 06:52  Mg     2.5     09-23    TPro  6.1  /  Alb  2.8<L>  /  TBili  0.4  /  DBili  x   /  AST  30  /  ALT  13  /  AlkPhos  76  09-23      PHYSICAL EXAM:  GEN: Mild to moderate agitation while awake   LUNGS: Bilateral Rhonchi  HEART: S1/S2 present. Irregular rate   ABD: Soft, non-tender, non-distended. Bowel sounds present  EXT: NC/NC/NE/2+PP/HARRISON/Skin Intact.   NEURO: Awake and Alert, but due to dementia orientation can not be assessed.

## 2020-09-23 NOTE — PROGRESS NOTE ADULT - ATTENDING COMMENTS
Patient seen and examined independently earlier today. Case discussed with housestaff, nursing, case mgmt, daughter. I agree with most of the resident's note, physical exam, and plan except as below. Pt w/ advanced dementia, rhonchi b/l. Not tolerating diet, confused. Daughter still insists on hospice (seems to fully understands the concept of hospice) but wants to stay till Friday (claims hospice RN told her it is ok). I explained to daughter that unfortunately pt's resp failure is multifactorial (Acute diast CHF, COPD, suspected asp-n PNA or pneumonitis), pt will unlikely improve, and she is actively dying. Daughter is adamant that she wants to keep the pt in the hospital until Friday. I again I explained to the daughter that would entail tube feeds, etc and daughter declines. She wants pt to cont w/ ABx and have Dr. Sharma come in to give his opinion. Grave Px. Will try scopolamine patch to decrease secretions as I believe pt is actively aspirating.    45 minutes spent on total encounter; more than 50% of the visit was spent counseling and/or coordinating care by the attending physician.
Patient seen and examined independently earlier today. Case discussed with housestaff, nursing, case mgmt, daughter. I agree with most of the resident's note, physical exam, and plan except as below. Pt w/ advanced dementia, rhonchi b/l. Not tolerating diet. Daughter is planning to take pt home on hospice. Daughter is asking to stabilize the pt before d/c. I explained to daughter that unfortunately pt's resp failure is multifactorial (Acute diast CHF, COPD, suspected asp-n PNA or pneumonitis) and pt will unlikely improve. Daughter is adamant that she wants to keep the pt in the hospital until she is "better". Daughter declines PEG. Will cont ABx and diuretics. Grave Px.
Agree with resident's note, HPI, PE, assessment and plan.  Pt was seen and examined independently.    Pt is at stable chronic state, mental status at baseline, INR 1.3 today.  Supratherapeutic INR most likely due to drug interaction (levaquin/Coumadin)    Pt with advanced chronic conditions - advanced dementia, advanced CHF, chronic respiratory failure, I discussed with daughter possibility of hospice, she will think about it. For now will continue current management. can resume Coumadin if daughter will agree to continue, can start with 1 mg QD.

## 2020-09-23 NOTE — PROGRESS NOTE ADULT - ASSESSMENT
86 year-old-female with PMHx of AFib on coumadin, HFpEF (s/p AICD), CVA with residual weakness, COPD (on home O2 at night 2L), HTN, DLD, Dementia presented to the ED after being sent by Dr Herrmann for INR of 26.    # Supra-therapeutic INR, most likely due to drug interaction Levaquin-Coumadin, and underlying Vitamin K depletion due to poor nutritional status   - s/p Vit K 5 mg PO, INR subtherapeutic now, daughter and primary cardiology does not want to resume Coumadin.     # Atrial Fibrillation  - Continue Cardizem 120mg PO QD and Metoprolol succinate 100mg PO QD for rate control   - Rate control only, daughter and primary cardiologist decided to stop warfarin and other forms of anticoagulation ( Except for DVT prophylaxis, will be restarted)     # Suspected Aspiration Pneumonia  - cont Unasyn for now  - Add Mucinex 625mg q12hrs    #Hypoxemia   - Due to Aspiration   - on Oxygen 4L/min by NC, Saturation 92% ( Acceptable for a COPD patient)     # Stage II Sacral Ulcer, POA  - offload pressure points, frequent turns  - Barrier cream     # HFpEF s/p AICD - possible component of fluid overload, daughter decreased dose of diuretics at home recently as per cardio recs    # HTN - cont briana meds    # COPD, chronic respiratory failure, on home oxygen  - As per the daughter increase in oxygen requirement recently  - Likely due to aspiration. COPD exacerbation less likely   - Duonebs and BiPaP PRN  - Monitor O2 sat. Keep between 88 - 92 %    # Advanced Dementia; AAO x 0  - Continue Depakote 250mg PO in AM and 375mg PO in PM  - Continue Trazodone 150mg PO QHS  - Continue Olanzapine 7.5mg PO QHS    # CVA with residual weakness  - Continue atorvastatin 80mg PO QHS. Warfarin and all other forms of Anticoagulation has been stopped and the daughter ( Health care proxy does not wish to continue any form of anti-coagulation)     #Constipation:   - Resolved with bowel regimen     #Cardiology consult   - Requested by the daughter for her primary Cardiologist Dr. Herrmann    #Home Hospice:   - Discussed and agreed by the daughter. Was supposed to go to home hospice tomorrow but the daughter wants to push hospice until  Friday 09/24/2020.  - Will Stop Anticoagulation for afib   - EP Consult Dr. Roe came in today and shut off the AICD but NOT the pacemaker     #DVT ppx: Heparin 5000 units SC q8hrs restarted yesterday     #Code Status:   DNR/DNI

## 2020-09-24 NOTE — CONSULT NOTE ADULT - ASSESSMENT
presents with elevated  INR    history AFIB now sr    no anticoag  advaced  dementia     poss aspiration pneumonia   on antibiotics     cont medical therapy

## 2020-09-24 NOTE — CONSULT NOTE ADULT - SUBJECTIVE AND OBJECTIVE BOX
Patient is a 86y old  Female who presents with a chief complaint of High INR (23 Sep 2020 11:09)      HPI:  87 YO F with PMHx of AFib on coumadin, HFpEF (s/p AICD, CVA with residual weakness, COPD (on home O2 at night 2L), HTN, DLD, Dementia presented to the ED after being sent by Dr Herrmann for INR of 26.  Patient was discharged from the hospital on September 4. Patient had high INR during that hospital stay and was given Vit K. As per the daughter at the bedside in the last 2 weeks only 1 mg of coumadin was given 2 times. The visiting nurse would come every Wednesday and the sample she took on recently showed an INR of 26  Daughter also reported that recently they would hear gargling noises so they sent a video to her doctor who prescribed Levofloxacin.   In the ED vitals were unremarkable. Saturating 94% on 4L NC  Patient received 5 mg PO Vitamin K  (19 Sep 2020 04:27)      PAST MEDICAL & SURGICAL HISTORY:  HTN (hypertension)    Cerebrovascular accident (CVA)    COPD (chronic obstructive pulmonary disease)    Atrial fibrillation    Systolic heart failure    AICD (automatic cardioverter/defibrillator) present    S/P appendectomy        PREVIOUS DIAGNOSTIC TESTING:      ECHO  FINDINGS:    STRESS  FINDINGS:    CATHETERIZATION  FINDINGS:    MEDICATIONS  (STANDING):  ampicillin/sulbactam  IVPB 1.5 Gram(s) IV Intermittent every 6 hours  atorvastatin 80 milliGRAM(s) Oral <User Schedule>  dextrose 5%. 1000 milliLiter(s) (50 mL/Hr) IV Continuous <Continuous>  diltiazem    milliGRAM(s) Oral <User Schedule>  diVALproex  milliGRAM(s) Oral <User Schedule>  diVALproex  milliGRAM(s) Oral daily  diVALproex Sprinkle 125 milliGRAM(s) Oral daily  guaiFENesin  milliGRAM(s) Oral every 12 hours  heparin   Injectable 5000 Unit(s) SubCutaneous every 8 hours  metoprolol succinate  milliGRAM(s) Oral <User Schedule>  morphine  - Injectable 2 milliGRAM(s) IV Push once  OLANZapine 7.5 milliGRAM(s) Oral <User Schedule>  pantoprazole    Tablet 40 milliGRAM(s) Oral before breakfast  saline laxative (FLEET) Rectal Enema 1 Enema Rectal once  scopolamine 1 mG/72 Hr(s) Patch 1 Patch Transdermal every 72 hours  sertraline 150 milliGRAM(s) Oral daily  traZODone 150 milliGRAM(s) Oral at bedtime    MEDICATIONS  (PRN):  morphine Concentrate 5 milliGRAM(s) Oral every 6 hours PRN Severe Pain (7 - 10)      FAMILY HISTORY:      SOCIAL HISTORY:  CIGARETTES:    ALCOHOL:    REVIEW OF SYSTEMS:  CONSTITUTIONAL: No fever, weight loss, or fatigue  NECK: No pain or stiffness  RESPIRATORY: No cough, wheezing, chills or hemoptysis; No shortness of breath  CARDIOVASCULAR: No chest pain, palpitations, dizziness, or leg swelling  GASTROINTESTINAL: No abdominal or epigastric pain. No nausea, vomiting, or hematemesis; No diarrhea or constipation. No melena or hematochezia.  GENITOURINARY: No dysuria, frequency, hematuria, or incontinence  NEUROLOGICAL: No headaches, memory loss, loss of strength, numbness, or tremors  SKIN: No itching, burning, rashes, or lesions   ENDOCRINE: No heat or cold intolerance; No hair loss  MUSCULOSKELETAL: No joint pain or swelling; No muscle, back, or extremity pain  HEME/LYMPH: No easy bruising, or bleeding gums          Vital Signs Last 24 Hrs  T(C): 36.6 (24 Sep 2020 05:26), Max: 36.6 (24 Sep 2020 05:26)  T(F): 97.8 (24 Sep 2020 05:26), Max: 97.8 (24 Sep 2020 05:26)  HR: 83 (24 Sep 2020 05:26) (57 - 101)  BP: 116/62 (24 Sep 2020 05:26) (95/52 - 140/56)  BP(mean): --  RR: 20 (24 Sep 2020 05:26) (20 - 20)  SpO2: 95% (23 Sep 2020 22:32) (92% - 95%)        PHYSICAL EXAM:  GENERAL: NAD, well-groomed, well-developed  HEAD:  Atraumatic, Normocephalic  NECK: Supple, No JVD, Normal thyroid  NERVOUS SYSTEM:  Alert & Oriented X3, Good concentration  CHEST/LUNG: Clear to percussion bilaterally; No rales, rhonchi, wheezing, or rubs  HEART: Regular rate and rhythm; No murmurs, rubs, or gallops  ABDOMEN: Soft, Nontender, Nondistended; Bowel sounds present  EXTREMITIES:  2+ Peripheral Pulses, No clubbing, cyanosis, or edema  SKIN: No rashes or lesions    INTERPRETATION OF TELEMETRY:    ECG:    I&O's Detail    23 Sep 2020 07:01  -  24 Sep 2020 07:00  --------------------------------------------------------  IN:    dextrose 5%: 460 mL    IV PiggyBack: 300 mL  Total IN: 760 mL    OUT:    Voided (mL): 700 mL  Total OUT: 700 mL    Total NET: 60 mL          LABS:                        10.5   10.81 )-----------( 233      ( 23 Sep 2020 06:52 )             33.9     09-23    157<H>  |  110  |  42<H>  ----------------------------<  108<H>  3.1<L>   |  36<H>  |  1.2    Ca    9.6      23 Sep 2020 06:52  Mg     2.5     09-23    TPro  6.1  /  Alb  2.8<L>  /  TBili  0.4  /  DBili  x   /  AST  30  /  ALT  13  /  AlkPhos  76  09-23            I&O's Summary    23 Sep 2020 07:01  -  24 Sep 2020 07:00  --------------------------------------------------------  IN: 760 mL / OUT: 700 mL / NET: 60 mL        RADIOLOGY & ADDITIONAL STUDIES:

## 2020-09-25 NOTE — DISCHARGE NOTE NURSING/CASE MANAGEMENT/SOCIAL WORK - PATIENT PORTAL LINK FT
You can access the FollowMyHealth Patient Portal offered by [facility] by registering at the following website: [email or web address]. By joining [employer]’s FollowMyHealth portal, you will also be able to view your health information using other applications (apps) compatible with our system.

## 2020-09-25 NOTE — PROGRESS NOTE ADULT - ASSESSMENT
87 YO F with PMHx of AFib on coumadin, HFpEF (s/p AICD, CVA with residual weakness, COPD (on home O2 at night 2L), HTN, DLD, Dementia presented to the ED after being sent by Dr Herrmann for INR of 26.    # Coumadin Toxicity, most likely due to drug interaction Levaquin-Coumadin, and underlying Vitamin K depletion   - s/p Vit K 5 mg PO, INR subtherapeutic now, daughter and primary cardiology does not want to resume Coumadin   # Atrial Fibrillation, persistent, not on AC anymore  - Continue Cardizem 120mg PO QD and Metoprolol succinate 100mg PO QD for rate control     # Acute on chronic resp failure Multifactorial: Asp-n Pneumonitis/COPD/Chronic diast CHF  - cont Unasyn for now until discharge     # Stage II Sacral Ulcer, POA  - offload pressure points, frequent turns  - Barrier cream     # HFpEF s/p AICD   AICD was turned off     # HTN   # COPD, chronic respiratory failure, on home oxygen  - As per the daughter increase in oxygen requirement recently  - Likely due to aspiration. COPD exacerbation less likely   - Duonebs and BiPaP PRN  - Monitor O2 sat. Keep between 88 - 92 %    # Advanced Dementia w/ behavioral distrurbances; AAO x 0  - Continue Depakote 250mg PO in AM and 375mg PO in PM (liquid)  - Continue Trazodone 150mg PO QHS  - Continue Olanzapine 7.5mg PO QHS if able to swallow    Discharge: plan for d/c home on hospice in am as per daughter's wishes. Grave Px    THIS IS  A NOTE FOR 9/24 THAT WAS LOST

## 2020-09-25 NOTE — PROGRESS NOTE ADULT - SUBJECTIVE AND OBJECTIVE BOX
THIS IS A NOTE FOR 9/24 THAT WAS LOST    Patient is a 86y old  Female who presents with a chief complaint of High INR (24 Sep 2020 09:09)      INTERVAL HPI/OVERNIGHT EVENTS: events noted, remains lethargic    InitialHPI:  87 YO F with PMHx of AFib on coumadin, HFpEF (s/p AICD, CVA with residual weakness, COPD (on home O2 at night 2L), HTN, DLD, Dementia presented to the ED after being sent by Dr Herrmann for INR of 26.  Patient was discharged from the hospital on September 4. Patient had high INR during that hospital stay and was given Vit K. As per the daughter at the bedside in the last 2 weeks only 1 mg of coumadin was given 2 times. The visiting nurse would come every Wednesday and the sample she took on recently showed an INR of 26  Daughter also reported that recently they would hear gargling noises so they sent a video to her doctor who prescribed Levofloxacin.   In the ED vitals were unremarkable. Saturating 94% on 4L NC  Patient received 5 mg PO Vitamin K  (19 Sep 2020 04:27)    PAST MEDICAL & SURGICAL HISTORY:  HTN (hypertension)    Cerebrovascular accident (CVA)    COPD (chronic obstructive pulmonary disease)    Atrial fibrillation    Systolic heart failure    AICD (automatic cardioverter/defibrillator) present    S/P appendectomy        General: lethargic  CV: S1 S2  Resp: rhonchi b/l  GI: NT/ND/S +BS  MS: no clubbing/cyanosis/edema, + pulses b/l  Neuro: unable to access    MEDICATIONS  (STANDING):  ampicillin/sulbactam  IVPB 1.5 Gram(s) IV Intermittent every 6 hours  atorvastatin 80 milliGRAM(s) Oral <User Schedule>  dextrose 5%. 1000 milliLiter(s) (50 mL/Hr) IV Continuous <Continuous>  diltiazem    milliGRAM(s) Oral <User Schedule>  guaiFENesin  milliGRAM(s) Oral every 12 hours  heparin   Injectable 5000 Unit(s) SubCutaneous every 8 hours  metoprolol succinate  milliGRAM(s) Oral <User Schedule>  morphine  - Injectable 2 milliGRAM(s) IV Push once  OLANZapine 7.5 milliGRAM(s) Oral <User Schedule>  pantoprazole    Tablet 40 milliGRAM(s) Oral before breakfast  saline laxative (FLEET) Rectal Enema 1 Enema Rectal once  scopolamine 1 mG/72 Hr(s) Patch 1 Patch Transdermal every 72 hours  sertraline 150 milliGRAM(s) Oral daily  traZODone 150 milliGRAM(s) Oral at bedtime  valproic  acid Syrup 250 milliGRAM(s) Oral daily  valproic  acid Syrup 125 milliGRAM(s) Oral daily  valproic  acid Syrup 125 milliGRAM(s) Oral daily    MEDICATIONS  (PRN):  morphine Concentrate 5 milliGRAM(s) Oral every 6 hours PRN Severe Pain (7 - 10)      Chart, Consultant(s) Notes Reviewed:  [x ] YES  [ ] NO  Care Discussed with Consultants/Other Providers/ Housestaff [ x] YES  [ ] NO  Radiology, labs, old available records personally reviewed.

## 2021-01-01 NOTE — ED ADULT NURSE NOTE - CAS DISCH CONDITION
You can access the FollowMyHealth Patient Portal offered by [facility] by registering at the following website: [email or web address]. By joining [employer]’s FollowMyHealth portal, you will also be able to view your health information using other applications (apps) compatible with our system.
Stable

## 2021-02-10 NOTE — ED ADULT NURSE NOTE - SUICIDE SCREENING QUESTION 2
Detail Level: Detailed Depth Of Biopsy: dermis Was A Bandage Applied: Yes Size Of Lesion In Cm: 0 Biopsy Type: H and E Biopsy Method: Personna blade Anesthesia Type: 1% lidocaine with epinephrine and a 1:10 solution of 8.4% sodium bicarbonate Anesthesia Volume In Cc (Will Not Render If 0): 0.5 Hemostasis: Drysol Wound Care: Vaseline Dressing: pressure dressing with telfa Destruction After The Procedure: No Type Of Destruction Used: Curettage Curettage Text: The wound bed was treated with curettage after the biopsy was performed. Cryotherapy Text: The wound bed was treated with cryotherapy after the biopsy was performed. Electrodesiccation Text: The wound bed was treated with electrodesiccation after the biopsy was performed. Electrodesiccation And Curettage Text: The wound bed was treated with electrodesiccation and curettage after the biopsy was performed. Silver Nitrate Text: The wound bed was treated with silver nitrate after the biopsy was performed. Lab: [id number] Lab Facility: [id number] Consent: Verbal consent was obtained and risks were reviewed including but not limited to scarring, infection, bleeding, scabbing, incomplete removal, nerve damage and allergy to anesthesia. Post-Care Instructions: I reviewed with the patient in detail post-care instructions. Patient is to keep the biopsy site dry overnight, and then apply bacitracin twice daily until healed. Patient may apply hydrogen peroxide soaks to remove any crusting. Notification Instructions: Patient will be notified of biopsy results. However, patient instructed to call the office if not contacted within 2 weeks. Billing Type: Third-Party Bill Information: Selecting Yes will display possible errors in your note based on the variables you have selected. This validation is only offered as a suggestion for you. PLEASE NOTE THAT THE VALIDATION TEXT WILL BE REMOVED WHEN YOU FINALIZE YOUR NOTE. IF YOU WANT TO FAX A PRELIMINARY NOTE YOU WILL NEED TO TOGGLE THIS TO 'NO' IF YOU DO NOT WANT IT IN YOUR FAXED NOTE. Patient unable to complete

## 2021-03-04 NOTE — BEHAVIORAL HEALTH ASSESSMENT NOTE - AFFECT QUALITY
Anxious Mercedes Flap Text: The defect edges were debeveled with a #15 scalpel blade.  Given the location of the defect, shape of the defect and the proximity to free margins a Mercedes flap was deemed most appropriate.  Using a sterile surgical marker, an appropriate advancement flap was drawn incorporating the defect and placing the expected incisions within the relaxed skin tension lines where possible. The area thus outlined was incised deep to adipose tissue with a #15 scalpel blade.  The skin margins were undermined to an appropriate distance in all directions utilizing iris scissors.

## 2021-03-21 NOTE — PATIENT PROFILE ADULT - HEALTH LITERACY
AP lateral neck soft tissue x-rays 2 views



HISTORY: Sore throat.



FINDINGS: No enlargement of the epiglottis. No prevertebral soft tissue swelling. No narrowing of the
 subglottic trachea evident. No narrowing of the aerated portions of the pharynx or larynx evident. S
light cervical scoliosis could be chronic or positional or muscle spasm.



IMPRESSION: Normal neck soft tissue x-rays.



Electronically signed by: Orville Ugarte MD (3/21/2021 12:58 AM) [facility]
no

## 2021-04-13 NOTE — ED ADULT NURSE NOTE - OBJECTIVE STATEMENT
Medical Necessity Clause: This procedure was medically necessary because the lesions that were treated were:
Render Note In Bullet Format When Appropriate: No
Medical Necessity Information: It is in your best interest to select a reason for this procedure from the list below. All of these items fulfill various CMS LCD requirements except the new and changing color options.
Consent: The patient's consent was obtained including but not limited to risks of crusting, scabbing, blistering, scarring, darker or lighter pigmentary change, recurrence, incomplete removal and infection.
Post-Care Instructions: I reviewed with the patient in detail post-care instructions. Patient is to wear sunprotection, and avoid picking at any of the treated lesions. Pt may apply Vaseline to crusted or scabbing areas.
Detail Level: Simple
pt brought in by daughter for increased agitation, pt denies c/o pain, no signs of distress, denies cp, sob, abdominal pain, n/v/d. left sided weakness from previous stroke.

## 2022-01-12 NOTE — ED ADULT NURSE NOTE - NSSEPSISSUSPECTED_ED_A_ED
HISTORY OF PRESENT ILLNESS:  Chief Complaint   Patient presents with   • Physical      HPI     Roseanna is here for her annual physical.    She gets migraine headaches. She takes Imitrex 100 mg prn and Zofran 4 mg prn.    She has hyperlipidemia. She is not on medication for this.    She has mild, intermittent asthma. She uses albuterol as  Needed.    PAST MEDICAL, FAMILY AND SOCIAL HISTORIES:   The following histories were personally reviewed with the patient and updated.  Current medications, Allergies, Problem list, Past Medical History, Past Surgical History, Social History and Family History    REVIEW OF SYSTEMS:  Review of Systems   Constitutional: Negative for chills, fever and malaise/fatigue.   HENT: Negative for congestion, ear pain and hearing loss.    Eyes: Negative for visual disturbance.   Cardiovascular: Negative for chest pain, dyspnea on exertion, irregular heartbeat and leg swelling.   Respiratory: Negative for cough and shortness of breath.    Endocrine: Negative for cold intolerance and heat intolerance.   Skin: Negative for rash.   Gastrointestinal: Negative for abdominal pain, change in bowel habit, constipation and diarrhea.   Genitourinary: Negative for bladder incontinence and hematuria.   Neurological: Negative for dizziness, headaches, loss of balance, numbness, seizures and weakness.   Psychiatric/Behavioral: Negative for depression. The patient is not nervous/anxious.        PHYSICAL EXAM:  Visit Vitals  /78 (BP Location: LUE - Left upper extremity, Patient Position: Sitting, Cuff Size: Regular)   Pulse 98   Temp 98.1 °F (36.7 °C) (Tympanic)   Ht 5' 7.75\" (1.721 m)   Wt 61.1 kg (134 lb 12.8 oz)   BMI 20.65 kg/m²      Physical Exam  Vitals and nursing note reviewed.   HENT:      Head: Normocephalic and atraumatic.      Right Ear: Tympanic membrane and ear canal normal.      Left Ear: Tympanic membrane and ear canal normal.      Nose: Nose normal.      Mouth/Throat:      Mouth: Mucous  membranes are moist.      Pharynx: Oropharynx is clear.   Eyes:      Conjunctiva/sclera: Conjunctivae normal.   Neck:      Thyroid: No thyromegaly.   Cardiovascular:      Rate and Rhythm: Normal rate and regular rhythm.      Heart sounds: Normal heart sounds. No murmur heard.      Pulmonary:      Effort: Pulmonary effort is normal. No respiratory distress.      Breath sounds: Normal breath sounds. No wheezing or rales.   Abdominal:      General: Bowel sounds are normal. There is no distension.      Palpations: Abdomen is soft.      Tenderness: There is no abdominal tenderness. There is no rebound.   Musculoskeletal:      Cervical back: Neck supple.      Right lower leg: No edema.      Left lower leg: No edema.   Skin:     General: Skin is warm and dry.   Neurological:      Mental Status: She is alert and oriented to person, place, and time.   Psychiatric:         Mood and Affect: Mood and affect normal.         Judgment: Judgment normal.     She declines breast exam.      ASSESSMENT AND PLAN:  1. Annual physical exam  -screening mammogram ordered  -We discussed colon cancer screening, she agrees to proceed with annual FIT stool testing.  -she declines Pneumococcal vaccine    2. Pure hypercholesterolemia  The 10-year ASCVD risk score (Ino DC Jr., et al., 2013) is: 1.5%    Values used to calculate the score:      Age: 58 years      Sex: Female      Is Non- : No      Diabetic: No      Tobacco smoker: No      Systolic Blood Pressure: 104 mmHg      Is BP treated: No      HDL Cholesterol: 79 mg/dL      Total Cholesterol: 230 mg/dL  -No indication to start on statin at this time. Recheck labs in 1 year.  - LIPID PANEL WITHOUT REFLEX; Future  - COMPREHENSIVE METABOLIC PANEL; Future    3. Migraine without aura and without status migrainosus, not intractable  -refilled Maxalt 10 mg prn and Zofran 4 mg prn  - rizatriptan (MAXALT) 10 MG tablet; Take 1 tablet by mouth as needed for Migraine.  Dispense:  10 tablet; Refill: 1  - ondansetron (ZOFRAN) 4 MG tablet; Take 1 tablet by mouth every 8 hours as needed for Nausea.  Dispense: 20 tablet; Refill: 3    4. Mild intermittent asthma without complication  -continue albuterol prn  - albuterol 108 (90 Base) MCG/ACT inhaler; Inhale 2 puffs into the lungs every 4 hours as needed for shortness of breath or wheezing.  Dispense: 18 g; Refill: 1    5. Recurrent cold sores  -continue Valtrex 2000 mg BID x 1 day as needed for cold sores  - Valacyclovir HCl 1000 MG Tab; Take 2 tablets by mouth 2 times daily for 1 day.  Dispense: 12 tablet; Refill: 2    6. Onychomycosis of toenail  -We discussed treatment options including topical versus oral therapy. She would like to see Podiatry to further discuss treatment results, referral placed.  - SERVICE TO PODIATRY    7. Encounter for screening mammogram for malignant neoplasm of breast  - MAMMO SCREENING W EMMANUEL BILATERAL; Future    8. Screen for colon cancer  - OCCULT BLOOD - FIT; Future          No

## 2022-01-18 NOTE — ED ADULT NURSE NOTE - TETANUS
Pulmonary Progress Note    Patient: Bret Gamble Date: 2022   : 1943 Attending: Garcia Watson MD   78 year old male      2021    Subjective: Lethargic / Poorly interactive.       Pertinent Reviewed: All pertinent labs, notes, images,and tests reviewed    Current Medication:  • potassium CHLORIDE  40 mEq Per NG tube Once   • furosemide  60 mg Intravenous Daily   • ceftaROLine  400 mg Intravenous 3 times per day   • [Held by provider] furosemide  40 mg Intravenous BID   • sodium chloride (PF)  10 mL Injection 2 times per day   • pantoprazole  40 mg Per NG tube 2 times per day   • carvedilol  6.25 mg Per NG tube 2 times per day   • insulin lispro   Subcutaneous 4 times per day   • sodium chloride  4 mL Nebulization BID Resp   • ferrous sulfate  300 mg Per NG tube Daily with breakfast   • folic acid  1 mg Per NG tube Daily   • rOPINIRole  1 mg Per NG tube Nightly   • [Held by provider] metOLazone  5 mg Per NG tube BID   • [Held by provider] finasteride  5 mg Oral Daily   • Potassium Standard Replacement Protocol   Does not apply See Admin Instructions   • Magnesium Standard Replacement Protocol   Does not apply See Admin Instructions   • Phosphorus Standard Replacement Protocol   Does not apply See Admin Instructions   • WARFARIN - PHARMACIST MONITORED   Does not apply See Admin Instructions       Rhythm:    Vital 24 Hour Range Last Value   Temperature Temp  Min: 97.4 °F (36.3 °C)  Max: 98.6 °F (37 °C) 97.4 °F (36.3 °C) (22 0600)   Pulse Pulse  Min: 61  Max: 65 61 (22 0600)   Respiratory Resp  Min: 18  Max: 18 18 (22 0600)   Non-Invasive  Blood Pressure BP  Min: 119/61  Max: 163/72 (!) 163/72 (22 0600)   Arterial  Blood Pressure No data recorded     Pulse Oximetry SpO2  Min: 90 %  Max: 96 % 96 % (22 06)     Vital Admission Last Value   Weight Weight: 113.5 kg (250 lb 3.6 oz) (21 2200) 74.3 kg (163 lb 12.8 oz) (removed bed pump and oxygen tank.) (22  0426)   Height N/A 5' 7\" (170.2 cm) (01/17/22 1300)   BMI N/A 25.65 (01/17/22 0426)       Intake/Output:    No intake/output data recorded.        Intake/Output Summary (Last 24 hours) at 1/18/2022 0656  Last data filed at 1/17/2022 2127  Gross per 24 hour   Intake 2344 ml   Output 1300 ml   Net 1044 ml     Physical Exam:   HEENT:   Fair remaining dentition.  No lymphadenopathy.  CHEST:  No accessory muscle use.  Chest quiet throughout, diminished at bases, a few scattered crackles.  O2 @ 8 LPM OxyMask-- O2 saturation 905-98%.  Loose, congested spontaneous cough  CARDIAC:  S1, S2 distant without S3, S4 or murmur.  ABDOMEN:  Soft, bowel sounds positive, small bore NG tube in place.  EXTREMITIES:  Without clubbing, cyanosis or edema    Laboratory Results:    Recent Labs   Lab 01/18/22  0513 01/17/22  0604 01/16/22  0442 01/16/22  0441 01/15/22  1318 01/15/22  0436 01/15/22  0436   WBC 5.1 4.2 4.1*  --   --    < > 3.5*   HCT 25.7* 24.3* 23.4*  --   --    < > 29.4*   HGB 7.9* 7.4* 7.2*  --   --    < > 9.0*    177 166  --   --    < > 112*   INR 1.2 1.1  --  1.1  --    < > 1.2   SODIUM  --  143  --  144  --   --  143   POTASSIUM  --  3.9  --  4.0 4.8   < > 3.5   CHLORIDE  --  105  --  104  --   --  107   CO2  --  34*  --  34*  --   --  31   GLUCOSE  --  145*  --  111*  --   --  128*   BUN  --  120*  --  116*  --   --  103*   CREATININE  --  1.80*  --  1.87*  --   --  1.70*    < > = values in this interval not displayed.       Cultures:   COVID screen 12/20/2020--Positive (cycle threshold 15.6),   Blood culture    --12/30/2021 positive for Staphylococcus aureus x1   --01/01/2022 no growth x2.    Procalcitonin 01/06 1.06.    C. difficile toxin--Negative    Imaging:   Chest x-ray 01/17  Extensive pulmonary infiltrates   --Confluent right upper lobe/left retrocardiac spaces.   Vascular congestion  Cardiomegaly      Echocardiogram 01/03  EF 35%  Small VSD.    No obvious evidence of  endocarditis/vegetations    Assessment:   1.  Fall with rhabdomyolysis (resolved).  2.  COVID pneumonitis.  3.  Staphylococcus aureus pneumonia/transient bacteremia.  4.  Secretion retention.  5.  Chronic hypoxemia.  6.  Sarcoidosis (quiescent).  7.  Severe sleep-disordered breathing (AHI 64), on home auto CPAP 10-20 cm of water pressure.     The patient has been hospitalized since 2021 with a prolonged and complicated hospital course, as elucidated above.  His wife also  end of December secondary to COVID while hospitalized at [facility].     He has extensive pulmonary infiltrates, is on moderate flow supplemental oxygen, slightly higher requirements than is his baseline.     RECOMMENDATIONS:  1.  Continue moderate flow supplemental oxygen.     --His baseline is 3-4 LPM    -->increased to 8 LPM currently    --Xray with both vascular congestion / extensive infiltrates.     --Wean O2 as/if able    2.  Aggressive pulmonary toilet to include TBT p.r.n.     --Defer vest therapy--> unlikely to tolerate.    3.  No role for bronchoscopy at this point.    4.  Judicious diuresis   --Net loss ~equivocal    5.  Continue CPAP as patient allows (refusing).     --Auto titrating mode with a range of 10-20 cmH2O--Home settings    --Refusing    6.  Do not resuscitate status.     --Palliative care is involved and  meeting with his DPOA (Son--Aram)    --To maintain current medical Rx        --Fully support transition to more comfort focused approach if deteriorates further-- given his baseline debilitation and progressive worsening while hospitalized.     Cora is guarded.     unknown

## 2022-06-09 NOTE — DIETITIAN INITIAL EVALUATION ADULT. - FACTORS AFF FOOD INTAKE
Daughter reports pt needs assistance with eating due to left side paralysis. Reports having chewing/swallowing problems due to past stroke and cuts up food/sometimes thickens liquids for pt. Recommended SLP kuldeep DISCHARGE

## 2022-07-19 NOTE — ED ADULT NURSE NOTE - NS ED NURSE RECORD ANOTHER HT AND WT
Health Maintenance Due   Topic Date Due   • Colorectal Cancer Screen-  Never done   • Depression Screening  12/09/2021   • Shingles Vaccine (1 of 2) Never done   • COVID-19 Vaccine (4 - Booster for Pfizer series) 06/10/2022       Patient is due for topics as listed above but is not proceeding with Immunization(s) COVID-19 and Shingles at this time. Education provided for Colorectal Cancer Screening: Colonoscopy.   Yes

## 2022-07-26 NOTE — DISCHARGE NOTE ADULT - FUNCTIONAL SCREEN CURRENT LEVEL: DRESSING, MLM
CC:  [place] GONZALEZ Isaac is here today for Diarrhea       Medications: medications verified and updated    Refills needed today?  NO    Latex allergy or sensitivity: No known latex allergy.     Patient would like communication of their results via:    Home Phone: [phone] (home)  Okay to leave a message containing results? Yes    Cell Phone:   Telephone Information:   Mobile [phone]     Okay to leave a message containing results? Yes    Tobacco history: verified      Lab Results   Component Value Date    CHOLESTEROL 143 03/04/2022    HDL 41 (L) 03/04/2022    CALCLDL 92 03/04/2022    TRIGLYCERIDE 51 03/04/2022     Lab Results   Component Value Date    WBC 5.7 03/04/2022    RBC 4.42 03/04/2022    HCT 41.2 03/04/2022    HGB 13.5 03/04/2022     03/04/2022                     Health Maintenance Due   Topic Date Due   • COVID-19 Vaccine (1) Never done       Patient is due for the topics as listed above and wishes to proceed with them. MD to discuss    Last four PHQ 2/9 Test Results  0: Not at all  1: Several days  2: More than half the days  3: Nearly every day      Date 5/2/2022 3/2/2022 9/2/2021 9/2/2021   Adult PHQ 2 Score 1 1 0 2   Adult PHQ 2 Interpretation No further screening needed No further screening needed No further screening needed No further screening needed           (2) assistive person

## 2022-12-01 NOTE — GOALS OF CARE CONVERSATION - ADVANCED CARE PLANNING - NSPROPTRIGHTCAREGIVER_GEN_A_NUR
[FreeTextEntry1] : 1. anterior neck mass, 2. left neck mass [FreeTextEntry2] : Excision of 1. anterior neck mass, 2. left neck mass [FreeTextEntry3] : lidocaine with epinephrine [FreeTextEntry4] : none [FreeTextEntry5] : none [FreeTextEntry6] : 3 cc lidocaine with epinephrine injected surrounding each neck mass.\par \par Skin prepped and draped in sterile fashion.\par \par Left neck mass excised. Hemostasis obtained. Skin closed with 4-0 monocryl and 6-0 nylon sutures.\par \par Anterior neck mass excised. Hemostasis obtained. Skin closed with 4-0 monocryl and 6-0 nylon sutures.\par \par Tolerated well.\par Tissue sent for pathology review.\par Follow up next week for suture removal.\par  no

## 2023-03-13 NOTE — PATIENT PROFILE ADULT - INFORMATION PROVIDED TO:
-- DO NOT REPLY / DO NOT REPLY ALL --  -- Message is from Engagement Center Operations (ECO) --    General Patient Message: Patient is calling requesting the referral that was made for his vertigo be sent to email  [email or web address] or mail to home address. Thank You    Caller Information       Type Contact Phone/Fax    03/13/2023 05:11 PM CDT Phone (Incoming) Luanatoshia harrison (Self) [phone] (H)        Alternative phone number:  [phone]    Can a detailed message be left? Yes    Message Turnaround:     Is it Working Hours? No - After Hours/Weekend/Holiday     Did the caller agree that this message can wait until the office reopens in the morning? YES - The Message Can Wait - Send a message to the provider's clinical support pool     [place]:    Please give this turnaround time to the caller:   \"This message will be sent to [state Provider's name]. The clinical team will fulfill your request as soon as they review your message.\"                 patient

## 2023-07-02 NOTE — ED ADULT NURSE NOTE - NSIMPLEMENTINTERV_GEN_ALL_ED
Yes Implemented All Fall with Harm Risk Interventions:  [place] to call system. Call bell, personal items and telephone within reach. Instruct patient to call for assistance. Room bathroom lighting operational. Non-slip footwear when patient is off stretcher. Physically safe environment: no spills, clutter or unnecessary equipment. Stretcher in lowest position, wheels locked, appropriate side rails in place. Provide visual cue, wrist band, yellow gown, etc. Monitor gait and stability. Monitor for mental status changes and reorient to person, place, and time. Review medications for side effects contributing to fall risk. Reinforce activity limits and safety measures with patient and family. Provide visual clues: red socks.

## 2023-07-11 NOTE — ED ADULT NURSE NOTE - INV PAIN INTERVENTIONS-NUMBER SCALE
Scheduled with Rubia in the OR.      Scheduled procedure with Patient at      Telephone Information:   Mobile [phone]      Scheduled Via: Case Request Order for  [facility]  Procedure date: 9.20.23  Procedure time: 7:15AM  Insurance confirmed as ANTHEM, will be the same at time of procedure?: YES  Insurance Accepted at Facility? YES    The following have been confirmed:    DIABETIC Yes/No  DO NOT TAKE ASPIRIN OR PHENTERMINE 7 days before your surgery.  If daily aspirin was prescribed by MD, verify if/when you should stop prior.    Pre-Op testing required Yes, Patient informed Yes  Prep required? YES HIBICLENS ; Briefly reviewed? YES       STAAR Kit Items:  Patient to  STAAR kit, including high carbohydrate drink, pre-operative wash, new toothbrush and tooth paste, at pre-op appointment or in clinic.     Surgery confirmation letter with instructions sent to patient.        stool softners/multiple medication modalities

## 2023-11-08 NOTE — BEHAVIORAL HEALTH ASSESSMENT NOTE - AXIS III
48
Atrial fibrillation, CHF s/p AICD , CVA, COPD on Home oxygen , hypertension, pulmonary HTN, bilateral bed sores

## 2023-11-09 NOTE — ED PROVIDER NOTE - NS ED ROS FT
Review of Systems:  	•	CONSTITUTIONAL - no fever, no diaphoresis, no weight change  	•	SKIN - no rash  	•	HEMATOLOGIC - no bleeding, no bruising  	•	EYES - no eye pain, no blurred vision  	•	ENT - no change in hearing, no pain  	•	RESPIRATORY - no shortness of breath, no cough  	•	CARDIAC - no chest pain, no palpitations  	•	GI - no abd pain, no nausea, no vomiting, no diarrhea, no constipation, no bleeding  	•	 - no dysuria, no hematuria, no flank pain, no urinary retention  	•	MUSCULOSKELETAL - right shoulder pain  	•	NEUROLOGIC - no weakness, no headache, right hand paresthesias, no dizziness Ivermectin Counseling:  Patient instructed to take medication on an empty stomach with a full glass of water.  Patient informed of potential adverse effects including but not limited to nausea, diarrhea, dizziness, itching, and swelling of the extremities or lymph nodes.  The patient verbalized understanding of the proper use and possible adverse effects of ivermectin.  All of the patient's questions and concerns were addressed.

## 2023-11-27 NOTE — ED ADULT NURSE NOTE - NSFALLRSKINDICTYPE_ED_ALL_ED
Altered Elimination/Confusion/Impaired Gait/Need for Mobility Assisted Device/Disorientation
R/O PAF (paroxysmal atrial fibrillation)

## 2023-12-18 NOTE — SWALLOW BEDSIDE ASSESSMENT ADULT - SLP GENERAL OBSERVATIONS
negative
pt received in bed awake alert w/o c/o pain. daughter at b/s who reports pt has been coughing at home when drinking thin liquids.
Pt. received OOB in chair on room air. Pt. presented w/ mild confusion and a hoarse vocal quality.
Pt. received alert, responsive on room air. Pt. presented w/  hoarse vocal quality.

## 2024-01-01 NOTE — PROGRESS NOTE ADULT - ASSESSMENT
Increased perianal redness. Vashe in use, A/Q mixture added 9/13. Continues with reddened area to buttocks, improving.     Plan:  Clean perianal area with Vashe solution  Continue questran with aquaphor.    83 y/o female with hx of stroke w/ residual left sided deficits presenting to hospital with increased lethargy, facial droop and dysarthria now improved.    Plan:  f/u repeat CTH results  continue Lipitor and anticoagulation  continue current medical management  PT/OT/Rehab      Neuroattending note will follow 85 y/o female with hx of stroke w/ residual left sided deficits presenting to hospital with increased lethargy, facial droop and dysarthria now improved.    Plan:  f/u repeat CTH results  continue Lipitor and anticoagulation  continue current medical management  PT/OT/Rehab

## 2024-06-25 NOTE — ED ADULT NURSE NOTE - ED STAT RN HANDOFF WHERE
Problem: Pain  Goal: Acceptable pain level achieved/maintained at rest using appropriate pain scale for the patient  Outcome: Monitoring/Evaluating progress  Goal: Acceptable pain level achieved/maintained with activity using appropriate pain scale for the patient  Outcome: Monitoring/Evaluating progress  Goal: Acceptable pain level achieved/maintained without oversedation  Outcome: Monitoring/Evaluating progress     Problem: At Risk for Falls  Goal: Patient does not fall  Outcome: Monitoring/Evaluating progress  Goal: Patient takes action to control fall-related risks  Outcome: Monitoring/Evaluating progress     Problem: Mental Status, Alterations (Non-Delirium)  Goal: Mental Status is maintained/improved from status at baseline  Outcome: Monitoring/Evaluating progress      4b

## 2024-09-18 NOTE — PATIENT PROFILE ADULT - NSTRANSFERBELONGINGSDISPO_GEN_A_NUR
[FreeTextEntry3] : I, Michelle Moss, acted solely as a scribe for Dr. Ervin Antoine on this date 09/18/2024. I personally performed the services described in the documentation, reviewed the documentation recorded by the scribe in my presence, and it accurately and completely records my words and actions. not applicable

## 2025-01-22 NOTE — ED ADULT NURSE NOTE - NS ED NURSE REPORT GIVEN DT
Goal Outcome Evaluation:              Outcome Evaluation: VSS. UOP; loyd in place. IV lasix given today. Contact ISO initiated for ESBL urine. Continue current POC.                              28-May-2020 13:34

## 2025-05-15 NOTE — BEHAVIORAL HEALTH ASSESSMENT NOTE - THOUGHT CONTENT
Confirmed appt for today at 0830 with dr. Robledo after review with retina team.    Jacob Botello RN 7:44 AM 05/15/25    
[facility] Call Center    Phone Message    May a detailed message be left on voicemail: yes     Reason for Call: Other: Pt will not be able to make it to the clinic by 8:30. He is wondering if he can come in about an hour or so later. Writer tried priority line x2 and Teams with no answer. Please call pt ASAP to discuss. Thank you.      Action Taken: Message routed to:  [facility] EYE    Travel Screening: Not Applicable     Date of Service:                                                                      
Ok for 0830 with Dr. Schumacher or may be seen in acute eye clinic per review with retina team earlier today    Pt not able to arrive by 0830 and scheduled in acute eye clinic at 0900    Jacob Botello RN 8:07 AM 05/15/25    
Pt to come to clinic today per on call eye provider.  Pt having new flashing    S/p retinopexy for retina tear.    Triage team will reach out to assist in scheduling.    Jacob Botello RN 7:05 AM 05/15/25    
Unremarkable